# Patient Record
Sex: MALE | Race: WHITE | NOT HISPANIC OR LATINO | URBAN - METROPOLITAN AREA
[De-identification: names, ages, dates, MRNs, and addresses within clinical notes are randomized per-mention and may not be internally consistent; named-entity substitution may affect disease eponyms.]

---

## 2017-03-28 ENCOUNTER — APPOINTMENT (RX ONLY)
Dept: URBAN - METROPOLITAN AREA CLINIC 23 | Facility: CLINIC | Age: 72
Setting detail: DERMATOLOGY
End: 2017-03-28

## 2017-03-28 DIAGNOSIS — D18.0 HEMANGIOMA: ICD-10-CM

## 2017-03-28 DIAGNOSIS — Z85.828 PERSONAL HISTORY OF OTHER MALIGNANT NEOPLASM OF SKIN: ICD-10-CM

## 2017-03-28 DIAGNOSIS — L82.1 OTHER SEBORRHEIC KERATOSIS: ICD-10-CM

## 2017-03-28 DIAGNOSIS — Z87.2 PERSONAL HISTORY OF DISEASES OF THE SKIN AND SUBCUTANEOUS TISSUE: ICD-10-CM

## 2017-03-28 DIAGNOSIS — L81.4 OTHER MELANIN HYPERPIGMENTATION: ICD-10-CM

## 2017-03-28 PROBLEM — D18.01 HEMANGIOMA OF SKIN AND SUBCUTANEOUS TISSUE: Status: ACTIVE | Noted: 2017-03-28

## 2017-03-28 PROCEDURE — ? COUNSELING

## 2017-03-28 PROCEDURE — 99214 OFFICE O/P EST MOD 30 MIN: CPT

## 2017-03-28 ASSESSMENT — LOCATION SIMPLE DESCRIPTION DERM
LOCATION SIMPLE: RIGHT UPPER BACK
LOCATION SIMPLE: RIGHT SHOULDER
LOCATION SIMPLE: LEFT SHOULDER
LOCATION SIMPLE: ABDOMEN
LOCATION SIMPLE: RIGHT PRETIBIAL REGION
LOCATION SIMPLE: CHEST
LOCATION SIMPLE: LEFT CLAVICULAR SKIN
LOCATION SIMPLE: LEFT UPPER BACK
LOCATION SIMPLE: LEFT PRETIBIAL REGION
LOCATION SIMPLE: LEFT POSTERIOR THIGH

## 2017-03-28 ASSESSMENT — LOCATION DETAILED DESCRIPTION DERM
LOCATION DETAILED: RIGHT POSTERIOR SHOULDER
LOCATION DETAILED: RIGHT DISTAL PRETIBIAL REGION
LOCATION DETAILED: LEFT MEDIAL UPPER BACK
LOCATION DETAILED: RIGHT MEDIAL INFERIOR CHEST
LOCATION DETAILED: LEFT POSTERIOR SHOULDER
LOCATION DETAILED: LEFT CLAVICULAR SKIN
LOCATION DETAILED: LEFT DISTAL PRETIBIAL REGION
LOCATION DETAILED: EPIGASTRIC SKIN
LOCATION DETAILED: LEFT DISTAL POSTERIOR THIGH
LOCATION DETAILED: RIGHT INFERIOR UPPER BACK

## 2017-03-28 ASSESSMENT — LOCATION ZONE DERM
LOCATION ZONE: ARM
LOCATION ZONE: TRUNK
LOCATION ZONE: LEG

## 2017-06-29 ENCOUNTER — APPOINTMENT (RX ONLY)
Dept: URBAN - METROPOLITAN AREA CLINIC 23 | Facility: CLINIC | Age: 72
Setting detail: DERMATOLOGY
End: 2017-06-29

## 2017-06-29 DIAGNOSIS — L72.0 EPIDERMAL CYST: ICD-10-CM

## 2017-06-29 PROCEDURE — ? COUNSELING

## 2017-06-29 PROCEDURE — 99212 OFFICE O/P EST SF 10 MIN: CPT

## 2017-06-29 ASSESSMENT — LOCATION DETAILED DESCRIPTION DERM
LOCATION DETAILED: RIGHT MEDIAL UPPER BACK
LOCATION DETAILED: RIGHT INFERIOR LATERAL UPPER BACK

## 2017-06-29 ASSESSMENT — LOCATION SIMPLE DESCRIPTION DERM: LOCATION SIMPLE: RIGHT UPPER BACK

## 2017-06-29 ASSESSMENT — LOCATION ZONE DERM: LOCATION ZONE: TRUNK

## 2017-09-25 ENCOUNTER — APPOINTMENT (OUTPATIENT)
Dept: PHYSICAL THERAPY | Age: 72
End: 2017-09-25

## 2018-01-01 ENCOUNTER — ANESTHESIA EVENT (OUTPATIENT)
Dept: SURGERY | Age: 73
DRG: 470 | End: 2018-01-01
Payer: OTHER MISCELLANEOUS

## 2018-01-01 ENCOUNTER — APPOINTMENT (OUTPATIENT)
Dept: GENERAL RADIOLOGY | Age: 73
DRG: 872 | End: 2018-01-01
Attending: EMERGENCY MEDICINE
Payer: MEDICARE

## 2018-01-01 ENCOUNTER — HOSPITAL ENCOUNTER (INPATIENT)
Age: 73
LOS: 3 days | Discharge: NURSING, REHAB OR LONG-TERM CARE | DRG: 470 | End: 2018-08-05
Attending: ORTHOPAEDIC SURGERY | Admitting: ORTHOPAEDIC SURGERY
Payer: OTHER MISCELLANEOUS

## 2018-01-01 ENCOUNTER — APPOINTMENT (OUTPATIENT)
Dept: MRI IMAGING | Age: 73
DRG: 948 | End: 2018-01-01
Attending: PHYSICAL MEDICINE & REHABILITATION
Payer: MEDICARE

## 2018-01-01 ENCOUNTER — APPOINTMENT (OUTPATIENT)
Dept: GENERAL RADIOLOGY | Age: 73
DRG: 684 | End: 2018-01-01
Attending: EMERGENCY MEDICINE
Payer: MEDICARE

## 2018-01-01 ENCOUNTER — HOSPITAL ENCOUNTER (INPATIENT)
Age: 73
LOS: 3 days | Discharge: NURSING, REHAB OR LONG-TERM CARE | DRG: 872 | End: 2018-08-18
Attending: EMERGENCY MEDICINE | Admitting: INTERNAL MEDICINE
Payer: MEDICARE

## 2018-01-01 ENCOUNTER — HOSPITAL ENCOUNTER (OUTPATIENT)
Dept: PHYSICAL THERAPY | Age: 73
Discharge: HOME | End: 2018-05-08
Payer: OTHER MISCELLANEOUS

## 2018-01-01 ENCOUNTER — APPOINTMENT (OUTPATIENT)
Dept: ULTRASOUND IMAGING | Age: 73
DRG: 872 | End: 2018-01-01
Attending: INTERNAL MEDICINE
Payer: MEDICARE

## 2018-01-01 ENCOUNTER — PATIENT OUTREACH (OUTPATIENT)
Dept: CASE MANAGEMENT | Age: 73
End: 2018-01-01

## 2018-01-01 ENCOUNTER — HOSPITAL ENCOUNTER (OUTPATIENT)
Dept: LAB | Age: 73
Discharge: HOME | End: 2018-09-28

## 2018-01-01 ENCOUNTER — HOSPITAL ENCOUNTER (INPATIENT)
Age: 73
LOS: 7 days | Discharge: NURSING, REHAB OR LONG-TERM CARE | DRG: 684 | End: 2018-09-06
Attending: EMERGENCY MEDICINE | Admitting: INTERNAL MEDICINE
Payer: MEDICARE

## 2018-01-01 ENCOUNTER — HOSPITAL ENCOUNTER (EMERGENCY)
Age: 73
End: 2018-11-27
Attending: EMERGENCY MEDICINE
Payer: MEDICARE

## 2018-01-01 ENCOUNTER — HOSPITAL ENCOUNTER (OUTPATIENT)
Dept: SURGERY | Age: 73
Discharge: HOME | End: 2018-07-26
Payer: OTHER MISCELLANEOUS

## 2018-01-01 ENCOUNTER — ANESTHESIA (OUTPATIENT)
Dept: SURGERY | Age: 73
DRG: 470 | End: 2018-01-01
Payer: OTHER MISCELLANEOUS

## 2018-01-01 ENCOUNTER — APPOINTMENT (OUTPATIENT)
Dept: GENERAL RADIOLOGY | Age: 73
End: 2018-01-01
Attending: EMERGENCY MEDICINE
Payer: MEDICARE

## 2018-01-01 ENCOUNTER — APPOINTMENT (OUTPATIENT)
Dept: GENERAL RADIOLOGY | Age: 73
DRG: 872 | End: 2018-01-01
Attending: INTERNAL MEDICINE
Payer: MEDICARE

## 2018-01-01 ENCOUNTER — APPOINTMENT (OUTPATIENT)
Dept: GENERAL RADIOLOGY | Age: 73
DRG: 684 | End: 2018-01-01
Attending: INTERNAL MEDICINE
Payer: MEDICARE

## 2018-01-01 ENCOUNTER — HOSPITAL ENCOUNTER (OUTPATIENT)
Dept: SURGERY | Age: 73
Discharge: HOME | End: 2018-05-08
Payer: OTHER MISCELLANEOUS

## 2018-01-01 ENCOUNTER — HOSPITAL ENCOUNTER (INPATIENT)
Age: 73
LOS: 14 days | Discharge: NURSING, REHAB OR LONG-TERM CARE | DRG: 948 | End: 2018-09-20
Attending: PHYSICAL MEDICINE & REHABILITATION | Admitting: PHYSICAL MEDICINE & REHABILITATION
Payer: MEDICARE

## 2018-01-01 VITALS
SYSTOLIC BLOOD PRESSURE: 155 MMHG | RESPIRATION RATE: 16 BRPM | BODY MASS INDEX: 27.07 KG/M2 | WEIGHT: 199.9 LBS | HEIGHT: 72 IN | DIASTOLIC BLOOD PRESSURE: 80 MMHG | HEART RATE: 88 BPM | OXYGEN SATURATION: 96 % | TEMPERATURE: 97.7 F

## 2018-01-01 VITALS
WEIGHT: 222 LBS | OXYGEN SATURATION: 90 % | TEMPERATURE: 98.2 F | SYSTOLIC BLOOD PRESSURE: 114 MMHG | BODY MASS INDEX: 30.07 KG/M2 | HEART RATE: 96 BPM | DIASTOLIC BLOOD PRESSURE: 71 MMHG | HEIGHT: 72 IN | RESPIRATION RATE: 16 BRPM

## 2018-01-01 VITALS
WEIGHT: 228.9 LBS | BODY MASS INDEX: 30.34 KG/M2 | TEMPERATURE: 96.9 F | HEIGHT: 73 IN | SYSTOLIC BLOOD PRESSURE: 129 MMHG | HEART RATE: 72 BPM | DIASTOLIC BLOOD PRESSURE: 68 MMHG | OXYGEN SATURATION: 99 %

## 2018-01-01 VITALS
DIASTOLIC BLOOD PRESSURE: 37 MMHG | BODY MASS INDEX: 26.95 KG/M2 | HEIGHT: 72 IN | RESPIRATION RATE: 21 BRPM | SYSTOLIC BLOOD PRESSURE: 64 MMHG | OXYGEN SATURATION: 100 % | WEIGHT: 199 LBS | HEART RATE: 80 BPM

## 2018-01-01 VITALS
HEIGHT: 72 IN | BODY MASS INDEX: 27.07 KG/M2 | SYSTOLIC BLOOD PRESSURE: 162 MMHG | HEART RATE: 99 BPM | TEMPERATURE: 96.3 F | RESPIRATION RATE: 20 BRPM | WEIGHT: 199.9 LBS | OXYGEN SATURATION: 96 % | DIASTOLIC BLOOD PRESSURE: 77 MMHG

## 2018-01-01 VITALS
RESPIRATION RATE: 20 BRPM | TEMPERATURE: 98.3 F | OXYGEN SATURATION: 96 % | WEIGHT: 225 LBS | HEIGHT: 72 IN | SYSTOLIC BLOOD PRESSURE: 111 MMHG | BODY MASS INDEX: 30.48 KG/M2 | DIASTOLIC BLOOD PRESSURE: 73 MMHG | HEART RATE: 87 BPM

## 2018-01-01 VITALS
WEIGHT: 222 LBS | SYSTOLIC BLOOD PRESSURE: 125 MMHG | HEIGHT: 72 IN | TEMPERATURE: 97 F | HEART RATE: 70 BPM | DIASTOLIC BLOOD PRESSURE: 82 MMHG | BODY MASS INDEX: 30.07 KG/M2

## 2018-01-01 DIAGNOSIS — I46.9 CARDIAC ARREST (HCC): Primary | ICD-10-CM

## 2018-01-01 DIAGNOSIS — K59.00 CONSTIPATION, UNSPECIFIED CONSTIPATION TYPE: ICD-10-CM

## 2018-01-01 DIAGNOSIS — I10 ESSENTIAL HYPERTENSION: ICD-10-CM

## 2018-01-01 DIAGNOSIS — A41.9 SEPSIS, DUE TO UNSPECIFIED ORGANISM: ICD-10-CM

## 2018-01-01 DIAGNOSIS — M17.12 PRIMARY OSTEOARTHRITIS OF LEFT KNEE: ICD-10-CM

## 2018-01-01 DIAGNOSIS — I95.9 HYPOTENSION, UNSPECIFIED HYPOTENSION TYPE: ICD-10-CM

## 2018-01-01 DIAGNOSIS — R33.9 URINARY RETENTION: ICD-10-CM

## 2018-01-01 DIAGNOSIS — E78.5 HYPERLIPIDEMIA, UNSPECIFIED HYPERLIPIDEMIA TYPE: ICD-10-CM

## 2018-01-01 DIAGNOSIS — R11.2 NAUSEA AND VOMITING, INTRACTABILITY OF VOMITING NOT SPECIFIED, UNSPECIFIED VOMITING TYPE: ICD-10-CM

## 2018-01-01 DIAGNOSIS — E11.8 CONTROLLED DIABETES MELLITUS TYPE 2 WITH COMPLICATIONS, UNSPECIFIED WHETHER LONG TERM INSULIN USE (HCC): ICD-10-CM

## 2018-01-01 DIAGNOSIS — Z96.652 S/P TOTAL KNEE ARTHROPLASTY, LEFT: ICD-10-CM

## 2018-01-01 DIAGNOSIS — Z96.652 TOTAL KNEE REPLACEMENT STATUS, LEFT: ICD-10-CM

## 2018-01-01 DIAGNOSIS — N18.30 CKD (CHRONIC KIDNEY DISEASE) STAGE 3, GFR 30-59 ML/MIN (HCC): ICD-10-CM

## 2018-01-01 DIAGNOSIS — E86.0 DEHYDRATION: Primary | ICD-10-CM

## 2018-01-01 DIAGNOSIS — R50.9 FEVER, UNSPECIFIED FEVER CAUSE: Primary | ICD-10-CM

## 2018-01-01 DIAGNOSIS — R25.2 SPASTICITY: ICD-10-CM

## 2018-01-01 DIAGNOSIS — N17.9 AKI (ACUTE KIDNEY INJURY) (HCC): ICD-10-CM

## 2018-01-01 DIAGNOSIS — F31.9 BIPOLAR AFFECTIVE DISORDER, REMISSION STATUS UNSPECIFIED (HCC): ICD-10-CM

## 2018-01-01 DIAGNOSIS — D72.825 BANDEMIA: ICD-10-CM

## 2018-01-01 DIAGNOSIS — R82.90 ABNORMAL URINALYSIS: ICD-10-CM

## 2018-01-01 DIAGNOSIS — I10 ESSENTIAL HYPERTENSION: Primary | ICD-10-CM

## 2018-01-01 DIAGNOSIS — N17.9 ACUTE KIDNEY INJURY (HCC): ICD-10-CM

## 2018-01-01 DIAGNOSIS — R53.81 DEBILITY: ICD-10-CM

## 2018-01-01 LAB
ABO + RH BLD: NORMAL
ALBUMIN SERPL-MCNC: 1.7 G/DL (ref 3.2–4.6)
ALBUMIN SERPL-MCNC: 2.5 G/DL (ref 3.2–4.6)
ALBUMIN SERPL-MCNC: 2.9 G/DL (ref 3.2–4.6)
ALBUMIN/GLOB SERPL: 0.3 {RATIO} (ref 1.2–3.5)
ALBUMIN/GLOB SERPL: 0.5 {RATIO} (ref 1.2–3.5)
ALBUMIN/GLOB SERPL: 0.6 {RATIO} (ref 1.2–3.5)
ALP SERPL-CCNC: 105 U/L (ref 50–136)
ALP SERPL-CCNC: 114 U/L (ref 50–136)
ALP SERPL-CCNC: 214 U/L (ref 50–136)
ALT SERPL-CCNC: 1512 U/L (ref 12–65)
ALT SERPL-CCNC: 59 U/L (ref 12–65)
ALT SERPL-CCNC: 93 U/L (ref 12–65)
ANION GAP SERPL CALC-SCNC: 10 MMOL/L (ref 7–16)
ANION GAP SERPL CALC-SCNC: 11 MMOL/L (ref 7–16)
ANION GAP SERPL CALC-SCNC: 11 MMOL/L (ref 7–16)
ANION GAP SERPL CALC-SCNC: 14 MMOL/L (ref 7–16)
ANION GAP SERPL CALC-SCNC: 22 MMOL/L (ref 7–16)
ANION GAP SERPL CALC-SCNC: 5 MMOL/L (ref 7–16)
ANION GAP SERPL CALC-SCNC: 6 MMOL/L (ref 7–16)
ANION GAP SERPL CALC-SCNC: 7 MMOL/L (ref 7–16)
ANION GAP SERPL CALC-SCNC: 7 MMOL/L (ref 7–16)
ANION GAP SERPL CALC-SCNC: 8 MMOL/L (ref 7–16)
ANION GAP SERPL CALC-SCNC: 9 MMOL/L (ref 7–16)
APPEARANCE UR: ABNORMAL
APPEARANCE UR: ABNORMAL
APPEARANCE UR: CLEAR
APTT PPP: 24.8 SEC (ref 23.2–35.3)
APTT PPP: 29.1 SEC (ref 23.2–35.3)
ARTERIAL PATENCY WRIST A: YES
AST SERPL-CCNC: 54 U/L (ref 15–37)
AST SERPL-CCNC: 5585 U/L (ref 15–37)
AST SERPL-CCNC: 88 U/L (ref 15–37)
ATRIAL RATE: 102 BPM
ATRIAL RATE: 277 BPM
ATRIAL RATE: 81 BPM
BACTERIA SPEC CULT: ABNORMAL
BACTERIA SPEC CULT: NORMAL
BACTERIA URNS QL MICRO: ABNORMAL /HPF
BACTERIA URNS QL MICRO: ABNORMAL /HPF
BASE DEFICIT BLD-SCNC: 15 MMOL/L
BASOPHILS # BLD: 0 K/UL
BASOPHILS # BLD: 0 K/UL
BASOPHILS # BLD: 0 K/UL (ref 0–0.2)
BASOPHILS # BLD: 0.1 K/UL
BASOPHILS # BLD: 0.1 K/UL
BASOPHILS # BLD: 0.1 K/UL (ref 0–0.2)
BASOPHILS # BLD: 0.2 K/UL (ref 0–0.2)
BASOPHILS NFR BLD MANUAL: 1 % (ref 0–2)
BASOPHILS NFR BLD: 0 % (ref 0–2)
BASOPHILS NFR BLD: 1 % (ref 0–2)
BASOPHILS NFR BLD: 1 % (ref 0–2)
BDY SITE: ABNORMAL
BILIRUB SERPL-MCNC: 0.5 MG/DL (ref 0.2–1.1)
BILIRUB SERPL-MCNC: 0.7 MG/DL (ref 0.2–1.1)
BILIRUB SERPL-MCNC: 0.9 MG/DL (ref 0.2–1.1)
BILIRUB UR QL: NEGATIVE
BLOOD GROUP ANTIBODIES SERPL: NORMAL
BODY TEMPERATURE: 98.6
BUN SERPL-MCNC: 15 MG/DL (ref 8–23)
BUN SERPL-MCNC: 19 MG/DL (ref 8–23)
BUN SERPL-MCNC: 22 MG/DL (ref 8–23)
BUN SERPL-MCNC: 23 MG/DL (ref 8–23)
BUN SERPL-MCNC: 24 MG/DL (ref 8–23)
BUN SERPL-MCNC: 26 MG/DL (ref 8–23)
BUN SERPL-MCNC: 29 MG/DL (ref 8–23)
BUN SERPL-MCNC: 34 MG/DL (ref 8–23)
BUN SERPL-MCNC: 35 MG/DL (ref 8–23)
BUN SERPL-MCNC: 35 MG/DL (ref 8–23)
BUN SERPL-MCNC: 36 MG/DL (ref 8–23)
BUN SERPL-MCNC: 37 MG/DL (ref 8–23)
BUN SERPL-MCNC: 37 MG/DL (ref 8–23)
BUN SERPL-MCNC: 38 MG/DL (ref 8–23)
BUN SERPL-MCNC: 42 MG/DL (ref 8–23)
BUN SERPL-MCNC: 47 MG/DL (ref 8–23)
CA-I BLD-MCNC: 1.06 MMOL/L (ref 1.12–1.32)
CALCIUM SERPL-MCNC: 10.4 MG/DL (ref 8.3–10.4)
CALCIUM SERPL-MCNC: 8.3 MG/DL (ref 8.3–10.4)
CALCIUM SERPL-MCNC: 8.5 MG/DL (ref 8.3–10.4)
CALCIUM SERPL-MCNC: 8.8 MG/DL (ref 8.3–10.4)
CALCIUM SERPL-MCNC: 8.9 MG/DL (ref 8.3–10.4)
CALCIUM SERPL-MCNC: 9.1 MG/DL (ref 8.3–10.4)
CALCIUM SERPL-MCNC: 9.3 MG/DL (ref 8.3–10.4)
CALCIUM SERPL-MCNC: 9.4 MG/DL (ref 8.3–10.4)
CALCIUM SERPL-MCNC: 9.7 MG/DL (ref 8.3–10.4)
CALCIUM SERPL-MCNC: 9.9 MG/DL (ref 8.3–10.4)
CALCIUM SERPL-MCNC: 9.9 MG/DL (ref 8.3–10.4)
CALCULATED P AXIS, ECG09: 59 DEGREES
CALCULATED R AXIS, ECG10: -65 DEGREES
CALCULATED R AXIS, ECG10: -67 DEGREES
CALCULATED R AXIS, ECG10: -74 DEGREES
CALCULATED T AXIS, ECG11: 85 DEGREES
CALCULATED T AXIS, ECG11: 86 DEGREES
CALCULATED T AXIS, ECG11: 97 DEGREES
CASTS URNS QL MICRO: ABNORMAL /LPF
CHLORIDE SERPL-SCNC: 101 MMOL/L (ref 98–107)
CHLORIDE SERPL-SCNC: 104 MMOL/L (ref 98–107)
CHLORIDE SERPL-SCNC: 105 MMOL/L (ref 98–107)
CHLORIDE SERPL-SCNC: 105 MMOL/L (ref 98–107)
CHLORIDE SERPL-SCNC: 96 MMOL/L (ref 98–107)
CHLORIDE SERPL-SCNC: 97 MMOL/L (ref 98–107)
CHLORIDE SERPL-SCNC: 97 MMOL/L (ref 98–107)
CHLORIDE SERPL-SCNC: 98 MMOL/L (ref 98–107)
CHLORIDE SERPL-SCNC: 99 MMOL/L (ref 98–107)
CHLORIDE SERPL-SCNC: 99 MMOL/L (ref 98–107)
CHLORIDE UR-SCNC: 58 MMOL/L
CO2 SERPL-SCNC: 14 MMOL/L (ref 21–32)
CO2 SERPL-SCNC: 25 MMOL/L (ref 21–32)
CO2 SERPL-SCNC: 26 MMOL/L (ref 21–32)
CO2 SERPL-SCNC: 26 MMOL/L (ref 21–32)
CO2 SERPL-SCNC: 27 MMOL/L (ref 21–32)
CO2 SERPL-SCNC: 28 MMOL/L (ref 21–32)
CO2 SERPL-SCNC: 29 MMOL/L (ref 21–32)
CO2 SERPL-SCNC: 29 MMOL/L (ref 21–32)
CO2 SERPL-SCNC: 30 MMOL/L (ref 21–32)
CO2 SERPL-SCNC: 30 MMOL/L (ref 21–32)
CO2 SERPL-SCNC: 31 MMOL/L (ref 21–32)
COLOR UR: NORMAL
COLOR UR: YELLOW
CREAT SERPL-MCNC: 0.92 MG/DL (ref 0.8–1.5)
CREAT SERPL-MCNC: 1.01 MG/DL (ref 0.8–1.5)
CREAT SERPL-MCNC: 1.02 MG/DL (ref 0.8–1.5)
CREAT SERPL-MCNC: 1.1 MG/DL (ref 0.8–1.5)
CREAT SERPL-MCNC: 1.15 MG/DL (ref 0.8–1.5)
CREAT SERPL-MCNC: 1.19 MG/DL (ref 0.8–1.5)
CREAT SERPL-MCNC: 1.28 MG/DL (ref 0.8–1.5)
CREAT SERPL-MCNC: 1.55 MG/DL (ref 0.8–1.5)
CREAT SERPL-MCNC: 1.61 MG/DL (ref 0.8–1.5)
CREAT SERPL-MCNC: 1.65 MG/DL (ref 0.8–1.5)
CREAT SERPL-MCNC: 1.69 MG/DL (ref 0.8–1.5)
CREAT SERPL-MCNC: 1.82 MG/DL (ref 0.8–1.5)
CREAT SERPL-MCNC: 1.89 MG/DL (ref 0.8–1.5)
CREAT SERPL-MCNC: 1.95 MG/DL (ref 0.8–1.5)
CREAT SERPL-MCNC: 2.18 MG/DL (ref 0.8–1.5)
CREAT SERPL-MCNC: 3.99 MG/DL (ref 0.8–1.5)
CREAT UR-MCNC: 87.56 MG/DL
DIAGNOSIS, 93000: NORMAL
DIFFERENTIAL METHOD BLD: ABNORMAL
EOSINOPHIL # BLD: 0 K/UL
EOSINOPHIL # BLD: 0 K/UL (ref 0–0.8)
EOSINOPHIL # BLD: 0 K/UL (ref 0–0.8)
EOSINOPHIL # BLD: 0.1 K/UL
EOSINOPHIL # BLD: 0.1 K/UL (ref 0–0.8)
EOSINOPHIL # BLD: 0.2 K/UL
EOSINOPHIL # BLD: 0.2 K/UL
EOSINOPHIL # BLD: 0.2 K/UL (ref 0–0.8)
EOSINOPHIL NFR BLD: 0 % (ref 0.5–7.8)
EOSINOPHIL NFR BLD: 1 % (ref 0.5–7.8)
EOSINOPHIL NFR BLD: 2 % (ref 0.5–7.8)
EOSINOPHIL NFR BLD: 2 % (ref 0.5–7.8)
EOSINOPHIL NFR BLD: 3 % (ref 0.5–7.8)
EOSINOPHIL NFR BLD: 3 % (ref 0.5–7.8)
EPI CELLS #/AREA URNS HPF: ABNORMAL /HPF
EPI CELLS #/AREA URNS HPF: ABNORMAL /HPF
ERYTHROCYTE [DISTWIDTH] IN BLOOD BY AUTOMATED COUNT: 14.4 %
ERYTHROCYTE [DISTWIDTH] IN BLOOD BY AUTOMATED COUNT: 14.5 %
ERYTHROCYTE [DISTWIDTH] IN BLOOD BY AUTOMATED COUNT: 14.5 %
ERYTHROCYTE [DISTWIDTH] IN BLOOD BY AUTOMATED COUNT: 14.6 %
ERYTHROCYTE [DISTWIDTH] IN BLOOD BY AUTOMATED COUNT: 14.7 %
ERYTHROCYTE [DISTWIDTH] IN BLOOD BY AUTOMATED COUNT: 14.9 %
ERYTHROCYTE [DISTWIDTH] IN BLOOD BY AUTOMATED COUNT: 14.9 %
ERYTHROCYTE [DISTWIDTH] IN BLOOD BY AUTOMATED COUNT: 15 %
ERYTHROCYTE [DISTWIDTH] IN BLOOD BY AUTOMATED COUNT: 15.1 %
ERYTHROCYTE [DISTWIDTH] IN BLOOD BY AUTOMATED COUNT: 15.1 %
ERYTHROCYTE [DISTWIDTH] IN BLOOD BY AUTOMATED COUNT: 15.2 %
ERYTHROCYTE [DISTWIDTH] IN BLOOD BY AUTOMATED COUNT: 15.2 %
ERYTHROCYTE [DISTWIDTH] IN BLOOD BY AUTOMATED COUNT: 15.3 % (ref 11.9–14.6)
ERYTHROCYTE [DISTWIDTH] IN BLOOD BY AUTOMATED COUNT: 15.6 % (ref 11.9–14.6)
ERYTHROCYTE [DISTWIDTH] IN BLOOD BY AUTOMATED COUNT: 16 % (ref 11.9–14.6)
ERYTHROCYTE [DISTWIDTH] IN BLOOD BY AUTOMATED COUNT: 17 %
GAS FLOW.O2 O2 DELIVERY SYS: ABNORMAL L/MIN
GAS FLOW.O2 SETTING OXYMISER: 15 BPM
GLOBULIN SER CALC-MCNC: 4.9 G/DL (ref 2.3–3.5)
GLOBULIN SER CALC-MCNC: 4.9 G/DL (ref 2.3–3.5)
GLOBULIN SER CALC-MCNC: 5 G/DL (ref 2.3–3.5)
GLUCOSE BLD STRIP.AUTO-MCNC: 105 MG/DL (ref 65–100)
GLUCOSE BLD STRIP.AUTO-MCNC: 108 MG/DL (ref 65–100)
GLUCOSE BLD STRIP.AUTO-MCNC: 110 MG/DL (ref 65–100)
GLUCOSE BLD STRIP.AUTO-MCNC: 111 MG/DL (ref 65–100)
GLUCOSE BLD STRIP.AUTO-MCNC: 112 MG/DL (ref 65–100)
GLUCOSE BLD STRIP.AUTO-MCNC: 112 MG/DL (ref 65–100)
GLUCOSE BLD STRIP.AUTO-MCNC: 116 MG/DL (ref 65–100)
GLUCOSE BLD STRIP.AUTO-MCNC: 116 MG/DL (ref 65–100)
GLUCOSE BLD STRIP.AUTO-MCNC: 119 MG/DL (ref 65–100)
GLUCOSE BLD STRIP.AUTO-MCNC: 130 MG/DL (ref 65–100)
GLUCOSE BLD STRIP.AUTO-MCNC: 130 MG/DL (ref 65–100)
GLUCOSE BLD STRIP.AUTO-MCNC: 131 MG/DL (ref 65–100)
GLUCOSE BLD STRIP.AUTO-MCNC: 132 MG/DL (ref 65–100)
GLUCOSE BLD STRIP.AUTO-MCNC: 132 MG/DL (ref 65–100)
GLUCOSE BLD STRIP.AUTO-MCNC: 133 MG/DL (ref 65–100)
GLUCOSE BLD STRIP.AUTO-MCNC: 135 MG/DL (ref 65–100)
GLUCOSE BLD STRIP.AUTO-MCNC: 135 MG/DL (ref 65–100)
GLUCOSE BLD STRIP.AUTO-MCNC: 137 MG/DL (ref 65–100)
GLUCOSE BLD STRIP.AUTO-MCNC: 137 MG/DL (ref 65–100)
GLUCOSE BLD STRIP.AUTO-MCNC: 140 MG/DL (ref 65–100)
GLUCOSE BLD STRIP.AUTO-MCNC: 143 MG/DL (ref 65–100)
GLUCOSE BLD STRIP.AUTO-MCNC: 145 MG/DL (ref 65–100)
GLUCOSE BLD STRIP.AUTO-MCNC: 147 MG/DL (ref 65–100)
GLUCOSE BLD STRIP.AUTO-MCNC: 150 MG/DL (ref 65–100)
GLUCOSE BLD STRIP.AUTO-MCNC: 150 MG/DL (ref 65–100)
GLUCOSE BLD STRIP.AUTO-MCNC: 151 MG/DL (ref 65–100)
GLUCOSE BLD STRIP.AUTO-MCNC: 152 MG/DL (ref 65–100)
GLUCOSE BLD STRIP.AUTO-MCNC: 153 MG/DL (ref 65–100)
GLUCOSE BLD STRIP.AUTO-MCNC: 154 MG/DL (ref 65–100)
GLUCOSE BLD STRIP.AUTO-MCNC: 154 MG/DL (ref 65–100)
GLUCOSE BLD STRIP.AUTO-MCNC: 159 MG/DL (ref 65–100)
GLUCOSE BLD STRIP.AUTO-MCNC: 160 MG/DL (ref 65–100)
GLUCOSE BLD STRIP.AUTO-MCNC: 160 MG/DL (ref 65–100)
GLUCOSE BLD STRIP.AUTO-MCNC: 165 MG/DL (ref 65–100)
GLUCOSE BLD STRIP.AUTO-MCNC: 165 MG/DL (ref 65–100)
GLUCOSE BLD STRIP.AUTO-MCNC: 171 MG/DL (ref 65–100)
GLUCOSE BLD STRIP.AUTO-MCNC: 173 MG/DL (ref 65–100)
GLUCOSE BLD STRIP.AUTO-MCNC: 174 MG/DL (ref 65–100)
GLUCOSE BLD STRIP.AUTO-MCNC: 175 MG/DL (ref 65–100)
GLUCOSE BLD STRIP.AUTO-MCNC: 176 MG/DL (ref 65–100)
GLUCOSE BLD STRIP.AUTO-MCNC: 178 MG/DL (ref 65–100)
GLUCOSE BLD STRIP.AUTO-MCNC: 178 MG/DL (ref 65–100)
GLUCOSE BLD STRIP.AUTO-MCNC: 179 MG/DL (ref 65–100)
GLUCOSE BLD STRIP.AUTO-MCNC: 180 MG/DL (ref 65–100)
GLUCOSE BLD STRIP.AUTO-MCNC: 181 MG/DL (ref 65–100)
GLUCOSE BLD STRIP.AUTO-MCNC: 183 MG/DL (ref 65–100)
GLUCOSE BLD STRIP.AUTO-MCNC: 183 MG/DL (ref 65–100)
GLUCOSE BLD STRIP.AUTO-MCNC: 185 MG/DL (ref 65–100)
GLUCOSE BLD STRIP.AUTO-MCNC: 186 MG/DL (ref 65–100)
GLUCOSE BLD STRIP.AUTO-MCNC: 188 MG/DL (ref 65–100)
GLUCOSE BLD STRIP.AUTO-MCNC: 188 MG/DL (ref 65–100)
GLUCOSE BLD STRIP.AUTO-MCNC: 189 MG/DL (ref 65–100)
GLUCOSE BLD STRIP.AUTO-MCNC: 192 MG/DL (ref 65–100)
GLUCOSE BLD STRIP.AUTO-MCNC: 194 MG/DL (ref 65–100)
GLUCOSE BLD STRIP.AUTO-MCNC: 194 MG/DL (ref 65–100)
GLUCOSE BLD STRIP.AUTO-MCNC: 195 MG/DL (ref 65–100)
GLUCOSE BLD STRIP.AUTO-MCNC: 198 MG/DL (ref 65–100)
GLUCOSE BLD STRIP.AUTO-MCNC: 202 MG/DL (ref 65–100)
GLUCOSE BLD STRIP.AUTO-MCNC: 202 MG/DL (ref 65–100)
GLUCOSE BLD STRIP.AUTO-MCNC: 203 MG/DL (ref 65–100)
GLUCOSE BLD STRIP.AUTO-MCNC: 205 MG/DL (ref 65–100)
GLUCOSE BLD STRIP.AUTO-MCNC: 206 MG/DL (ref 65–100)
GLUCOSE BLD STRIP.AUTO-MCNC: 212 MG/DL (ref 65–100)
GLUCOSE BLD STRIP.AUTO-MCNC: 219 MG/DL (ref 65–100)
GLUCOSE BLD STRIP.AUTO-MCNC: 223 MG/DL (ref 65–100)
GLUCOSE BLD STRIP.AUTO-MCNC: 229 MG/DL (ref 65–100)
GLUCOSE BLD STRIP.AUTO-MCNC: 243 MG/DL (ref 65–100)
GLUCOSE BLD STRIP.AUTO-MCNC: 244 MG/DL (ref 65–100)
GLUCOSE BLD STRIP.AUTO-MCNC: 246 MG/DL (ref 65–100)
GLUCOSE BLD STRIP.AUTO-MCNC: 249 MG/DL (ref 65–100)
GLUCOSE BLD STRIP.AUTO-MCNC: 251 MG/DL (ref 65–100)
GLUCOSE BLD STRIP.AUTO-MCNC: 259 MG/DL (ref 65–100)
GLUCOSE BLD STRIP.AUTO-MCNC: 260 MG/DL (ref 65–100)
GLUCOSE BLD STRIP.AUTO-MCNC: 261 MG/DL (ref 65–100)
GLUCOSE BLD STRIP.AUTO-MCNC: 263 MG/DL (ref 65–100)
GLUCOSE BLD STRIP.AUTO-MCNC: 279 MG/DL (ref 65–100)
GLUCOSE BLD STRIP.AUTO-MCNC: 293 MG/DL (ref 65–100)
GLUCOSE BLD STRIP.AUTO-MCNC: 30 MG/DL (ref 65–100)
GLUCOSE BLD STRIP.AUTO-MCNC: 344 MG/DL (ref 65–100)
GLUCOSE BLD STRIP.AUTO-MCNC: 57 MG/DL (ref 65–100)
GLUCOSE BLD STRIP.AUTO-MCNC: 69 MG/DL (ref 65–100)
GLUCOSE BLD STRIP.AUTO-MCNC: 71 MG/DL (ref 65–100)
GLUCOSE BLD STRIP.AUTO-MCNC: 71 MG/DL (ref 65–100)
GLUCOSE BLD STRIP.AUTO-MCNC: 73 MG/DL (ref 65–100)
GLUCOSE BLD STRIP.AUTO-MCNC: 76 MG/DL (ref 65–100)
GLUCOSE BLD STRIP.AUTO-MCNC: 78 MG/DL (ref 65–100)
GLUCOSE BLD STRIP.AUTO-MCNC: 78 MG/DL (ref 65–100)
GLUCOSE BLD STRIP.AUTO-MCNC: 79 MG/DL (ref 65–100)
GLUCOSE BLD STRIP.AUTO-MCNC: 80 MG/DL (ref 65–100)
GLUCOSE BLD STRIP.AUTO-MCNC: 80 MG/DL (ref 65–100)
GLUCOSE BLD STRIP.AUTO-MCNC: 84 MG/DL (ref 65–100)
GLUCOSE BLD STRIP.AUTO-MCNC: 86 MG/DL (ref 65–100)
GLUCOSE BLD STRIP.AUTO-MCNC: 88 MG/DL (ref 65–100)
GLUCOSE BLD STRIP.AUTO-MCNC: 88 MG/DL (ref 65–100)
GLUCOSE BLD STRIP.AUTO-MCNC: 92 MG/DL (ref 65–100)
GLUCOSE BLD STRIP.AUTO-MCNC: 94 MG/DL (ref 65–100)
GLUCOSE BLD STRIP.AUTO-MCNC: 94 MG/DL (ref 65–100)
GLUCOSE BLD STRIP.AUTO-MCNC: 95 MG/DL (ref 65–100)
GLUCOSE BLD STRIP.AUTO-MCNC: 95 MG/DL (ref 65–100)
GLUCOSE BLD STRIP.AUTO-MCNC: 99 MG/DL (ref 65–100)
GLUCOSE SERPL-MCNC: 119 MG/DL (ref 65–100)
GLUCOSE SERPL-MCNC: 141 MG/DL (ref 65–100)
GLUCOSE SERPL-MCNC: 160 MG/DL (ref 65–100)
GLUCOSE SERPL-MCNC: 178 MG/DL (ref 65–100)
GLUCOSE SERPL-MCNC: 183 MG/DL (ref 65–100)
GLUCOSE SERPL-MCNC: 200 MG/DL (ref 65–100)
GLUCOSE SERPL-MCNC: 207 MG/DL (ref 65–100)
GLUCOSE SERPL-MCNC: 25 MG/DL (ref 65–100)
GLUCOSE SERPL-MCNC: 258 MG/DL (ref 65–100)
GLUCOSE SERPL-MCNC: 295 MG/DL (ref 65–100)
GLUCOSE SERPL-MCNC: 75 MG/DL (ref 65–100)
GLUCOSE SERPL-MCNC: 78 MG/DL (ref 65–100)
GLUCOSE SERPL-MCNC: 80 MG/DL (ref 65–100)
GLUCOSE SERPL-MCNC: 85 MG/DL (ref 65–100)
GLUCOSE SERPL-MCNC: 93 MG/DL (ref 65–100)
GLUCOSE SERPL-MCNC: 96 MG/DL (ref 65–100)
GLUCOSE UR STRIP.AUTO-MCNC: >1000 MG/DL
GLUCOSE UR STRIP.AUTO-MCNC: ABNORMAL MG/DL
HCO3 BLD-SCNC: 12.7 MMOL/L (ref 22–26)
HCT VFR BLD AUTO: 31.5 % (ref 41.1–50.3)
HCT VFR BLD AUTO: 32.1 % (ref 41.1–50.3)
HCT VFR BLD AUTO: 32.7 % (ref 41.1–50.3)
HCT VFR BLD AUTO: 32.7 % (ref 41.1–50.3)
HCT VFR BLD AUTO: 33 % (ref 41.1–50.3)
HCT VFR BLD AUTO: 33.3 % (ref 41.1–50.3)
HCT VFR BLD AUTO: 33.8 % (ref 41.1–50.3)
HCT VFR BLD AUTO: 34.3 % (ref 41.1–50.3)
HCT VFR BLD AUTO: 36.3 % (ref 41.1–50.3)
HCT VFR BLD AUTO: 36.8 % (ref 41.1–50.3)
HCT VFR BLD AUTO: 37 % (ref 41.1–50.3)
HCT VFR BLD AUTO: 37.2 % (ref 41.1–50.3)
HCT VFR BLD AUTO: 37.5 % (ref 41.1–50.3)
HCT VFR BLD AUTO: 41.2 % (ref 41.1–50.3)
HCT VFR BLD AUTO: 43.7 % (ref 41.1–50.3)
HCT VFR BLD AUTO: 48.9 % (ref 41.1–50.3)
HGB BLD-MCNC: 10 G/DL (ref 13.6–17.2)
HGB BLD-MCNC: 10.1 G/DL (ref 13.6–17.2)
HGB BLD-MCNC: 10.3 G/DL (ref 13.6–17.2)
HGB BLD-MCNC: 10.5 G/DL (ref 13.6–17.2)
HGB BLD-MCNC: 10.7 G/DL (ref 13.6–17.2)
HGB BLD-MCNC: 11.3 G/DL (ref 13.6–17.2)
HGB BLD-MCNC: 11.4 G/DL (ref 13.6–17.2)
HGB BLD-MCNC: 11.4 G/DL (ref 13.6–17.2)
HGB BLD-MCNC: 11.6 G/DL (ref 13.6–17.2)
HGB BLD-MCNC: 11.8 G/DL (ref 13.6–17.2)
HGB BLD-MCNC: 12.6 G/DL (ref 13.6–17.2)
HGB BLD-MCNC: 13 G/DL (ref 13.6–17.2)
HGB BLD-MCNC: 13 G/DL (ref 13.6–17.2)
HGB BLD-MCNC: 14 G/DL (ref 13.6–17.2)
HGB BLD-MCNC: 15.7 G/DL (ref 13.6–17.2)
HGB BLD-MCNC: 9.8 G/DL (ref 13.6–17.2)
HGB BLD-MCNC: 9.9 G/DL (ref 13.6–17.2)
HGB BLD-MCNC: 9.9 G/DL (ref 13.6–17.2)
HGB UR QL STRIP: ABNORMAL
HGB UR QL STRIP: ABNORMAL
HGB UR QL STRIP: NEGATIVE
IMM GRANULOCYTES # BLD: 0 K/UL (ref 0–0.5)
IMM GRANULOCYTES # BLD: 0.1 K/UL
IMM GRANULOCYTES # BLD: 0.1 K/UL (ref 0–0.5)
IMM GRANULOCYTES # BLD: 0.2 K/UL
IMM GRANULOCYTES # BLD: 0.4 K/UL
IMM GRANULOCYTES # BLD: 0.9 K/UL
IMM GRANULOCYTES NFR BLD AUTO: 0 % (ref 0–5)
IMM GRANULOCYTES NFR BLD AUTO: 1 % (ref 0–5)
IMM GRANULOCYTES NFR BLD AUTO: 3 % (ref 0–5)
INR PPP: 1
INR PPP: 1.1
INR PPP: 1.2
INR PPP: 1.6
INR PPP: 1.8
INSPIRATION.DURATION SETTING TIME VENT: 0.9 SEC
KETONES UR QL STRIP.AUTO: 40 MG/DL
KETONES UR QL STRIP.AUTO: ABNORMAL MG/DL
KETONES UR QL STRIP.AUTO: NEGATIVE MG/DL
LACTATE BLD-SCNC: 1.1 MMOL/L (ref 0.5–1.9)
LACTATE BLD-SCNC: 1.5 MMOL/L (ref 0.5–1.9)
LACTATE BLD-SCNC: 15.2 MMOL/L (ref 0.5–1.9)
LEUKOCYTE ESTERASE UR QL STRIP.AUTO: ABNORMAL
LEUKOCYTE ESTERASE UR QL STRIP.AUTO: ABNORMAL
LEUKOCYTE ESTERASE UR QL STRIP.AUTO: NEGATIVE
LYMPHOCYTES # BLD: 1.1 K/UL
LYMPHOCYTES # BLD: 1.5 K/UL (ref 0.5–4.6)
LYMPHOCYTES # BLD: 1.8 K/UL (ref 0.5–4.6)
LYMPHOCYTES # BLD: 1.9 K/UL
LYMPHOCYTES # BLD: 2 K/UL (ref 0.5–4.6)
LYMPHOCYTES # BLD: 2.2 K/UL
LYMPHOCYTES # BLD: 2.2 K/UL (ref 0.5–4.6)
LYMPHOCYTES # BLD: 2.3 K/UL
LYMPHOCYTES # BLD: 2.4 K/UL (ref 0.5–4.6)
LYMPHOCYTES # BLD: 2.7 K/UL (ref 0.5–4.6)
LYMPHOCYTES # BLD: 2.8 K/UL (ref 0.5–4.6)
LYMPHOCYTES # BLD: 6 K/UL (ref 0.5–4.6)
LYMPHOCYTES NFR BLD MANUAL: 31 % (ref 16–44)
LYMPHOCYTES NFR BLD: 10 % (ref 13–44)
LYMPHOCYTES NFR BLD: 13 % (ref 13–44)
LYMPHOCYTES NFR BLD: 15 % (ref 13–44)
LYMPHOCYTES NFR BLD: 16 % (ref 13–44)
LYMPHOCYTES NFR BLD: 18 % (ref 13–44)
LYMPHOCYTES NFR BLD: 19 % (ref 13–44)
LYMPHOCYTES NFR BLD: 24 % (ref 13–44)
LYMPHOCYTES NFR BLD: 26 % (ref 13–44)
LYMPHOCYTES NFR BLD: 28 % (ref 13–44)
LYMPHOCYTES NFR BLD: 29 % (ref 13–44)
LYMPHOCYTES NFR BLD: 3 % (ref 13–44)
LYMPHOCYTES NFR BLD: 35 % (ref 13–44)
LYMPHOCYTES NFR BLD: 6 % (ref 13–44)
MAGNESIUM SERPL-MCNC: 1.6 MG/DL (ref 1.8–2.4)
MAGNESIUM SERPL-MCNC: 1.7 MG/DL (ref 1.8–2.4)
MAGNESIUM SERPL-MCNC: 1.8 MG/DL (ref 1.8–2.4)
MAGNESIUM SERPL-MCNC: 2.5 MG/DL (ref 1.8–2.4)
MCH RBC QN AUTO: 28.2 PG (ref 26.1–32.9)
MCH RBC QN AUTO: 28.3 PG (ref 26.1–32.9)
MCH RBC QN AUTO: 28.4 PG (ref 26.1–32.9)
MCH RBC QN AUTO: 28.5 PG (ref 26.1–32.9)
MCH RBC QN AUTO: 28.7 PG (ref 26.1–32.9)
MCH RBC QN AUTO: 28.7 PG (ref 26.1–32.9)
MCH RBC QN AUTO: 28.8 PG (ref 26.1–32.9)
MCH RBC QN AUTO: 28.9 PG (ref 26.1–32.9)
MCH RBC QN AUTO: 28.9 PG (ref 26.1–32.9)
MCH RBC QN AUTO: 29 PG (ref 26.1–32.9)
MCH RBC QN AUTO: 29.1 PG (ref 26.1–32.9)
MCH RBC QN AUTO: 29.1 PG (ref 26.1–32.9)
MCH RBC QN AUTO: 29.2 PG (ref 26.1–32.9)
MCH RBC QN AUTO: 29.2 PG (ref 26.1–32.9)
MCH RBC QN AUTO: 29.4 PG (ref 26.1–32.9)
MCH RBC QN AUTO: 29.4 PG (ref 26.1–32.9)
MCHC RBC AUTO-ENTMCNC: 28.9 G/DL (ref 31.4–35)
MCHC RBC AUTO-ENTMCNC: 30.6 G/DL (ref 31.4–35)
MCHC RBC AUTO-ENTMCNC: 30.8 G/DL (ref 31.4–35)
MCHC RBC AUTO-ENTMCNC: 30.8 G/DL (ref 31.4–35)
MCHC RBC AUTO-ENTMCNC: 30.9 G/DL (ref 31.4–35)
MCHC RBC AUTO-ENTMCNC: 30.9 G/DL (ref 31.4–35)
MCHC RBC AUTO-ENTMCNC: 31 G/DL (ref 31.4–35)
MCHC RBC AUTO-ENTMCNC: 31.1 G/DL (ref 31.4–35)
MCHC RBC AUTO-ENTMCNC: 31.2 G/DL (ref 31.4–35)
MCHC RBC AUTO-ENTMCNC: 31.5 G/DL (ref 31.4–35)
MCHC RBC AUTO-ENTMCNC: 31.6 G/DL (ref 31.4–35)
MCHC RBC AUTO-ENTMCNC: 32 G/DL (ref 31.4–35)
MCHC RBC AUTO-ENTMCNC: 32.1 G/DL (ref 31.4–35)
MCHC RBC AUTO-ENTMCNC: 32.4 G/DL (ref 31.4–35)
MCV RBC AUTO: 101.8 FL (ref 79.6–97.8)
MCV RBC AUTO: 88.3 FL (ref 79.6–97.8)
MCV RBC AUTO: 90.7 FL (ref 79.6–97.8)
MCV RBC AUTO: 91.2 FL (ref 79.6–97.8)
MCV RBC AUTO: 91.6 FL (ref 79.6–97.8)
MCV RBC AUTO: 92.1 FL (ref 79.6–97.8)
MCV RBC AUTO: 92.4 FL (ref 79.6–97.8)
MCV RBC AUTO: 92.6 FL (ref 79.6–97.8)
MCV RBC AUTO: 92.6 FL (ref 79.6–97.8)
MCV RBC AUTO: 93.4 FL (ref 79.6–97.8)
MCV RBC AUTO: 93.6 FL (ref 79.6–97.8)
MCV RBC AUTO: 93.9 FL (ref 79.6–97.8)
MCV RBC AUTO: 94 FL (ref 79.6–97.8)
MCV RBC AUTO: 94.1 FL (ref 79.6–97.8)
METAMYELOCYTES NFR BLD MANUAL: 1 %
MM INDURATION POC: 0 MM (ref 0–5)
MM INDURATION POC: NORMAL 0MM (ref 0–5)
MONOCYTES # BLD: 0.8 K/UL (ref 0.1–1.3)
MONOCYTES # BLD: 0.9 K/UL (ref 0.1–1.3)
MONOCYTES # BLD: 1 K/UL
MONOCYTES # BLD: 1.1 K/UL (ref 0.1–1.3)
MONOCYTES # BLD: 1.3 K/UL (ref 0.1–1.3)
MONOCYTES # BLD: 1.6 K/UL
MONOCYTES # BLD: 2.2 K/UL (ref 0.1–1.3)
MONOCYTES # BLD: 2.9 K/UL
MONOCYTES # BLD: 3.1 K/UL
MONOCYTES NFR BLD MANUAL: 4 % (ref 3–9)
MONOCYTES NFR BLD: 10 % (ref 4–12)
MONOCYTES NFR BLD: 11 % (ref 4–12)
MONOCYTES NFR BLD: 13 % (ref 4–12)
MONOCYTES NFR BLD: 15 % (ref 4–12)
MONOCYTES NFR BLD: 7 % (ref 4–12)
MONOCYTES NFR BLD: 8 % (ref 4–12)
MONOCYTES NFR BLD: 8 % (ref 4–12)
MONOCYTES NFR BLD: 9 % (ref 4–12)
NEUTS SEG # BLD: 10.7 K/UL (ref 1.7–8.2)
NEUTS SEG # BLD: 12.2 K/UL (ref 1.7–8.2)
NEUTS SEG # BLD: 18.7 K/UL
NEUTS SEG # BLD: 27.6 K/UL
NEUTS SEG # BLD: 28.8 K/UL
NEUTS SEG # BLD: 3.2 K/UL (ref 1.7–8.2)
NEUTS SEG # BLD: 4.1 K/UL (ref 1.7–8.2)
NEUTS SEG # BLD: 5.1 K/UL (ref 1.7–8.2)
NEUTS SEG # BLD: 5.5 K/UL (ref 1.7–8.2)
NEUTS SEG # BLD: 5.8 K/UL (ref 1.7–8.2)
NEUTS SEG # BLD: 7 K/UL (ref 1.7–8.2)
NEUTS SEG # BLD: 8.4 K/UL (ref 1.7–8.2)
NEUTS SEG # BLD: 8.9 K/UL (ref 1.7–8.2)
NEUTS SEG # BLD: 9.9 K/UL
NEUTS SEG NFR BLD MANUAL: 62 % (ref 47–75)
NEUTS SEG NFR BLD: 52 % (ref 43–78)
NEUTS SEG NFR BLD: 54 % (ref 43–78)
NEUTS SEG NFR BLD: 57 % (ref 43–78)
NEUTS SEG NFR BLD: 59 % (ref 43–78)
NEUTS SEG NFR BLD: 64 % (ref 43–78)
NEUTS SEG NFR BLD: 68 % (ref 43–78)
NEUTS SEG NFR BLD: 69 % (ref 43–78)
NEUTS SEG NFR BLD: 72 % (ref 43–78)
NEUTS SEG NFR BLD: 75 % (ref 43–78)
NEUTS SEG NFR BLD: 76 % (ref 43–78)
NEUTS SEG NFR BLD: 81 % (ref 43–78)
NEUTS SEG NFR BLD: 82 % (ref 43–78)
NEUTS SEG NFR BLD: 86 % (ref 43–78)
NITRITE UR QL STRIP.AUTO: NEGATIVE
NITRITE UR QL STRIP.AUTO: POSITIVE
NRBC # BLD: 0 K/UL (ref 0–0.2)
NRBC # BLD: 0.09 K/UL (ref 0–0.2)
NRBC BLD-RTO: 1 PER 100 WBC
O2/TOTAL GAS SETTING VFR VENT: 100 %
OTHER OBSERVATIONS,UCOM: ABNORMAL
OTHER OBSERVATIONS,UCOM: ABNORMAL
P-R INTERVAL, ECG05: 168 MS
PCO2 BLD: 37.3 MMHG (ref 35–45)
PEEP RESPIRATORY: 8 CMH2O
PH BLD: 7.14 [PH] (ref 7.35–7.45)
PH UR STRIP: 5 [PH] (ref 5–9)
PH UR STRIP: 5.5 [PH] (ref 5–9)
PH UR STRIP: 6 [PH] (ref 5–9)
PLATELET # BLD AUTO: 141 K/UL (ref 150–450)
PLATELET # BLD AUTO: 149 K/UL (ref 150–450)
PLATELET # BLD AUTO: 149 K/UL (ref 150–450)
PLATELET # BLD AUTO: 150 K/UL (ref 150–450)
PLATELET # BLD AUTO: 159 K/UL (ref 150–450)
PLATELET # BLD AUTO: 168 K/UL (ref 150–450)
PLATELET # BLD AUTO: 196 K/UL (ref 150–450)
PLATELET # BLD AUTO: 204 K/UL (ref 150–450)
PLATELET # BLD AUTO: 214 K/UL (ref 150–450)
PLATELET # BLD AUTO: 254 K/UL (ref 150–450)
PLATELET # BLD AUTO: 286 K/UL (ref 150–450)
PLATELET # BLD AUTO: 287 K/UL (ref 150–450)
PLATELET # BLD AUTO: 351 K/UL (ref 150–450)
PLATELET # BLD AUTO: 353 K/UL (ref 150–450)
PLATELET # BLD AUTO: 364 K/UL (ref 150–450)
PLATELET # BLD AUTO: 379 K/UL (ref 150–450)
PLATELET COMMENTS,PCOM: ADEQUATE
PMV BLD AUTO: 10.2 FL (ref 9.4–12.3)
PMV BLD AUTO: 10.2 FL (ref 9.4–12.3)
PMV BLD AUTO: 10.4 FL (ref 10.8–14.1)
PMV BLD AUTO: 10.6 FL (ref 9.4–12.3)
PMV BLD AUTO: 10.7 FL (ref 9.4–12.3)
PMV BLD AUTO: 10.8 FL (ref 10.8–14.1)
PMV BLD AUTO: 10.9 FL (ref 10.8–14.1)
PMV BLD AUTO: 10.9 FL (ref 9.4–12.3)
PMV BLD AUTO: 9.2 FL (ref 9.4–12.3)
PMV BLD AUTO: 9.3 FL (ref 9.4–12.3)
PMV BLD AUTO: 9.4 FL (ref 9.4–12.3)
PMV BLD AUTO: 9.5 FL (ref 9.4–12.3)
PMV BLD AUTO: 9.5 FL (ref 9.4–12.3)
PMV BLD AUTO: 9.6 FL (ref 9.4–12.3)
PMV BLD AUTO: 9.7 FL (ref 9.4–12.3)
PMV BLD AUTO: 9.8 FL (ref 9.4–12.3)
PO2 BLD: 494 MMHG (ref 75–100)
POTASSIUM BLD-SCNC: 6.3 MMOL/L (ref 3.5–5.1)
POTASSIUM SERPL-SCNC: 3.9 MMOL/L (ref 3.5–5.1)
POTASSIUM SERPL-SCNC: 4 MMOL/L (ref 3.5–5.1)
POTASSIUM SERPL-SCNC: 4.1 MMOL/L (ref 3.5–5.1)
POTASSIUM SERPL-SCNC: 4.1 MMOL/L (ref 3.5–5.1)
POTASSIUM SERPL-SCNC: 4.3 MMOL/L (ref 3.5–5.1)
POTASSIUM SERPL-SCNC: 4.3 MMOL/L (ref 3.5–5.1)
POTASSIUM SERPL-SCNC: 4.4 MMOL/L (ref 3.5–5.1)
POTASSIUM SERPL-SCNC: 4.4 MMOL/L (ref 3.5–5.1)
POTASSIUM SERPL-SCNC: 4.5 MMOL/L (ref 3.5–5.1)
POTASSIUM SERPL-SCNC: 4.6 MMOL/L (ref 3.5–5.1)
POTASSIUM SERPL-SCNC: 4.6 MMOL/L (ref 3.5–5.1)
POTASSIUM SERPL-SCNC: 4.9 MMOL/L (ref 3.5–5.1)
POTASSIUM SERPL-SCNC: 5 MMOL/L (ref 3.5–5.1)
POTASSIUM SERPL-SCNC: 5 MMOL/L (ref 3.5–5.1)
POTASSIUM SERPL-SCNC: 5.4 MMOL/L (ref 3.5–5.1)
POTASSIUM SERPL-SCNC: 6.7 MMOL/L (ref 3.5–5.1)
PPD POC: NORMAL NEGATIVE
PPD POC: NORMAL NEGATIVE
PROCALCITONIN SERPL-MCNC: 3.7 NG/ML
PROMYELOCYTES NFR BLD MANUAL: 1 %
PROT SERPL-MCNC: 6.7 G/DL (ref 6.3–8.2)
PROT SERPL-MCNC: 7.4 G/DL (ref 6.3–8.2)
PROT SERPL-MCNC: 7.8 G/DL (ref 6.3–8.2)
PROT UR STRIP-MCNC: ABNORMAL MG/DL
PROT UR STRIP-MCNC: ABNORMAL MG/DL
PROT UR STRIP-MCNC: NEGATIVE MG/DL
PROTHROMBIN TIME: 13.8 SEC (ref 11.5–14.5)
PROTHROMBIN TIME: 14.5 SEC (ref 11.5–14.5)
PROTHROMBIN TIME: 14.6 SEC (ref 11.5–14.5)
PROTHROMBIN TIME: 14.7 SEC (ref 11.5–14.5)
PROTHROMBIN TIME: 15 SEC (ref 11.5–14.5)
PROTHROMBIN TIME: 19.9 SEC (ref 11.5–14.5)
PROTHROMBIN TIME: 21.2 SEC (ref 11.5–14.5)
Q-T INTERVAL, ECG07: 334 MS
Q-T INTERVAL, ECG07: 376 MS
Q-T INTERVAL, ECG07: 382 MS
QRS DURATION, ECG06: 112 MS
QRS DURATION, ECG06: 124 MS
QRS DURATION, ECG06: 130 MS
QTC CALCULATION (BEZET), ECG08: 443 MS
QTC CALCULATION (BEZET), ECG08: 444 MS
QTC CALCULATION (BEZET), ECG08: 447 MS
RBC # BLD AUTO: 3.37 M/UL (ref 4.23–5.6)
RBC # BLD AUTO: 3.43 M/UL (ref 4.23–5.6)
RBC # BLD AUTO: 3.44 M/UL (ref 4.23–5.6)
RBC # BLD AUTO: 3.48 M/UL (ref 4.23–5.6)
RBC # BLD AUTO: 3.5 M/UL (ref 4.23–5.6)
RBC # BLD AUTO: 3.64 M/UL (ref 4.23–5.6)
RBC # BLD AUTO: 3.65 M/UL (ref 4.23–5.6)
RBC # BLD AUTO: 3.65 M/UL (ref 4.23–5.6)
RBC # BLD AUTO: 3.92 M/UL (ref 4.23–5.6)
RBC # BLD AUTO: 3.93 M/UL (ref 4.23–5.6)
RBC # BLD AUTO: 3.94 M/UL (ref 4.23–5.6)
RBC # BLD AUTO: 4.06 M/UL (ref 4.23–5.6)
RBC # BLD AUTO: 4.08 M/UL (ref 4.23–5.6)
RBC # BLD AUTO: 4.45 M/UL (ref 4.23–5.67)
RBC # BLD AUTO: 4.79 M/UL (ref 4.23–5.67)
RBC # BLD AUTO: 5.54 M/UL (ref 4.23–5.67)
RBC #/AREA URNS HPF: ABNORMAL /HPF
RBC #/AREA URNS HPF: ABNORMAL /HPF
RBC MORPH BLD: ABNORMAL
SAO2 % BLD: 100 % (ref 95–98)
SERVICE CMNT-IMP: ABNORMAL
SERVICE CMNT-IMP: ABNORMAL
SERVICE CMNT-IMP: NORMAL
SODIUM BLD-SCNC: 131 MMOL/L (ref 136–145)
SODIUM SERPL-SCNC: 135 MMOL/L (ref 136–145)
SODIUM SERPL-SCNC: 135 MMOL/L (ref 136–145)
SODIUM SERPL-SCNC: 136 MMOL/L (ref 136–145)
SODIUM SERPL-SCNC: 136 MMOL/L (ref 136–145)
SODIUM SERPL-SCNC: 137 MMOL/L (ref 136–145)
SODIUM SERPL-SCNC: 138 MMOL/L (ref 136–145)
SODIUM SERPL-SCNC: 139 MMOL/L (ref 136–145)
SODIUM SERPL-SCNC: 141 MMOL/L (ref 136–145)
SODIUM UR-SCNC: 59 MMOL/L
SP GR UR REFRACTOMETRY: 1.01 (ref 1–1.02)
SP GR UR REFRACTOMETRY: 1.02 (ref 1–1.02)
SP GR UR REFRACTOMETRY: 1.02 (ref 1–1.02)
SP GR UR REFRACTOMETRY: 1.03 (ref 1–1.02)
SP GR UR REFRACTOMETRY: 1.03 (ref 1–1.02)
SPECIMEN EXP DATE BLD: NORMAL
SPECIMEN TYPE: ABNORMAL
TROPONIN I BLD-MCNC: 0.05 NG/ML (ref 0.02–0.05)
UROBILINOGEN UR QL STRIP.AUTO: 0.2 EU/DL (ref 0.2–1)
VALPROATE SERPL-MCNC: 54 UG/ML (ref 50–100)
VENTILATION MODE VENT: ABNORMAL
VENTRICULAR RATE, ECG03: 108 BPM
VENTRICULAR RATE, ECG03: 81 BPM
VENTRICULAR RATE, ECG03: 84 BPM
VT SETTING VENT: 500 ML
WBC # BLD AUTO: 10.3 K/UL (ref 4.3–11.1)
WBC # BLD AUTO: 11 K/UL (ref 4.3–11.1)
WBC # BLD AUTO: 12.3 K/UL (ref 4.3–11.1)
WBC # BLD AUTO: 12.5 K/UL (ref 4.3–11.1)
WBC # BLD AUTO: 13.1 K/UL (ref 4.3–11.1)
WBC # BLD AUTO: 15.4 K/UL (ref 4.3–11.1)
WBC # BLD AUTO: 19.2 K/UL (ref 4.3–11.1)
WBC # BLD AUTO: 23 K/UL (ref 4.3–11.1)
WBC # BLD AUTO: 32.3 K/UL (ref 4.3–11.1)
WBC # BLD AUTO: 35 K/UL (ref 4.3–11.1)
WBC # BLD AUTO: 6.1 K/UL (ref 4.3–11.1)
WBC # BLD AUTO: 7.6 K/UL (ref 4.3–11.1)
WBC # BLD AUTO: 7.9 K/UL (ref 4.3–11.1)
WBC # BLD AUTO: 8.6 K/UL (ref 4.3–11.1)
WBC # BLD AUTO: 9.1 K/UL (ref 4.3–11.1)
WBC # BLD AUTO: 9.8 K/UL (ref 4.3–11.1)
WBC MORPH BLD: ABNORMAL
WBC URNS QL MICRO: >100 /HPF
WBC URNS QL MICRO: ABNORMAL /HPF

## 2018-01-01 PROCEDURE — 87186 SC STD MICRODIL/AGAR DIL: CPT

## 2018-01-01 PROCEDURE — 83735 ASSAY OF MAGNESIUM: CPT

## 2018-01-01 PROCEDURE — 36415 COLL VENOUS BLD VENIPUNCTURE: CPT

## 2018-01-01 PROCEDURE — 74011000250 HC RX REV CODE- 250: Performed by: PHYSICAL MEDICINE & REHABILITATION

## 2018-01-01 PROCEDURE — 84295 ASSAY OF SERUM SODIUM: CPT

## 2018-01-01 PROCEDURE — 74011250637 HC RX REV CODE- 250/637: Performed by: UROLOGY

## 2018-01-01 PROCEDURE — 97535 SELF CARE MNGMENT TRAINING: CPT

## 2018-01-01 PROCEDURE — 82962 GLUCOSE BLOOD TEST: CPT

## 2018-01-01 PROCEDURE — 92507 TX SP LANG VOICE COMM INDIV: CPT

## 2018-01-01 PROCEDURE — 74011250636 HC RX REV CODE- 250/636: Performed by: PHYSICAL MEDICINE & REHABILITATION

## 2018-01-01 PROCEDURE — 94760 N-INVAS EAR/PLS OXIMETRY 1: CPT

## 2018-01-01 PROCEDURE — 85027 COMPLETE CBC AUTOMATED: CPT | Performed by: ORTHOPAEDIC SURGERY

## 2018-01-01 PROCEDURE — 97116 GAIT TRAINING THERAPY: CPT

## 2018-01-01 PROCEDURE — 80048 BASIC METABOLIC PNL TOTAL CA: CPT

## 2018-01-01 PROCEDURE — 51702 INSERT TEMP BLADDER CATH: CPT | Performed by: EMERGENCY MEDICINE

## 2018-01-01 PROCEDURE — 65310000000 HC RM PRIVATE REHAB

## 2018-01-01 PROCEDURE — 74011250636 HC RX REV CODE- 250/636: Performed by: INTERNAL MEDICINE

## 2018-01-01 PROCEDURE — C9113 INJ PANTOPRAZOLE SODIUM, VIA: HCPCS | Performed by: INTERNAL MEDICINE

## 2018-01-01 PROCEDURE — 74011636637 HC RX REV CODE- 636/637: Performed by: PHYSICAL MEDICINE & REHABILITATION

## 2018-01-01 PROCEDURE — 74011250636 HC RX REV CODE- 250/636: Performed by: FAMILY MEDICINE

## 2018-01-01 PROCEDURE — 87040 BLOOD CULTURE FOR BACTERIA: CPT

## 2018-01-01 PROCEDURE — 77030036688 HC BLNKT CLD THER S2SG -B

## 2018-01-01 PROCEDURE — 77030037623 HC FEM TRIAL PK ATTUNE INTTN J&J -D: Performed by: ORTHOPAEDIC SURGERY

## 2018-01-01 PROCEDURE — 74011250637 HC RX REV CODE- 250/637: Performed by: PHYSICAL MEDICINE & REHABILITATION

## 2018-01-01 PROCEDURE — 87641 MR-STAPH DNA AMP PROBE: CPT | Performed by: ORTHOPAEDIC SURGERY

## 2018-01-01 PROCEDURE — 74011250637 HC RX REV CODE- 250/637: Performed by: ORTHOPAEDIC SURGERY

## 2018-01-01 PROCEDURE — 93005 ELECTROCARDIOGRAM TRACING: CPT | Performed by: ANESTHESIOLOGY

## 2018-01-01 PROCEDURE — 65270000029 HC RM PRIVATE

## 2018-01-01 PROCEDURE — 77030019605

## 2018-01-01 PROCEDURE — 97162 PT EVAL MOD COMPLEX 30 MIN: CPT

## 2018-01-01 PROCEDURE — 99239 HOSP IP/OBS DSCHRG MGMT >30: CPT | Performed by: PHYSICAL MEDICINE & REHABILITATION

## 2018-01-01 PROCEDURE — 99232 SBSQ HOSP IP/OBS MODERATE 35: CPT | Performed by: PHYSICAL MEDICINE & REHABILITATION

## 2018-01-01 PROCEDURE — 74011250636 HC RX REV CODE- 250/636: Performed by: EMERGENCY MEDICINE

## 2018-01-01 PROCEDURE — 97166 OT EVAL MOD COMPLEX 45 MIN: CPT

## 2018-01-01 PROCEDURE — 80053 COMPREHEN METABOLIC PANEL: CPT

## 2018-01-01 PROCEDURE — 77030020256 HC SOL INJ NACL 0.9%  500ML: Performed by: ORTHOPAEDIC SURGERY

## 2018-01-01 PROCEDURE — 97530 THERAPEUTIC ACTIVITIES: CPT

## 2018-01-01 PROCEDURE — 97150 GROUP THERAPEUTIC PROCEDURES: CPT

## 2018-01-01 PROCEDURE — 85025 COMPLETE CBC W/AUTO DIFF WBC: CPT

## 2018-01-01 PROCEDURE — 77030019557 HC ELECTRD VES SEAL MEDT -F: Performed by: ORTHOPAEDIC SURGERY

## 2018-01-01 PROCEDURE — 74011250637 HC RX REV CODE- 250/637: Performed by: INTERNAL MEDICINE

## 2018-01-01 PROCEDURE — 74011000250 HC RX REV CODE- 250: Performed by: INTERNAL MEDICINE

## 2018-01-01 PROCEDURE — 74011000250 HC RX REV CODE- 250: Performed by: FAMILY MEDICINE

## 2018-01-01 PROCEDURE — 74011000258 HC RX REV CODE- 258: Performed by: INTERNAL MEDICINE

## 2018-01-01 PROCEDURE — 97110 THERAPEUTIC EXERCISES: CPT

## 2018-01-01 PROCEDURE — 80048 BASIC METABOLIC PNL TOTAL CA: CPT | Performed by: ORTHOPAEDIC SURGERY

## 2018-01-01 PROCEDURE — 77030034849

## 2018-01-01 PROCEDURE — 97165 OT EVAL LOW COMPLEX 30 MIN: CPT

## 2018-01-01 PROCEDURE — 85018 HEMOGLOBIN: CPT

## 2018-01-01 PROCEDURE — 0SRD0J9 REPLACEMENT OF LEFT KNEE JOINT WITH SYNTHETIC SUBSTITUTE, CEMENTED, OPEN APPROACH: ICD-10-PCS | Performed by: ORTHOPAEDIC SURGERY

## 2018-01-01 PROCEDURE — 81003 URINALYSIS AUTO W/O SCOPE: CPT

## 2018-01-01 PROCEDURE — 74011636637 HC RX REV CODE- 636/637: Performed by: INTERNAL MEDICINE

## 2018-01-01 PROCEDURE — 76010000162 HC OR TIME 1.5 TO 2 HR INTENSV-TIER 1: Performed by: ORTHOPAEDIC SURGERY

## 2018-01-01 PROCEDURE — 74011000250 HC RX REV CODE- 250

## 2018-01-01 PROCEDURE — 77030003602 HC NDL NRV BLK BBMI -B: Performed by: ANESTHESIOLOGY

## 2018-01-01 PROCEDURE — 85610 PROTHROMBIN TIME: CPT | Performed by: ORTHOPAEDIC SURGERY

## 2018-01-01 PROCEDURE — 96365 THER/PROPH/DIAG IV INF INIT: CPT | Performed by: EMERGENCY MEDICINE

## 2018-01-01 PROCEDURE — 84484 ASSAY OF TROPONIN QUANT: CPT

## 2018-01-01 PROCEDURE — C1776 JOINT DEVICE (IMPLANTABLE): HCPCS | Performed by: ORTHOPAEDIC SURGERY

## 2018-01-01 PROCEDURE — 97163 PT EVAL HIGH COMPLEX 45 MIN: CPT

## 2018-01-01 PROCEDURE — 71045 X-RAY EXAM CHEST 1 VIEW: CPT

## 2018-01-01 PROCEDURE — 77030018673: Performed by: ORTHOPAEDIC SURGERY

## 2018-01-01 PROCEDURE — 85730 THROMBOPLASTIN TIME PARTIAL: CPT | Performed by: ORTHOPAEDIC SURGERY

## 2018-01-01 PROCEDURE — 99285 EMERGENCY DEPT VISIT HI MDM: CPT | Performed by: EMERGENCY MEDICINE

## 2018-01-01 PROCEDURE — 85610 PROTHROMBIN TIME: CPT

## 2018-01-01 PROCEDURE — 77030011943

## 2018-01-01 PROCEDURE — 77030006804 HC BLD SAW RECIP CNMD -B: Performed by: ORTHOPAEDIC SURGERY

## 2018-01-01 PROCEDURE — 77010033678 HC OXYGEN DAILY

## 2018-01-01 PROCEDURE — 77030020263 HC SOL INJ SOD CL0.9% LFCR 1000ML

## 2018-01-01 PROCEDURE — 76942 ECHO GUIDE FOR BIOPSY: CPT | Performed by: ORTHOPAEDIC SURGERY

## 2018-01-01 PROCEDURE — 76010010054 HC POST OP PAIN BLOCK: Performed by: ORTHOPAEDIC SURGERY

## 2018-01-01 PROCEDURE — 94002 VENT MGMT INPAT INIT DAY: CPT

## 2018-01-01 PROCEDURE — 74011250636 HC RX REV CODE- 250/636: Performed by: ORTHOPAEDIC SURGERY

## 2018-01-01 PROCEDURE — 71046 X-RAY EXAM CHEST 2 VIEWS: CPT

## 2018-01-01 PROCEDURE — 82570 ASSAY OF URINE CREATININE: CPT

## 2018-01-01 PROCEDURE — 87088 URINE BACTERIA CULTURE: CPT

## 2018-01-01 PROCEDURE — 85027 COMPLETE CBC AUTOMATED: CPT

## 2018-01-01 PROCEDURE — 73562 X-RAY EXAM OF KNEE 3: CPT

## 2018-01-01 PROCEDURE — 51798 US URINE CAPACITY MEASURE: CPT

## 2018-01-01 PROCEDURE — 86580 TB INTRADERMAL TEST: CPT | Performed by: ORTHOPAEDIC SURGERY

## 2018-01-01 PROCEDURE — 77030005520 HC CATH URETH FOL38 BARD -A

## 2018-01-01 PROCEDURE — 74011250637 HC RX REV CODE- 250/637: Performed by: EMERGENCY MEDICINE

## 2018-01-01 PROCEDURE — 87086 URINE CULTURE/COLONY COUNT: CPT

## 2018-01-01 PROCEDURE — 81001 URINALYSIS AUTO W/SCOPE: CPT | Performed by: ORTHOPAEDIC SURGERY

## 2018-01-01 PROCEDURE — 83605 ASSAY OF LACTIC ACID: CPT

## 2018-01-01 PROCEDURE — 81003 URINALYSIS AUTO W/O SCOPE: CPT | Performed by: ORTHOPAEDIC SURGERY

## 2018-01-01 PROCEDURE — 77030031139 HC SUT VCRL2 J&J -A: Performed by: ORTHOPAEDIC SURGERY

## 2018-01-01 PROCEDURE — 99221 1ST HOSP IP/OBS SF/LOW 40: CPT | Performed by: PHYSICAL MEDICINE & REHABILITATION

## 2018-01-01 PROCEDURE — 77030027138 HC INCENT SPIROMETER -A

## 2018-01-01 PROCEDURE — 77030012935 HC DRSG AQUACEL BMS -B: Performed by: ORTHOPAEDIC SURGERY

## 2018-01-01 PROCEDURE — 74011000250 HC RX REV CODE- 250: Performed by: EMERGENCY MEDICINE

## 2018-01-01 PROCEDURE — 77030020782 HC GWN BAIR PAWS FLX 3M -B: Performed by: ANESTHESIOLOGY

## 2018-01-01 PROCEDURE — 74011250636 HC RX REV CODE- 250/636: Performed by: ANESTHESIOLOGY

## 2018-01-01 PROCEDURE — 77030006720 HC BLD PAT RMR ZIMM -B: Performed by: ORTHOPAEDIC SURGERY

## 2018-01-01 PROCEDURE — 93005 ELECTROCARDIOGRAM TRACING: CPT | Performed by: EMERGENCY MEDICINE

## 2018-01-01 PROCEDURE — 77030013819 HC MX SYS CEM ZIMM -B: Performed by: ORTHOPAEDIC SURGERY

## 2018-01-01 PROCEDURE — 86901 BLOOD TYPING SEROLOGIC RH(D): CPT

## 2018-01-01 PROCEDURE — 93970 EXTREMITY STUDY: CPT

## 2018-01-01 PROCEDURE — 84145 PROCALCITONIN (PCT): CPT

## 2018-01-01 PROCEDURE — 77030033067 HC SUT PDO STRATFX SPIR J&J -B: Performed by: ORTHOPAEDIC SURGERY

## 2018-01-01 PROCEDURE — 96375 TX/PRO/DX INJ NEW DRUG ADDON: CPT | Performed by: EMERGENCY MEDICINE

## 2018-01-01 PROCEDURE — 76210000016 HC OR PH I REC 1 TO 1.5 HR: Performed by: ORTHOPAEDIC SURGERY

## 2018-01-01 PROCEDURE — 0T9B70Z DRAINAGE OF BLADDER WITH DRAINAGE DEVICE, VIA NATURAL OR ARTIFICIAL OPENING: ICD-10-PCS | Performed by: PHYSICAL MEDICINE & REHABILITATION

## 2018-01-01 PROCEDURE — 77030006777 HC BLD SAW OSC CNMD -B: Performed by: ORTHOPAEDIC SURGERY

## 2018-01-01 PROCEDURE — 82803 BLOOD GASES ANY COMBINATION: CPT

## 2018-01-01 PROCEDURE — 77030003665 HC NDL SPN BBMI -A: Performed by: ANESTHESIOLOGY

## 2018-01-01 PROCEDURE — 77030032490 HC SLV COMPR SCD KNE COVD -B

## 2018-01-01 PROCEDURE — 77030018836 HC SOL IRR NACL ICUM -A: Performed by: ORTHOPAEDIC SURGERY

## 2018-01-01 PROCEDURE — 97161 PT EVAL LOW COMPLEX 20 MIN: CPT

## 2018-01-01 PROCEDURE — 77030013727 HC IRR FAN PULSVC ZIMM -B: Performed by: ORTHOPAEDIC SURGERY

## 2018-01-01 PROCEDURE — 74011250636 HC RX REV CODE- 250/636

## 2018-01-01 PROCEDURE — 84300 ASSAY OF URINE SODIUM: CPT

## 2018-01-01 PROCEDURE — 77030019940 HC BLNKT HYPOTHRM STRY -B: Performed by: ANESTHESIOLOGY

## 2018-01-01 PROCEDURE — 77030008467 HC STPLR SKN COVD -B: Performed by: ORTHOPAEDIC SURGERY

## 2018-01-01 PROCEDURE — 92523 SPEECH SOUND LANG COMPREHEN: CPT

## 2018-01-01 PROCEDURE — 77030007880 HC KT SPN EPDRL BBMI -B: Performed by: ANESTHESIOLOGY

## 2018-01-01 PROCEDURE — C9113 INJ PANTOPRAZOLE SODIUM, VIA: HCPCS | Performed by: PHYSICAL MEDICINE & REHABILITATION

## 2018-01-01 PROCEDURE — 77030039270 HC TU BLD FLTR CARD -A

## 2018-01-01 PROCEDURE — 82436 ASSAY OF URINE CHLORIDE: CPT

## 2018-01-01 PROCEDURE — 74011250636 HC RX REV CODE- 250/636: Performed by: UROLOGY

## 2018-01-01 PROCEDURE — 36415 COLL VENOUS BLD VENIPUNCTURE: CPT | Performed by: ORTHOPAEDIC SURGERY

## 2018-01-01 PROCEDURE — 74011000302 HC RX REV CODE- 302: Performed by: ORTHOPAEDIC SURGERY

## 2018-01-01 PROCEDURE — 99223 1ST HOSP IP/OBS HIGH 75: CPT | Performed by: PHYSICAL MEDICINE & REHABILITATION

## 2018-01-01 PROCEDURE — 76060000034 HC ANESTHESIA 1.5 TO 2 HR: Performed by: ORTHOPAEDIC SURGERY

## 2018-01-01 PROCEDURE — 96368 THER/DIAG CONCURRENT INF: CPT | Performed by: EMERGENCY MEDICINE

## 2018-01-01 PROCEDURE — 85025 COMPLETE CBC W/AUTO DIFF WBC: CPT | Performed by: ORTHOPAEDIC SURGERY

## 2018-01-01 PROCEDURE — 80164 ASSAY DIPROPYLACETIC ACD TOT: CPT

## 2018-01-01 PROCEDURE — 77030011208: Performed by: ORTHOPAEDIC SURGERY

## 2018-01-01 PROCEDURE — C1713 ANCHOR/SCREW BN/BN,TIS/BN: HCPCS | Performed by: ORTHOPAEDIC SURGERY

## 2018-01-01 PROCEDURE — 36600 WITHDRAWAL OF ARTERIAL BLOOD: CPT

## 2018-01-01 PROCEDURE — 74011000258 HC RX REV CODE- 258: Performed by: EMERGENCY MEDICINE

## 2018-01-01 PROCEDURE — 70551 MRI BRAIN STEM W/O DYE: CPT

## 2018-01-01 PROCEDURE — 99233 SBSQ HOSP IP/OBS HIGH 50: CPT | Performed by: PHYSICAL MEDICINE & REHABILITATION

## 2018-01-01 PROCEDURE — 74011000250 HC RX REV CODE- 250: Performed by: ORTHOPAEDIC SURGERY

## 2018-01-01 PROCEDURE — 77030034849: Performed by: ORTHOPAEDIC SURGERY

## 2018-01-01 PROCEDURE — 77030011640 HC PAD GRND REM COVD -A: Performed by: ORTHOPAEDIC SURGERY

## 2018-01-01 DEVICE — CEMENT BNE GENTAMC HV R+G 40GM -- PALACOS R+G 5036964: Type: IMPLANTABLE DEVICE | Site: KNEE | Status: FUNCTIONAL

## 2018-01-01 DEVICE — COMPONENT PAT DIA38MM POLYETH DOME CEM MEDIALIZED ATTUNE: Type: IMPLANTABLE DEVICE | Site: KNEE | Status: FUNCTIONAL

## 2018-01-01 DEVICE — INSERT TIB SZ 8 THK6MM KNEE POST STBL ROT PLATFRM ATTUNE: Type: IMPLANTABLE DEVICE | Site: KNEE | Status: FUNCTIONAL

## 2018-01-01 DEVICE — COMPONENT FEM SZ 8 L KNEE POST STBL CEM ATTUNE: Type: IMPLANTABLE DEVICE | Site: KNEE | Status: FUNCTIONAL

## 2018-01-01 DEVICE — BASEPLATE TIB SZ 8 KNEE CEM ROT PLATFRM ATTUNE: Type: IMPLANTABLE DEVICE | Site: KNEE | Status: FUNCTIONAL

## 2018-01-01 RX ORDER — LATANOPROST 50 UG/ML
1 SOLUTION/ DROPS OPHTHALMIC
Status: DISCONTINUED | OUTPATIENT
Start: 2018-01-01 | End: 2018-01-01 | Stop reason: HOSPADM

## 2018-01-01 RX ORDER — TAMSULOSIN HYDROCHLORIDE 0.4 MG/1
0.4 CAPSULE ORAL
Qty: 15 CAP | Refills: 0 | Status: SHIPPED | OUTPATIENT
Start: 2018-01-01 | End: 2018-01-01

## 2018-01-01 RX ORDER — DIVALPROEX SODIUM 500 MG/1
1000 TABLET, EXTENDED RELEASE ORAL 2 TIMES DAILY
COMMUNITY

## 2018-01-01 RX ORDER — AMOXICILLIN 250 MG
1 CAPSULE ORAL DAILY
Status: DISCONTINUED | OUTPATIENT
Start: 2018-01-01 | End: 2018-01-01 | Stop reason: HOSPADM

## 2018-01-01 RX ORDER — PREGABALIN 25 MG/1
25 CAPSULE ORAL
Status: ON HOLD | COMMUNITY
End: 2018-01-01 | Stop reason: CLARIF

## 2018-01-01 RX ORDER — BUPROPION HYDROCHLORIDE 150 MG/1
150 TABLET, EXTENDED RELEASE ORAL 2 TIMES DAILY
Status: CANCELLED | OUTPATIENT
Start: 2018-01-01

## 2018-01-01 RX ORDER — ACETAMINOPHEN 500 MG
1000 TABLET ORAL
Status: COMPLETED | OUTPATIENT
Start: 2018-01-01 | End: 2018-01-01

## 2018-01-01 RX ORDER — ASPIRIN 81 MG/1
81 TABLET ORAL 2 TIMES DAILY
Status: DISCONTINUED | OUTPATIENT
Start: 2018-01-01 | End: 2018-01-01

## 2018-01-01 RX ORDER — TAMSULOSIN HYDROCHLORIDE 0.4 MG/1
0.4 CAPSULE ORAL
Status: DISCONTINUED | OUTPATIENT
Start: 2018-01-01 | End: 2018-01-01 | Stop reason: HOSPADM

## 2018-01-01 RX ORDER — MAGNESIUM SULFATE HEPTAHYDRATE 40 MG/ML
2 INJECTION, SOLUTION INTRAVENOUS ONCE
Status: COMPLETED | OUTPATIENT
Start: 2018-01-01 | End: 2018-01-01

## 2018-01-01 RX ORDER — FUROSEMIDE 20 MG/1
20 TABLET ORAL DAILY
Status: DISCONTINUED | OUTPATIENT
Start: 2018-01-01 | End: 2018-01-01 | Stop reason: HOSPADM

## 2018-01-01 RX ORDER — ZOLPIDEM TARTRATE 5 MG/1
5 TABLET ORAL
Status: DISCONTINUED | OUTPATIENT
Start: 2018-01-01 | End: 2018-01-01 | Stop reason: HOSPADM

## 2018-01-01 RX ORDER — ONDANSETRON 2 MG/ML
4 INJECTION INTRAMUSCULAR; INTRAVENOUS
Status: DISCONTINUED | OUTPATIENT
Start: 2018-01-01 | End: 2018-01-01 | Stop reason: HOSPADM

## 2018-01-01 RX ORDER — SIMVASTATIN 40 MG/1
40 TABLET, FILM COATED ORAL
Status: DISCONTINUED | OUTPATIENT
Start: 2018-01-01 | End: 2018-01-01 | Stop reason: HOSPADM

## 2018-01-01 RX ORDER — DOXYCYCLINE 100 MG/1
100 CAPSULE ORAL 2 TIMES DAILY
COMMUNITY
End: 2018-01-01

## 2018-01-01 RX ORDER — BISMUTH SUBSALICYLATE 262 MG
1 TABLET,CHEWABLE ORAL DAILY
COMMUNITY
End: 2018-01-01

## 2018-01-01 RX ORDER — NYSTATIN 100000 U/G
CREAM TOPICAL
COMMUNITY
End: 2018-01-01

## 2018-01-01 RX ORDER — WARFARIN SODIUM 5 MG/1
5 TABLET ORAL
Status: DISCONTINUED | OUTPATIENT
Start: 2018-01-01 | End: 2018-01-01 | Stop reason: HOSPADM

## 2018-01-01 RX ORDER — ALBUTEROL SULFATE 0.83 MG/ML
2.5 SOLUTION RESPIRATORY (INHALATION) AS NEEDED
Status: DISCONTINUED | OUTPATIENT
Start: 2018-01-01 | End: 2018-01-01 | Stop reason: HOSPADM

## 2018-01-01 RX ORDER — MIRTAZAPINE 15 MG/1
7.5 TABLET, FILM COATED ORAL
Status: DISCONTINUED | OUTPATIENT
Start: 2018-01-01 | End: 2018-01-01 | Stop reason: HOSPADM

## 2018-01-01 RX ORDER — VANCOMYCIN 1.75 GRAM/500 ML IN 0.9 % SODIUM CHLORIDE INTRAVENOUS
1750
Status: DISCONTINUED | OUTPATIENT
Start: 2018-01-01 | End: 2018-01-01 | Stop reason: DRUGHIGH

## 2018-01-01 RX ORDER — NYSTATIN 100000 [USP'U]/G
POWDER TOPICAL 2 TIMES DAILY
Status: DISCONTINUED | OUTPATIENT
Start: 2018-01-01 | End: 2018-01-01 | Stop reason: HOSPADM

## 2018-01-01 RX ORDER — NALOXONE HYDROCHLORIDE 0.4 MG/ML
0.4 INJECTION, SOLUTION INTRAMUSCULAR; INTRAVENOUS; SUBCUTANEOUS AS NEEDED
Status: DISCONTINUED | OUTPATIENT
Start: 2018-01-01 | End: 2018-01-01 | Stop reason: HOSPADM

## 2018-01-01 RX ORDER — PREGABALIN 75 MG/1
75 CAPSULE ORAL 2 TIMES DAILY
Status: DISCONTINUED | OUTPATIENT
Start: 2018-01-01 | End: 2018-01-01 | Stop reason: HOSPADM

## 2018-01-01 RX ORDER — FUROSEMIDE 20 MG/1
20 TABLET ORAL DAILY
COMMUNITY
End: 2018-01-01

## 2018-01-01 RX ORDER — ONDANSETRON 2 MG/ML
4 INJECTION INTRAMUSCULAR; INTRAVENOUS
Status: CANCELLED | OUTPATIENT
Start: 2018-01-01

## 2018-01-01 RX ORDER — THERA TABS 400 MCG
1 TAB ORAL DAILY
COMMUNITY
End: 2018-01-01

## 2018-01-01 RX ORDER — BUPROPION HYDROCHLORIDE 150 MG/1
150 TABLET, EXTENDED RELEASE ORAL 2 TIMES DAILY
Status: DISCONTINUED | OUTPATIENT
Start: 2018-01-01 | End: 2018-01-01 | Stop reason: HOSPADM

## 2018-01-01 RX ORDER — SODIUM CHLORIDE 0.9 % (FLUSH) 0.9 %
5-10 SYRINGE (ML) INJECTION EVERY 8 HOURS
Status: CANCELLED | OUTPATIENT
Start: 2018-01-01

## 2018-01-01 RX ORDER — SODIUM CHLORIDE 9 MG/ML
100 INJECTION, SOLUTION INTRAVENOUS CONTINUOUS
Status: DISPENSED | OUTPATIENT
Start: 2018-01-01 | End: 2018-01-01

## 2018-01-01 RX ORDER — BUPROPION HYDROCHLORIDE 200 MG/1
200 TABLET, EXTENDED RELEASE ORAL DAILY
Status: ON HOLD | COMMUNITY
End: 2018-01-01 | Stop reason: CLARIF

## 2018-01-01 RX ORDER — AMLODIPINE BESYLATE 5 MG/1
5 TABLET ORAL DAILY
Qty: 30 TAB | Refills: 1 | Status: SHIPPED | OUTPATIENT
Start: 2018-01-01

## 2018-01-01 RX ORDER — HYDROCODONE BITARTRATE AND ACETAMINOPHEN 7.5; 325 MG/1; MG/1
1-2 TABLET ORAL
Status: DISCONTINUED | OUTPATIENT
Start: 2018-01-01 | End: 2018-01-01 | Stop reason: HOSPADM

## 2018-01-01 RX ORDER — MIRTAZAPINE 7.5 MG/1
7.5 TABLET, FILM COATED ORAL
Qty: 30 TAB | Refills: 0 | Status: SHIPPED | OUTPATIENT
Start: 2018-01-01 | End: 2018-01-01

## 2018-01-01 RX ORDER — ASPIRIN 81 MG/1
81 TABLET ORAL 2 TIMES DAILY
Status: DISCONTINUED | OUTPATIENT
Start: 2018-01-01 | End: 2018-01-01 | Stop reason: HOSPADM

## 2018-01-01 RX ORDER — HEPARIN SODIUM 5000 [USP'U]/ML
5000 INJECTION, SOLUTION INTRAVENOUS; SUBCUTANEOUS EVERY 12 HOURS
Status: CANCELLED | OUTPATIENT
Start: 2018-01-01

## 2018-01-01 RX ORDER — DIVALPROEX SODIUM 500 MG/1
1000 TABLET, EXTENDED RELEASE ORAL 2 TIMES DAILY
Status: DISCONTINUED | OUTPATIENT
Start: 2018-01-01 | End: 2018-01-01 | Stop reason: HOSPADM

## 2018-01-01 RX ORDER — INSULIN LISPRO 100 [IU]/ML
INJECTION, SOLUTION INTRAVENOUS; SUBCUTANEOUS
Status: DISCONTINUED | OUTPATIENT
Start: 2018-01-01 | End: 2018-01-01

## 2018-01-01 RX ORDER — SODIUM CHLORIDE 0.9 % (FLUSH) 0.9 %
5-10 SYRINGE (ML) INJECTION EVERY 8 HOURS
Status: DISCONTINUED | OUTPATIENT
Start: 2018-01-01 | End: 2018-01-01 | Stop reason: HOSPADM

## 2018-01-01 RX ORDER — ACETAMINOPHEN 325 MG/1
650 TABLET ORAL
Status: DISCONTINUED | OUTPATIENT
Start: 2018-01-01 | End: 2018-01-01 | Stop reason: HOSPADM

## 2018-01-01 RX ORDER — HYDROCODONE BITARTRATE AND ACETAMINOPHEN 7.5; 325 MG/1; MG/1
2 TABLET ORAL
Status: DISCONTINUED | OUTPATIENT
Start: 2018-01-01 | End: 2018-01-01 | Stop reason: HOSPADM

## 2018-01-01 RX ORDER — ASPIRIN 81 MG/1
81 TABLET ORAL 2 TIMES DAILY
Qty: 30 TAB | Refills: 0 | Status: SHIPPED | OUTPATIENT
Start: 2018-01-01 | End: 2018-01-01

## 2018-01-01 RX ORDER — SIMVASTATIN 40 MG/1
40 TABLET, FILM COATED ORAL
COMMUNITY
End: 2018-01-01

## 2018-01-01 RX ORDER — HYDROCODONE BITARTRATE AND ACETAMINOPHEN 7.5; 325 MG/1; MG/1
2 TABLET ORAL
Status: CANCELLED | OUTPATIENT
Start: 2018-01-01

## 2018-01-01 RX ORDER — HYDROCODONE BITARTRATE AND ACETAMINOPHEN 7.5; 325 MG/1; MG/1
1 TABLET ORAL
Status: DISCONTINUED | OUTPATIENT
Start: 2018-01-01 | End: 2018-01-01

## 2018-01-01 RX ORDER — DIVALPROEX SODIUM 500 MG/1
1000 TABLET, EXTENDED RELEASE ORAL 2 TIMES DAILY
Status: CANCELLED | OUTPATIENT
Start: 2018-01-01

## 2018-01-01 RX ORDER — LATANOPROST 50 UG/ML
1 SOLUTION/ DROPS OPHTHALMIC
COMMUNITY

## 2018-01-01 RX ORDER — SODIUM CHLORIDE, SODIUM LACTATE, POTASSIUM CHLORIDE, CALCIUM CHLORIDE 600; 310; 30; 20 MG/100ML; MG/100ML; MG/100ML; MG/100ML
100 INJECTION, SOLUTION INTRAVENOUS CONTINUOUS
Status: DISCONTINUED | OUTPATIENT
Start: 2018-01-01 | End: 2018-01-01 | Stop reason: HOSPADM

## 2018-01-01 RX ORDER — HYDROMORPHONE HYDROCHLORIDE 4 MG/1
4 TABLET ORAL
Status: DISCONTINUED | OUTPATIENT
Start: 2018-01-01 | End: 2018-01-01

## 2018-01-01 RX ORDER — BACLOFEN 10 MG/1
5 TABLET ORAL 3 TIMES DAILY
Status: DISCONTINUED | OUTPATIENT
Start: 2018-01-01 | End: 2018-01-01

## 2018-01-01 RX ORDER — MIRTAZAPINE 15 MG/1
15 TABLET, FILM COATED ORAL
Status: DISCONTINUED | OUTPATIENT
Start: 2018-01-01 | End: 2018-01-01

## 2018-01-01 RX ORDER — HYDROMORPHONE HYDROCHLORIDE 2 MG/1
2 TABLET ORAL
Status: DISCONTINUED | OUTPATIENT
Start: 2018-01-01 | End: 2018-01-01 | Stop reason: HOSPADM

## 2018-01-01 RX ORDER — VANCOMYCIN/0.9 % SOD CHLORIDE 1.5G/250ML
1500 PLASTIC BAG, INJECTION (ML) INTRAVENOUS
Status: DISCONTINUED | OUTPATIENT
Start: 2018-01-01 | End: 2018-01-01

## 2018-01-01 RX ORDER — ROPIVACAINE HYDROCHLORIDE 2 MG/ML
INJECTION, SOLUTION EPIDURAL; INFILTRATION; PERINEURAL AS NEEDED
Status: DISCONTINUED | OUTPATIENT
Start: 2018-01-01 | End: 2018-01-01 | Stop reason: HOSPADM

## 2018-01-01 RX ORDER — PROPOFOL 10 MG/ML
INJECTION, EMULSION INTRAVENOUS
Status: DISCONTINUED | OUTPATIENT
Start: 2018-01-01 | End: 2018-01-01 | Stop reason: HOSPADM

## 2018-01-01 RX ORDER — DIPHENHYDRAMINE HYDROCHLORIDE 50 MG/ML
12.5 INJECTION, SOLUTION INTRAMUSCULAR; INTRAVENOUS
Status: DISCONTINUED | OUTPATIENT
Start: 2018-01-01 | End: 2018-01-01 | Stop reason: HOSPADM

## 2018-01-01 RX ORDER — ACETAMINOPHEN 325 MG/1
650 TABLET ORAL
Status: CANCELLED | OUTPATIENT
Start: 2018-01-01

## 2018-01-01 RX ORDER — MIRTAZAPINE 15 MG/1
15 TABLET, FILM COATED ORAL
Status: DISCONTINUED | OUTPATIENT
Start: 2018-01-01 | End: 2018-01-01 | Stop reason: HOSPADM

## 2018-01-01 RX ORDER — POLYETHYLENE GLYCOL 3350 17 G/17G
17 POWDER, FOR SOLUTION ORAL DAILY
Status: DISCONTINUED | OUTPATIENT
Start: 2018-01-01 | End: 2018-01-01 | Stop reason: HOSPADM

## 2018-01-01 RX ORDER — SODIUM CHLORIDE 0.9 % (FLUSH) 0.9 %
5-10 SYRINGE (ML) INJECTION AS NEEDED
Status: DISCONTINUED | OUTPATIENT
Start: 2018-01-01 | End: 2018-01-01 | Stop reason: HOSPADM

## 2018-01-01 RX ORDER — MORPHINE SULFATE 2 MG/ML
2 INJECTION, SOLUTION INTRAMUSCULAR; INTRAVENOUS ONCE
Status: COMPLETED | OUTPATIENT
Start: 2018-01-01 | End: 2018-01-01

## 2018-01-01 RX ORDER — ACETAMINOPHEN 500 MG
1000 TABLET ORAL EVERY 6 HOURS
Status: DISCONTINUED | OUTPATIENT
Start: 2018-01-01 | End: 2018-01-01 | Stop reason: HOSPADM

## 2018-01-01 RX ORDER — ONDANSETRON 8 MG/1
8 TABLET, ORALLY DISINTEGRATING ORAL
Status: DISCONTINUED | OUTPATIENT
Start: 2018-01-01 | End: 2018-01-01 | Stop reason: HOSPADM

## 2018-01-01 RX ORDER — GLIPIZIDE 5 MG/1
5 TABLET ORAL
Status: DISCONTINUED | OUTPATIENT
Start: 2018-01-01 | End: 2018-01-01

## 2018-01-01 RX ORDER — DEXTROSE 50 % IN WATER (D50W) INTRAVENOUS SYRINGE
25 AS NEEDED
Status: CANCELLED | OUTPATIENT
Start: 2018-01-01

## 2018-01-01 RX ORDER — BUPIVACAINE HYDROCHLORIDE 7.5 MG/ML
INJECTION, SOLUTION INTRASPINAL AS NEEDED
Status: DISCONTINUED | OUTPATIENT
Start: 2018-01-01 | End: 2018-01-01 | Stop reason: HOSPADM

## 2018-01-01 RX ORDER — PROMETHAZINE HYDROCHLORIDE 25 MG/1
25 TABLET ORAL
Status: DISCONTINUED | OUTPATIENT
Start: 2018-01-01 | End: 2018-01-01 | Stop reason: HOSPADM

## 2018-01-01 RX ORDER — BETHANECHOL CHLORIDE 10 MG/1
10 TABLET ORAL
Status: DISCONTINUED | OUTPATIENT
Start: 2018-01-01 | End: 2018-01-01

## 2018-01-01 RX ORDER — MIDAZOLAM HYDROCHLORIDE 1 MG/ML
2 INJECTION, SOLUTION INTRAMUSCULAR; INTRAVENOUS ONCE
Status: COMPLETED | OUTPATIENT
Start: 2018-01-01 | End: 2018-01-01

## 2018-01-01 RX ORDER — DEXTROSE 50 % IN WATER (D50W) INTRAVENOUS SYRINGE
50
Status: COMPLETED | OUTPATIENT
Start: 2018-01-01 | End: 2018-01-01

## 2018-01-01 RX ORDER — VANCOMYCIN/0.9 % SOD CHLORIDE 1.5G/250ML
1500 PLASTIC BAG, INJECTION (ML) INTRAVENOUS EVERY 12 HOURS
Status: DISCONTINUED | OUTPATIENT
Start: 2018-01-01 | End: 2018-01-01

## 2018-01-01 RX ORDER — BISACODYL 5 MG
10 TABLET, DELAYED RELEASE (ENTERIC COATED) ORAL DAILY PRN
Status: DISCONTINUED | OUTPATIENT
Start: 2018-01-01 | End: 2018-01-01 | Stop reason: HOSPADM

## 2018-01-01 RX ORDER — VANCOMYCIN/0.9 % SOD CHLORIDE 1.5G/250ML
1500 PLASTIC BAG, INJECTION (ML) INTRAVENOUS EVERY 24 HOURS
Status: DISCONTINUED | OUTPATIENT
Start: 2018-01-01 | End: 2018-01-01

## 2018-01-01 RX ORDER — DEXTROSE 50 % IN WATER (D50W) INTRAVENOUS SYRINGE
25 AS NEEDED
Status: DISCONTINUED | OUTPATIENT
Start: 2018-01-01 | End: 2018-01-01 | Stop reason: HOSPADM

## 2018-01-01 RX ORDER — GLIPIZIDE 10 MG/1
10 TABLET, FILM COATED, EXTENDED RELEASE ORAL DAILY
Status: DISCONTINUED | OUTPATIENT
Start: 2018-01-01 | End: 2018-01-01 | Stop reason: HOSPADM

## 2018-01-01 RX ORDER — HYDROMORPHONE HYDROCHLORIDE 2 MG/ML
0.5 INJECTION, SOLUTION INTRAMUSCULAR; INTRAVENOUS; SUBCUTANEOUS
Status: DISCONTINUED | OUTPATIENT
Start: 2018-01-01 | End: 2018-01-01 | Stop reason: HOSPADM

## 2018-01-01 RX ORDER — ACETAMINOPHEN 10 MG/ML
1000 INJECTION, SOLUTION INTRAVENOUS ONCE
Status: COMPLETED | OUTPATIENT
Start: 2018-01-01 | End: 2018-01-01

## 2018-01-01 RX ORDER — MIRTAZAPINE 15 MG/1
15 TABLET, FILM COATED ORAL
Status: CANCELLED | OUTPATIENT
Start: 2018-01-01

## 2018-01-01 RX ORDER — CELECOXIB 200 MG/1
200 CAPSULE ORAL EVERY 12 HOURS
Status: DISCONTINUED | OUTPATIENT
Start: 2018-01-01 | End: 2018-01-01

## 2018-01-01 RX ORDER — LIDOCAINE HYDROCHLORIDE 10 MG/ML
0.1 INJECTION INFILTRATION; PERINEURAL AS NEEDED
Status: DISCONTINUED | OUTPATIENT
Start: 2018-01-01 | End: 2018-01-01 | Stop reason: HOSPADM

## 2018-01-01 RX ORDER — BUPROPION HYDROCHLORIDE 150 MG/1
150 TABLET, EXTENDED RELEASE ORAL 2 TIMES DAILY
COMMUNITY

## 2018-01-01 RX ORDER — AMLODIPINE BESYLATE 5 MG/1
5 TABLET ORAL DAILY
Status: DISCONTINUED | OUTPATIENT
Start: 2018-01-01 | End: 2018-01-01 | Stop reason: HOSPADM

## 2018-01-01 RX ORDER — SIMVASTATIN 20 MG/1
40 TABLET, FILM COATED ORAL
Status: DISCONTINUED | OUTPATIENT
Start: 2018-01-01 | End: 2018-01-01 | Stop reason: HOSPADM

## 2018-01-01 RX ORDER — GLIPIZIDE 5 MG/1
10 TABLET ORAL
Status: DISCONTINUED | OUTPATIENT
Start: 2018-01-01 | End: 2018-01-01 | Stop reason: HOSPADM

## 2018-01-01 RX ORDER — SODIUM CHLORIDE 9 MG/ML
75 INJECTION, SOLUTION INTRAVENOUS CONTINUOUS
Status: DISCONTINUED | OUTPATIENT
Start: 2018-01-01 | End: 2018-01-01

## 2018-01-01 RX ORDER — ONDANSETRON 2 MG/ML
INJECTION INTRAMUSCULAR; INTRAVENOUS AS NEEDED
Status: DISCONTINUED | OUTPATIENT
Start: 2018-01-01 | End: 2018-01-01 | Stop reason: HOSPADM

## 2018-01-01 RX ORDER — NALOXONE HYDROCHLORIDE 0.4 MG/ML
0.1 INJECTION, SOLUTION INTRAMUSCULAR; INTRAVENOUS; SUBCUTANEOUS AS NEEDED
Status: DISCONTINUED | OUTPATIENT
Start: 2018-01-01 | End: 2018-01-01 | Stop reason: HOSPADM

## 2018-01-01 RX ORDER — AMOXICILLIN 250 MG
2 CAPSULE ORAL DAILY
Status: DISCONTINUED | OUTPATIENT
Start: 2018-01-01 | End: 2018-01-01 | Stop reason: HOSPADM

## 2018-01-01 RX ORDER — LOSARTAN POTASSIUM 100 MG/1
100 TABLET ORAL DAILY
COMMUNITY
End: 2018-01-01

## 2018-01-01 RX ORDER — HEPARIN SODIUM 5000 [USP'U]/ML
5000 INJECTION, SOLUTION INTRAVENOUS; SUBCUTANEOUS EVERY 12 HOURS
Status: DISCONTINUED | OUTPATIENT
Start: 2018-01-01 | End: 2018-01-01 | Stop reason: HOSPADM

## 2018-01-01 RX ORDER — TAMSULOSIN HYDROCHLORIDE 0.4 MG/1
0.4 CAPSULE ORAL
Status: CANCELLED | OUTPATIENT
Start: 2018-01-01

## 2018-01-01 RX ORDER — INSULIN DEGLUDEC 200 U/ML
50 INJECTION, SOLUTION SUBCUTANEOUS DAILY
Status: DISCONTINUED | OUTPATIENT
Start: 2018-01-01 | End: 2018-01-01 | Stop reason: HOSPADM

## 2018-01-01 RX ORDER — SODIUM CHLORIDE 9 MG/ML
100 INJECTION, SOLUTION INTRAVENOUS CONTINUOUS
Status: DISCONTINUED | OUTPATIENT
Start: 2018-01-01 | End: 2018-01-01

## 2018-01-01 RX ORDER — ASPIRIN 325 MG
325 TABLET, DELAYED RELEASE (ENTERIC COATED) ORAL DAILY
Status: DISCONTINUED | OUTPATIENT
Start: 2018-01-01 | End: 2018-01-01

## 2018-01-01 RX ORDER — SIMVASTATIN 40 MG/1
40 TABLET, FILM COATED ORAL
Status: CANCELLED | OUTPATIENT
Start: 2018-01-01

## 2018-01-01 RX ORDER — ASPIRIN 81 MG/1
81 TABLET ORAL DAILY
Status: DISCONTINUED | OUTPATIENT
Start: 2018-01-01 | End: 2018-01-01 | Stop reason: HOSPADM

## 2018-01-01 RX ORDER — HYDROCODONE BITARTRATE AND ACETAMINOPHEN 7.5; 325 MG/1; MG/1
1 TABLET ORAL
Qty: 30 TAB | Refills: 0 | Status: SHIPPED | OUTPATIENT
Start: 2018-01-01

## 2018-01-01 RX ORDER — FENTANYL CITRATE 50 UG/ML
100 INJECTION, SOLUTION INTRAMUSCULAR; INTRAVENOUS ONCE
Status: DISCONTINUED | OUTPATIENT
Start: 2018-01-01 | End: 2018-01-01 | Stop reason: HOSPADM

## 2018-01-01 RX ORDER — DIPHENHYDRAMINE HCL 25 MG
25 CAPSULE ORAL
Status: DISCONTINUED | OUTPATIENT
Start: 2018-01-01 | End: 2018-01-01 | Stop reason: HOSPADM

## 2018-01-01 RX ORDER — LOSARTAN POTASSIUM 50 MG/1
100 TABLET ORAL
Status: DISCONTINUED | OUTPATIENT
Start: 2018-01-01 | End: 2018-01-01 | Stop reason: HOSPADM

## 2018-01-01 RX ORDER — DIVALPROEX SODIUM 500 MG/1
1000 TABLET, DELAYED RELEASE ORAL 2 TIMES DAILY
Status: ON HOLD | COMMUNITY
End: 2018-01-01 | Stop reason: CLARIF

## 2018-01-01 RX ORDER — DOPAMINE HYDROCHLORIDE 320 MG/100ML
5 INJECTION, SOLUTION INTRAVENOUS
Status: DISCONTINUED | OUTPATIENT
Start: 2018-01-01 | End: 2018-11-28 | Stop reason: HOSPADM

## 2018-01-01 RX ORDER — NALOXONE HYDROCHLORIDE 0.4 MG/ML
.2-.4 INJECTION, SOLUTION INTRAMUSCULAR; INTRAVENOUS; SUBCUTANEOUS
Status: DISCONTINUED | OUTPATIENT
Start: 2018-01-01 | End: 2018-01-01 | Stop reason: HOSPADM

## 2018-01-01 RX ORDER — BUPROPION HYDROCHLORIDE 100 MG/1
200 TABLET, EXTENDED RELEASE ORAL DAILY
Status: DISCONTINUED | OUTPATIENT
Start: 2018-01-01 | End: 2018-01-01 | Stop reason: CLARIF

## 2018-01-01 RX ORDER — INSULIN DEGLUDEC 200 U/ML
50 INJECTION, SOLUTION SUBCUTANEOUS DAILY
COMMUNITY
End: 2018-01-01

## 2018-01-01 RX ORDER — PROPOFOL 10 MG/ML
INJECTION, EMULSION INTRAVENOUS AS NEEDED
Status: DISCONTINUED | OUTPATIENT
Start: 2018-01-01 | End: 2018-01-01 | Stop reason: HOSPADM

## 2018-01-01 RX ORDER — CEFAZOLIN SODIUM/WATER 2 G/20 ML
2 SYRINGE (ML) INTRAVENOUS ONCE
Status: COMPLETED | OUTPATIENT
Start: 2018-01-01 | End: 2018-01-01

## 2018-01-01 RX ORDER — DEXAMETHASONE SODIUM PHOSPHATE 4 MG/ML
INJECTION, SOLUTION INTRA-ARTICULAR; INTRALESIONAL; INTRAMUSCULAR; INTRAVENOUS; SOFT TISSUE AS NEEDED
Status: DISCONTINUED | OUTPATIENT
Start: 2018-01-01 | End: 2018-01-01 | Stop reason: HOSPADM

## 2018-01-01 RX ORDER — CEFPODOXIME PROXETIL 200 MG/1
200 TABLET, FILM COATED ORAL 2 TIMES DAILY
Qty: 14 TAB | Refills: 0 | Status: SHIPPED | OUTPATIENT
Start: 2018-01-01 | End: 2018-01-01

## 2018-01-01 RX ORDER — ASPIRIN 81 MG/1
81 TABLET ORAL EVERY 12 HOURS
Status: DISCONTINUED | OUTPATIENT
Start: 2018-01-01 | End: 2018-01-01 | Stop reason: HOSPADM

## 2018-01-01 RX ORDER — NALOXONE HYDROCHLORIDE 0.4 MG/ML
0.4 INJECTION, SOLUTION INTRAMUSCULAR; INTRAVENOUS; SUBCUTANEOUS AS NEEDED
Status: CANCELLED | OUTPATIENT
Start: 2018-01-01

## 2018-01-01 RX ORDER — ASPIRIN 81 MG/1
81 TABLET ORAL 2 TIMES DAILY
Status: CANCELLED | OUTPATIENT
Start: 2018-01-01

## 2018-01-01 RX ORDER — PREGABALIN 75 MG/1
75 CAPSULE ORAL 2 TIMES DAILY
COMMUNITY
End: 2018-01-01

## 2018-01-01 RX ORDER — ONDANSETRON 2 MG/ML
4 INJECTION INTRAMUSCULAR; INTRAVENOUS ONCE
Status: DISCONTINUED | OUTPATIENT
Start: 2018-01-01 | End: 2018-01-01 | Stop reason: HOSPADM

## 2018-01-01 RX ORDER — NYSTATIN 100000 U/G
CREAM TOPICAL
Status: DISCONTINUED | OUTPATIENT
Start: 2018-01-01 | End: 2018-01-01 | Stop reason: HOSPADM

## 2018-01-01 RX ORDER — CEFAZOLIN SODIUM/WATER 2 G/20 ML
2 SYRINGE (ML) INTRAVENOUS EVERY 8 HOURS
Status: COMPLETED | OUTPATIENT
Start: 2018-01-01 | End: 2018-01-01

## 2018-01-01 RX ORDER — CALCIUM POLYCARBOPHIL 625 MG
1 TABLET ORAL DAILY
Status: DISCONTINUED | OUTPATIENT
Start: 2018-01-01 | End: 2018-01-01 | Stop reason: HOSPADM

## 2018-01-01 RX ORDER — GLIPIZIDE 10 MG/1
10 TABLET, FILM COATED, EXTENDED RELEASE ORAL DAILY
COMMUNITY

## 2018-01-01 RX ORDER — HYDROMORPHONE HYDROCHLORIDE 2 MG/ML
1 INJECTION, SOLUTION INTRAMUSCULAR; INTRAVENOUS; SUBCUTANEOUS
Status: DISCONTINUED | OUTPATIENT
Start: 2018-01-01 | End: 2018-01-01 | Stop reason: HOSPADM

## 2018-01-01 RX ORDER — MAGNESIUM SULFATE HEPTAHYDRATE 40 MG/ML
2 INJECTION, SOLUTION INTRAVENOUS ONCE
Status: DISCONTINUED | OUTPATIENT
Start: 2018-01-01 | End: 2018-01-01

## 2018-01-01 RX ORDER — TRANEXAMIC ACID 100 MG/ML
INJECTION, SOLUTION INTRAVENOUS AS NEEDED
Status: DISCONTINUED | OUTPATIENT
Start: 2018-01-01 | End: 2018-01-01 | Stop reason: HOSPADM

## 2018-01-01 RX ORDER — ENOXAPARIN SODIUM 100 MG/ML
40 INJECTION SUBCUTANEOUS DAILY
Status: DISCONTINUED | OUTPATIENT
Start: 2018-01-01 | End: 2018-01-01 | Stop reason: HOSPADM

## 2018-01-01 RX ORDER — FACIAL-BODY WIPES
10 EACH TOPICAL DAILY PRN
Status: DISCONTINUED | OUTPATIENT
Start: 2018-01-01 | End: 2018-01-01 | Stop reason: HOSPADM

## 2018-01-01 RX ORDER — LATANOPROST 50 UG/ML
1 SOLUTION/ DROPS OPHTHALMIC
Status: CANCELLED | OUTPATIENT
Start: 2018-01-01

## 2018-01-01 RX ORDER — SODIUM BICARBONATE 1 MEQ/ML
50 SYRINGE (ML) INTRAVENOUS
Status: COMPLETED | OUTPATIENT
Start: 2018-01-01 | End: 2018-01-01

## 2018-01-01 RX ORDER — PANTOPRAZOLE SODIUM 40 MG/1
40 TABLET, DELAYED RELEASE ORAL
Status: DISCONTINUED | OUTPATIENT
Start: 2018-01-01 | End: 2018-01-01 | Stop reason: HOSPADM

## 2018-01-01 RX ORDER — DEXAMETHASONE SODIUM PHOSPHATE 100 MG/10ML
10 INJECTION INTRAMUSCULAR; INTRAVENOUS ONCE
Status: ACTIVE | OUTPATIENT
Start: 2018-01-01 | End: 2018-01-01

## 2018-01-01 RX ORDER — MIDAZOLAM HYDROCHLORIDE 1 MG/ML
2 INJECTION, SOLUTION INTRAMUSCULAR; INTRAVENOUS
Status: DISCONTINUED | OUTPATIENT
Start: 2018-01-01 | End: 2018-01-01 | Stop reason: HOSPADM

## 2018-01-01 RX ORDER — NOREPINEPHRINE BIT/0.9 % NACL 4MG/250ML
0-16 PLASTIC BAG, INJECTION (ML) INTRAVENOUS
Status: DISCONTINUED | OUTPATIENT
Start: 2018-01-01 | End: 2018-11-28 | Stop reason: HOSPADM

## 2018-01-01 RX ORDER — CALCIUM POLYCARBOPHIL 625 MG
625 TABLET ORAL DAILY
COMMUNITY

## 2018-01-01 RX ORDER — VANCOMYCIN 2 GRAM/500 ML IN 0.9 % SODIUM CHLORIDE INTRAVENOUS
2000 ONCE
Status: COMPLETED | OUTPATIENT
Start: 2018-01-01 | End: 2018-01-01

## 2018-01-01 RX ADMIN — HEPARIN SODIUM 5000 UNITS: 5000 INJECTION INTRAVENOUS; SUBCUTANEOUS at 05:35

## 2018-01-01 RX ADMIN — SODIUM CHLORIDE 500 ML: 900 INJECTION, SOLUTION INTRAVENOUS at 08:39

## 2018-01-01 RX ADMIN — PANTOPRAZOLE SODIUM 40 MG: 40 TABLET, DELAYED RELEASE ORAL at 06:03

## 2018-01-01 RX ADMIN — POLYETHYLENE GLYCOL (3350) 17 G: 17 POWDER, FOR SOLUTION ORAL at 09:02

## 2018-01-01 RX ADMIN — BUPROPION HYDROCHLORIDE 150 MG: 150 TABLET, EXTENDED RELEASE ORAL at 08:30

## 2018-01-01 RX ADMIN — HUMAN INSULIN 2 UNITS: 100 INJECTION, SOLUTION SUBCUTANEOUS at 07:44

## 2018-01-01 RX ADMIN — PREGABALIN 75 MG: 75 CAPSULE ORAL at 08:24

## 2018-01-01 RX ADMIN — GLIPIZIDE 10 MG: 10 TABLET, FILM COATED, EXTENDED RELEASE ORAL at 09:12

## 2018-01-01 RX ADMIN — DIVALPROEX SODIUM 1000 MG: 500 TABLET, FILM COATED, EXTENDED RELEASE ORAL at 21:35

## 2018-01-01 RX ADMIN — ASPIRIN 81 MG: 81 TABLET, COATED ORAL at 17:05

## 2018-01-01 RX ADMIN — POLYETHYLENE GLYCOL (3350) 17 G: 17 POWDER, FOR SOLUTION ORAL at 08:42

## 2018-01-01 RX ADMIN — SIMVASTATIN 40 MG: 40 TABLET, FILM COATED ORAL at 21:05

## 2018-01-01 RX ADMIN — AMLODIPINE BESYLATE 5 MG: 5 TABLET ORAL at 08:36

## 2018-01-01 RX ADMIN — BUPROPION HYDROCHLORIDE 150 MG: 150 TABLET, EXTENDED RELEASE ORAL at 08:16

## 2018-01-01 RX ADMIN — VANCOMYCIN HYDROCHLORIDE 1500 MG: 10 INJECTION, POWDER, LYOPHILIZED, FOR SOLUTION INTRAVENOUS at 14:06

## 2018-01-01 RX ADMIN — HUMAN INSULIN 4 UNITS: 100 INJECTION, SOLUTION SUBCUTANEOUS at 12:03

## 2018-01-01 RX ADMIN — HEPARIN SODIUM 5000 UNITS: 5000 INJECTION INTRAVENOUS; SUBCUTANEOUS at 09:19

## 2018-01-01 RX ADMIN — TAMSULOSIN HYDROCHLORIDE 0.4 MG: 0.4 CAPSULE ORAL at 20:28

## 2018-01-01 RX ADMIN — BUPROPION HYDROCHLORIDE 150 MG: 150 TABLET, EXTENDED RELEASE ORAL at 17:12

## 2018-01-01 RX ADMIN — BUPROPION HYDROCHLORIDE 150 MG: 150 TABLET, FILM COATED, EXTENDED RELEASE ORAL at 18:34

## 2018-01-01 RX ADMIN — BUPROPION HYDROCHLORIDE 150 MG: 150 TABLET, EXTENDED RELEASE ORAL at 17:13

## 2018-01-01 RX ADMIN — DIVALPROEX SODIUM 1000 MG: 500 TABLET, FILM COATED, EXTENDED RELEASE ORAL at 09:19

## 2018-01-01 RX ADMIN — PANTOPRAZOLE SODIUM 40 MG: 40 TABLET, DELAYED RELEASE ORAL at 04:24

## 2018-01-01 RX ADMIN — ACETAMINOPHEN 1000 MG: 500 TABLET, FILM COATED ORAL at 04:25

## 2018-01-01 RX ADMIN — DIVALPROEX SODIUM 1000 MG: 500 TABLET, FILM COATED, EXTENDED RELEASE ORAL at 21:58

## 2018-01-01 RX ADMIN — TAMSULOSIN HYDROCHLORIDE 0.4 MG: 0.4 CAPSULE ORAL at 21:19

## 2018-01-01 RX ADMIN — BUPROPION HYDROCHLORIDE 150 MG: 150 TABLET, EXTENDED RELEASE ORAL at 18:39

## 2018-01-01 RX ADMIN — DIVALPROEX SODIUM 1000 MG: 500 TABLET, FILM COATED, EXTENDED RELEASE ORAL at 08:12

## 2018-01-01 RX ADMIN — LOSARTAN POTASSIUM 100 MG: 50 TABLET ORAL at 21:32

## 2018-01-01 RX ADMIN — ACETAMINOPHEN 1000 MG: 500 TABLET, FILM COATED ORAL at 23:21

## 2018-01-01 RX ADMIN — BUPROPION HYDROCHLORIDE 150 MG: 150 TABLET, EXTENDED RELEASE ORAL at 17:52

## 2018-01-01 RX ADMIN — DIVALPROEX SODIUM 1000 MG: 500 TABLET, FILM COATED, EXTENDED RELEASE ORAL at 21:32

## 2018-01-01 RX ADMIN — HYDROCODONE BITARTRATE AND ACETAMINOPHEN 2 TABLET: 7.5; 325 TABLET ORAL at 19:19

## 2018-01-01 RX ADMIN — TAMSULOSIN HYDROCHLORIDE 0.4 MG: 0.4 CAPSULE ORAL at 22:45

## 2018-01-01 RX ADMIN — SENNOSIDES AND DOCUSATE SODIUM 1 TABLET: 8.6; 5 TABLET ORAL at 08:42

## 2018-01-01 RX ADMIN — HEPARIN SODIUM 5000 UNITS: 5000 INJECTION INTRAVENOUS; SUBCUTANEOUS at 08:11

## 2018-01-01 RX ADMIN — SIMVASTATIN 40 MG: 40 TABLET, FILM COATED ORAL at 23:21

## 2018-01-01 RX ADMIN — BUPROPION HYDROCHLORIDE 150 MG: 150 TABLET, EXTENDED RELEASE ORAL at 08:25

## 2018-01-01 RX ADMIN — HEPARIN SODIUM 5000 UNITS: 5000 INJECTION INTRAVENOUS; SUBCUTANEOUS at 09:04

## 2018-01-01 RX ADMIN — HUMAN INSULIN 6 UNITS: 100 INJECTION, SOLUTION SUBCUTANEOUS at 12:22

## 2018-01-01 RX ADMIN — GLIPIZIDE 10 MG: 5 TABLET ORAL at 09:09

## 2018-01-01 RX ADMIN — DIVALPROEX SODIUM 1000 MG: 500 TABLET, FILM COATED, EXTENDED RELEASE ORAL at 22:30

## 2018-01-01 RX ADMIN — Medication 10 ML: at 21:34

## 2018-01-01 RX ADMIN — HYDROCODONE BITARTRATE AND ACETAMINOPHEN 2 TABLET: 7.5; 325 TABLET ORAL at 20:51

## 2018-01-01 RX ADMIN — POLYETHYLENE GLYCOL (3350) 17 G: 17 POWDER, FOR SOLUTION ORAL at 21:44

## 2018-01-01 RX ADMIN — DIVALPROEX SODIUM 1000 MG: 500 TABLET, FILM COATED, EXTENDED RELEASE ORAL at 20:45

## 2018-01-01 RX ADMIN — LATANOPROST 1 DROP: 50 SOLUTION OPHTHALMIC at 20:15

## 2018-01-01 RX ADMIN — PANTOPRAZOLE SODIUM 40 MG: 40 TABLET, DELAYED RELEASE ORAL at 08:20

## 2018-01-01 RX ADMIN — POLYETHYLENE GLYCOL (3350) 17 G: 17 POWDER, FOR SOLUTION ORAL at 10:14

## 2018-01-01 RX ADMIN — DIVALPROEX SODIUM 1000 MG: 500 TABLET, FILM COATED, EXTENDED RELEASE ORAL at 10:00

## 2018-01-01 RX ADMIN — BACLOFEN 5 MG: 10 TABLET ORAL at 08:31

## 2018-01-01 RX ADMIN — Medication: at 04:06

## 2018-01-01 RX ADMIN — Medication 5 ML: at 16:10

## 2018-01-01 RX ADMIN — ACETAMINOPHEN 1000 MG: 500 TABLET, FILM COATED ORAL at 22:30

## 2018-01-01 RX ADMIN — SENNOSIDES AND DOCUSATE SODIUM 1 TABLET: 8.6; 5 TABLET ORAL at 08:39

## 2018-01-01 RX ADMIN — PIPERACILLIN SODIUM,TAZOBACTAM SODIUM 3.38 G: 3; .375 INJECTION, POWDER, FOR SOLUTION INTRAVENOUS at 05:19

## 2018-01-01 RX ADMIN — BUPROPION HYDROCHLORIDE 150 MG: 150 TABLET, EXTENDED RELEASE ORAL at 09:06

## 2018-01-01 RX ADMIN — HEPARIN SODIUM 5000 UNITS: 5000 INJECTION INTRAVENOUS; SUBCUTANEOUS at 20:46

## 2018-01-01 RX ADMIN — DIVALPROEX SODIUM 1000 MG: 500 TABLET, FILM COATED, EXTENDED RELEASE ORAL at 08:11

## 2018-01-01 RX ADMIN — HEPARIN SODIUM 5000 UNITS: 5000 INJECTION INTRAVENOUS; SUBCUTANEOUS at 04:54

## 2018-01-01 RX ADMIN — HEPARIN SODIUM 5000 UNITS: 5000 INJECTION INTRAVENOUS; SUBCUTANEOUS at 20:12

## 2018-01-01 RX ADMIN — WARFARIN SODIUM 5 MG: 5 TABLET ORAL at 21:32

## 2018-01-01 RX ADMIN — LOSARTAN POTASSIUM 100 MG: 50 TABLET ORAL at 20:42

## 2018-01-01 RX ADMIN — SIMVASTATIN 40 MG: 20 TABLET, FILM COATED ORAL at 20:45

## 2018-01-01 RX ADMIN — HEPARIN SODIUM 5000 UNITS: 5000 INJECTION INTRAVENOUS; SUBCUTANEOUS at 08:20

## 2018-01-01 RX ADMIN — DIVALPROEX SODIUM 1000 MG: 500 TABLET, FILM COATED, EXTENDED RELEASE ORAL at 08:26

## 2018-01-01 RX ADMIN — LATANOPROST 1 DROP: 50 SOLUTION OPHTHALMIC at 21:24

## 2018-01-01 RX ADMIN — ASPIRIN 81 MG: 81 TABLET, DELAYED RELEASE ORAL at 10:28

## 2018-01-01 RX ADMIN — BUPROPION HYDROCHLORIDE 150 MG: 150 TABLET, EXTENDED RELEASE ORAL at 17:03

## 2018-01-01 RX ADMIN — LATANOPROST 1 DROP: 50 SOLUTION OPHTHALMIC at 21:20

## 2018-01-01 RX ADMIN — PANTOPRAZOLE SODIUM 40 MG: 40 TABLET, DELAYED RELEASE ORAL at 06:30

## 2018-01-01 RX ADMIN — DEXTROSE MONOHYDRATE 25 G: 25 INJECTION, SOLUTION INTRAVENOUS at 21:49

## 2018-01-01 RX ADMIN — HUMAN INSULIN 4 UNITS: 100 INJECTION, SOLUTION SUBCUTANEOUS at 11:52

## 2018-01-01 RX ADMIN — DIVALPROEX SODIUM 1000 MG: 500 TABLET, FILM COATED, EXTENDED RELEASE ORAL at 21:11

## 2018-01-01 RX ADMIN — SENNOSIDES AND DOCUSATE SODIUM 1 TABLET: 8.6; 5 TABLET ORAL at 08:30

## 2018-01-01 RX ADMIN — PANTOPRAZOLE SODIUM 40 MG: 40 TABLET, DELAYED RELEASE ORAL at 06:10

## 2018-01-01 RX ADMIN — SODIUM PHOSPHATE 1 ENEMA: 7; 19 ENEMA RECTAL at 12:43

## 2018-01-01 RX ADMIN — HEPARIN SODIUM 5000 UNITS: 5000 INJECTION INTRAVENOUS; SUBCUTANEOUS at 08:42

## 2018-01-01 RX ADMIN — Medication 2 G: at 17:46

## 2018-01-01 RX ADMIN — BUPROPION HYDROCHLORIDE 150 MG: 150 TABLET, FILM COATED, EXTENDED RELEASE ORAL at 20:42

## 2018-01-01 RX ADMIN — ASPIRIN 81 MG: 81 TABLET, COATED ORAL at 08:30

## 2018-01-01 RX ADMIN — HYDROMORPHONE HYDROCHLORIDE 4 MG: 4 TABLET ORAL at 12:04

## 2018-01-01 RX ADMIN — BACLOFEN 5 MG: 10 TABLET ORAL at 21:19

## 2018-01-01 RX ADMIN — HYDROCODONE BITARTRATE AND ACETAMINOPHEN 2 TABLET: 7.5; 325 TABLET ORAL at 20:06

## 2018-01-01 RX ADMIN — BUPROPION HYDROCHLORIDE 150 MG: 150 TABLET, EXTENDED RELEASE ORAL at 08:39

## 2018-01-01 RX ADMIN — HUMAN INSULIN 4 UNITS: 100 INJECTION, SOLUTION SUBCUTANEOUS at 11:50

## 2018-01-01 RX ADMIN — HYDROCODONE BITARTRATE AND ACETAMINOPHEN 1 TABLET: 7.5; 325 TABLET ORAL at 22:45

## 2018-01-01 RX ADMIN — ENOXAPARIN SODIUM 40 MG: 40 INJECTION, SOLUTION INTRAVENOUS; SUBCUTANEOUS at 08:54

## 2018-01-01 RX ADMIN — MIRTAZAPINE 15 MG: 15 TABLET, FILM COATED ORAL at 21:57

## 2018-01-01 RX ADMIN — TAMSULOSIN HYDROCHLORIDE 0.4 MG: 0.4 CAPSULE ORAL at 21:57

## 2018-01-01 RX ADMIN — Medication 10 ML: at 21:44

## 2018-01-01 RX ADMIN — HYDROCODONE BITARTRATE AND ACETAMINOPHEN 2 TABLET: 7.5; 325 TABLET ORAL at 16:56

## 2018-01-01 RX ADMIN — HEPARIN SODIUM 5000 UNITS: 5000 INJECTION INTRAVENOUS; SUBCUTANEOUS at 22:20

## 2018-01-01 RX ADMIN — HEPARIN SODIUM 5000 UNITS: 5000 INJECTION INTRAVENOUS; SUBCUTANEOUS at 21:17

## 2018-01-01 RX ADMIN — SIMVASTATIN 40 MG: 20 TABLET, FILM COATED ORAL at 21:45

## 2018-01-01 RX ADMIN — PIPERACILLIN SODIUM,TAZOBACTAM SODIUM 3.38 G: 3; .375 INJECTION, POWDER, FOR SOLUTION INTRAVENOUS at 22:19

## 2018-01-01 RX ADMIN — BUPROPION HYDROCHLORIDE 150 MG: 150 TABLET, EXTENDED RELEASE ORAL at 08:26

## 2018-01-01 RX ADMIN — ASPIRIN 81 MG: 81 TABLET, DELAYED RELEASE ORAL at 09:19

## 2018-01-01 RX ADMIN — HEPARIN SODIUM 5000 UNITS: 5000 INJECTION INTRAVENOUS; SUBCUTANEOUS at 04:56

## 2018-01-01 RX ADMIN — BUPROPION HYDROCHLORIDE 150 MG: 150 TABLET, EXTENDED RELEASE ORAL at 17:37

## 2018-01-01 RX ADMIN — PREGABALIN 75 MG: 75 CAPSULE ORAL at 17:14

## 2018-01-01 RX ADMIN — GLIPIZIDE 5 MG: 5 TABLET ORAL at 08:03

## 2018-01-01 RX ADMIN — LATANOPROST 1 DROP: 50 SOLUTION OPHTHALMIC at 22:45

## 2018-01-01 RX ADMIN — HUMAN INSULIN 2 UNITS: 100 INJECTION, SOLUTION SUBCUTANEOUS at 08:11

## 2018-01-01 RX ADMIN — MAGNESIUM SULFATE HEPTAHYDRATE 2 G: 40 INJECTION, SOLUTION INTRAVENOUS at 09:03

## 2018-01-01 RX ADMIN — BUPROPION HYDROCHLORIDE 150 MG: 150 TABLET, EXTENDED RELEASE ORAL at 17:05

## 2018-01-01 RX ADMIN — PIPERACILLIN SODIUM,TAZOBACTAM SODIUM 4.5 G: 4; .5 INJECTION, POWDER, FOR SOLUTION INTRAVENOUS at 15:07

## 2018-01-01 RX ADMIN — TAMSULOSIN HYDROCHLORIDE 0.4 MG: 0.4 CAPSULE ORAL at 20:07

## 2018-01-01 RX ADMIN — SODIUM CHLORIDE 250 ML/HR: 900 INJECTION, SOLUTION INTRAVENOUS at 14:00

## 2018-01-01 RX ADMIN — PANTOPRAZOLE SODIUM 40 MG: 40 TABLET, DELAYED RELEASE ORAL at 09:46

## 2018-01-01 RX ADMIN — INSULIN HUMAN 2 UNITS: 100 INJECTION, SOLUTION PARENTERAL at 12:31

## 2018-01-01 RX ADMIN — HUMAN INSULIN 2 UNITS: 100 INJECTION, SOLUTION SUBCUTANEOUS at 08:08

## 2018-01-01 RX ADMIN — AMLODIPINE BESYLATE 5 MG: 5 TABLET ORAL at 08:31

## 2018-01-01 RX ADMIN — TAMSULOSIN HYDROCHLORIDE 0.4 MG: 0.4 CAPSULE ORAL at 21:35

## 2018-01-01 RX ADMIN — PIPERACILLIN SODIUM,TAZOBACTAM SODIUM 3.38 G: 3; .375 INJECTION, POWDER, FOR SOLUTION INTRAVENOUS at 05:45

## 2018-01-01 RX ADMIN — LATANOPROST 1 DROP: 50 SOLUTION OPHTHALMIC at 22:00

## 2018-01-01 RX ADMIN — HYDROMORPHONE HYDROCHLORIDE 2 MG: 2 TABLET ORAL at 17:46

## 2018-01-01 RX ADMIN — TAMSULOSIN HYDROCHLORIDE 0.4 MG: 0.4 CAPSULE ORAL at 23:42

## 2018-01-01 RX ADMIN — HEPARIN SODIUM 5000 UNITS: 5000 INJECTION INTRAVENOUS; SUBCUTANEOUS at 21:06

## 2018-01-01 RX ADMIN — DIVALPROEX SODIUM 1000 MG: 500 TABLET, FILM COATED, EXTENDED RELEASE ORAL at 09:06

## 2018-01-01 RX ADMIN — PIPERACILLIN SODIUM,TAZOBACTAM SODIUM 4.5 G: 4; .5 INJECTION, POWDER, FOR SOLUTION INTRAVENOUS at 05:36

## 2018-01-01 RX ADMIN — POLYETHYLENE GLYCOL (3350) 17 G: 17 POWDER, FOR SOLUTION ORAL at 08:44

## 2018-01-01 RX ADMIN — HUMAN INSULIN 4 UNITS: 100 INJECTION, SOLUTION SUBCUTANEOUS at 16:48

## 2018-01-01 RX ADMIN — HUMAN INSULIN 6 UNITS: 100 INJECTION, SOLUTION SUBCUTANEOUS at 11:53

## 2018-01-01 RX ADMIN — HUMAN INSULIN 4 UNITS: 100 INJECTION, SOLUTION SUBCUTANEOUS at 12:13

## 2018-01-01 RX ADMIN — BUPROPION HYDROCHLORIDE 150 MG: 150 TABLET, EXTENDED RELEASE ORAL at 17:29

## 2018-01-01 RX ADMIN — HUMAN INSULIN 2 UNITS: 100 INJECTION, SOLUTION SUBCUTANEOUS at 17:40

## 2018-01-01 RX ADMIN — SODIUM CHLORIDE 40 MG: 9 INJECTION INTRAMUSCULAR; INTRAVENOUS; SUBCUTANEOUS at 08:41

## 2018-01-01 RX ADMIN — HUMAN INSULIN 2 UNITS: 100 INJECTION, SOLUTION SUBCUTANEOUS at 21:59

## 2018-01-01 RX ADMIN — DIVALPROEX SODIUM 1000 MG: 500 TABLET, FILM COATED, EXTENDED RELEASE ORAL at 09:12

## 2018-01-01 RX ADMIN — HUMAN INSULIN 6 UNITS: 100 INJECTION, SOLUTION SUBCUTANEOUS at 16:51

## 2018-01-01 RX ADMIN — HUMAN INSULIN 2 UNITS: 100 INJECTION, SOLUTION SUBCUTANEOUS at 16:59

## 2018-01-01 RX ADMIN — DIVALPROEX SODIUM 1000 MG: 500 TABLET, FILM COATED, EXTENDED RELEASE ORAL at 21:16

## 2018-01-01 RX ADMIN — ASPIRIN 81 MG: 81 TABLET, DELAYED RELEASE ORAL at 09:05

## 2018-01-01 RX ADMIN — PREGABALIN 75 MG: 75 CAPSULE ORAL at 08:54

## 2018-01-01 RX ADMIN — PROPOFOL 100 MCG/KG/MIN: 10 INJECTION, EMULSION INTRAVENOUS at 09:51

## 2018-01-01 RX ADMIN — LATANOPROST 1 DROP: 50 SOLUTION OPHTHALMIC at 22:19

## 2018-01-01 RX ADMIN — HUMAN INSULIN 4 UNITS: 100 INJECTION, SOLUTION SUBCUTANEOUS at 16:43

## 2018-01-01 RX ADMIN — SIMVASTATIN 40 MG: 20 TABLET, FILM COATED ORAL at 21:07

## 2018-01-01 RX ADMIN — ACETAMINOPHEN 650 MG: 325 TABLET ORAL at 16:11

## 2018-01-01 RX ADMIN — SODIUM CHLORIDE 100 ML/HR: 900 INJECTION, SOLUTION INTRAVENOUS at 17:49

## 2018-01-01 RX ADMIN — HYDROMORPHONE HYDROCHLORIDE 2 MG: 2 TABLET ORAL at 20:42

## 2018-01-01 RX ADMIN — HEPARIN SODIUM 5000 UNITS: 5000 INJECTION INTRAVENOUS; SUBCUTANEOUS at 16:32

## 2018-01-01 RX ADMIN — VANCOMYCIN HYDROCHLORIDE 1500 MG: 10 INJECTION, POWDER, LYOPHILIZED, FOR SOLUTION INTRAVENOUS at 01:39

## 2018-01-01 RX ADMIN — HYDROCODONE BITARTRATE AND ACETAMINOPHEN 2 TABLET: 7.5; 325 TABLET ORAL at 21:19

## 2018-01-01 RX ADMIN — SODIUM CHLORIDE 75 ML/HR: 900 INJECTION, SOLUTION INTRAVENOUS at 21:33

## 2018-01-01 RX ADMIN — ASPIRIN 81 MG: 81 TABLET, DELAYED RELEASE ORAL at 21:08

## 2018-01-01 RX ADMIN — HEPARIN SODIUM 5000 UNITS: 5000 INJECTION INTRAVENOUS; SUBCUTANEOUS at 08:26

## 2018-01-01 RX ADMIN — SIMVASTATIN 40 MG: 40 TABLET, FILM COATED ORAL at 23:42

## 2018-01-01 RX ADMIN — PIPERACILLIN SODIUM,TAZOBACTAM SODIUM 3.38 G: 3; .375 INJECTION, POWDER, FOR SOLUTION INTRAVENOUS at 06:09

## 2018-01-01 RX ADMIN — SIMVASTATIN 40 MG: 20 TABLET, FILM COATED ORAL at 21:34

## 2018-01-01 RX ADMIN — GLIPIZIDE 10 MG: 5 TABLET ORAL at 08:37

## 2018-01-01 RX ADMIN — SIMVASTATIN 40 MG: 40 TABLET, FILM COATED ORAL at 21:57

## 2018-01-01 RX ADMIN — POLYETHYLENE GLYCOL (3350) 17 G: 17 POWDER, FOR SOLUTION ORAL at 08:03

## 2018-01-01 RX ADMIN — POLYETHYLENE GLYCOL (3350) 17 G: 17 POWDER, FOR SOLUTION ORAL at 08:19

## 2018-01-01 RX ADMIN — SODIUM CHLORIDE 1000 ML: 900 INJECTION, SOLUTION INTRAVENOUS at 13:21

## 2018-01-01 RX ADMIN — BUPROPION HYDROCHLORIDE 150 MG: 150 TABLET, EXTENDED RELEASE ORAL at 17:27

## 2018-01-01 RX ADMIN — DIVALPROEX SODIUM 1000 MG: 500 TABLET, FILM COATED, EXTENDED RELEASE ORAL at 22:45

## 2018-01-01 RX ADMIN — INSULIN HUMAN 2 UNITS: 100 INJECTION, SOLUTION PARENTERAL at 23:48

## 2018-01-01 RX ADMIN — BISACODYL 10 MG: 5 TABLET, COATED ORAL at 09:45

## 2018-01-01 RX ADMIN — VANCOMYCIN HYDROCHLORIDE 1500 MG: 10 INJECTION, POWDER, LYOPHILIZED, FOR SOLUTION INTRAVENOUS at 15:26

## 2018-01-01 RX ADMIN — VANCOMYCIN HYDROCHLORIDE 2000 MG: 10 INJECTION, POWDER, LYOPHILIZED, FOR SOLUTION INTRAVENOUS at 15:55

## 2018-01-01 RX ADMIN — INSULIN HUMAN 2 UNITS: 100 INJECTION, SOLUTION PARENTERAL at 21:33

## 2018-01-01 RX ADMIN — SENNOSIDES AND DOCUSATE SODIUM 1 TABLET: 8.6; 5 TABLET ORAL at 10:14

## 2018-01-01 RX ADMIN — BUPROPION HYDROCHLORIDE 150 MG: 150 TABLET, FILM COATED, EXTENDED RELEASE ORAL at 09:12

## 2018-01-01 RX ADMIN — HYDROCODONE BITARTRATE AND ACETAMINOPHEN 1 TABLET: 7.5; 325 TABLET ORAL at 21:35

## 2018-01-01 RX ADMIN — ASPIRIN 81 MG: 81 TABLET, DELAYED RELEASE ORAL at 09:12

## 2018-01-01 RX ADMIN — PREGABALIN 75 MG: 75 CAPSULE ORAL at 09:12

## 2018-01-01 RX ADMIN — AMLODIPINE BESYLATE 5 MG: 5 TABLET ORAL at 08:03

## 2018-01-01 RX ADMIN — GLIPIZIDE 5 MG: 5 TABLET ORAL at 08:47

## 2018-01-01 RX ADMIN — WARFARIN SODIUM 5 MG: 5 TABLET ORAL at 22:30

## 2018-01-01 RX ADMIN — ASPIRIN 81 MG: 81 TABLET, DELAYED RELEASE ORAL at 09:40

## 2018-01-01 RX ADMIN — TAMSULOSIN HYDROCHLORIDE 0.4 MG: 0.4 CAPSULE ORAL at 21:07

## 2018-01-01 RX ADMIN — GLIPIZIDE 5 MG: 5 TABLET ORAL at 08:42

## 2018-01-01 RX ADMIN — DIVALPROEX SODIUM 1000 MG: 500 TABLET, FILM COATED, EXTENDED RELEASE ORAL at 21:05

## 2018-01-01 RX ADMIN — PREGABALIN 75 MG: 75 CAPSULE ORAL at 17:52

## 2018-01-01 RX ADMIN — HYDROMORPHONE HYDROCHLORIDE 4 MG: 4 TABLET ORAL at 09:12

## 2018-01-01 RX ADMIN — DIVALPROEX SODIUM 1000 MG: 500 TABLET, FILM COATED, EXTENDED RELEASE ORAL at 08:31

## 2018-01-01 RX ADMIN — FUROSEMIDE 20 MG: 20 TABLET ORAL at 08:53

## 2018-01-01 RX ADMIN — DIVALPROEX SODIUM 1000 MG: 500 TABLET, FILM COATED, EXTENDED RELEASE ORAL at 08:36

## 2018-01-01 RX ADMIN — HEPARIN SODIUM 5000 UNITS: 5000 INJECTION INTRAVENOUS; SUBCUTANEOUS at 09:44

## 2018-01-01 RX ADMIN — HUMAN INSULIN 2 UNITS: 100 INJECTION, SOLUTION SUBCUTANEOUS at 16:35

## 2018-01-01 RX ADMIN — ROPIVACAINE HYDROCHLORIDE 20 ML: 2 INJECTION, SOLUTION EPIDURAL; INFILTRATION; PERINEURAL at 09:20

## 2018-01-01 RX ADMIN — HUMAN INSULIN 2 UNITS: 100 INJECTION, SOLUTION SUBCUTANEOUS at 21:25

## 2018-01-01 RX ADMIN — ASPIRIN 81 MG: 81 TABLET, COATED ORAL at 08:03

## 2018-01-01 RX ADMIN — FUROSEMIDE 20 MG: 20 TABLET ORAL at 08:24

## 2018-01-01 RX ADMIN — INSULIN HUMAN 2 UNITS: 100 INJECTION, SOLUTION PARENTERAL at 17:14

## 2018-01-01 RX ADMIN — HEPARIN SODIUM 5000 UNITS: 5000 INJECTION INTRAVENOUS; SUBCUTANEOUS at 15:08

## 2018-01-01 RX ADMIN — PREGABALIN 75 MG: 75 CAPSULE ORAL at 20:42

## 2018-01-01 RX ADMIN — LATANOPROST 1 DROP: 50 SOLUTION OPHTHALMIC at 21:43

## 2018-01-01 RX ADMIN — DIVALPROEX SODIUM 1000 MG: 500 TABLET, FILM COATED, EXTENDED RELEASE ORAL at 10:14

## 2018-01-01 RX ADMIN — ACETAMINOPHEN 1000 MG: 500 TABLET, FILM COATED ORAL at 08:54

## 2018-01-01 RX ADMIN — HEPARIN SODIUM 5000 UNITS: 5000 INJECTION INTRAVENOUS; SUBCUTANEOUS at 21:45

## 2018-01-01 RX ADMIN — BACLOFEN 5 MG: 10 TABLET ORAL at 08:03

## 2018-01-01 RX ADMIN — SODIUM CHLORIDE, SODIUM LACTATE, POTASSIUM CHLORIDE, AND CALCIUM CHLORIDE: 600; 310; 30; 20 INJECTION, SOLUTION INTRAVENOUS at 10:25

## 2018-01-01 RX ADMIN — DIVALPROEX SODIUM 1000 MG: 500 TABLET, FILM COATED, EXTENDED RELEASE ORAL at 22:19

## 2018-01-01 RX ADMIN — ACETAMINOPHEN 1000 MG: 500 TABLET, FILM COATED ORAL at 17:12

## 2018-01-01 RX ADMIN — HUMAN INSULIN 4 UNITS: 100 INJECTION, SOLUTION SUBCUTANEOUS at 16:58

## 2018-01-01 RX ADMIN — PANTOPRAZOLE SODIUM 40 MG: 40 TABLET, DELAYED RELEASE ORAL at 05:16

## 2018-01-01 RX ADMIN — ACETAMINOPHEN 1000 MG: 500 TABLET, FILM COATED ORAL at 12:04

## 2018-01-01 RX ADMIN — HYDROCODONE BITARTRATE AND ACETAMINOPHEN 2 TABLET: 7.5; 325 TABLET ORAL at 21:11

## 2018-01-01 RX ADMIN — DIVALPROEX SODIUM 1000 MG: 500 TABLET, FILM COATED, EXTENDED RELEASE ORAL at 21:33

## 2018-01-01 RX ADMIN — PANTOPRAZOLE SODIUM 40 MG: 40 TABLET, DELAYED RELEASE ORAL at 06:08

## 2018-01-01 RX ADMIN — DIVALPROEX SODIUM 1000 MG: 500 TABLET, FILM COATED, EXTENDED RELEASE ORAL at 08:17

## 2018-01-01 RX ADMIN — HEPARIN SODIUM 5000 UNITS: 5000 INJECTION INTRAVENOUS; SUBCUTANEOUS at 17:20

## 2018-01-01 RX ADMIN — SIMVASTATIN 40 MG: 40 TABLET, FILM COATED ORAL at 22:00

## 2018-01-01 RX ADMIN — MAGNESIUM SULFATE HEPTAHYDRATE 2 G: 40 INJECTION, SOLUTION INTRAVENOUS at 08:50

## 2018-01-01 RX ADMIN — PIPERACILLIN SODIUM,TAZOBACTAM SODIUM 4.5 G: 4; .5 INJECTION, POWDER, FOR SOLUTION INTRAVENOUS at 22:04

## 2018-01-01 RX ADMIN — ASPIRIN 81 MG: 81 TABLET, COATED ORAL at 08:25

## 2018-01-01 RX ADMIN — HUMAN INSULIN 2 UNITS: 100 INJECTION, SOLUTION SUBCUTANEOUS at 21:34

## 2018-01-01 RX ADMIN — AMLODIPINE BESYLATE 5 MG: 5 TABLET ORAL at 08:39

## 2018-01-01 RX ADMIN — SODIUM CHLORIDE 100 ML/HR: 900 INJECTION, SOLUTION INTRAVENOUS at 23:30

## 2018-01-01 RX ADMIN — PREGABALIN 75 MG: 75 CAPSULE ORAL at 08:31

## 2018-01-01 RX ADMIN — VANCOMYCIN HYDROCHLORIDE 2500 MG: 10 INJECTION, POWDER, LYOPHILIZED, FOR SOLUTION INTRAVENOUS at 15:00

## 2018-01-01 RX ADMIN — INSULIN HUMAN 2 UNITS: 100 INJECTION, SOLUTION PARENTERAL at 12:39

## 2018-01-01 RX ADMIN — BUPROPION HYDROCHLORIDE 150 MG: 150 TABLET, FILM COATED, EXTENDED RELEASE ORAL at 18:00

## 2018-01-01 RX ADMIN — HEPARIN SODIUM 5000 UNITS: 5000 INJECTION INTRAVENOUS; SUBCUTANEOUS at 20:53

## 2018-01-01 RX ADMIN — PIPERACILLIN SODIUM,TAZOBACTAM SODIUM 3.38 G: 3; .375 INJECTION, POWDER, FOR SOLUTION INTRAVENOUS at 21:34

## 2018-01-01 RX ADMIN — ASPIRIN 81 MG: 81 TABLET, COATED ORAL at 08:36

## 2018-01-01 RX ADMIN — PANTOPRAZOLE SODIUM 40 MG: 40 TABLET, DELAYED RELEASE ORAL at 05:51

## 2018-01-01 RX ADMIN — DOPAMINE HYDROCHLORIDE IN DEXTROSE 5 MCG/KG/MIN: 3.2 INJECTION, SOLUTION INTRAVENOUS at 16:57

## 2018-01-01 RX ADMIN — SODIUM CHLORIDE 1000 ML: 900 INJECTION, SOLUTION INTRAVENOUS at 14:40

## 2018-01-01 RX ADMIN — HUMAN INSULIN 2 UNITS: 100 INJECTION, SOLUTION SUBCUTANEOUS at 12:30

## 2018-01-01 RX ADMIN — NYSTATIN: 100000 POWDER TOPICAL at 08:18

## 2018-01-01 RX ADMIN — INSULIN DEGLUDEC 50 UNITS: 200 INJECTION, SOLUTION SUBCUTANEOUS at 09:00

## 2018-01-01 RX ADMIN — ASPIRIN 81 MG: 81 TABLET, DELAYED RELEASE ORAL at 12:00

## 2018-01-01 RX ADMIN — SIMVASTATIN 40 MG: 40 TABLET, FILM COATED ORAL at 21:33

## 2018-01-01 RX ADMIN — ASPIRIN 81 MG: 81 TABLET, COATED ORAL at 08:41

## 2018-01-01 RX ADMIN — ASPIRIN 81 MG: 81 TABLET, DELAYED RELEASE ORAL at 08:31

## 2018-01-01 RX ADMIN — DIVALPROEX SODIUM 1000 MG: 500 TABLET, FILM COATED, EXTENDED RELEASE ORAL at 08:24

## 2018-01-01 RX ADMIN — PANTOPRAZOLE SODIUM 40 MG: 40 TABLET, DELAYED RELEASE ORAL at 08:23

## 2018-01-01 RX ADMIN — BETHANECHOL CHLORIDE 10 MG: 10 TABLET ORAL at 08:48

## 2018-01-01 RX ADMIN — BACLOFEN 5 MG: 10 TABLET ORAL at 17:27

## 2018-01-01 RX ADMIN — HEPARIN SODIUM 5000 UNITS: 5000 INJECTION INTRAVENOUS; SUBCUTANEOUS at 09:07

## 2018-01-01 RX ADMIN — BUPROPION HYDROCHLORIDE 150 MG: 150 TABLET, FILM COATED, EXTENDED RELEASE ORAL at 09:05

## 2018-01-01 RX ADMIN — ASPIRIN 81 MG: 81 TABLET, DELAYED RELEASE ORAL at 18:00

## 2018-01-01 RX ADMIN — HUMAN INSULIN 2 UNITS: 100 INJECTION, SOLUTION SUBCUTANEOUS at 21:43

## 2018-01-01 RX ADMIN — SIMVASTATIN 40 MG: 20 TABLET, FILM COATED ORAL at 21:19

## 2018-01-01 RX ADMIN — POLYETHYLENE GLYCOL (3350) 17 G: 17 POWDER, FOR SOLUTION ORAL at 09:42

## 2018-01-01 RX ADMIN — HEPARIN SODIUM 5000 UNITS: 5000 INJECTION INTRAVENOUS; SUBCUTANEOUS at 08:31

## 2018-01-01 RX ADMIN — INSULIN HUMAN 2 UNITS: 100 INJECTION, SOLUTION PARENTERAL at 18:39

## 2018-01-01 RX ADMIN — BACLOFEN 5 MG: 10 TABLET ORAL at 20:45

## 2018-01-01 RX ADMIN — MIRTAZAPINE 7.5 MG: 15 TABLET, FILM COATED ORAL at 02:10

## 2018-01-01 RX ADMIN — MIRTAZAPINE 15 MG: 15 TABLET, FILM COATED ORAL at 22:45

## 2018-01-01 RX ADMIN — HUMAN INSULIN 2 UNITS: 100 INJECTION, SOLUTION SUBCUTANEOUS at 21:10

## 2018-01-01 RX ADMIN — BUPROPION HYDROCHLORIDE 150 MG: 150 TABLET, EXTENDED RELEASE ORAL at 08:31

## 2018-01-01 RX ADMIN — SODIUM CHLORIDE 100 ML/HR: 900 INJECTION, SOLUTION INTRAVENOUS at 18:34

## 2018-01-01 RX ADMIN — LATANOPROST 1 DROP: 50 SOLUTION OPHTHALMIC at 22:30

## 2018-01-01 RX ADMIN — SENNOSIDES AND DOCUSATE SODIUM 2 TABLET: 8.6; 5 TABLET ORAL at 08:24

## 2018-01-01 RX ADMIN — VANCOMYCIN HYDROCHLORIDE 1500 MG: 10 INJECTION, POWDER, LYOPHILIZED, FOR SOLUTION INTRAVENOUS at 03:00

## 2018-01-01 RX ADMIN — TAMSULOSIN HYDROCHLORIDE 0.4 MG: 0.4 CAPSULE ORAL at 21:45

## 2018-01-01 RX ADMIN — HEPARIN SODIUM 5000 UNITS: 5000 INJECTION INTRAVENOUS; SUBCUTANEOUS at 20:31

## 2018-01-01 RX ADMIN — ASPIRIN 81 MG: 81 TABLET, COATED ORAL at 10:25

## 2018-01-01 RX ADMIN — BUPROPION HYDROCHLORIDE 150 MG: 150 TABLET, EXTENDED RELEASE ORAL at 08:20

## 2018-01-01 RX ADMIN — SODIUM CHLORIDE 40 MG: 9 INJECTION INTRAMUSCULAR; INTRAVENOUS; SUBCUTANEOUS at 15:29

## 2018-01-01 RX ADMIN — HEPARIN SODIUM 5000 UNITS: 5000 INJECTION INTRAVENOUS; SUBCUTANEOUS at 17:05

## 2018-01-01 RX ADMIN — HUMAN INSULIN 6 UNITS: 100 INJECTION, SOLUTION SUBCUTANEOUS at 17:02

## 2018-01-01 RX ADMIN — BUPROPION HYDROCHLORIDE 150 MG: 150 TABLET, EXTENDED RELEASE ORAL at 08:42

## 2018-01-01 RX ADMIN — BUPROPION HYDROCHLORIDE 150 MG: 150 TABLET, FILM COATED, EXTENDED RELEASE ORAL at 12:00

## 2018-01-01 RX ADMIN — BUPROPION HYDROCHLORIDE 150 MG: 150 TABLET, EXTENDED RELEASE ORAL at 09:45

## 2018-01-01 RX ADMIN — PIPERACILLIN SODIUM,TAZOBACTAM SODIUM 3.38 G: 3; .375 INJECTION, POWDER, FOR SOLUTION INTRAVENOUS at 13:51

## 2018-01-01 RX ADMIN — SIMVASTATIN 40 MG: 40 TABLET, FILM COATED ORAL at 22:43

## 2018-01-01 RX ADMIN — DIVALPROEX SODIUM 1000 MG: 500 TABLET, FILM COATED, EXTENDED RELEASE ORAL at 08:21

## 2018-01-01 RX ADMIN — ACETAMINOPHEN 650 MG: 325 TABLET ORAL at 02:08

## 2018-01-01 RX ADMIN — DIVALPROEX SODIUM 1000 MG: 500 TABLET, FILM COATED, EXTENDED RELEASE ORAL at 21:19

## 2018-01-01 RX ADMIN — HYDROMORPHONE HYDROCHLORIDE 2 MG: 2 TABLET ORAL at 08:24

## 2018-01-01 RX ADMIN — ASPIRIN 81 MG: 81 TABLET, DELAYED RELEASE ORAL at 18:34

## 2018-01-01 RX ADMIN — ENOXAPARIN SODIUM 40 MG: 40 INJECTION, SOLUTION INTRAVENOUS; SUBCUTANEOUS at 09:12

## 2018-01-01 RX ADMIN — BUPROPION HYDROCHLORIDE 150 MG: 150 TABLET, FILM COATED, EXTENDED RELEASE ORAL at 08:52

## 2018-01-01 RX ADMIN — ASPIRIN 81 MG: 81 TABLET, DELAYED RELEASE ORAL at 08:52

## 2018-01-01 RX ADMIN — HEPARIN SODIUM 5000 UNITS: 5000 INJECTION INTRAVENOUS; SUBCUTANEOUS at 05:48

## 2018-01-01 RX ADMIN — SODIUM CHLORIDE 40 MG: 9 INJECTION INTRAMUSCULAR; INTRAVENOUS; SUBCUTANEOUS at 08:35

## 2018-01-01 RX ADMIN — POLYETHYLENE GLYCOL (3350) 17 G: 17 POWDER, FOR SOLUTION ORAL at 08:25

## 2018-01-01 RX ADMIN — WARFARIN SODIUM 5 MG: 5 TABLET ORAL at 23:21

## 2018-01-01 RX ADMIN — DIVALPROEX SODIUM 1000 MG: 500 TABLET, FILM COATED, EXTENDED RELEASE ORAL at 09:44

## 2018-01-01 RX ADMIN — BACLOFEN 5 MG: 10 TABLET ORAL at 08:24

## 2018-01-01 RX ADMIN — VANCOMYCIN HYDROCHLORIDE 1500 MG: 10 INJECTION, POWDER, LYOPHILIZED, FOR SOLUTION INTRAVENOUS at 10:28

## 2018-01-01 RX ADMIN — BUPROPION HYDROCHLORIDE 150 MG: 150 TABLET, FILM COATED, EXTENDED RELEASE ORAL at 17:37

## 2018-01-01 RX ADMIN — BUPROPION HYDROCHLORIDE 150 MG: 150 TABLET, FILM COATED, EXTENDED RELEASE ORAL at 21:08

## 2018-01-01 RX ADMIN — BUPROPION HYDROCHLORIDE 150 MG: 150 TABLET, FILM COATED, EXTENDED RELEASE ORAL at 09:20

## 2018-01-01 RX ADMIN — SENNOSIDES AND DOCUSATE SODIUM 1 TABLET: 8.6; 5 TABLET ORAL at 08:04

## 2018-01-01 RX ADMIN — PIPERACILLIN SODIUM,TAZOBACTAM SODIUM 3.38 G: 3; .375 INJECTION, POWDER, FOR SOLUTION INTRAVENOUS at 21:43

## 2018-01-01 RX ADMIN — BACLOFEN 5 MG: 10 TABLET ORAL at 08:40

## 2018-01-01 RX ADMIN — HYDROCODONE BITARTRATE AND ACETAMINOPHEN 2 TABLET: 7.5; 325 TABLET ORAL at 08:10

## 2018-01-01 RX ADMIN — AMLODIPINE BESYLATE 5 MG: 5 TABLET ORAL at 09:07

## 2018-01-01 RX ADMIN — SENNOSIDES AND DOCUSATE SODIUM 1 TABLET: 8.6; 5 TABLET ORAL at 09:45

## 2018-01-01 RX ADMIN — FUROSEMIDE 20 MG: 20 TABLET ORAL at 09:12

## 2018-01-01 RX ADMIN — BISACODYL 10 MG: 5 TABLET, COATED ORAL at 08:20

## 2018-01-01 RX ADMIN — BACLOFEN 5 MG: 10 TABLET ORAL at 21:11

## 2018-01-01 RX ADMIN — HYDROCODONE BITARTRATE AND ACETAMINOPHEN 2 TABLET: 7.5; 325 TABLET ORAL at 19:49

## 2018-01-01 RX ADMIN — ASPIRIN 81 MG: 81 TABLET, COATED ORAL at 08:31

## 2018-01-01 RX ADMIN — ACETAMINOPHEN 1000 MG: 500 TABLET, FILM COATED ORAL at 14:42

## 2018-01-01 RX ADMIN — BETHANECHOL CHLORIDE 10 MG: 10 TABLET ORAL at 17:15

## 2018-01-01 RX ADMIN — SODIUM CHLORIDE 40 MG: 9 INJECTION INTRAMUSCULAR; INTRAVENOUS; SUBCUTANEOUS at 08:30

## 2018-01-01 RX ADMIN — GLIPIZIDE 5 MG: 5 TABLET ORAL at 10:25

## 2018-01-01 RX ADMIN — SENNOSIDES AND DOCUSATE SODIUM 2 TABLET: 8.6; 5 TABLET ORAL at 08:53

## 2018-01-01 RX ADMIN — BACLOFEN 5 MG: 10 TABLET ORAL at 17:02

## 2018-01-01 RX ADMIN — HUMAN INSULIN 4 UNITS: 100 INJECTION, SOLUTION SUBCUTANEOUS at 07:51

## 2018-01-01 RX ADMIN — INSULIN DEGLUDEC 50 UNITS: 200 INJECTION, SOLUTION SUBCUTANEOUS at 08:58

## 2018-01-01 RX ADMIN — PIPERACILLIN SODIUM,TAZOBACTAM SODIUM 3.38 G: 3; .375 INJECTION, POWDER, FOR SOLUTION INTRAVENOUS at 15:24

## 2018-01-01 RX ADMIN — TAMSULOSIN HYDROCHLORIDE 0.4 MG: 0.4 CAPSULE ORAL at 21:12

## 2018-01-01 RX ADMIN — PREGABALIN 75 MG: 75 CAPSULE ORAL at 08:26

## 2018-01-01 RX ADMIN — NYSTATIN: 100000 POWDER TOPICAL at 18:00

## 2018-01-01 RX ADMIN — SIMVASTATIN 40 MG: 20 TABLET, FILM COATED ORAL at 20:08

## 2018-01-01 RX ADMIN — BUPROPION HYDROCHLORIDE 150 MG: 150 TABLET, EXTENDED RELEASE ORAL at 17:41

## 2018-01-01 RX ADMIN — TAMSULOSIN HYDROCHLORIDE 0.4 MG: 0.4 CAPSULE ORAL at 21:00

## 2018-01-01 RX ADMIN — BACLOFEN 5 MG: 10 TABLET ORAL at 10:14

## 2018-01-01 RX ADMIN — DIVALPROEX SODIUM 1000 MG: 500 TABLET, FILM COATED, EXTENDED RELEASE ORAL at 08:03

## 2018-01-01 RX ADMIN — PIPERACILLIN SODIUM,TAZOBACTAM SODIUM 4.5 G: 4; .5 INJECTION, POWDER, FOR SOLUTION INTRAVENOUS at 14:06

## 2018-01-01 RX ADMIN — AMLODIPINE BESYLATE 5 MG: 5 TABLET ORAL at 10:19

## 2018-01-01 RX ADMIN — LATANOPROST 1 DROP: 50 SOLUTION OPHTHALMIC at 21:18

## 2018-01-01 RX ADMIN — GLIPIZIDE 5 MG: 5 TABLET ORAL at 08:31

## 2018-01-01 RX ADMIN — BUPROPION HYDROCHLORIDE 150 MG: 150 TABLET, FILM COATED, EXTENDED RELEASE ORAL at 09:40

## 2018-01-01 RX ADMIN — SIMVASTATIN 40 MG: 20 TABLET, FILM COATED ORAL at 22:20

## 2018-01-01 RX ADMIN — ASPIRIN 81 MG: 81 TABLET, COATED ORAL at 08:42

## 2018-01-01 RX ADMIN — LATANOPROST 1 DROP: 50 SOLUTION OPHTHALMIC at 21:58

## 2018-01-01 RX ADMIN — LATANOPROST 1 DROP: 50 SOLUTION OPHTHALMIC at 23:49

## 2018-01-01 RX ADMIN — ASPIRIN 81 MG: 81 TABLET, COATED ORAL at 08:20

## 2018-01-01 RX ADMIN — HEPARIN SODIUM 5000 UNITS: 5000 INJECTION INTRAVENOUS; SUBCUTANEOUS at 21:34

## 2018-01-01 RX ADMIN — DIVALPROEX SODIUM 1000 MG: 500 TABLET, FILM COATED, EXTENDED RELEASE ORAL at 08:22

## 2018-01-01 RX ADMIN — DIVALPROEX SODIUM 1000 MG: 500 TABLET, FILM COATED, EXTENDED RELEASE ORAL at 21:54

## 2018-01-01 RX ADMIN — DEXTROSE MONOHYDRATE 25 G: 25 INJECTION, SOLUTION INTRAVENOUS at 16:55

## 2018-01-01 RX ADMIN — NYSTATIN: 100000 POWDER TOPICAL at 11:00

## 2018-01-01 RX ADMIN — HUMAN INSULIN 2 UNITS: 100 INJECTION, SOLUTION SUBCUTANEOUS at 15:43

## 2018-01-01 RX ADMIN — ASPIRIN 81 MG: 81 TABLET, COATED ORAL at 08:48

## 2018-01-01 RX ADMIN — DIVALPROEX SODIUM 1000 MG: 500 TABLET, FILM COATED, EXTENDED RELEASE ORAL at 09:05

## 2018-01-01 RX ADMIN — POLYETHYLENE GLYCOL (3350) 17 G: 17 POWDER, FOR SOLUTION ORAL at 08:33

## 2018-01-01 RX ADMIN — ASPIRIN 81 MG: 81 TABLET, DELAYED RELEASE ORAL at 08:10

## 2018-01-01 RX ADMIN — GLIPIZIDE 5 MG: 5 TABLET ORAL at 09:44

## 2018-01-01 RX ADMIN — PANTOPRAZOLE SODIUM 40 MG: 40 TABLET, DELAYED RELEASE ORAL at 04:11

## 2018-01-01 RX ADMIN — ASPIRIN 81 MG: 81 TABLET, DELAYED RELEASE ORAL at 20:42

## 2018-01-01 RX ADMIN — HUMAN INSULIN 2 UNITS: 100 INJECTION, SOLUTION SUBCUTANEOUS at 16:39

## 2018-01-01 RX ADMIN — MIRTAZAPINE 15 MG: 15 TABLET, FILM COATED ORAL at 23:42

## 2018-01-01 RX ADMIN — DIVALPROEX SODIUM 1000 MG: 500 TABLET, FILM COATED, EXTENDED RELEASE ORAL at 08:48

## 2018-01-01 RX ADMIN — HEPARIN SODIUM 5000 UNITS: 5000 INJECTION INTRAVENOUS; SUBCUTANEOUS at 16:25

## 2018-01-01 RX ADMIN — HUMAN INSULIN 4 UNITS: 100 INJECTION, SOLUTION SUBCUTANEOUS at 22:16

## 2018-01-01 RX ADMIN — GLIPIZIDE 10 MG: 10 TABLET, FILM COATED, EXTENDED RELEASE ORAL at 08:53

## 2018-01-01 RX ADMIN — HUMAN INSULIN 2 UNITS: 100 INJECTION, SOLUTION SUBCUTANEOUS at 08:27

## 2018-01-01 RX ADMIN — HEPARIN SODIUM 5000 UNITS: 5000 INJECTION INTRAVENOUS; SUBCUTANEOUS at 08:34

## 2018-01-01 RX ADMIN — PIPERACILLIN SODIUM,TAZOBACTAM SODIUM 4.5 G: 4; .5 INJECTION, POWDER, FOR SOLUTION INTRAVENOUS at 05:11

## 2018-01-01 RX ADMIN — SODIUM CHLORIDE 40 MG: 9 INJECTION INTRAMUSCULAR; INTRAVENOUS; SUBCUTANEOUS at 09:08

## 2018-01-01 RX ADMIN — HUMAN INSULIN 2 UNITS: 100 INJECTION, SOLUTION SUBCUTANEOUS at 21:23

## 2018-01-01 RX ADMIN — SENNOSIDES AND DOCUSATE SODIUM 1 TABLET: 8.6; 5 TABLET ORAL at 08:26

## 2018-01-01 RX ADMIN — HYDROCODONE BITARTRATE AND ACETAMINOPHEN 2 TABLET: 7.5; 325 TABLET ORAL at 22:21

## 2018-01-01 RX ADMIN — SIMVASTATIN 40 MG: 20 TABLET, FILM COATED ORAL at 20:28

## 2018-01-01 RX ADMIN — HEPARIN SODIUM 5000 UNITS: 5000 INJECTION INTRAVENOUS; SUBCUTANEOUS at 21:11

## 2018-01-01 RX ADMIN — Medication 10 ML: at 13:51

## 2018-01-01 RX ADMIN — BUPROPION HYDROCHLORIDE 150 MG: 150 TABLET, FILM COATED, EXTENDED RELEASE ORAL at 22:30

## 2018-01-01 RX ADMIN — HEPARIN SODIUM 5000 UNITS: 5000 INJECTION INTRAVENOUS; SUBCUTANEOUS at 10:21

## 2018-01-01 RX ADMIN — DIVALPROEX SODIUM 1000 MG: 500 TABLET, FILM COATED, EXTENDED RELEASE ORAL at 21:07

## 2018-01-01 RX ADMIN — GLIPIZIDE 5 MG: 5 TABLET ORAL at 08:36

## 2018-01-01 RX ADMIN — BACLOFEN 5 MG: 10 TABLET ORAL at 16:49

## 2018-01-01 RX ADMIN — ASPIRIN 81 MG: 81 TABLET, COATED ORAL at 09:45

## 2018-01-01 RX ADMIN — PREGABALIN 75 MG: 75 CAPSULE ORAL at 08:17

## 2018-01-01 RX ADMIN — HUMAN INSULIN 2 UNITS: 100 INJECTION, SOLUTION SUBCUTANEOUS at 17:44

## 2018-01-01 RX ADMIN — HYDROCODONE BITARTRATE AND ACETAMINOPHEN 2 TABLET: 7.5; 325 TABLET ORAL at 21:45

## 2018-01-01 RX ADMIN — DIVALPROEX SODIUM 1000 MG: 500 TABLET, FILM COATED, EXTENDED RELEASE ORAL at 21:59

## 2018-01-01 RX ADMIN — DIPHENHYDRAMINE HYDROCHLORIDE 25 MG: 25 CAPSULE ORAL at 17:12

## 2018-01-01 RX ADMIN — SODIUM CHLORIDE 100 ML/HR: 900 INJECTION, SOLUTION INTRAVENOUS at 15:29

## 2018-01-01 RX ADMIN — HYDROMORPHONE HYDROCHLORIDE 4 MG: 4 TABLET ORAL at 04:19

## 2018-01-01 RX ADMIN — HYDROCODONE BITARTRATE AND ACETAMINOPHEN 1 TABLET: 7.5; 325 TABLET ORAL at 06:12

## 2018-01-01 RX ADMIN — HUMAN INSULIN 2 UNITS: 100 INJECTION, SOLUTION SUBCUTANEOUS at 21:37

## 2018-01-01 RX ADMIN — ASPIRIN 81 MG: 81 TABLET, COATED ORAL at 09:06

## 2018-01-01 RX ADMIN — PIPERACILLIN SODIUM,TAZOBACTAM SODIUM 4.5 G: 4; .5 INJECTION, POWDER, FOR SOLUTION INTRAVENOUS at 14:42

## 2018-01-01 RX ADMIN — HUMAN INSULIN 2 UNITS: 100 INJECTION, SOLUTION SUBCUTANEOUS at 08:30

## 2018-01-01 RX ADMIN — DIVALPROEX SODIUM 1000 MG: 500 TABLET, FILM COATED, EXTENDED RELEASE ORAL at 20:24

## 2018-01-01 RX ADMIN — SIMVASTATIN 40 MG: 40 TABLET, FILM COATED ORAL at 21:08

## 2018-01-01 RX ADMIN — SIMVASTATIN 40 MG: 20 TABLET, FILM COATED ORAL at 22:45

## 2018-01-01 RX ADMIN — GLIPIZIDE 5 MG: 5 TABLET ORAL at 07:51

## 2018-01-01 RX ADMIN — HEPARIN SODIUM 5000 UNITS: 5000 INJECTION INTRAVENOUS; SUBCUTANEOUS at 08:41

## 2018-01-01 RX ADMIN — HEPARIN SODIUM 5000 UNITS: 5000 INJECTION INTRAVENOUS; SUBCUTANEOUS at 21:20

## 2018-01-01 RX ADMIN — ASPIRIN 81 MG: 81 TABLET, DELAYED RELEASE ORAL at 09:00

## 2018-01-01 RX ADMIN — LATANOPROST 1 DROP: 50 SOLUTION OPHTHALMIC at 21:34

## 2018-01-01 RX ADMIN — HUMAN INSULIN 4 UNITS: 100 INJECTION, SOLUTION SUBCUTANEOUS at 22:49

## 2018-01-01 RX ADMIN — HEPARIN SODIUM 5000 UNITS: 5000 INJECTION INTRAVENOUS; SUBCUTANEOUS at 08:43

## 2018-01-01 RX ADMIN — PREGABALIN 75 MG: 75 CAPSULE ORAL at 22:30

## 2018-01-01 RX ADMIN — ASPIRIN 81 MG: 81 TABLET, COATED ORAL at 08:11

## 2018-01-01 RX ADMIN — SODIUM CHLORIDE 40 MG: 9 INJECTION INTRAMUSCULAR; INTRAVENOUS; SUBCUTANEOUS at 09:20

## 2018-01-01 RX ADMIN — SODIUM CHLORIDE 500 ML: 900 INJECTION, SOLUTION INTRAVENOUS at 21:09

## 2018-01-01 RX ADMIN — BUPROPION HYDROCHLORIDE 150 MG: 150 TABLET, EXTENDED RELEASE ORAL at 08:35

## 2018-01-01 RX ADMIN — LATANOPROST 1 DROP: 50 SOLUTION OPHTHALMIC at 20:40

## 2018-01-01 RX ADMIN — HEPARIN SODIUM 5000 UNITS: 5000 INJECTION INTRAVENOUS; SUBCUTANEOUS at 21:09

## 2018-01-01 RX ADMIN — BUPROPION HYDROCHLORIDE 150 MG: 150 TABLET, EXTENDED RELEASE ORAL at 17:40

## 2018-01-01 RX ADMIN — BUPROPION HYDROCHLORIDE 150 MG: 150 TABLET, FILM COATED, EXTENDED RELEASE ORAL at 08:24

## 2018-01-01 RX ADMIN — BUPROPION HYDROCHLORIDE 150 MG: 150 TABLET, FILM COATED, EXTENDED RELEASE ORAL at 08:53

## 2018-01-01 RX ADMIN — DIVALPROEX SODIUM 1000 MG: 500 TABLET, FILM COATED, EXTENDED RELEASE ORAL at 20:51

## 2018-01-01 RX ADMIN — SENNOSIDES AND DOCUSATE SODIUM 1 TABLET: 8.6; 5 TABLET ORAL at 08:17

## 2018-01-01 RX ADMIN — DIVALPROEX SODIUM 1000 MG: 500 TABLET, FILM COATED, EXTENDED RELEASE ORAL at 20:27

## 2018-01-01 RX ADMIN — DIVALPROEX SODIUM 1000 MG: 500 TABLET, FILM COATED, EXTENDED RELEASE ORAL at 20:20

## 2018-01-01 RX ADMIN — Medication 2 G: at 09:42

## 2018-01-01 RX ADMIN — BUPROPION HYDROCHLORIDE 150 MG: 150 TABLET, FILM COATED, EXTENDED RELEASE ORAL at 17:05

## 2018-01-01 RX ADMIN — TUBERCULIN PURIFIED PROTEIN DERIVATIVE 5 UNITS: 5 INJECTION, SOLUTION INTRADERMAL at 08:52

## 2018-01-01 RX ADMIN — PIPERACILLIN SODIUM,TAZOBACTAM SODIUM 4.5 G: 4; .5 INJECTION, POWDER, FOR SOLUTION INTRAVENOUS at 01:00

## 2018-01-01 RX ADMIN — HEPARIN SODIUM 5000 UNITS: 5000 INJECTION INTRAVENOUS; SUBCUTANEOUS at 04:33

## 2018-01-01 RX ADMIN — BUPROPION HYDROCHLORIDE 150 MG: 150 TABLET, EXTENDED RELEASE ORAL at 08:12

## 2018-01-01 RX ADMIN — ASPIRIN 81 MG: 81 TABLET, DELAYED RELEASE ORAL at 17:04

## 2018-01-01 RX ADMIN — HYDROCODONE BITARTRATE AND ACETAMINOPHEN 2 TABLET: 7.5; 325 TABLET ORAL at 20:37

## 2018-01-01 RX ADMIN — DIVALPROEX SODIUM 1000 MG: 500 TABLET, FILM COATED, EXTENDED RELEASE ORAL at 20:42

## 2018-01-01 RX ADMIN — ASPIRIN 81 MG: 81 TABLET, DELAYED RELEASE ORAL at 21:32

## 2018-01-01 RX ADMIN — AMLODIPINE BESYLATE 5 MG: 5 TABLET ORAL at 08:23

## 2018-01-01 RX ADMIN — BACLOFEN 5 MG: 10 TABLET ORAL at 16:58

## 2018-01-01 RX ADMIN — HUMAN INSULIN 4 UNITS: 100 INJECTION, SOLUTION SUBCUTANEOUS at 12:10

## 2018-01-01 RX ADMIN — PROPOFOL 50 MG: 10 INJECTION, EMULSION INTRAVENOUS at 09:51

## 2018-01-01 RX ADMIN — BUPROPION HYDROCHLORIDE 150 MG: 150 TABLET, EXTENDED RELEASE ORAL at 08:37

## 2018-01-01 RX ADMIN — BUPROPION HYDROCHLORIDE 150 MG: 150 TABLET, EXTENDED RELEASE ORAL at 10:19

## 2018-01-01 RX ADMIN — AMLODIPINE BESYLATE 5 MG: 5 TABLET ORAL at 08:48

## 2018-01-01 RX ADMIN — SODIUM CHLORIDE 40 MG: 9 INJECTION INTRAMUSCULAR; INTRAVENOUS; SUBCUTANEOUS at 08:10

## 2018-01-01 RX ADMIN — SENNOSIDES AND DOCUSATE SODIUM 1 TABLET: 8.6; 5 TABLET ORAL at 08:31

## 2018-01-01 RX ADMIN — DIVALPROEX SODIUM 1000 MG: 500 TABLET, FILM COATED, EXTENDED RELEASE ORAL at 08:41

## 2018-01-01 RX ADMIN — HYDROMORPHONE HYDROCHLORIDE 4 MG: 4 TABLET ORAL at 21:32

## 2018-01-01 RX ADMIN — BACLOFEN 5 MG: 10 TABLET ORAL at 16:55

## 2018-01-01 RX ADMIN — HEPARIN SODIUM 5000 UNITS: 5000 INJECTION INTRAVENOUS; SUBCUTANEOUS at 22:45

## 2018-01-01 RX ADMIN — SIMVASTATIN 40 MG: 40 TABLET, FILM COATED ORAL at 22:30

## 2018-01-01 RX ADMIN — SODIUM CHLORIDE, SODIUM LACTATE, POTASSIUM CHLORIDE, AND CALCIUM CHLORIDE 100 ML/HR: 600; 310; 30; 20 INJECTION, SOLUTION INTRAVENOUS at 08:00

## 2018-01-01 RX ADMIN — SIMVASTATIN 40 MG: 20 TABLET, FILM COATED ORAL at 21:17

## 2018-01-01 RX ADMIN — HUMAN INSULIN 2 UNITS: 100 INJECTION, SOLUTION SUBCUTANEOUS at 16:48

## 2018-01-01 RX ADMIN — DIVALPROEX SODIUM 1000 MG: 500 TABLET, FILM COATED, EXTENDED RELEASE ORAL at 22:43

## 2018-01-01 RX ADMIN — DEXAMETHASONE SODIUM PHOSPHATE 10 MG: 4 INJECTION, SOLUTION INTRA-ARTICULAR; INTRALESIONAL; INTRAMUSCULAR; INTRAVENOUS; SOFT TISSUE at 09:55

## 2018-01-01 RX ADMIN — BUPROPION HYDROCHLORIDE 150 MG: 150 TABLET, EXTENDED RELEASE ORAL at 17:04

## 2018-01-01 RX ADMIN — TRANEXAMIC ACID 1000 MG: 100 INJECTION, SOLUTION INTRAVENOUS at 09:55

## 2018-01-01 RX ADMIN — MIRTAZAPINE 15 MG: 15 TABLET, FILM COATED ORAL at 21:05

## 2018-01-01 RX ADMIN — SIMVASTATIN 40 MG: 40 TABLET, FILM COATED ORAL at 21:32

## 2018-01-01 RX ADMIN — HUMAN INSULIN 2 UNITS: 100 INJECTION, SOLUTION SUBCUTANEOUS at 21:47

## 2018-01-01 RX ADMIN — PREGABALIN 75 MG: 75 CAPSULE ORAL at 21:32

## 2018-01-01 RX ADMIN — HUMAN INSULIN 2 UNITS: 100 INJECTION, SOLUTION SUBCUTANEOUS at 08:35

## 2018-01-01 RX ADMIN — DIVALPROEX SODIUM 1000 MG: 500 TABLET, FILM COATED, EXTENDED RELEASE ORAL at 21:42

## 2018-01-01 RX ADMIN — SODIUM CHLORIDE 100 ML/HR: 900 INJECTION, SOLUTION INTRAVENOUS at 04:59

## 2018-01-01 RX ADMIN — SODIUM CHLORIDE, SODIUM LACTATE, POTASSIUM CHLORIDE, AND CALCIUM CHLORIDE: 600; 310; 30; 20 INJECTION, SOLUTION INTRAVENOUS at 10:00

## 2018-01-01 RX ADMIN — HUMAN INSULIN 2 UNITS: 100 INJECTION, SOLUTION SUBCUTANEOUS at 08:03

## 2018-01-01 RX ADMIN — SIMVASTATIN 40 MG: 40 TABLET, FILM COATED ORAL at 21:58

## 2018-01-01 RX ADMIN — DIVALPROEX SODIUM 1000 MG: 500 TABLET, FILM COATED, EXTENDED RELEASE ORAL at 20:07

## 2018-01-01 RX ADMIN — Medication 50 MEQ: at 17:11

## 2018-01-01 RX ADMIN — HYDROCODONE BITARTRATE AND ACETAMINOPHEN 2 TABLET: 7.5; 325 TABLET ORAL at 17:11

## 2018-01-01 RX ADMIN — SENNOSIDES AND DOCUSATE SODIUM 1 TABLET: 8.6; 5 TABLET ORAL at 08:36

## 2018-01-01 RX ADMIN — SENNOSIDES AND DOCUSATE SODIUM 1 TABLET: 8.6; 5 TABLET ORAL at 08:47

## 2018-01-01 RX ADMIN — BUPROPION HYDROCHLORIDE 150 MG: 150 TABLET, EXTENDED RELEASE ORAL at 08:03

## 2018-01-01 RX ADMIN — HEPARIN SODIUM 5000 UNITS: 5000 INJECTION INTRAVENOUS; SUBCUTANEOUS at 20:47

## 2018-01-01 RX ADMIN — Medication 2 G: at 00:20

## 2018-01-01 RX ADMIN — SIMVASTATIN 40 MG: 20 TABLET, FILM COATED ORAL at 21:09

## 2018-01-01 RX ADMIN — TAMSULOSIN HYDROCHLORIDE 0.4 MG: 0.4 CAPSULE ORAL at 22:43

## 2018-01-01 RX ADMIN — DIVALPROEX SODIUM 1000 MG: 500 TABLET, FILM COATED, EXTENDED RELEASE ORAL at 08:51

## 2018-01-01 RX ADMIN — AMLODIPINE BESYLATE 5 MG: 5 TABLET ORAL at 08:21

## 2018-01-01 RX ADMIN — MIRTAZAPINE 15 MG: 15 TABLET, FILM COATED ORAL at 21:08

## 2018-01-01 RX ADMIN — PIPERACILLIN SODIUM,TAZOBACTAM SODIUM 4.5 G: 4; .5 INJECTION, POWDER, FOR SOLUTION INTRAVENOUS at 15:38

## 2018-01-01 RX ADMIN — TAMSULOSIN HYDROCHLORIDE 0.4 MG: 0.4 CAPSULE ORAL at 22:20

## 2018-01-01 RX ADMIN — TAMSULOSIN HYDROCHLORIDE 0.4 MG: 0.4 CAPSULE ORAL at 20:45

## 2018-01-01 RX ADMIN — BUPROPION HYDROCHLORIDE 150 MG: 150 TABLET, EXTENDED RELEASE ORAL at 17:15

## 2018-01-01 RX ADMIN — LATANOPROST 1 DROP: 50 SOLUTION OPHTHALMIC at 21:10

## 2018-01-01 RX ADMIN — SODIUM CHLORIDE 40 MG: 9 INJECTION INTRAMUSCULAR; INTRAVENOUS; SUBCUTANEOUS at 08:52

## 2018-01-01 RX ADMIN — PIPERACILLIN SODIUM,TAZOBACTAM SODIUM 3.38 G: 3; .375 INJECTION, POWDER, FOR SOLUTION INTRAVENOUS at 16:10

## 2018-01-01 RX ADMIN — ACETAMINOPHEN 1000 MG: 500 TABLET, FILM COATED ORAL at 04:19

## 2018-01-01 RX ADMIN — HYDROCODONE BITARTRATE AND ACETAMINOPHEN 2 TABLET: 7.5; 325 TABLET ORAL at 07:50

## 2018-01-01 RX ADMIN — SENNOSIDES AND DOCUSATE SODIUM 2 TABLET: 8.6; 5 TABLET ORAL at 09:12

## 2018-01-01 RX ADMIN — TAMSULOSIN HYDROCHLORIDE 0.4 MG: 0.4 CAPSULE ORAL at 21:17

## 2018-01-01 RX ADMIN — SODIUM CHLORIDE 1000 ML: 900 INJECTION, SOLUTION INTRAVENOUS at 16:39

## 2018-01-01 RX ADMIN — BUPROPION HYDROCHLORIDE 150 MG: 150 TABLET, FILM COATED, EXTENDED RELEASE ORAL at 21:32

## 2018-01-01 RX ADMIN — HEPARIN SODIUM 5000 UNITS: 5000 INJECTION INTRAVENOUS; SUBCUTANEOUS at 05:00

## 2018-01-01 RX ADMIN — ASPIRIN 81 MG: 81 TABLET, DELAYED RELEASE ORAL at 18:39

## 2018-01-01 RX ADMIN — AMLODIPINE BESYLATE 5 MG: 5 TABLET ORAL at 08:41

## 2018-01-01 RX ADMIN — ASPIRIN 81 MG: 81 TABLET, DELAYED RELEASE ORAL at 17:37

## 2018-01-01 RX ADMIN — SODIUM CHLORIDE 40 MG: 9 INJECTION INTRAMUSCULAR; INTRAVENOUS; SUBCUTANEOUS at 09:30

## 2018-01-01 RX ADMIN — DIVALPROEX SODIUM 1000 MG: 500 TABLET, FILM COATED, EXTENDED RELEASE ORAL at 21:45

## 2018-01-01 RX ADMIN — LATANOPROST 1 DROP: 50 SOLUTION OPHTHALMIC at 22:08

## 2018-01-01 RX ADMIN — BUPROPION HYDROCHLORIDE 150 MG: 150 TABLET, FILM COATED, EXTENDED RELEASE ORAL at 08:10

## 2018-01-01 RX ADMIN — HUMAN INSULIN 2 UNITS: 100 INJECTION, SOLUTION SUBCUTANEOUS at 13:06

## 2018-01-01 RX ADMIN — BUPIVACAINE HYDROCHLORIDE 2 ML: 7.5 INJECTION, SOLUTION INTRASPINAL at 09:49

## 2018-01-01 RX ADMIN — HUMAN INSULIN 2 UNITS: 100 INJECTION, SOLUTION SUBCUTANEOUS at 22:00

## 2018-01-01 RX ADMIN — HYDROMORPHONE HYDROCHLORIDE 2 MG: 2 TABLET ORAL at 04:25

## 2018-01-01 RX ADMIN — Medication 10 ML: at 05:20

## 2018-01-01 RX ADMIN — MIDAZOLAM HYDROCHLORIDE 2 MG: 1 INJECTION, SOLUTION INTRAMUSCULAR; INTRAVENOUS at 09:17

## 2018-01-01 RX ADMIN — ASPIRIN 81 MG: 81 TABLET, DELAYED RELEASE ORAL at 08:53

## 2018-01-01 RX ADMIN — LATANOPROST 1 DROP: 50 SOLUTION OPHTHALMIC at 21:14

## 2018-01-01 RX ADMIN — HEPARIN SODIUM 5000 UNITS: 5000 INJECTION INTRAVENOUS; SUBCUTANEOUS at 15:38

## 2018-01-01 RX ADMIN — HYDROMORPHONE HYDROCHLORIDE 4 MG: 4 TABLET ORAL at 23:21

## 2018-01-01 RX ADMIN — HYDROCODONE BITARTRATE AND ACETAMINOPHEN 2 TABLET: 7.5; 325 TABLET ORAL at 14:40

## 2018-01-01 RX ADMIN — PREGABALIN 75 MG: 75 CAPSULE ORAL at 18:39

## 2018-01-01 RX ADMIN — ACETAMINOPHEN 1000 MG: 10 INJECTION, SOLUTION INTRAVENOUS at 17:46

## 2018-01-01 RX ADMIN — BACLOFEN 5 MG: 10 TABLET ORAL at 21:35

## 2018-01-01 RX ADMIN — MIRTAZAPINE 15 MG: 15 TABLET, FILM COATED ORAL at 22:43

## 2018-01-01 RX ADMIN — ACETAMINOPHEN 650 MG: 325 TABLET ORAL at 17:34

## 2018-01-01 RX ADMIN — DIVALPROEX SODIUM 1000 MG: 500 TABLET, FILM COATED, EXTENDED RELEASE ORAL at 08:35

## 2018-01-01 RX ADMIN — BUPROPION HYDROCHLORIDE 150 MG: 150 TABLET, FILM COATED, EXTENDED RELEASE ORAL at 08:41

## 2018-01-01 RX ADMIN — SENNOSIDES AND DOCUSATE SODIUM 1 TABLET: 8.6; 5 TABLET ORAL at 09:06

## 2018-01-01 RX ADMIN — GLIPIZIDE 10 MG: 10 TABLET, FILM COATED, EXTENDED RELEASE ORAL at 08:24

## 2018-01-01 RX ADMIN — HEPARIN SODIUM 5000 UNITS: 5000 INJECTION INTRAVENOUS; SUBCUTANEOUS at 08:04

## 2018-01-01 RX ADMIN — HYDROCODONE BITARTRATE AND ACETAMINOPHEN 2 TABLET: 7.5; 325 TABLET ORAL at 20:45

## 2018-01-01 RX ADMIN — HUMAN INSULIN 4 UNITS: 100 INJECTION, SOLUTION SUBCUTANEOUS at 12:18

## 2018-01-01 RX ADMIN — SENNOSIDES AND DOCUSATE SODIUM 1 TABLET: 8.6; 5 TABLET ORAL at 08:24

## 2018-01-01 RX ADMIN — BACLOFEN 5 MG: 10 TABLET ORAL at 17:41

## 2018-01-01 RX ADMIN — ONDANSETRON 8 MG: 8 TABLET, ORALLY DISINTEGRATING ORAL at 17:12

## 2018-01-01 RX ADMIN — BUPROPION HYDROCHLORIDE 150 MG: 150 TABLET, FILM COATED, EXTENDED RELEASE ORAL at 17:07

## 2018-01-01 RX ADMIN — DIVALPROEX SODIUM 1000 MG: 500 TABLET, FILM COATED, EXTENDED RELEASE ORAL at 21:09

## 2018-01-01 RX ADMIN — TAMSULOSIN HYDROCHLORIDE 0.4 MG: 0.4 CAPSULE ORAL at 22:00

## 2018-01-01 RX ADMIN — ENOXAPARIN SODIUM 40 MG: 40 INJECTION, SOLUTION INTRAVENOUS; SUBCUTANEOUS at 08:24

## 2018-01-01 RX ADMIN — ASPIRIN 81 MG: 81 TABLET, DELAYED RELEASE ORAL at 22:30

## 2018-01-01 RX ADMIN — INSULIN HUMAN 2 UNITS: 100 INJECTION, SOLUTION PARENTERAL at 11:58

## 2018-01-01 RX ADMIN — DIVALPROEX SODIUM 1000 MG: 500 TABLET, FILM COATED, EXTENDED RELEASE ORAL at 08:53

## 2018-01-01 RX ADMIN — HUMAN INSULIN 4 UNITS: 100 INJECTION, SOLUTION SUBCUTANEOUS at 13:10

## 2018-01-01 RX ADMIN — SIMVASTATIN 40 MG: 40 TABLET, FILM COATED ORAL at 21:43

## 2018-01-01 RX ADMIN — Medication 10 ML: at 22:20

## 2018-01-01 RX ADMIN — HYDROMORPHONE HYDROCHLORIDE 1 MG: 2 INJECTION, SOLUTION INTRAMUSCULAR; INTRAVENOUS; SUBCUTANEOUS at 10:15

## 2018-01-01 RX ADMIN — LATANOPROST 1 DROP: 50 SOLUTION OPHTHALMIC at 21:57

## 2018-01-01 RX ADMIN — SODIUM CHLORIDE 40 MG: 9 INJECTION INTRAMUSCULAR; INTRAVENOUS; SUBCUTANEOUS at 09:03

## 2018-01-01 RX ADMIN — SIMVASTATIN 40 MG: 20 TABLET, FILM COATED ORAL at 21:00

## 2018-01-01 RX ADMIN — HYDROCODONE BITARTRATE AND ACETAMINOPHEN 2 TABLET: 7.5; 325 TABLET ORAL at 15:05

## 2018-01-01 RX ADMIN — LOSARTAN POTASSIUM 100 MG: 50 TABLET ORAL at 22:30

## 2018-01-01 RX ADMIN — ONDANSETRON 4 MG: 2 INJECTION INTRAMUSCULAR; INTRAVENOUS at 09:55

## 2018-01-01 RX ADMIN — AMLODIPINE BESYLATE 5 MG: 5 TABLET ORAL at 08:32

## 2018-01-01 RX ADMIN — ASPIRIN 81 MG: 81 TABLET, DELAYED RELEASE ORAL at 17:13

## 2018-01-01 RX ADMIN — SENNOSIDES AND DOCUSATE SODIUM 1 TABLET: 8.6; 5 TABLET ORAL at 08:32

## 2018-01-01 RX ADMIN — BUPROPION HYDROCHLORIDE 150 MG: 150 TABLET, EXTENDED RELEASE ORAL at 08:48

## 2018-01-01 RX ADMIN — SENNOSIDES AND DOCUSATE SODIUM 1 TABLET: 8.6; 5 TABLET ORAL at 08:21

## 2018-01-01 RX ADMIN — SODIUM CHLORIDE 75 ML/HR: 900 INJECTION, SOLUTION INTRAVENOUS at 22:26

## 2018-01-01 RX ADMIN — DIVALPROEX SODIUM 1000 MG: 500 TABLET, FILM COATED, EXTENDED RELEASE ORAL at 12:00

## 2018-01-01 RX ADMIN — GLIPIZIDE 10 MG: 5 TABLET ORAL at 08:20

## 2018-01-01 RX ADMIN — SIMVASTATIN 40 MG: 20 TABLET, FILM COATED ORAL at 21:11

## 2018-01-01 RX ADMIN — SODIUM CHLORIDE, SODIUM LACTATE, POTASSIUM CHLORIDE, AND CALCIUM CHLORIDE 100 ML/HR: 600; 310; 30; 20 INJECTION, SOLUTION INTRAVENOUS at 11:36

## 2018-01-01 RX ADMIN — BETHANECHOL CHLORIDE 10 MG: 10 TABLET ORAL at 12:12

## 2018-01-01 RX ADMIN — ASPIRIN 81 MG: 81 TABLET, DELAYED RELEASE ORAL at 12:47

## 2018-01-01 RX ADMIN — NYSTATIN: 100000 POWDER TOPICAL at 08:33

## 2018-01-01 RX ADMIN — HUMAN INSULIN 4 UNITS: 100 INJECTION, SOLUTION SUBCUTANEOUS at 11:55

## 2018-01-01 RX ADMIN — HYDROCODONE BITARTRATE AND ACETAMINOPHEN 2 TABLET: 7.5; 325 TABLET ORAL at 08:29

## 2018-01-01 RX ADMIN — ASPIRIN 81 MG: 81 TABLET, DELAYED RELEASE ORAL at 08:16

## 2018-01-01 RX ADMIN — PIPERACILLIN SODIUM,TAZOBACTAM SODIUM 4.5 G: 4; .5 INJECTION, POWDER, FOR SOLUTION INTRAVENOUS at 21:15

## 2018-01-01 RX ADMIN — POLYETHYLENE GLYCOL (3350) 17 G: 17 POWDER, FOR SOLUTION ORAL at 12:43

## 2018-01-01 RX ADMIN — AMLODIPINE BESYLATE 5 MG: 5 TABLET ORAL at 09:45

## 2018-01-01 RX ADMIN — DIVALPROEX SODIUM 1000 MG: 500 TABLET, FILM COATED, EXTENDED RELEASE ORAL at 08:42

## 2018-01-01 RX ADMIN — MORPHINE SULFATE 2 MG: 2 INJECTION, SOLUTION INTRAMUSCULAR; INTRAVENOUS at 17:01

## 2018-01-01 RX ADMIN — BACLOFEN 5 MG: 10 TABLET ORAL at 21:08

## 2018-01-01 RX ADMIN — GLIPIZIDE 5 MG: 5 TABLET ORAL at 08:30

## 2018-03-27 ENCOUNTER — RX ONLY (OUTPATIENT)
Age: 73
Setting detail: RX ONLY
End: 2018-03-27

## 2018-03-27 ENCOUNTER — APPOINTMENT (RX ONLY)
Dept: URBAN - METROPOLITAN AREA CLINIC 23 | Facility: CLINIC | Age: 73
Setting detail: DERMATOLOGY
End: 2018-03-27

## 2018-03-27 DIAGNOSIS — L82.1 OTHER SEBORRHEIC KERATOSIS: ICD-10-CM

## 2018-03-27 DIAGNOSIS — L81.4 OTHER MELANIN HYPERPIGMENTATION: ICD-10-CM

## 2018-03-27 DIAGNOSIS — Z87.2 PERSONAL HISTORY OF DISEASES OF THE SKIN AND SUBCUTANEOUS TISSUE: ICD-10-CM

## 2018-03-27 DIAGNOSIS — D18.0 HEMANGIOMA: ICD-10-CM

## 2018-03-27 DIAGNOSIS — Z85.828 PERSONAL HISTORY OF OTHER MALIGNANT NEOPLASM OF SKIN: ICD-10-CM

## 2018-03-27 PROBLEM — I10 ESSENTIAL (PRIMARY) HYPERTENSION: Status: ACTIVE | Noted: 2018-03-27

## 2018-03-27 PROBLEM — D18.01 HEMANGIOMA OF SKIN AND SUBCUTANEOUS TISSUE: Status: ACTIVE | Noted: 2018-03-27

## 2018-03-27 PROBLEM — H91.90 UNSPECIFIED HEARING LOSS, UNSPECIFIED EAR: Status: ACTIVE | Noted: 2018-03-27

## 2018-03-27 PROBLEM — E13.9 OTHER SPECIFIED DIABETES MELLITUS WITHOUT COMPLICATIONS: Status: ACTIVE | Noted: 2018-03-27

## 2018-03-27 PROBLEM — L55.1 SUNBURN OF SECOND DEGREE: Status: ACTIVE | Noted: 2018-03-27

## 2018-03-27 PROCEDURE — ? COUNSELING

## 2018-03-27 PROCEDURE — 99213 OFFICE O/P EST LOW 20 MIN: CPT

## 2018-03-27 ASSESSMENT — LOCATION ZONE DERM
LOCATION ZONE: LEG
LOCATION ZONE: ARM
LOCATION ZONE: TRUNK
LOCATION ZONE: TRUNK

## 2018-03-27 ASSESSMENT — LOCATION SIMPLE DESCRIPTION DERM
LOCATION SIMPLE: LEFT LOWER BACK
LOCATION SIMPLE: LEFT SHOULDER
LOCATION SIMPLE: LEFT PRETIBIAL REGION
LOCATION SIMPLE: RIGHT UPPER BACK
LOCATION SIMPLE: CHEST
LOCATION SIMPLE: RIGHT SHOULDER
LOCATION SIMPLE: ABDOMEN
LOCATION SIMPLE: LEFT UPPER BACK

## 2018-03-27 ASSESSMENT — LOCATION DETAILED DESCRIPTION DERM
LOCATION DETAILED: RIGHT INFERIOR UPPER BACK
LOCATION DETAILED: PERIUMBILICAL SKIN
LOCATION DETAILED: LEFT INFERIOR MEDIAL MIDBACK
LOCATION DETAILED: STERNUM
LOCATION DETAILED: RIGHT SUPERIOR UPPER BACK
LOCATION DETAILED: LEFT MEDIAL UPPER BACK
LOCATION DETAILED: RIGHT MEDIAL INFERIOR CHEST
LOCATION DETAILED: LEFT DISTAL PRETIBIAL REGION
LOCATION DETAILED: LEFT POSTERIOR SHOULDER
LOCATION DETAILED: RIGHT POSTERIOR SHOULDER

## 2018-05-08 PROBLEM — N18.30 CKD (CHRONIC KIDNEY DISEASE) STAGE 3, GFR 30-59 ML/MIN (HCC): Status: ACTIVE | Noted: 2018-01-01

## 2018-05-08 PROBLEM — D72.829 ELEVATED WHITE BLOOD CELL COUNT: Status: ACTIVE | Noted: 2018-01-01

## 2018-05-08 NOTE — PROGRESS NOTES
05/08/18 1030   Oxygen Therapy   O2 Sat (%) 98 %   Pulse via Oximetry 79 beats per minute   O2 Device Room air   Pre-Treatment   Breath Sounds Bilateral Clear   Pre FEV1 (liters) 3.7 liters   % Predicted 105   Incentive Spirometry Treatment   Actual Volume (ml) 2250 ml     Initial respiratory Assessment completed with pt. Pt was interviewed and evaluated in Joint camp prior to surgery. Patient ID:  Pacheco Guzman  [id number]  9 y.o.  1945  Surgeon: Dr. Juan J Rizo  Date of Surgery: 5/31/2018  Procedure: Total Left Knee Arthroplasty  Primary Care Physician: Kristen Sauceda [phone]  Specialists:                                  Pt instructed in the use of Incentive Spirometry. Pt instructed to bring Incentive Spirometer back on date of surgery & to start using Is upon return to pt room.     Pt taught proper cough technique    History of smoking:                                     FORMER                         Quit date:        6/8/1977    Secondhand smoke:      Past procedures with Oxygen desaturation:NONE    Past Medical History:   Diagnosis Date    Arthritis     BMI 30.0-30.9,adult     Chronic kidney disease     no nephrologist     Chronic pain     neck, lower back     Depression     Diabetes ([address]     type 2; oral and insulin meds; 1-2 times per day; average 125-130; A1C=7.1 on 03/12/18    Enlarged prostate with urinary obstruction     Former smoker     GERD (gastroesophageal reflux disease)     High cholesterol     Hypertension     on med for control     Neurogenic bladder     Other ill-defined conditions(799.89)     Overflow incontinence     Sciatica     recently took medrol dose pack around 05/04/18    Self-catheterizes urinary bladder     TID; can urinate \"sometimes\"     Skin cancer     Sleep apnea     bi-pap; instructed to bring dos    Spondylolisthesis of lumbar region     Thromboembolus [facility]) 2013    DVT left leg; caused from a falling accident that damaged left leg     Urine retention     Varicose veins of both lower extremities with complications                                                                                                                                                          Respiratory history:DENIES SOB                                                                  Respiratory meds:                                         FAMILY PRESENT:                                                            NO                                        PAST SLEEP STUDY:        YES                      HX OF STACEY:                        YES                                                        STACEY assessment:     COMPLIANT WITH CPAP                                                                                         PHYSICAL EXAM   Body mass index is 30.2 kg/(m^2).    Visit Vitals    /68 (BP 1 Location: Right arm, BP Patient Position: At rest;Sitting)    Pulse 72    Temp 96.9 °F (36.1 °C)    Ht 6' 1\" (1.854 m)    Wt 103.8 kg (228 lb 14.4 oz)    SpO2 99%    BMI 30.2 kg/m2     Neck circumference:  45    cm    Loud snoring:        YES                             Witnessed apnea or wakening gasping or choking:,                                                                                                   APNEA    Awakens with headaches:                                                  DENIES    Morning or daytime tiredness/ sleepiness:                                                                                                         TIRED   Dry mouth or sore throat in morning:                YES                                                                           Bailey stage:  4    SACS score:58    STOP/BAN                              CPAP:                                                  HOME CPAP                    Referrals:    Pt. Phone Number:

## 2018-05-08 NOTE — ADVANCED PRACTICE NURSE
Total Joint Surgery Preoperative Chart Review      Patient ID:  Latanya Gutierrez  [id number]  35 y.o.  1945  Surgeon: Dr. Sarahi Rogers  Date of Surgery: 5/31/2018  Procedure: Total Left Knee Arthroplasty  Primary Care Physician: Rayna Gann [phone]  Specialty Physician(s):      Subjective:   Latanya Gutierrez is a 71 y.o. WHITE OR  male who presents for preoperative evaluation for Total Left Knee arthroplasty. This is a preoperative chart review note based on data collected by the nurse at the surgical Pre-Assessment visit.     Past Medical History:   Diagnosis Date    Arthritis     BMI 30.0-30.9,adult     Chronic kidney disease     no nephrologist     Chronic pain     neck, lower back     Depression     Diabetes [address] [address]     type 2; oral and insulin meds; 1-2 times per day; average 125-130; A1C=7.1 on 03/12/18    Enlarged prostate with urinary obstruction     Former smoker     GERD (gastroesophageal reflux disease)     High cholesterol     Hypertension     on med for control     Neurogenic bladder     Other ill-defined conditions(799.89)     Overflow incontinence     Sciatica     recently took medrol dose pack around 05/04/18    Self-catheterizes urinary bladder     TID; can urinate \"sometimes\"     Skin cancer     Sleep apnea     bi-pap; instructed to bring dos    Spondylolisthesis of lumbar region     Thromboembolus [facility]) 2013    DVT left leg; caused from a falling accident that damaged left leg     Urine retention     Varicose veins of both lower extremities with complications       Past Surgical History:   Procedure Laterality Date    HX HEMORRHOIDECTOMY  1971    HX SHOULDER ARTHROSCOPY Left 1971    HX UROLOGICAL  06/2017    CYSTOSCOPY, TRANSURETHRAL RESECTION OF PROSTATE     Family History   Problem Relation Age of Onset    No Known Problems Mother     Heart Attack Father     Cancer Sister     Parkinson's Disease Brother       Social History   Substance Use Topics    Smoking status: Former Smoker     Packs/day: 1.00     Years: 7.00     Quit date: 5/8/1971    Smokeless tobacco: Never Used    Alcohol use Yes      Comment: occasionally        Prior to Admission medications    Medication Sig Start Date End Date Taking? Authorizing Provider   buPROPion SR (WELLBUTRIN, ZYBAN) 200 mg SR tablet Take 200 mg by mouth daily. Take / use AM day of surgery  per anesthesia protocols. Indications: ANXIETY WITH DEPRESSION   Yes Historical Provider   divalproex DR (DEPAKOTE) 500 mg tablet Take 1,000 mg by mouth two (2) times a day. Take / use AM day of surgery  per anesthesia protocols. Indications: DEPRESSION ASSOCIATED WITH BIPOLAR DISORDER   Yes Historical Provider   furosemide (LASIX) 20 mg tablet Take 20 mg by mouth daily. Yes Historical Provider   glipiZIDE SR (GLUCOTROL XL) 10 mg CR tablet Take 5 mg by mouth daily. Indications: type 2 diabetes mellitus   Yes Historical Provider   Liraglutide (VICTOZA) 0.6 mg/0.1 mL (18 mg/3 mL) pnij 1.8 mg by SubCUTAneous route daily. Indications: type 2 diabetes mellitus   Yes Historical Provider   losartan (COZAAR) 100 mg tablet Take 100 mg by mouth daily. Indications: hypertension   Yes Historical Provider   nystatin (MYCOSTATIN) topical cream Apply  to affected area two (2) times a day. Yes Historical Provider   calcium polycarbophil (FIBERCON) 625 mg tablet Take 625 mg by mouth daily. Yes Historical Provider   pregabalin (LYRICA) 25 mg capsule Take 25 mg by mouth nightly. Yes Historical Provider   simvastatin (ZOCOR) 40 mg tablet Take 40 mg by mouth nightly. Yes Historical Provider   therapeutic multivitamin (THERAGRAN) tablet Take 1 Tab by mouth daily. Yes Historical Provider   insulin degludec (TRESIBA FLEXTOUCH U-200) 200 unit/mL (3 mL) inpn 40 Units by SubCUTAneous route daily. Take / use 32 units the day before and AM day of surgery  per anesthesia protocols.   Indications: type 2 diabetes mellitus   Yes Historical Provider     Allergies   Allergen Reactions    Oxycodone Itching    Tramadol Itching          Objective:     Physical Exam:   Patient Vitals for the past 24 hrs:   Temp Pulse BP SpO2   05/08/18 1212 96.9 °F (36.1 °C) 72 129/68 99 %       ECG:    EKG Results     None          Data Review:   Labs:   Recent Results (from the past 24 hour(s))   CBC W/O DIFF    Collection Time: 05/08/18 11:22 AM   Result Value Ref Range    WBC 12.5 (H) 4.3 - 11.1 K/uL    RBC 5.54 4.23 - 5.67 M/uL    HGB 15.7 13.6 - 17.2 g/dL    HCT 48.9 41.1 - 50.3 %    MCV 88.3 79.6 - 97.8 FL    MCH 28.3 26.1 - 32.9 PG    MCHC 32.1 31.4 - 35.0 g/dL    RDW 15.3 (H) 11.9 - 14.6 %    PLATELET 022 182 - 744 K/uL    MPV 10.9 10.8 - 37.3 FL   METABOLIC PANEL, BASIC    Collection Time: 05/08/18 11:22 AM   Result Value Ref Range    Sodium 138 136 - 145 mmol/L    Potassium 5.0 3.5 - 5.1 mmol/L    Chloride 101 98 - 107 mmol/L    CO2 28 21 - 32 mmol/L    Anion gap 9 7 - 16 mmol/L    Glucose 295 (H) 65 - 100 mg/dL    BUN 47 (H) 8 - 23 MG/DL    Creatinine 1.89 (H) 0.8 - 1.5 MG/DL    GFR est AA 45 (L) >60 ml/min/1.73m2    GFR est non-AA 38 (L) >60 ml/min/1.73m2    Calcium 9.4 8.3 - 10.4 MG/DL   PROTHROMBIN TIME + INR    Collection Time: 05/08/18 11:22 AM   Result Value Ref Range    Prothrombin time 13.8 11.5 - 14.5 sec    INR 1.0     PTT    Collection Time: 05/08/18 11:22 AM   Result Value Ref Range    aPTT 24.8 23.2 - 35.3 SEC   URINALYSIS W/ RFLX MICROSCOPIC    Collection Time: 05/08/18 11:22 AM   Result Value Ref Range    Color YELLOW      Appearance CLEAR      Specific gravity 1.026 (H) 1.001 - 1.023      pH (UA) 6.0 5.0 - 9.0      Protein NEGATIVE  NEG mg/dL    Glucose >1000 mg/dL    Ketone NEGATIVE  NEG mg/dL    Bilirubin NEGATIVE  NEG      Blood NEGATIVE  NEG      Urobilinogen 0.2 0.2 - 1.0 EU/dL    Nitrites NEGATIVE  NEG      Leukocyte Esterase NEGATIVE  NEG           Problem List:  )  Patient Active Problem List   Diagnosis Code    CKD (chronic kidney disease) stage 3, GFR 30-59 ml/min N18.3    Elevated white blood cell count D72.829       Total Joint Surgery Pre-Assessment Recommendations:           Patient with multiple comorbidities including:  Advanced age 71, Sleep apnea and CKD3. Patient would benefit from inpatient hospitalization with total knee surgery. Patient creatinine elevated. Overuse of NSAID. Discussed only using 800 mg ibuprofen bid instead of 1000 mg tid. Elevated blood glucose and WBC related to use of prednisone. He has 3 days left on prednisone dose pack use for lower back pain. Patient is to wear home BIPAP during hospitalization. Renal protocol initiated. The patient's chart will be flagged renal risk. Renal RisK Alerts:  1. Caution with use of muscle relaxants and sedatives with reduced renal function  2. Caution with total amount of narcotics used   3. Avoid morphine if patient has reduced renal function due to accumulations of the highly active metabolite, morphine-6-glucuronide, which can lead to sedation and respiratory depression  4. Avoid nephrotoxic drugs such as LAMB inhibitors  5. Consider volume status monitoring in addition to [place]  6.  Ensure hand-off to hospitalist for appropriate perioperative IV fluid management          Signed By: FRANCIS Ramirez    May 8, 2018

## 2018-05-08 NOTE — PERIOP NOTES
Dr. Osbaldo Alvarado:      Patient: John Sands, DOS 05/31/18    During a recent visit to the surgical preadmission testing center, the above mentioned patient was found to have a non-fasting blood glucose level of 295 mg/dL. This may indicate inadequate diabetic management and raises concerns that the patient is not medically optimized for surgery. It is our standard practice to postpone elective surgery for patients who present fasting blood glucose level >300 mg/dL on the day of their procedure. The patient has been advised of this policy and counseled on the importance of glucose control. We feel that this patient is at increased risk of cancellation; however, their blood glucose may be in acceptable range when they are NPO. Therefore, we will leave the decision to you whether to delay the surgery and refer the patient to their primary care provider or keep them as currently scheduled. Our goal is to prevent as many delays and cancellations as possible while ensuring patient safety.     Sincerely,    [facility]

## 2018-05-08 NOTE — PROGRESS NOTES
Ramona Inman  : (26 y.o.) [facility] [place] [place] at [place] [address] [facility], [facility] [address]  [phone]       Physical Therapy Prehab Plan of Treatment and Evaluation Summary:2018    ICD-10: Treatment Diagnosis:   · Pain in Left Knee (M25.562)  · Stiffness of Left Knee, Not elsewhere classified (X25.807)  Precautions/Allergies:   Review of patient's allergies indicates no known allergies. MEDICAL/REFERRING DIAGNOSIS:  Unilateral primary osteoarthritis, left knee [M17.12]  REFERRING PHYSICIAN: Olive Ronquillo MD  DATE OF SURGERY: 18   Assessment:   Comments:  He is here by himself. He injured his knee in  at work. He is motivated for surgery. His dtr is a single parent with 4 kids and is his family in town. She cannot stya with him at [place] so is looking to pursue rehab. I gave him the list of facilities and instructed him to call his . PROBLEM LIST (Impacting functional limitations):  Mr. Kwasi Fu presents with the following left lower extremity(s) problems:  1. Strength  2. Range of Motion  3. Home Exercise Program  4. Pain   INTERVENTIONS PLANNED:  1. Home Exercise Program  2. Educational Discussion     TREATMENT PLAN: Effective Dates: 2018 TO 2018. Frequency/Duration: Patient to continue to perform home exercise program at least twice per day up until his surgery. GOALS: (Goals have been discussed and agreed upon with patient.)  Discharge Goals: Time Frame: 1 Day  1. Patient will demonstrate independence with a home exercise program designed to increase strength, range of motion and pain control to minimize functional deficits and optimize patient for total joint replacement. Rehabilitation Potential For Stated Goals: Good  Regarding Cboy Cutler's therapy, I certify that the treatment plan above will be carried out by a therapist or under their direction.   Thank you for this referral,  Emi Rdz, PT HISTORY:   Present Symptoms:  Pain Intensity 1: 5 (at its worst)  Pain Location 1: Knee  Pain Orientation 1: Anterior, Left, Lateral, Medial   History of Present Injury/Illness (Reason for Referral):  Medical/Referring Diagnosis: Unilateral primary osteoarthritis, left knee [M17.12]   Past Medical History/Comorbidities:   Mr. Ivan Howell  has a past medical history of Diabetes ([address] and Other ill-defined conditions(799.89). Mr. Ivan Howell  has a past surgical history that includes hx orthopaedic.   Social History/Living Environment:   Home Environment: Private residence  # Steps to Enter: 0  One/Two Story Residence: One story  Living Alone: Yes  Support Systems: Child(giovanni)  Patient Expects to be Discharged to[de-identified] Rehabilitation facility  Current DME Used/Available at Home: Cane, straight  Tub or Shower Type: Shower  Work/Activity:retired (worker's comp injury)  Dominant Side:  RIGHT  Current Medications:  See Pre-assessment nursing note   Number of Personal Factors/Comorbidities that affect the Plan of Care: 1-2: MODERATE COMPLEXITY   EXAMINATION:   ADLs (Current Functional Status):   Ambulation:  [x] Independent  [] Walk Indoors Only  [] Walk Outdoors  [x] Use Assistive Device  [] Use Wheelchair Only Dressing:  [x] [address] from Someone for:  [] Sock/Shoes  [] Pants  [] Everything   Bathing/Showering:   [x] Independent  [] Requires Assistance from Someone  [] [address]:  [] Routine house and yard work  [x] Light Housework Only  [] None   Observation/Orthostatic Postural Assessment:    Genu valgus left, Leg length discrepancy, left (L LE 1/8\" shorter than R)  ROM/Flexibility:   AROM: Within functional limits (R LE)                LLE AROM  L Knee Flexion: 108          Strength:   Strength: Generally decreased, functional (R hip/knee 4/5; ankle DF 2+/5)       LLE Strength  L Hip Flexion: 4  L Knee Extension: 3+  L Ankle Dorsiflexion: 3          Functional Mobility: Sensation: Impaired (R LE DM)    Stand to Sit: Independent  Sit to Stand: Independent  Stand Pivot Transfers: Independent  Distance (ft): 1000 Feet (ft)  Ambulation - Level of Assistance: Modified independent  Assistive Device: Cane, straight  Speed/Kenzie: Pace decreased (<100 feet/min)  Step Length: Right shortened  Stance: Left decreased  Gait Abnormalities: Antalgic          Balance:    Sitting: Intact  Standing: With support   Body Structures Involved:  1. Bones  2. Joints  3. Muscles Body Functions Affected:  1. Neuromusculoskeletal  2. Movement Related Activities and Participation Affected:  1. Mobility   Number of elements that affect the Plan of Care: 4+: HIGH COMPLEXITY   CLINICAL PRESENTATION:   Presentation: Stable and uncomplicated: LOW COMPLEXITY   CLINICAL DECISION MAKING:   Outcome Measure: Tool Used: Lower Extremity Functional Scale (LEFS)  Score:  Initial: 37/80 Most Recent: X/80 (Date: -- )   Interpretation of Score: 20 questions each scored on a 5 point scale with 0 representing \"extreme difficulty or unable to perform\" and 4 representing \"no difficulty\". The lower the score, the greater the functional disability. 80/80 represents no disability. Minimal detectable change is 9 points. Score 80 79-65 64-49 48-33 32-17 16-1 0   Modifier CH CI CJ CK CL CM CN     ? Mobility - Walking and Moving Around:     - CURRENT STATUS: CK - 40%-59% impaired, limited or restricted    - GOAL STATUS: CK - 40%-59% impaired, limited or restricted    - D/C STATUS:  CK - 40%-59% impaired, limited or restricted  Medical Necessity:   · Mr. Angelica Heard is expected to optimize his lower extremity strength and ROM in preparation for joint replacement surgery. Reason for Services/Other Comments:  · Achieve baseline assesment of musculoskeletal system, functional mobility and home environment. , educate in PT HEP in preparation for surgery, educate in hospital plan of care.    Use of outcome tool(s) and clinical judgement create a POC that gives a: Clear prediction of patient's progress: LOW COMPLEXITY   TREATMENT:   Treatment/Session Assessment:  Patient was instructed in PT- HEP to increase strength and ROM in LEs. Answered all questions. · Post session pain:  2  · Compliance with Program/Exercises: compliant most of the time.   Total Treatment Duration:  PT Patient Time In/Time Out  Time In: 1000  Time Out: [address], PT

## 2018-05-08 NOTE — PERIOP NOTES
Dr. Jimy Mckeon,    Your patient was seen in [place] [address] today. I am attaching the results of the labs for your to review and follow-up with if necessary. If you would like to order additional labs or tests please enter into [employer] [facility] [place] or fax to [phone]. Please do not respond to this message as my mailbox is not monitored. You may call [phone] with questions or concerns.     Recent Results (from the past 12 hour(s))   CBC W/O DIFF    Collection Time: 05/08/18 11:22 AM   Result Value Ref Range    WBC 12.5 (H) 4.3 - 11.1 K/uL    RBC 5.54 4.23 - 5.67 M/uL    HGB 15.7 13.6 - 17.2 g/dL    HCT 48.9 41.1 - 50.3 %    MCV 88.3 79.6 - 97.8 FL    MCH 28.3 26.1 - 32.9 PG    MCHC 32.1 31.4 - 35.0 g/dL    RDW 15.3 (H) 11.9 - 14.6 %    PLATELET 353 634 - 635 K/uL    MPV 10.9 10.8 - 78.5 FL   METABOLIC PANEL, BASIC    Collection Time: 05/08/18 11:22 AM   Result Value Ref Range    Sodium 138 136 - 145 mmol/L    Potassium 5.0 3.5 - 5.1 mmol/L    Chloride 101 98 - 107 mmol/L    CO2 28 21 - 32 mmol/L    Anion gap 9 7 - 16 mmol/L    Glucose 295 (H) 65 - 100 mg/dL    BUN 47 (H) 8 - 23 MG/DL    Creatinine 1.89 (H) 0.8 - 1.5 MG/DL    GFR est AA 45 (L) >60 ml/min/1.73m2    GFR est non-AA 38 (L) >60 ml/min/1.73m2    Calcium 9.4 8.3 - 10.4 MG/DL   PROTHROMBIN TIME + INR    Collection Time: 05/08/18 11:22 AM   Result Value Ref Range    Prothrombin time 13.8 11.5 - 14.5 sec    INR 1.0     PTT    Collection Time: 05/08/18 11:22 AM   Result Value Ref Range    aPTT 24.8 23.2 - 35.3 SEC   URINALYSIS W/ RFLX MICROSCOPIC    Collection Time: 05/08/18 11:22 AM   Result Value Ref Range    Color YELLOW      Appearance CLEAR      Specific gravity 1.026 (H) 1.001 - 1.023      pH (UA) 6.0 5.0 - 9.0      Protein NEGATIVE  NEG mg/dL    Glucose >1000 mg/dL    Ketone NEGATIVE  NEG mg/dL    Bilirubin NEGATIVE  NEG      Blood NEGATIVE  NEG      Urobilinogen 0.2 0.2 - 1.0 EU/dL    Nitrites NEGATIVE  NEG      Leukocyte Esterase NEGATIVE  NEG

## 2018-05-08 NOTE — PERIOP NOTES
Radha Alejandro MD    Your patient recently had labs drawn during a hospital appointment due to an upcoming surgery. The results are attached. If you have any questions or concerns please reach out to your patient for a follow-up in your office. Please do not respond to this message as my mailbox is not monitored. You may call [phone] with questions or concerns.     Recent Results (from the past 12 hour(s))   CBC W/O DIFF    Collection Time: 05/08/18 11:22 AM   Result Value Ref Range    WBC 12.5 (H) 4.3 - 11.1 K/uL    RBC 5.54 4.23 - 5.67 M/uL    HGB 15.7 13.6 - 17.2 g/dL    HCT 48.9 41.1 - 50.3 %    MCV 88.3 79.6 - 97.8 FL    MCH 28.3 26.1 - 32.9 PG    MCHC 32.1 31.4 - 35.0 g/dL    RDW 15.3 (H) 11.9 - 14.6 %    PLATELET 301 866 - 968 K/uL    MPV 10.9 10.8 - 64.4 FL   METABOLIC PANEL, BASIC    Collection Time: 05/08/18 11:22 AM   Result Value Ref Range    Sodium 138 136 - 145 mmol/L    Potassium 5.0 3.5 - 5.1 mmol/L    Chloride 101 98 - 107 mmol/L    CO2 28 21 - 32 mmol/L    Anion gap 9 7 - 16 mmol/L    Glucose 295 (H) 65 - 100 mg/dL    BUN 47 (H) 8 - 23 MG/DL    Creatinine 1.89 (H) 0.8 - 1.5 MG/DL    GFR est AA 45 (L) >60 ml/min/1.73m2    GFR est non-AA 38 (L) >60 ml/min/1.73m2    Calcium 9.4 8.3 - 10.4 MG/DL   PROTHROMBIN TIME + INR    Collection Time: 05/08/18 11:22 AM   Result Value Ref Range    Prothrombin time 13.8 11.5 - 14.5 sec    INR 1.0     PTT    Collection Time: 05/08/18 11:22 AM   Result Value Ref Range    aPTT 24.8 23.2 - 35.3 SEC   URINALYSIS W/ RFLX MICROSCOPIC    Collection Time: 05/08/18 11:22 AM   Result Value Ref Range    Color YELLOW      Appearance CLEAR      Specific gravity 1.026 (H) 1.001 - 1.023      pH (UA) 6.0 5.0 - 9.0      Protein NEGATIVE  NEG mg/dL    Glucose >1000 mg/dL    Ketone NEGATIVE  NEG mg/dL    Bilirubin NEGATIVE  NEG      Blood NEGATIVE  NEG      Urobilinogen 0.2 0.2 - 1.0 EU/dL    Nitrites NEGATIVE  NEG      Leukocyte Esterase NEGATIVE  NEG

## 2018-05-08 NOTE — PERIOP NOTES
Patient verified name, , and surgery as listed in [facility]. Type 3 surgery, walk in assessment complete. Labs per surgeon: CBC, BMP, U/A, PT/PTT ; results within [facility] protocols except BUN=47 & Creatinine=1.89; [facility] to review. MRSA nasal swab pending. Labs per anesthesia protocol: included in surgeons orders. EKG: last done at pcp office 17. Per Care Everywhere \"normal\". Office called and record requested. [phone]     Patient states hasn't seen cardio, Dr. Eva Mora in about 5-6 yrs. Office called and records requested. [address], NP notified of elevated WBC count, BUN, and Creatinine. She came and seen patient today. Patient currently on a medrol dose pack for sciatic pain. Per [facility] can increase labs. No orders received. Most recent endocrinology office note dated 18 on chart for reference. All other records under care everywhere for reference if needed. Hibiclens and instructions to return bottle on DOS given per hospital policy. Nasal Swab collected per MD order and instructions for Mupirocin nasal ointment if required. Patient provided with handouts including Guide to Surgery, Pain Management, Hand Hygiene, Blood Transfusion Education, and [place] [facility] Brochure. Patient answered medical/surgical history questions at their best of ability. All prior to admission medications documented in [facility]. Original medication prescription bottles not visualized during patient appointment. Patient instructed to hold all vitamins 7 days prior to surgery and NSAIDS 5 days prior to surgery. Medications to be held: vitamins. Patient instructed to continue previous medications as prescribed prior to surgery and to take the following medications the day of surgery according to anesthesia guidelines with a small sip of water: wellbutrin, depakote, 32 units of Tresiba. Instructed to bring [place], Daniel Schein, and [address].       Patient teach back successful and patient demonstrates knowledge of instruction.

## 2018-05-08 NOTE — PERIOP NOTES
Dr Marietta Cervantes reviewed chart (cardio note from Dr Mariana Everett dated 2010). No order received. Ok to proceed.

## 2018-07-26 PROBLEM — R82.90 ABNORMAL URINALYSIS: Status: ACTIVE | Noted: 2018-01-01

## 2018-07-26 NOTE — PERIOP NOTES
Corey Alexander MD    Your patient recently had labs drawn during a hospital appointment due to an upcoming surgery. The results are attached. If you have any questions or concerns please reach out to your patient for a follow-up in your office. Please do not respond to this message as my mailbox is not monitored. You may call [phone] with questions or concerns. Antibiotic script for positive nitrites to be called in by Jacqueline Li NP. Recent Results (from the past 12 hour(s))   EKG, 12 LEAD, INITIAL    Collection Time: 07/26/18 11:15 AM   Result Value Ref Range    Ventricular Rate 81 BPM    Atrial Rate 81 BPM    P-R Interval 168 ms    QRS Duration 130 ms    Q-T Interval 382 ms    QTC Calculation (Bezet) 443 ms    Calculated P Axis 59 degrees    Calculated R Axis -67 degrees    Calculated T Axis 97 degrees    Diagnosis       Sinus rhythm with marked sinus arrhythmia  Left anterior fascicular block  Non-specific intra-ventricular conduction block  Cannot rule out Septal infarct , age undetermined  Abnormal ECG  No previous ECGs available  Confirmed by SALEEM HERNANDEZ (), Signa Prost ([id number]) on 7/26/2018 1:11:44 PM     CBC WITH AUTOMATED DIFF    Collection Time: 07/26/18 12:26 PM   Result Value Ref Range    WBC 7.9 4.3 - 11.1 K/uL    RBC 4.79 4.23 - 5.67 M/uL    HGB 14.0 13.6 - 17.2 g/dL    HCT 43.7 41.1 - 50.3 %    MCV 91.2 79.6 - 97.8 FL    MCH 29.2 26.1 - 32.9 PG    MCHC 32.0 31.4 - 35.0 g/dL    RDW 15.6 (H) 11.9 - 14.6 %    PLATELET 620 825 - 384 K/uL    MPV 10.4 (L) 10.8 - 14.1 FL    DF AUTOMATED      NEUTROPHILS 52 43 - 78 %    LYMPHOCYTES 35 13 - 44 %    MONOCYTES 10 4.0 - 12.0 %    EOSINOPHILS 3 0.5 - 7.8 %    BASOPHILS 0 0.0 - 2.0 %    IMMATURE GRANULOCYTES 0 0.0 - 5.0 %    ABS. NEUTROPHILS 4.1 1.7 - 8.2 K/UL    ABS. LYMPHOCYTES 2.7 0.5 - 4.6 K/UL    ABS. MONOCYTES 0.8 0.1 - 1.3 K/UL    ABS. EOSINOPHILS 0.2 0.0 - 0.8 K/UL    ABS. BASOPHILS 0.0 0.0 - 0.2 K/UL    ABS. IMM.  GRANS. 0.0 0.0 - 0.5 K/UL METABOLIC PANEL, BASIC    Collection Time: 07/26/18 12:26 PM   Result Value Ref Range    Sodium 138 136 - 145 mmol/L    Potassium 4.6 3.5 - 5.1 mmol/L    Chloride 101 98 - 107 mmol/L    CO2 29 21 - 32 mmol/L    Anion gap 8 7 - 16 mmol/L    Glucose 78 65 - 100 mg/dL    BUN 38 (H) 8 - 23 MG/DL    Creatinine 1.95 (H) 0.8 - 1.5 MG/DL    GFR est AA 44 (L) >60 ml/min/1.73m2    GFR est non-AA 36 (L) >60 ml/min/1.73m2    Calcium 9.1 8.3 - 10.4 MG/DL   PROTHROMBIN TIME + INR    Collection Time: 07/26/18 12:26 PM   Result Value Ref Range    Prothrombin time 14.7 (H) 11.5 - 14.5 sec    INR 1.1     PTT    Collection Time: 07/26/18 12:26 PM   Result Value Ref Range    aPTT 29.1 23.2 - 35.3 SEC   GLUCOSE, POC    Collection Time: 07/26/18 12:27 PM   Result Value Ref Range    Glucose (POC) 78 65 - 100 mg/dL   URINALYSIS W/ RFLX MICROSCOPIC    Collection Time: 07/26/18  1:04 PM   Result Value Ref Range    Color YELLOW      Appearance TURBID      Specific gravity 1.017 1.001 - 1.023      pH (UA) 5.5 5.0 - 9.0      Protein TRACE (A) NEG mg/dL    Glucose >1000 mg/dL    Ketone NEGATIVE  NEG mg/dL    Bilirubin NEGATIVE  NEG      Blood MODERATE (A) NEG      Urobilinogen 0.2 0.2 - 1.0 EU/dL    Nitrites POSITIVE (A) NEG      Leukocyte Esterase LARGE (A) NEG

## 2018-07-26 NOTE — PERIOP NOTES
Dr. Tito Estrada,     Your patient was seen in [place] [address] today. I am attaching the results of the labs for your to review and follow-up with if necessary. If you would like to order additional labs or tests please enter into [facility] or fax to [phone]. Please do not respond to this message as my mailbox is not monitored. You may call [phone] with questions or concerns. Antibiotic script for positive nitrites to be called in by Jasmine Martinez NP. Recent Results (from the past 12 hour(s))   EKG, 12 LEAD, INITIAL    Collection Time: 07/26/18 11:15 AM   Result Value Ref Range    Ventricular Rate 81 BPM    Atrial Rate 81 BPM    P-R Interval 168 ms    QRS Duration 130 ms    Q-T Interval 382 ms    QTC Calculation (Bezet) 443 ms    Calculated P Axis 59 degrees    Calculated R Axis -67 degrees    Calculated T Axis 97 degrees    Diagnosis       Sinus rhythm with marked sinus arrhythmia  Left anterior fascicular block  Non-specific intra-ventricular conduction block  Cannot rule out Septal infarct , age undetermined  Abnormal ECG  No previous ECGs available  Confirmed by SALEEM HERNANDEZ (), Farida Vail ([id number]) on 7/26/2018 1:11:44 PM     CBC WITH AUTOMATED DIFF    Collection Time: 07/26/18 12:26 PM   Result Value Ref Range    WBC 7.9 4.3 - 11.1 K/uL    RBC 4.79 4.23 - 5.67 M/uL    HGB 14.0 13.6 - 17.2 g/dL    HCT 43.7 41.1 - 50.3 %    MCV 91.2 79.6 - 97.8 FL    MCH 29.2 26.1 - 32.9 PG    MCHC 32.0 31.4 - 35.0 g/dL    RDW 15.6 (H) 11.9 - 14.6 %    PLATELET 657 274 - 115 K/uL    MPV 10.4 (L) 10.8 - 14.1 FL    DF AUTOMATED      NEUTROPHILS 52 43 - 78 %    LYMPHOCYTES 35 13 - 44 %    MONOCYTES 10 4.0 - 12.0 %    EOSINOPHILS 3 0.5 - 7.8 %    BASOPHILS 0 0.0 - 2.0 %    IMMATURE GRANULOCYTES 0 0.0 - 5.0 %    ABS. NEUTROPHILS 4.1 1.7 - 8.2 K/UL    ABS. LYMPHOCYTES 2.7 0.5 - 4.6 K/UL    ABS. MONOCYTES 0.8 0.1 - 1.3 K/UL    ABS. EOSINOPHILS 0.2 0.0 - 0.8 K/UL    ABS. BASOPHILS 0.0 0.0 - 0.2 K/UL    ABS. IMM.  GRANS. 0.0 0.0 - 0.5 K/UL   METABOLIC PANEL, BASIC    Collection Time: 07/26/18 12:26 PM   Result Value Ref Range    Sodium 138 136 - 145 mmol/L    Potassium 4.6 3.5 - 5.1 mmol/L    Chloride 101 98 - 107 mmol/L    CO2 29 21 - 32 mmol/L    Anion gap 8 7 - 16 mmol/L    Glucose 78 65 - 100 mg/dL    BUN 38 (H) 8 - 23 MG/DL    Creatinine 1.95 (H) 0.8 - 1.5 MG/DL    GFR est AA 44 (L) >60 ml/min/1.73m2    GFR est non-AA 36 (L) >60 ml/min/1.73m2    Calcium 9.1 8.3 - 10.4 MG/DL   PROTHROMBIN TIME + INR    Collection Time: 07/26/18 12:26 PM   Result Value Ref Range    Prothrombin time 14.7 (H) 11.5 - 14.5 sec    INR 1.1     PTT    Collection Time: 07/26/18 12:26 PM   Result Value Ref Range    aPTT 29.1 23.2 - 35.3 SEC   GLUCOSE, POC    Collection Time: 07/26/18 12:27 PM   Result Value Ref Range    Glucose (POC) 78 65 - 100 mg/dL   URINALYSIS W/ RFLX MICROSCOPIC    Collection Time: 07/26/18  1:04 PM   Result Value Ref Range    Color YELLOW      Appearance TURBID      Specific gravity 1.017 1.001 - 1.023      pH (UA) 5.5 5.0 - 9.0      Protein TRACE (A) NEG mg/dL    Glucose >1000 mg/dL    Ketone NEGATIVE  NEG mg/dL    Bilirubin NEGATIVE  NEG      Blood MODERATE (A) NEG      Urobilinogen 0.2 0.2 - 1.0 EU/dL    Nitrites POSITIVE (A) NEG      Leukocyte Esterase LARGE (A) NEG

## 2018-07-26 NOTE — PERIOP NOTES
Patient verified name, , and surgery as listed in [facility]. Type 3 surgery, walk in assessment complete. Labs per surgeon: CBC, BMP, U/A, PT/PTT, MRSA nasal swab ; results pending. Labs per anesthesia protocol: included in surgeons orders. SQBS=78  EKG: done today; abnormal; [facility] to review. Had already requested and received old cardiology records dated back to  from [facility] from previous [address] appointment in May. Dr. Shan Avila with anesthesia reviewed cardiac records on 18 and said it was okay to proceed with surgery at that time. Hibiclens and instructions to return bottle on DOS given per hospital policy. Nasal Swab collected per MD order and instructions for Mupirocin nasal ointment if required. Patient provided with handouts including Guide to Surgery, Pain Management, Hand Hygiene, Blood Transfusion Education, and [place] [facility] Brochure. Patient answered medical/surgical history questions at their best of ability. All prior to admission medications documented in [facility]. Original medication prescription bottles visualized during patient appointment. Patient instructed to hold all vitamins 7 days prior to surgery and NSAIDS 5 days prior to surgery. Medications to be held: vitamins. Instructed to use/take 40 units of tresiba the day before surgery per protocol. Patient instructed to continue previous medications as prescribed prior to surgery and to take the following medications the day of surgery according to anesthesia guidelines with a small sip of water: bupropion, divalproex, 40 units of tresiba. Instructed to bring: bi-pap,  Victoza, eye drops, tresiba, and [employer]. Patient teach back successful and patient demonstrates knowledge of instruction.

## 2018-07-26 NOTE — ADVANCED PRACTICE NURSE
Total Joint Surgery Preoperative Chart Review      Patient ID:  Delbert Rivas  [id number]  89 y.o.  1945  Surgeon: Dr. Mehdi Zapata  Date of Surgery: 8/2/2018  Procedure: Total Left Knee Arthroplasty  Primary Care Physician: Refugio Gann -[phone]  Specialty Physician(s):      Subjective:   Delbert Rivas is a 71 y.o. WHITE OR  male who presents for preoperative evaluation for Total Left Knee arthroplasty. This is a preoperative chart review note based on data collected by the nurse at the surgical Pre-Assessment visit.     Past Medical History:   Diagnosis Date    Arthritis     BMI 30.0-30.9,adult     CAD (coronary artery disease)     mild to moderate apical reversible ischemia per cardio note dated 07/12/10    Chronic kidney disease     no nephrologist     Chronic pain     neck, lower back     Depression     Diabetes ([address]     type 2; oral and insulin meds; 1-2 times per day; average 104; A1C=8.1 on 06/13/18    Enlarged prostate with urinary obstruction     Former smoker     GERD (gastroesophageal reflux disease)     High cholesterol     Hypertension     on med for control     Neurogenic bladder     Other ill-defined conditions(799.89)     Overflow incontinence     Sciatica     recently took medrol dose pack around 05/04/18    Self-catheterizes urinary bladder     has catheterized once in the past month; urinating more frequently own his own now  (07/26/18)    Skin cancer     Sleep apnea     bi-pap; instructed to bring dos    Spondylolisthesis of lumbar region     Thromboembolus [facility]) 2013    DVT left leg; caused from a falling accident that damaged left leg     Urine retention     Varicose veins of both lower extremities with complications       Past Surgical History:   Procedure Laterality Date    HX HEMORRHOIDECTOMY  1971    HX SHOULDER ARTHROSCOPY Left 1971    HX UROLOGICAL  06/2017    CYSTOSCOPY, TRANSURETHRAL RESECTION OF PROSTATE     Family History   Problem Relation Age of Onset    No Known Problems Mother     Heart Attack Father     Cancer Sister     Parkinson's Disease Brother       Social History   Substance Use Topics    Smoking status: Former Smoker     Packs/day: 1.00     Years: 7.00     Quit date: 5/8/1971    Smokeless tobacco: Never Used    Alcohol use Yes      Comment: occasionally        Prior to Admission medications    Medication Sig Start Date End Date Taking? Authorizing Provider   latanoprost (XALATAN) 0.005 % ophthalmic solution Administer 1 Drop to both eyes nightly. Yes Historical Provider   empagliflozin-metformin (SYNJARDY XR) 25-1,000 mg TBph Take 1 Tab by mouth daily. Indications: type 2 diabetes mellitus   Yes Historical Provider   multivitamin (ONE A DAY) tablet Take 1 Tab by mouth daily. Yes Historical Provider   buPROPion SR (WELLBUTRIN, ZYBAN) 200 mg SR tablet Take 200 mg by mouth daily. Take / use AM day of surgery  per anesthesia protocols. Indications: ANXIETY WITH DEPRESSION   Yes Historical Provider   divalproex DR (DEPAKOTE) 500 mg tablet Take 1,000 mg by mouth two (2) times a day. Take / use AM day of surgery  per anesthesia protocols. Indications: DEPRESSION ASSOCIATED WITH BIPOLAR DISORDER   Yes Historical Provider   furosemide (LASIX) 20 mg tablet Take 20 mg by mouth daily. Yes Historical Provider   glipiZIDE SR (GLUCOTROL XL) 10 mg CR tablet Take 5 mg by mouth daily. Indications: type 2 diabetes mellitus   Yes Historical Provider   Liraglutide (VICTOZA) 0.6 mg/0.1 mL (18 mg/3 mL) pnij 1.8 mg by SubCUTAneous route daily. Indications: type 2 diabetes mellitus   Yes Historical Provider   losartan (COZAAR) 100 mg tablet Take 100 mg by mouth daily. Indications: hypertension   Yes Historical Provider   nystatin (MYCOSTATIN) topical cream Apply  to affected area two (2) times daily as needed. Yes Historical Provider   calcium polycarbophil (FIBERCON) 625 mg tablet Take 625 mg by mouth daily.    Yes Historical Provider   pregabalin (LYRICA) 25 mg capsule Take 25 mg by mouth nightly. Yes Historical Provider   simvastatin (ZOCOR) 40 mg tablet Take 40 mg by mouth nightly. Yes Historical Provider   insulin degludec (TRESIBA FLEXTOUCH U-200) 200 unit/mL (3 mL) inpn 50 Units by SubCUTAneous route daily. Take / use 40 units the day before and AM day of surgery  per anesthesia protocols.   Indications: type 2 diabetes mellitus   Yes Historical Provider     Allergies   Allergen Reactions    Oxycodone Itching    Tramadol Itching          Objective:     Physical Exam:   Patient Vitals for the past 24 hrs:   Temp Pulse BP   07/26/18 1214 97 °F (36.1 °C) 70 125/82       ECG:    EKG Results     Procedure 720 Value Units Date/Time    EKG, 12 LEAD, INITIAL [[id number]] Collected:  07/26/18 1115    Order Status:  Completed Updated:  07/26/18 1311     Ventricular Rate 81 BPM      Atrial Rate 81 BPM      P-R Interval 168 ms      QRS Duration 130 ms      Q-T Interval 382 ms      QTC Calculation (Bezet) 443 ms      Calculated P Axis 59 degrees      Calculated R Axis -67 degrees      Calculated T Axis 97 degrees      Diagnosis --     Sinus rhythm with marked sinus arrhythmia  Left anterior fascicular block  Non-specific intra-ventricular conduction block  Cannot rule out Septal infarct , age undetermined  Abnormal ECG  No previous ECGs available  Confirmed by SALEEM HERNANDEZ (), Almita Ivett ([id number]) on 7/26/2018 1:11:44 PM            Data Review:   Labs:   Recent Results (from the past 24 hour(s))   EKG, 12 LEAD, INITIAL    Collection Time: 07/26/18 11:15 AM   Result Value Ref Range    Ventricular Rate 81 BPM    Atrial Rate 81 BPM    P-R Interval 168 ms    QRS Duration 130 ms    Q-T Interval 382 ms    QTC Calculation (Bezet) 443 ms    Calculated P Axis 59 degrees    Calculated R Axis -67 degrees    Calculated T Axis 97 degrees    Diagnosis       Sinus rhythm with marked sinus arrhythmia  Left anterior fascicular block  Non-specific intra-ventricular conduction block  Cannot rule out Septal infarct , age undetermined  Abnormal ECG  No previous ECGs available  Confirmed by SALEEM HERNANDEZ (), Claude Connolly ([id number]) on 7/26/2018 1:11:44 PM     CBC WITH AUTOMATED DIFF    Collection Time: 07/26/18 12:26 PM   Result Value Ref Range    WBC 7.9 4.3 - 11.1 K/uL    RBC 4.79 4.23 - 5.67 M/uL    HGB 14.0 13.6 - 17.2 g/dL    HCT 43.7 41.1 - 50.3 %    MCV 91.2 79.6 - 97.8 FL    MCH 29.2 26.1 - 32.9 PG    MCHC 32.0 31.4 - 35.0 g/dL    RDW 15.6 (H) 11.9 - 14.6 %    PLATELET 198 742 - 054 K/uL    MPV 10.4 (L) 10.8 - 14.1 FL    DF AUTOMATED      NEUTROPHILS 52 43 - 78 %    LYMPHOCYTES 35 13 - 44 %    MONOCYTES 10 4.0 - 12.0 %    EOSINOPHILS 3 0.5 - 7.8 %    BASOPHILS 0 0.0 - 2.0 %    IMMATURE GRANULOCYTES 0 0.0 - 5.0 %    ABS. NEUTROPHILS 4.1 1.7 - 8.2 K/UL    ABS. LYMPHOCYTES 2.7 0.5 - 4.6 K/UL    ABS. MONOCYTES 0.8 0.1 - 1.3 K/UL    ABS. EOSINOPHILS 0.2 0.0 - 0.8 K/UL    ABS. BASOPHILS 0.0 0.0 - 0.2 K/UL    ABS. IMM.  GRANS. 0.0 0.0 - 0.5 K/UL   METABOLIC PANEL, BASIC    Collection Time: 07/26/18 12:26 PM   Result Value Ref Range    Sodium 138 136 - 145 mmol/L    Potassium 4.6 3.5 - 5.1 mmol/L    Chloride 101 98 - 107 mmol/L    CO2 29 21 - 32 mmol/L    Anion gap 8 7 - 16 mmol/L    Glucose 78 65 - 100 mg/dL    BUN 38 (H) 8 - 23 MG/DL    Creatinine 1.95 (H) 0.8 - 1.5 MG/DL    GFR est AA 44 (L) >60 ml/min/1.73m2    GFR est non-AA 36 (L) >60 ml/min/1.73m2    Calcium 9.1 8.3 - 10.4 MG/DL   PROTHROMBIN TIME + INR    Collection Time: 07/26/18 12:26 PM   Result Value Ref Range    Prothrombin time 14.7 (H) 11.5 - 14.5 sec    INR 1.1     PTT    Collection Time: 07/26/18 12:26 PM   Result Value Ref Range    aPTT 29.1 23.2 - 35.3 SEC   GLUCOSE, POC    Collection Time: 07/26/18 12:27 PM   Result Value Ref Range    Glucose (POC) 78 65 - 100 mg/dL   URINALYSIS W/ RFLX MICROSCOPIC    Collection Time: 07/26/18  1:04 PM   Result Value Ref Range    Color YELLOW      Appearance TURBID Specific gravity 1.017 1.001 - 1.023      pH (UA) 5.5 5.0 - 9.0      Protein TRACE (A) NEG mg/dL    Glucose >1000 mg/dL    Ketone NEGATIVE  NEG mg/dL    Bilirubin NEGATIVE  NEG      Blood MODERATE (A) NEG      Urobilinogen 0.2 0.2 - 1.0 EU/dL    Nitrites POSITIVE (A) NEG      Leukocyte Esterase LARGE (A) NEG      WBC >100 0 /hpf    RBC 10-20 0 /hpf    Epithelial cells 0-3 0 /hpf    Bacteria TRACE 0 /hpf    Casts 0-3 0 /lpf    Other observations RESULTS VERIFIED MANUALLY           Problem List:  )  Patient Active Problem List   Diagnosis Code    CKD (chronic kidney disease) stage 3, GFR 30-59 ml/min N18.3    Elevated white blood cell count D72.829    Abnormal urinalysis R82.90       Total Joint Surgery Pre-Assessment Recommendations:           Patient with multiple comorbidities including:  Advanced age 71, sleep apnea and CKD 3. Patient would benefit from inpatient hospitalization with total knee surgery. Patient is to wear home BiPAP during hospitalization. Urine Microbiology results reviewed. Antibiotic therapy is indicated this time. Called and spoke with the patient about the results. Called in  Macrobid 100 mg po bid #14 with zero refills  prescription to [facility] at [phone]. Repeat urinalysis on day of surgery. Renal protocol initiated. The patient's chart will be flagged renal risk. Renal RisK Alerts:  1. Caution with use of muscle relaxants and sedatives with reduced renal function  2. Caution with total amount of narcotics used   3. Avoid morphine if patient has reduced renal function due to accumulations of the highly active metabolite, morphine-6-glucuronide, which can lead to sedation and respiratory depression  4. Avoid nephrotoxic drugs such as LAMB inhibitors  5. Consider volume status monitoring in addition to [place]  6.  Ensure hand-off to hospitalist for appropriate perioperative IV fluid management            Signed By: FRANCIS Motley    July 26, 2018

## 2018-07-26 NOTE — PERIOP NOTES
CBC, BMP, PT/PTT; results within [facility] protocols except elevated PT level=14.7 and elevated Creatinine=1.95.    U/A with positive nitrites and large amount of leukocytes. Result called to Sergio Littlejohn NP. States she will call in antibiotic script and inform patient. Dr. Neelam Jacob with anesthesia reviewed ekg with cardiac records from 2010, creatinine, and elevated PT level. No orders received. Okay to proceed.

## 2018-07-31 NOTE — H&P
H&P 
 
Patient ID: 
Whit Malcolm 
[id number] 
30 y.o. 
1945 Surgeon:  Feng Mooney MD 
Date of Surgery: * No surgery date entered * Procedure: Left Knee Total Arthroplasty Primary Care Physician: Tabatha Ruelas MD 
 
 
 
Subjective: 
Whit Malcolm is a 71 y.o. WHITE OR  male who presents with Left Knee pain. He has history of Left Knee pain for several months ago. Symptoms worse with walking and relieved with rest. Conservative treatment consisting of  therapeutic injections into the knee has not helped. The patient  lives with their family. The patients goal after surgery is improved pain and function. Past Medical History:  
Diagnosis Date  Arthritis  BMI 30.0-30.9,adult  CAD (coronary artery disease)   
 mild to moderate apical reversible ischemia per cardio note dated 07/12/10  Chronic kidney disease   
 no nephrologist   
 Chronic pain   
 neck, lower back  Depression  Diabetes ([facility] [address] type 2; oral and insulin meds; 1-2 times per day; average 104; A1C=8.1 on 06/13/18  Enlarged prostate with urinary obstruction  Former smoker  GERD (gastroesophageal reflux disease)  High cholesterol  Hypertension   
 on med for control  Neurogenic bladder  Other ill-defined conditions(799.89)  Overflow incontinence  Sciatica   
 recently took medrol dose pack around 05/04/18  Self-catheterizes urinary bladder   
 has catheterized once in the past month; urinating more frequently own his own now  (07/26/18)  Skin cancer  Sleep apnea   
 bi-pap; instructed to bring [facility]  Spondylolisthesis of lumbar region  Thromboembolus [facility]) 2013 DVT left leg; caused from a falling accident that damaged left leg  Urine retention  Varicose veins of both lower extremities with complications Past Surgical History:  
Procedure Laterality Date [address]   HX SHOULDER ARTHROSCOPY Left 1971  HX UROLOGICAL  06/2017 CYSTOSCOPY, TRANSURETHRAL RESECTION OF PROSTATE Family History Problem Relation Age of Onset  No Known Problems Mother  Heart Attack Father  Cancer Sister  Parkinson's Disease Brother Social History Substance Use Topics  Smoking status: Former Smoker Packs/day: 1.00 Years: 7.00 Quit date: 5/8/1971  Smokeless tobacco: Never Used  Alcohol use Yes Comment: occasionally Prior to Admission medications Medication Sig Start Date End Date Taking? Authorizing Provider  
latanoprost (XALATAN) 0.005 % ophthalmic solution Administer 1 Drop to both eyes nightly. Historical Provider  
empagliflozin-metformin (SYNJARDY XR) 25-1,000 mg TBph Take 1 Tab by mouth daily. Indications: type 2 diabetes mellitus    Historical Provider  
multivitamin (ONE A DAY) tablet Take 1 Tab by mouth daily. Historical Provider buPROPion SR (WELLBUTRIN, ZYBAN) 200 mg SR tablet Take 200 mg by mouth daily. Take / use AM day of surgery  per anesthesia protocols. Indications: ANXIETY WITH DEPRESSION    Historical Provider  
divalproex DR (DEPAKOTE) 500 mg tablet Take 1,000 mg by mouth two (2) times a day. Take / use AM day of surgery  per anesthesia protocols. Indications: DEPRESSION ASSOCIATED WITH BIPOLAR DISORDER    Historical Provider  
furosemide (LASIX) 20 mg tablet Take 20 mg by mouth daily. Historical Provider  
glipiZIDE SR (GLUCOTROL XL) 10 mg CR tablet Take 5 mg by mouth daily. Indications: type 2 diabetes mellitus    Historical Provider Liraglutide (VICTOZA) 0.6 mg/0.1 mL (18 mg/3 mL) pnij 1.8 mg by SubCUTAneous route daily. Indications: type 2 diabetes mellitus    Historical Provider  
losartan (COZAAR) 100 mg tablet Take 100 mg by mouth daily. Indications: hypertension    Historical Provider  
nystatin (MYCOSTATIN) topical cream Apply  to affected area two (2) times daily as needed.     Historical Provider  
calcium polycarbophil (FIBERCON) 625 mg tablet Take 625 mg by mouth daily. Historical Provider  
pregabalin (LYRICA) 25 mg capsule Take 25 mg by mouth nightly. Historical Provider  
simvastatin (ZOCOR) 40 mg tablet Take 40 mg by mouth nightly. Historical Provider  
insulin degludec (TRESIBA FLEXTOUCH U-200) 200 unit/mL (3 mL) inpn 50 Units by SubCUTAneous route daily. Take / use 40 units the day before and AM day of surgery  per anesthesia protocols. Indications: type 2 diabetes mellitus    Historical Provider Allergies Allergen Reactions  Oxycodone Itching  Tramadol Itching REVIEW OF SYSTEMS: 
CONSTITUTIONAL: Denies fever, decreased appetite, weight loss/gain, night sweats or fatigue. HEENT: Denies vision or hearing changes. has glasses. denies hearing aids. CARDIAC: Denies CP, palpitations, rheumatic fever, murmur, peripheral edema, carotid artery disease or syncopal episodes. RESPIRATORY: Denies dyspnea on exertion, asthma, COPD or orthopnea. GI: Denies GERD, history of GI bleed or melena, PUD, hepatitis or cirrhosis. : Denies dysuria, hematuria. denies BPH symptoms. HEMATOLOGIC: Denies anemia or blood disorders. ENDOCRINE: Denies thyroid disease. MUSCULOSKELETAL: See HPI. NEUROLOGIC: Denies seizure, peripheral neuropathy or memory loss. PSYCH: Denies depression, anxiety or insomnia. SKIN: Denies rash or open sores. Objective: PHYSICAL EXAM 
GENERAL: No data found. EYES: PERRL. EOM intact. MOUTH:Teeth and Gums normal. NECK: Full ROM. Trachea midline. No thyromegaly or JVD. CARDIOVASCULAR: Regular rate and rhythm. No murmur or gallops. No carotid bruits. Peripheral pulses: radial 2 +, PT 2+, DP 2+ bilaterally. LUNGS: CTA bilaterally. No wheezes, rhonchi or rales. GI: positive BS. Abdomen nontender. NEUROLOGIC: Alert and oriented x 3. Bilateral equal strong had grasp and bilateral equal strong plantar flexion and dorsiflexion. GAIT: abnormal  MUSCULOSKELETAL: ROM: full range with pain.  Tenderness: None. Crepitus: present. SKIN: No rash, bruising, swelling, redness or warmth. Labs:  No results found for this or any previous visit (from the past 24 hour(s)). Xray Left Knee: Joint space narrowing Assessment: 
Advanced Left Knee Osteoarthritis. Total Left Knee Arthroplasty Indicated. Patient Active Problem List  
Diagnosis Code  CKD (chronic kidney disease) stage 3, GFR 30-59 ml/min N18.3  Elevated white blood cell count D72.829  Abnormal urinalysis R82.90 Plan: 
I have advised the patient of the risks and consequences, including possible complications of performing total joint replacement, as well as not doing this operation. The patient had the opportunity to ask questions and have them answered to their satisfaction. Signed: 
CRISTINA Shannon 7/31/2018

## 2018-08-02 PROBLEM — M19.90 OSTEOARTHRITIS: Status: ACTIVE | Noted: 2018-01-01

## 2018-08-02 NOTE — PROGRESS NOTES
Care Management Interventions Mode of Transport at Discharge: Other (see comment) Transition of Care Consult (CM Consult): Discharge Planning Physical Therapy Consult: Yes Occupational Therapy Consult: Yes Current Support Network: Own Home, Lives Alone Confirm Follow Up Transport: Other (see comment) Plan discussed with Pt/Family/Caregiver: Yes Freedom of Choice Offered: Yes Discharge Location Discharge Placement: Skilled nursing facility TERRELL spoke with pt who is A&O. Pt is s/p L TKA. Pt states he lives alone and plans on going to rehab at [facility]. Pt is covered under [employer] ( RN case manager is Tay Clay at [phone] ). TERRELL spoke with Yamileth at [facility] who is aware of pt & provided  name & number at [employer]. TERRELL called & left VM message for [employer] , requesting call back in the event she needs any clinical info.

## 2018-08-02 NOTE — IP AVS SNAPSHOT
[id number] [facility] 
[phone] Patient: Gene Joshi 
MRN: [id number] :1945 About your hospitalization You were admitted on:  2018 You last received care in the:  [facility] 1 You were discharged on:  2018 Why you were hospitalized Your primary diagnosis was:  S/P Total Knee Arthroplasty, Left Your diagnoses also included:  Osteoarthritis Follow-up Information Follow up With Details Comments Contact Info [address] [place] [address] 
[phone] Discharge Orders None A check randy indicates which time of day the medication should be taken. My Medications ASK your doctor about these medications Instructions Each Dose to Equal  
 Morning Noon Evening Bedtime  
 calcium polycarbophil 625 mg tablet Commonly known as:  Zev Stands Your last dose was: Your next dose is: Take 625 mg by mouth daily. 625 mg  
    
   
   
   
  
 DEPAKOTE  mg ER tablet Generic drug:  divalproex ER Your last dose was: Your next dose is: Take 1,000 mg by mouth two (2) times a day. 1000 mg  
    
   
   
   
  
 doxycycline 100 mg capsule Commonly known as:  Thora Deem Your last dose was: Your next dose is: Take 100 mg by mouth two (2) times a day. 100 mg  
    
   
   
   
  
 glipiZIDE SR 10 mg CR tablet Commonly known as:  GLUCOTROL XL Your last dose was: Your next dose is: Take 10 mg by mouth daily. Indications: type 2 diabetes mellitus 10 mg  
    
   
   
   
  
 LASIX 20 mg tablet Generic drug:  furosemide Your last dose was: Your next dose is: Take 20 mg by mouth daily. 20 mg  
    
   
   
   
  
 latanoprost 0.005 % ophthalmic solution Commonly known as:  Vincent Weathers Your last dose was: Your next dose is:    
   
   
 Administer 1 Drop to both eyes nightly. 1 Drop Liraglutide 0.6 mg/0.1 mL (18 mg/3 mL) Pnij Commonly known as:  Rachel Esquivel Your last dose was: Your next dose is:    
   
   
 1.8 mg by SubCUTAneous route daily. Indications: type 2 diabetes mellitus 1.8 mg  
    
   
   
   
  
 losartan 100 mg tablet Commonly known as:  COZAAR Your last dose was: Your next dose is: Take 100 mg by mouth daily. Indications: hypertension 100 mg  
    
   
   
   
  
 LYRICA 75 mg capsule Generic drug:  pregabalin Your last dose was: Your next dose is: Take 75 mg by mouth two (2) times a day. 75 mg  
    
   
   
   
  
 multivitamin tablet Commonly known as:  ONE A DAY Your last dose was: Your next dose is: Take 1 Tab by mouth daily. 1 Tab  
    
   
   
   
  
 nystatin topical cream  
Commonly known as:  MYCOSTATIN Your last dose was: Your next dose is:    
   
   
 Apply  to affected area two (2) times daily as needed. simvastatin 40 mg tablet Commonly known as:  ZOCOR Your last dose was: Your next dose is: Take 40 mg by mouth nightly. 40 mg SYNJARDY XR 25-1,000 mg Tbph  
Generic drug:  empagliflozin-metformin Your last dose was: Your next dose is: Take 1 Tab by mouth daily. Indications: type 2 diabetes mellitus 1 Tab TRESIBA FLEXTOUCH U-200 200 unit/mL (3 mL) Inpn Generic drug:  insulin degludec Your last dose was: Your next dose is:    
   
   
 50 Units by SubCUTAneous route daily. Take / use 40 units the day before and AM day of surgery  per anesthesia protocols. Indications: type 2 diabetes mellitus 50 Units WELLBUTRIN  mg SR tablet Generic drug:  buPROPion SR Your last dose was: Your next dose is: Take 150 mg by mouth two (2) times a day. 150 mg Discharge Instructions None [facility] Announcement We are excited to announce that we are making your provider's discharge notes available to you in [employer]. You will see these notes when they are completed and signed by the physician that discharged you from your recent hospital stay. If you have any questions or concerns about any information you see in [employer], please call the Health Information Department where you were seen or reach out to your Primary Care Provider for more information about your plan of care. Introducing [facility]! [facility] introduces [employer] patient portal. Now you can access parts of your medical record, email your doctor's office, and request medication refills online. 1. In your internet browser, go to [email or web address] 2. Click on the First Time User? Click Here link in the Sign In box. You will see the New Member Sign Up page. 3. Enter your [employer] Access Code exactly as it appears below. You will not need to use this code after youve completed the sign-up process. If you do not sign up before the expiration date, you must request a new code. · [employer] Access Code: [email or web address] Expires: 10/21/2018  2:36 PM 
 
4. Enter the last four digits of your Social Security Number (xxxx) and Date of Birth (mm/dd/yyyy) as indicated and click Submit. You will be taken to the next sign-up page. 5. Create a myTom[employer]st ID. This will be your [employer] login ID and cannot be changed, so think of one that is secure and easy to remember. 6. Create a [employer] password. You can change your password at any time. 7. Enter your Password Reset Question and Answer. This can be used at a later time if you forget your password. 8. Enter your e-mail address. You will receive e-mail notification when new information is available in [address] 9. Click Sign Up. You can now view and download portions of your medical record. 10. Click the Download Summary menu link to download a portable copy of your medical information. If you have questions, please visit the Frequently Asked Questions section of the [employer] website. Remember, [employer] is NOT to be used for urgent needs. For medical emergencies, dial 911. Now available from your iPhone and Android! Introducing George Mcclellan As a [employer] patient, I wanted to make you aware of our electronic visit tool called George Mcclellan. [employer] 24/7 allows you to connect within minutes with a medical provider 24 hours a day, seven days a week via a mobile device or tablet or logging into a secure website from your computer. You can access George Mcclellan from anywhere in the [place]. A virtual visit might be right for you when you have a simple condition and feel like you just dont want to get out of bed, or cant get away from work for an appointment, when your regular [employer] provider is not available (evenings, weekends or holidays), or when youre out of town and need minor care. Electronic visits cost only $49 and if the [employer] 24/7 provider determines a prescription is needed to treat your condition, one can be electronically transmitted to a nearby pharmacy*. Please take a moment to enroll today if you have not already done so. The enrollment process is free and takes just a few minutes. To enroll, please download the [employer] 24/7 peewee to your tablet or phone, or visit [email or web address] to enroll on your computer.    
And, as an [address] [place] [address] patient with a "[employer] account, the results of your visits will be scanned into your electronic medical record and your primary care provider will be able to view the scanned results. We urge you to continue to see your regular [employer] provider for your ongoing medical care. And while your primary care provider may not be the one available when you seek a Voc[employer] virtual visit, the peace of mind you get from getting a real diagnosis real time can be priceless. For more information on Voc[employer], view our Frequently Asked Questions (FAQs) at [email or web address]. Sincerely, 
 
Clarisse Pacheco MD 
Chief Medical Officer [employer] *:  certain medications cannot be prescribed via Vocab Providers Seen During Your Hospitalization Provider Specialty Primary office phone Claudius Litten, MD Orthopedic Surgery [phone] Immunizations Administered for This Admission Name Date  
 TB Skin Test (PPD) Intradermal 8/2/2018 Your Primary Care Physician (PCP) Primary Care Physician Office Phone Office Fax Taty [phone] You are allergic to the following Allergen Reactions Oxycodone Itching Tramadol Itching Recent Documentation Height Weight BMI Smoking Status 1.829 m 100.7 kg 30.11 kg/m2 Former Smoker Emergency Contacts Name Discharge Info Relation Home Work Mobile Dee Gutierrez DISCHARGE CAREGIVER [3] Daughter [21] [phone] Patient Belongings The following personal items are in your possession at time of discharge: 
  Dental Appliances: Lowers, Partials, With patient  Visual Aid: Glasses      Home Medications: None   Jewelry: None  Clothing: Other (comment)    Other Valuables: Cell Phone, Wallet (PT'S DAUGHTER HAS VALUABLES) Please provide this summary of care documentation to your next provider. Signatures-by signing, you are acknowledging that this After Visit Summary has been reviewed with you and you have received a copy.   
  
 
  
    
    
 Patient Signature: ____________________________________________________________ Date:  ____________________________________________________________  
  
Marvetta Land Provider Signature:  ____________________________________________________________ Date:  ____________________________________________________________

## 2018-08-02 NOTE — CONSULTS
Physical Medicine & Rehabilitation Note-consult Patient: Gene Joshi MRN: [id number]  SSN: [id number] YOB: 1945  Age: 71 y.o. Sex: male Admit Date: 8/2/2018 Admitting Physician: Lexis See MD 
 
Medical Decision Making/Plan/Recommend:  Gait impairment and functional deficits following L TKA. Neurovascular exam intact. Expect no unusual barriers. Patient plans for SNF discharge. Patient will benefit from continued daily skilled rehabilitation efforts and regular medical and nursing care at SNF. PT, OT to start in am for left TKA ROM, strengthening, mobility, transfers, and gait training. Will follow progress. Chief Complaint : Gait dysfunction secondary to below. Admit Diagnosis: Unilateral primary osteoarthritis, left knee [M17.12] 
left total knee arthroplasty 8/2/2018 Pain DVT risk Post op acute blood loss anemia Hypertension Diabetes ([facility] [address] Spondylolisthesis of lumbar region Neurogenic bladder  -has catheterized once in the past month; urinating more frequently own his own now  (07/26/18) Acute Rehab Dx: 
Gait impairment Debility Mobility and ambulation deficits Self Care/ADL deficits Medical Dx: 
Past Medical History:  
Diagnosis Date  Arthritis  BMI 30.0-30.9,adult  CAD (coronary artery disease)   
 mild to moderate apical reversible ischemia per cardio note dated 07/12/10  Chronic kidney disease   
 no nephrologist   
 Chronic pain   
 neck, lower back  Depression  Diabetes ([address] type 2; oral and insulin meds; 1-2 times per day; average 104; A1C=8.1 on 06/13/18  Enlarged prostate with urinary obstruction  Former smoker  GERD (gastroesophageal reflux disease)  High cholesterol  Hypertension   
 on med for control  Neurogenic bladder  Other ill-defined conditions(799.89)  Overflow incontinence  Sciatica   
 recently took medrol dose pack around 05/04/18  Self-catheterizes urinary bladder   
 has catheterized once in the past month; urinating more frequently own his own now  (07/26/18)  Skin cancer  Sleep apnea   
 bi-pap; instructed to bring [facility]  Spondylolisthesis of lumbar region  Thromboembolus [facility]) 2013 DVT left leg; caused from a falling accident that damaged left leg  Urine retention  Varicose veins of both lower extremities with complications Subjective: HPI: Rebecca Calderon is a 71 y.o. male patient at [address] [place] [address] who was admitted on 8/2/2018  by Brianna Wallace MD with below mentioned medical history, is being seen for Physical Medicine and Rehabilitation consult. Rebecca Calderon presented with severe left knee pain due to end stage DJD that has been refractory to conservative treatment including intra-articular cortisone injections and modification of activities. Patient underwent a left total knee arthroplasty per Dr. Brianna Wallace MD on 8/2/2018. Katharine Joseph are consulted to assist with rehab needs and placement. Patient is to be WBAT LLE. Patient will be limited by surgical knee pain, decreased ROM and strength. Expect no major barriers otherwise. Rebecca Calderon is seen and examined today. Medical Records reviewed. Patient has been independent with ambulation, prior to admission, limited by left knee pain. Current Level of Function:  bed mobility - min A, transfers - min A x2, decreased balance , ambulation - min A x2. Previous Functional Level-  independent Family History Problem Relation Age of Onset  No Known Problems Mother  Heart Attack Father  Cancer Sister  Parkinson's Disease Brother Social History Substance Use Topics  Smoking status: Former Smoker Packs/day: 1.00 Years: 7.00 Quit date: 5/8/1971  Smokeless tobacco: Never Used  Alcohol use Yes Comment: occasionally Past Surgical History:  
Procedure Laterality Date  HX HEMORRHOIDECTOMY [facility] UROLOGICAL  06/2017 CYSTOSCOPY, TRANSURETHRAL RESECTION OF PROSTATE Prior to Admission medications Medication Sig Start Date End Date Taking? Authorizing Provider  
latanoprost (XALATAN) 0.005 % ophthalmic solution Administer 1 Drop to both eyes nightly. Yes Historical Provider  
empagliflozin-metformin (SYNJARDY XR) 25-1,000 mg TBph Take 1 Tab by mouth daily. Indications: type 2 diabetes mellitus   Yes Historical Provider  
multivitamin (ONE A DAY) tablet Take 1 Tab by mouth daily. Yes Historical Provider buPROPion SR (WELLBUTRIN, ZYBAN) 200 mg SR tablet Take 200 mg by mouth daily. Take / use AM day of surgery  per anesthesia protocols. Indications: ANXIETY WITH DEPRESSION   Yes Historical Provider  
divalproex DR (DEPAKOTE) 500 mg tablet Take 1,000 mg by mouth two (2) times a day. Take / use AM day of surgery  per anesthesia protocols. Indications: DEPRESSION ASSOCIATED WITH BIPOLAR DISORDER   Yes Historical Provider  
furosemide (LASIX) 20 mg tablet Take 20 mg by mouth daily. Yes Historical Provider  
glipiZIDE SR (GLUCOTROL XL) 10 mg CR tablet Take 5 mg by mouth daily. Indications: type 2 diabetes mellitus   Yes Historical Provider Liraglutide (VICTOZA) 0.6 mg/0.1 mL (18 mg/3 mL) pnij 1.8 mg by SubCUTAneous route daily. Indications: type 2 diabetes mellitus   Yes Historical Provider  
losartan (COZAAR) 100 mg tablet Take 100 mg by mouth daily. Indications: hypertension   Yes Historical Provider  
calcium polycarbophil (FIBERCON) 625 mg tablet Take 625 mg by mouth daily. Yes Historical Provider  
pregabalin (LYRICA) 25 mg capsule Take 25 mg by mouth nightly. Yes Historical Provider  
simvastatin (ZOCOR) 40 mg tablet Take 40 mg by mouth nightly. Yes Historical Provider  
insulin degludec (TRESIBA FLEXTOUCH U-200) 200 unit/mL (3 mL) inpn 50 Units by SubCUTAneous route daily.  Take / use 40 units the day before and AM day of surgery per anesthesia protocols. Indications: type 2 diabetes mellitus   Yes Historical Provider  
nystatin (MYCOSTATIN) topical cream Apply  to affected area two (2) times daily as needed. Historical Provider Allergies Allergen Reactions  Oxycodone Itching  Tramadol Itching Review of Systems: +left knee pain, +antalgic gait. Denies chest pain, shortness of breath, cough, headache, visual problems, abdominal pain, dysurea, calf pain. Pertinent positives are as noted in the medical records and unremarkable otherwise. Objective:  
 
Vitals: 
Blood pressure 131/84, pulse 66, temperature 97.2 °F (36.2 °C), resp. rate 16, height 6' (1.829 m), weight 222 lb (100.7 kg), SpO2 97 %. Temp (24hrs), Av.7 °F (36.5 °C), Min:97.2 °F (36.2 °C), Max:98.2 °F (36.8 °C) BMI (calculated): 30.1 (18 0936) Intake and Output: 
  
 
Physical Exam:  
General: Alert and age appropriately oriented. No acute cardio respiratory distress. HEENT: Normocephalic, no conjunctival pallor. Oral mucosa moist without cyanosis. No JVD. Lungs: Clear to auscultation bilaterally. Respiration even and unlabored Heart: Regular rate and rhythm, S1, S2 No  Murmurs. Abdomen: Soft, non-tender, non-distended. Genitourinary: defered Neuromuscular:  
 
 Grossly no focal motor deficits. wiggles left toes, foot. No sensory deficits distally BLE to soft touch. Skin/extremity: Non tender calves BLE. No rashes, no marginal erythema. Labs/Studies: 
Recent Results (from the past 72 hour(s)) GLUCOSE, POC Collection Time: 18  8:38 AM  
Result Value Ref Range Glucose (POC) 94 65 - 100 mg/dL TYPE & SCREEN Collection Time: 18  8:42 AM  
Result Value Ref Range Crossmatch Expiration 2018 ABO/Rh(D) A POSITIVE Antibody screen NEG Functional Assessment: 
Reviewed participation and progress in therapies Requires assist 
 
Ambulation: 
Requires assist 
 
Impression/Plan: Active Problems: 
  Osteoarthritis (8/2/2018) Current Facility-Administered Medications Medication Dose Route Frequency Provider Last Rate Last Dose  [START ON 8/3/2018] tuberculin injection 5 Units  5 Units IntraDERMal ONCE Lexis See MD   5 Units at 08/02/18 7818  [START ON 8/3/2018] buPROPion SR (WELLBUTRIN SR) tablet 200 mg  200 mg Oral DAILY Lexis See MD      
 . PHARMACY TO SUBSTITUTE PER PROTOCOL (Reordered from: calcium polycarbophil (FIBERCON) 625 mg tablet)    Per Protocol Lexis See MD      
 divalproex DR (DEPAKOTE) tablet 1,000 mg  1,000 mg Oral BID Lexis See MD      
 . PHARMACY TO SUBSTITUTE PER PROTOCOL (Reordered from: empagliflozin-metformin (SYNJARDY XR) 25-1,000 mg [facility])    Per Protocol Lexis See MD      
 [START ON 8/3/2018] furosemide (LASIX) tablet 20 mg  20 mg Oral DAILY Lexis See MD      
 [START ON 8/3/2018] glipiZIDE SR (GLUCOTROL XL) tablet 5 mg  5 mg Oral DAILY Lexis See MD      
 . PHARMACY TO SUBSTITUTE PER PROTOCOL (Reordered from: insulin degludec (TRESIBA FLEXTOUCH U-200) 200 unit/mL (3 mL) in)    Per Protocol Lexis See MD      
 latanoprost (XALATAN) 0.005 % ophthalmic solution 1 Drop  1 Drop Both Eyes QHS Lexis See MD      
 . PHARMACY TO SUBSTITUTE PER PROTOCOL (Reordered from: Liraglutide (VICTOZA) 0.6 mg/0.1 mL (18 mg/3 mL) pn)    Per Protocol Lexis See MD      
 [START ON 8/3/2018] losartan (COZAAR) tablet 100 mg  100 mg Oral DAILY Lexis See MD      
 nystatin (MYCOSTATIN) 100,000 unit/gram cream   Topical BID PRN Lexis See MD      
 pregabalin (LYRICA) capsule 25 mg  25 mg Oral QHS Lexis See MD      
 simvastatin (ZOCOR) tablet 40 mg  40 mg Oral QHS Lexis See MD      
 0.9% sodium chloride infusion  100 mL/hr IntraVENous CONTINUOUS Hollie Clements MD      
 sodium chloride (NS) flush 5-10 mL  5-10 mL IntraVENous PRN Hollie Clements MD      
 ceFAZolin (ANCEF) 2 g/20 mL in sterile water IV syringe  2 g IntraVENous Q8H Hollie Clements MD      
 acetaminophen (OFIRMEV) infusion 1,000 mg  1,000 mg IntraVENous ONCE Hollie Clements MD      
 [START ON 8/3/2018] acetaminophen (TYLENOL) tablet 1,000 mg  1,000 mg Oral Q6H Hollie Clements MD      
 HYDROmorphone (PF) (DILAUDID) injection 1 mg  1 mg IntraVENous Q3H PRN Hollie Clements MD      
 naloxone [facility]) injection 0.2-0.4 mg  0.2-0.4 mg IntraVENous Q10MIN PRN Hollie Clements MD      
 [START ON 8/3/2018] dexamethasone (DECADRON) injection 10 mg  10 mg IntraVENous ONCE Hollie Clements MD      
 promethazine (PHENERGAN) tablet 25 mg  25 mg Oral Q6H PRN Hollie Clements MD      
 diphenhydrAMINE (BENADRYL) capsule 25 mg  25 mg Oral Q4H PRN Hollie Clements MD      
 [START ON 8/3/2018] senna-docusate (PERICOLACE) 8.6-50 mg per tablet 2 Tab  2 Tab Oral DAILY Hollie Clements MD      
 zolpidem [facility]) tablet 5 mg  5 mg Oral QHS PRN Hollie Clements MD      
 aspirin delayed-release tablet 81 mg  81 mg Oral Q12H Hollie Clements MD      
 HYDROmorphone (DILAUDID) tablet 4 mg  4 mg Oral Q4H PRN Hollie Clements MD      
 [START ON 8/3/2018] enoxaparin (LOVENOX) injection 40 mg  40 mg SubCUTAneous DAILY Hollie Clements MD      
 warfarin (COUMADIN) tablet 5 mg  5 mg Oral QPM Hollie Clements MD      
 ondansetron [facility]) tablet 8 mg  8 mg Oral Q8H PRN Hollie Clements MD      
 HYDROmorphone (DILAUDID) tablet 2 mg  2 mg Oral Q4H PRN Hollie Clements MD      
  
 
Recommendations: Planning sub acute rehab at [facility]. Continue Acute Rehab Program. PT, OT  to focus on  gait training, transfer training, balance activities, ROM and strengthening exercises. Coordination of rehab/medical care.  
Counseling of Physical Medicine & Rehab care issues management. Rehabilitation Management/ Medical Management: 1. Devices:Walkers, Type: Dyane Pavy 2. Consult:Rehab team including PT, OT,  and . 3. Disposition Rehab-discussed with patient. 4. Thigh-high or knee-high JEFFREY's when out of bed. 5. DVT Prophylaxis - patient started on coumadin post operatively per ortho, bridged with lovenox. Monitor INR. \6. Incentive spirometer Q1H while awake 7. Post op hemorrhagic anemia- monitor. Check in am.  
8. Activity: WBAT LLE 9. Planned Labs: CBC,BMP, INR daily 10. Pain Control:   Continue scheduled tylenol, Celebrex and  PRN meds. 11. Wound Care: Keep left TKA wound clean and dry and reinforce dressing PRN. May remove Aquacel 1 week post op ad replace with new one. Remove staples 12-14 post surgery, when incision appears appropriately closed and apply benzoin and 1/2\" steristrips. Follow up with ORTHO per instructions. Thank you for the opportunity to participate in the care of this patient.  
 
Signed By: Elenita Puente MD

## 2018-08-02 NOTE — INTERVAL H&P NOTE
H&P Update: 
Felicita Aparicio was seen and examined. History and physical has been reviewed. The patient has been examined. There have been no significant clinical changes since the completion of the originally dated History and Physical. 
 
Signed By: CRISTINA Botello  August 2, 2018 8:53 AM

## 2018-08-02 NOTE — IP AVS SNAPSHOT
[address] [facility] [address] [facility] 
[phone] Patient: Jeffy Oscar 
MRN: [id number] :1945 A check randy indicates which time of day the medication should be taken. My Medications ASK your doctor about these medications Instructions Each Dose to Equal  
 Morning Noon Evening Bedtime  
 calcium polycarbophil 625 mg tablet Commonly known as:  Mitchell Miranda Your last dose was: Your next dose is: Take 625 mg by mouth daily. 625 mg  
    
   
   
   
  
 DEPAKOTE  mg ER tablet Generic drug:  divalproex ER Your last dose was: Your next dose is: Take 1,000 mg by mouth two (2) times a day. 1000 mg  
    
   
   
   
  
 doxycycline 100 mg capsule Commonly known as:  Jluis Manriqueon Your last dose was: Your next dose is: Take 100 mg by mouth two (2) times a day. 100 mg  
    
   
   
   
  
 glipiZIDE SR 10 mg CR tablet Commonly known as:  GLUCOTROL XL Your last dose was: Your next dose is: Take 10 mg by mouth daily. Indications: type 2 diabetes mellitus 10 mg  
    
   
   
   
  
 LASIX 20 mg tablet Generic drug:  furosemide Your last dose was: Your next dose is: Take 20 mg by mouth daily. 20 mg  
    
   
   
   
  
 latanoprost 0.005 % ophthalmic solution Commonly known as:  Marisa Wood Your last dose was: Your next dose is:    
   
   
 Administer 1 Drop to both eyes nightly. 1 Drop Liraglutide 0.6 mg/0.1 mL (18 mg/3 mL) Pnij Commonly known as:  Daniela Louis Your last dose was: Your next dose is:    
   
   
 1.8 mg by SubCUTAneous route daily. Indications: type 2 diabetes mellitus 1.8 mg  
    
   
   
   
  
 losartan 100 mg tablet Commonly known as:  COZAAR Your last dose was: Your next dose is: Take 100 mg by mouth daily. Indications: hypertension 100 mg  
    
   
   
   
  
 LYRICA 75 mg capsule Generic drug:  pregabalin Your last dose was: Your next dose is: Take 75 mg by mouth two (2) times a day. 75 mg  
    
   
   
   
  
 multivitamin tablet Commonly known as:  ONE A DAY Your last dose was: Your next dose is: Take 1 Tab by mouth daily. 1 Tab  
    
   
   
   
  
 nystatin topical cream  
Commonly known as:  MYCOSTATIN Your last dose was: Your next dose is:    
   
   
 Apply  to affected area two (2) times daily as needed. simvastatin 40 mg tablet Commonly known as:  ZOCOR Your last dose was: Your next dose is: Take 40 mg by mouth nightly. 40 mg SYNJARDY XR 25-1,000 mg Tbph  
Generic drug:  empagliflozin-metformin Your last dose was: Your next dose is: Take 1 Tab by mouth daily. Indications: type 2 diabetes mellitus 1 Tab TRESIBA FLEXTOUCH U-200 200 unit/mL (3 mL) Inpn Generic drug:  insulin degludec Your last dose was: Your next dose is:    
   
   
 50 Units by SubCUTAneous route daily. Take / use 40 units the day before and AM day of surgery  per anesthesia protocols. Indications: type 2 diabetes mellitus 50 Units WELLBUTRIN  mg SR tablet Generic drug:  buPROPion SR Your last dose was: Your next dose is: Take 150 mg by mouth two (2) times a day.   
 150 mg

## 2018-08-02 NOTE — ANESTHESIA PREPROCEDURE EVALUATION
Anesthetic History Review of Systems / Medical History Patient summary reviewed, nursing notes reviewed and pertinent labs reviewed Pulmonary Sleep apnea: CPAP Neuro/Psych Cardiovascular Hypertension: well controlled Exercise tolerance: >4 METS 
  
GI/Hepatic/Renal 
  
 
 
 
 
 
 Endo/Other Diabetes: well controlled, type 2 Other Findings Physical Exam 
 
Airway Mallampati: II 
TM Distance: 4 - 6 cm Neck ROM: normal range of motion Mouth opening: Normal 
 
 Cardiovascular Regular rate and rhythm,  S1 and S2 normal,  no murmur, click, rub, or gallop Dental 
No notable dental hx Pulmonary Breath sounds clear to auscultation Abdominal 
 
 
 
 Other Findings Anesthetic Plan ASA: 3 Anesthesia type: spinal 
 
 
Post-op pain plan if not by surgeon: peripheral nerve block single Induction: Intravenous Anesthetic plan and risks discussed with: Patient

## 2018-08-02 NOTE — PROGRESS NOTES
Spiritual care and pre-op prayer given to pt and daughter. Also, assisted patient in completing HCPOA. Copy placed on chart and original and additional copies given to patient. DOROTA Tejada

## 2018-08-02 NOTE — PERIOP NOTES
TRANSFER - OUT REPORT: 
 
Verbal report given to Andrea Heller RN on [place]  being transferred to [facility] for routine post - op Report consisted of patients Situation, Background, Assessment and  
Recommendations(SBAR). Information from the following report(s) OR Summary, Procedure Summary, Intake/Output and MAR was reviewed with the receiving nurse. Opportunity for questions and clarification was provided. Patient transported with: 
 O2 @ 2 liters

## 2018-08-02 NOTE — PERIOP NOTES
RASHEED EVANS AT BEDSIDE TO EVALUATE PT'S BOIL ON BACK AND EVALUATED IT. RASHEED AWARE PT HAD BOIL DRAINED YESTERDAY AT  AND HASN'T STARTED ANTIBIOTICS. [facility] PT WILL HAVE IV ANTIBIOTICS WITH SURGERY THAT WILL COVER BOIL AS WELL. PLAN TO PROCEED WITH SURGERY.

## 2018-08-02 NOTE — PROGRESS NOTES
Problem: Mobility Impaired (Adult and Pediatric) Goal: *Acute Goals and Plan of Care (Insert Text) GOALS (1-4 days): 
(1.)Mr. Cutler will move from supine to sit and sit to supine  in bed with STAND BY ASSIST. 
(2.)Mr. Cutler will transfer from bed to chair and chair to bed with STAND BY ASSIST using the least restrictive device. (3.)Mr. Cutler will ambulate with STAND BY ASSIST for 200 feet with the least restrictive device. (4.)Mr. Cutler will increase left knee ROM to 5°-80°. 
________________________________________________________________________________________________ PHYSICAL THERAPY [facility]: Initial Assessment 8/2/2018 INPATIENT: Hospital Day: 1 Payor: Elvira Jones / Plan: [id number] [facility] / Product Type: Workers Comp /  
  
NAME/AGE/GENDER: Amparo Sosa is a 71 y.o. male PRIMARY DIAGNOSIS:  Unilateral primary osteoarthritis, left knee [M17.12] Procedure(s) and Anesthesia Type: 
   * LEFT KNEE ARTHROPLASTY TOTAL / DEPUY - Spinal (Left) ICD-10: Treatment Diagnosis:  
 · Pain in Left Knee (M25.562) · Stiffness of Left Knee, Not elsewhere classified (M25.662) · Difficulty in walking, Not elsewhere classified (R26.2) · Other abnormalities of gait and mobility (R26.89) ASSESSMENT:  
 
Mr. Floresita Youngblood presents with decreased strength and ROM L LE and limited functional mobility S/P L TKA. Patient was able to transfer to recliner with rolling walker and min assist X 2 where he performed LE exercises. Gait distance was limited due to decreased sensation B LE. Patient lives alone and plans to discharge to SNF. Patient will benefit from skilled therapy services to address the below problem list.  
 
This section established at most recent assessment PROBLEM LIST (Impairments causing functional limitations): 1. Decreased Strength 2. Decreased ADL/Functional Activities 3. Decreased Transfer Abilities 4.  Decreased Ambulation Ability/Technique 5. Decreased Activity Tolerance 6. Decreased Flexibility/Joint Mobility 7. Decreased Pettis with Home Exercise Program 
 INTERVENTIONS PLANNED: (Benefits and precautions of physical therapy have been discussed with the patient.) 1. Bed Mobility 2. Gait Training 3. Home Exercise Program (HEP) 4. Therapeutic Exercise/Strengthening 5. Transfer Training 6. Range of Motion: active/assisted/passive 7. Therapeutic Activities 8. Group Therapy TREATMENT PLAN: Frequency/Duration: Follow patient BID for duration of hospital stay to address above goals. Rehabilitation Potential For Stated Goals: Good RECOMMENDED REHABILITATION/EQUIPMENT: (at time of discharge pending progress): Continue Skilled Therapy and Rehab. HISTORY:  
History of Present Injury/Illness (Reason for Referral): S/P L TKA Past Medical History/Comorbidities:  
Mr. Steven Dominguez  has a past medical history of Arthritis; BMI 30.0-30.9,adult; CAD (coronary artery disease); Chronic kidney disease; Chronic pain; Depression; Diabetes ([address] Enlarged prostate with urinary obstruction; Former smoker; GERD (gastroesophageal reflux disease); High cholesterol; Hypertension; Neurogenic bladder; Other ill-defined conditions(799.89); Overflow incontinence; Sciatica; Self-catheterizes urinary bladder; Skin cancer; Sleep apnea; Spondylolisthesis of lumbar region; Thromboembolus ([address] (2013); Urine retention; and Varicose veins of both lower extremities with complications. He also has no past medical history of Adverse effect of anesthesia; Aneurysm ([address] Arrhythmia; Asthma; Autoimmune disease ([address] Chronic obstructive pulmonary disease ([address] Coagulation disorder ([address] Difficult intubation; Endocarditis; Heart failure ([address] Ill-defined condition; Liver disease; Malignant hyperthermia due to anesthesia;  Nausea & vomiting; Nicotine vapor product user; Non-nicotine vapor product user; Pseudocholinesterase deficiency; PUD (peptic ulcer disease); Rheumatic fever; Seizures ([facility] Stroke [facility]); or Thyroid disease. Mr. Fiona Reyes  has a past surgical history that includes hx hemorrhoidectomy (1971); hx shoulder arthroscopy (Left, 1971); and hx urological (06/2017). Social History/Living Environment:  
Home Environment: Private residence # Steps to Enter: 0 One/Two Story Residence: One story Living Alone: Yes Support Systems: Child(giovanni) Patient Expects to be Discharged to[de-identified] Rehabilitation facility PHYSICI[facility] - [facility] [place]) Current DME Used/Available at Home: Cane, straight Tub or Shower Type: Shower Prior Level of Function/Work/Activity: 
ambulating with a cane Number of Personal Factors/Comorbidities that affect the Plan of Care: 1-2: MODERATE COMPLEXITY EXAMINATION:  
Most Recent Physical Functioning:  
  
Gross Assessment AROM: Within functional limits (except L knee) Strength: Generally decreased, functional 
Coordination: Within functional limits LLE AROM 
L Knee Flexion: 70 (approximate) L Knee Extension: -10 Bed Mobility Supine to Sit: Contact guard assistance;Minimum assistance Sit to Supine:  (left up in chair) Transfers Sit to Stand: Minimum assistance;Assist x2 Stand to Sit: Minimum assistance;Assist x1;Assist x2 Bed to Chair: Minimum assistance;Assist x2 Balance Sitting: Intact Standing: Pull to stand; With support Weight Bearing Status Left Side Weight Bearing: As tolerated Distance (ft): 5 Feet (ft) Ambulation - Level of Assistance: Minimal assistance;Assist x2 Assistive Device: Walker, rolling Speed/Kenzie: Slow Step Length: Left shortened;Right shortened Stance: Left decreased Gait Abnormalities: Antalgic;Decreased step clearance Interventions: Manual cues; Safety awareness training;Verbal cues Braces/Orthotics: none Left Knee Cold Type: Cryocuff Body Structures Involved: 1. Bones 2. Joints 3.  Muscles Body Functions Affected: 1. Neuromusculoskeletal 
2. Movement Related Activities and Participation Affected: 1. General Tasks and Demands 2. Mobility 3. Self Care Number of elements that affect the Plan of Care: 3: MODERATE COMPLEXITY CLINICAL PRESENTATION:  
Presentation: Stable and uncomplicated: LOW COMPLEXITY CLINICAL DECISION MAKIN [facility] [address] [id number] AM-PAC 6 Clicks Basic Mobility Inpatient Short Form How much difficulty does the patient currently have. .. Unable A Lot A Little None 1. Turning over in bed (including adjusting bedclothes, sheets and blankets)? [] 1   [] 2   [x] 3   [] 4  
2. Sitting down on and standing up from a chair with arms ( e.g., wheelchair, bedside commode, etc.)   [] 1   [] 2   [x] 3   [] 4  
3. Moving from lying on back to sitting on the side of the bed? [] 1   [] 2   [x] 3   [] 4 How much help from another person does the patient currently need. .. Total A Lot A Little None 4. Moving to and from a bed to a chair (including a wheelchair)? [] 1   [] 2   [x] 3   [] 4  
5. Need to walk in hospital room? [] 1   [x] 2   [] 3   [] 4  
6. Climbing 3-5 steps with a railing? [] 1   [x] 2   [] 3   [] 4  
© , Trustees of [address] [place] [address] [address] [address], under license to [employer]. All rights reserved Score:  Initial: 16 Most Recent: X (Date: -- ) Interpretation of Tool:  Represents activities that are increasingly more difficult (i.e. Bed mobility, Transfers, Gait). Score 24 23 22-20 19-15 14-10 9-7 6 Modifier CH CI CJ CK CL CM CN   
 
? Mobility - Walking and Moving Around:  
  - CURRENT STATUS: CK - 40%-59% impaired, limited or restricted  - GOAL STATUS: CJ - 20%-39% impaired, limited or restricted  - D/C STATUS:  ---------------To be determined--------------- Payor: Calvin Ervin / Plan: [address] [place] [address] / Product Type:  Workers Comp /   
 
Medical Necessity:    
· Patient demonstrates good rehab potential due to higher previous functional level. Reason for Services/Other Comments: 
· Patient continues to require present interventions due to patient's inability to perform functional mobility and exercises independently. Use of outcome tool(s) and clinical judgement create a POC that gives a: Clear prediction of patient's progress: LOW COMPLEXITY  
  
 
 
 
TREATMENT:  
(In addition to Assessment/Re-Assessment sessions the following treatments were rendered) Pre-treatment Symptoms/Complaints:  L knee pain 
Pain: Initial:  
Pain Intensity 1: 2 Pain Location 1: Knee Pain Orientation 1: Left Pain Intervention(s) 1: Cold pack, Repositioned  Post Session:  2 Therapeutic Exercise: (10 Minutes):  Exercises per grid below to improve mobility and strength. Required minimal verbal and manual cues to perform exercises correctly. Progressed range and repetitions as indicated. Date: 
8/2/18 Date: 
 Date: 
  
ACTIVITY/EXERCISE AM PM AM PM AM PM  
GROUP THERAPY  []  []  []  []  []  [] Ankle Pumps  10 Quad Sets  10 Gluteal Sets  10 Hip ABd/ADduction  10 Straight Leg Raises Knee Slides  10 Short Arc The [address] Chair Slides B = bilateral; AA = active assistive; A = active; P = passive Treatment/Session Assessment:   
 Response to Treatment:  Tolerated well. Education: 
[x] Home Exercises [x] Fall Precautions [] Hip Precautions [] D/C Instruction Review 
[] Knee/Hip Prosthesis Review [x] Walker Management/Safety [] Adaptive Equipment as Needed Interdisciplinary Collaboration:  
o Physical Therapist 
o Registered Nurse 
o Certified Nursing Assistant/Patient Care Technician After treatment position/precautions:  
o Up in chair 
o Bed/Chair-wheels locked 
o Call light within reach 
o RN notified Compliance with Program/Exercises: Will assess as treatment progresses.   
 Recommendations/Intent for next treatment session: Treatment next visit will focus on increasing Mr. Dagoberto Paul independence with bed mobility, transfers, gait training, strength/ROM exercises, modalities for pain, and patient education. Total Treatment Duration: PT Patient Time In/Time Out Time In: 6550 Time Out: 1700 Evy Wang, PT

## 2018-08-02 NOTE — ANESTHESIA PROCEDURE NOTES
Peripheral Block Start time: 8/2/2018 9:17 AM 
End time: 8/2/2018 9:20 AM 
Performed by: Leonid Peterson Authorized by: Leonid Peterson  
 
 
Pre-procedure: Indications: at surgeon's request and post-op pain management Preanesthetic Checklist: patient identified, risks and benefits discussed, site marked, timeout performed, anesthesia consent given and patient being monitored Timeout Time: 09:17 Block Type:  
Block Type: Adductor canal 
Monitoring:  Standard ASA monitoring, continuous pulse ox, frequent vital sign checks, heart rate, oxygen and responsive to questions Injection Technique:  Single shot Procedures: ultrasound guided Patient Position: supine Prep: chlorhexidine Location:  Mid thigh Needle Type:  Stimuplex Needle Gauge:  21 G Needle Localization:  Ultrasound guidance and anatomical landmarks Medication Injected:  0.2% 
ropivacaine Volume (mL):  20 Assessment: 
Number of attempts:  1 Injection Assessment:  Incremental injection every 5 mL, local visualized surrounding nerve on ultrasound, negative aspiration for blood, no paresthesia, no intravascular symptoms and ultrasound image on chart Patient tolerance:  Patient tolerated the procedure well with no immediate complications

## 2018-08-02 NOTE — PROGRESS NOTES
TRANSFER - IN REPORT: 
 
Verbal report received from Clem RN (name) on Laurie Putnam  being received from PACU (unit) for routine progression of care Report consisted of patients Situation, Background, Assessment and  
Recommendations(SBAR). Information from the following report(s) SBAR, Kardex, OR Summary, Procedure Summary, Intake/Output, MAR, Accordion, Recent Results and Med Rec Status was reviewed with the receiving nurse. Opportunity for questions and clarification was provided. Assessment completed upon patients arrival to unit and care assumed.

## 2018-08-02 NOTE — ANESTHESIA PROCEDURE NOTES
Spinal Block Start time: 8/2/2018 9:47 AM 
End time: 8/2/2018 9:49 AM 
Performed by: Kristofer Rojo Authorized by: Kristofer Rojo  
 
Pre-procedure: Indications: primary anesthetic  Preanesthetic Checklist: patient identified, risks and benefits discussed, anesthesia consent, site marked, patient being monitored and timeout performed Timeout Time: 09:47 Spinal Block:  
Patient Position:  Seated Prep Region:  Lumbar Prep: chlorhexidine Location:  L3-4 Technique:  Single shot Local:  Lidocaine 1% Local Dose (mL):  3 Needle:  
Needle Type:  Pencan Needle Gauge:  25 G Attempts:  1 Events: CSF confirmed, no blood with aspiration and no paresthesia Assessment: 
Insertion:  Uncomplicated Patient tolerance:  Patient tolerated the procedure well with no immediate complications Anesthesiology Spinal Procedure Note Risks and benefits were discussed with patient and plans are to proceed. Patient was placed in the sitting position. The back was prepped at the lumbar region with Chlorhexidine. 1% xylocaine was used as local at L3-L4. A  25g Pencan was passed 1 times. Easy aspiration of CSF was noted. 15 mg isobaric Bupivacaine was injected.

## 2018-08-02 NOTE — PROGRESS NOTES
08/02/18 1333 Oxygen Therapy O2 Sat (%) 97 % Pulse via Oximetry 67 beats per minute O2 Device Room air Patient achieved 2000Ml/sec on IS. Patient encouraged to do every hour while awake-patient agreed and demonstrated. No shortness of breath or distress noted. BS are clear b/l. [facility] notes reviewed- patient will use home CPAP unit

## 2018-08-02 NOTE — PROGRESS NOTES
Shift Assessment Complete. Pt had a LTKA today. . Pt is A&Ox 3.  +2 pedal pulses. BLE pharmacologically paralyzed. Dressing is clean, dry and intact. IVF inusing. Ramey is draining straw yellow urine. Pt denies any pain or need at this time. Bed low and locked. Side rails x3. Call light with in reach. Pt verbalizes understanding of call light.

## 2018-08-02 NOTE — ANESTHESIA POSTPROCEDURE EVALUATION
Post-Anesthesia Evaluation and Assessment Patient: James Owen MRN: [id number]  SSN: [id number] YOB: 1945  Age: 71 y.o. Sex: male Cardiovascular Function/Vital Signs Visit Vitals  /57 (BP 1 Location: Left arm, BP Patient Position: At rest)  Pulse 66  Temp 36.2 °C (97.2 °F)  Resp 18  Ht 6' (1.829 m)  Wt 100.7 kg (222 lb)  SpO2 100%  BMI 30.11 kg/m2 Patient is status post spinal anesthesia for Procedure(s): LEFT KNEE ARTHROPLASTY TOTAL / Ed Castañeda. Nausea/Vomiting: None Postoperative hydration reviewed and adequate. Pain: 
Pain Scale 1: Visual (08/02/18 1207) Pain Intensity 1: 0 (08/02/18 1207) Managed Neurological Status:  
Neuro (WDL): Exceptions to [facility] (08/02/18 1207) Neuro Neurologic State: Sleeping (08/02/18 1207) LUE Motor Response: Purposeful (08/02/18 1207) LLE Motor Response: Numbness; Pharmacologically paralyzed (08/02/18 1207) RUE Motor Response: Purposeful (08/02/18 1207) RLE Motor Response: Numbness; Pharmacologically paralyzed (08/02/18 1207) At baseline Mental Status and Level of Consciousness: Arousable Pulmonary Status:  
O2 Device: Nasal cannula (08/02/18 1207) Adequate oxygenation and airway patent Complications related to anesthesia: None Post-anesthesia assessment completed. No concerns Signed By: Jerome Pepper MD   
 August 2, 2018

## 2018-08-02 NOTE — OP NOTES
[facility] Cemented Total Knee Arthroplasty Patient:Lux Cutler  
: 1945 Medical Record AVTCNQ:[id number] Pre-operative Diagnosis:  Unilateral primary osteoarthritis, left knee [M17.12] Post-operative Diagnosis: Unilateral primary osteoarthritis, left knee [M17.12] Surgeon: Ponce Forbes MD 
Assistant: Arley Knox PA-C Anesthesia: Spinal 
 
Procedure: Total Knee Arthroplasty with use of Bone Cement The complexity of the total joint surgery requires the use of a first assistant for positioning, retraction and assistance in closure. The patient's Body mass index is 30.11 kg/(m^2). , BMI's greater then 40 make surgical exposure and retraction extremely difficult and increase operative time. Tourniquet Time: none EBL: 150cc Additional Findings: Severe LFC and PF DJD Releases none Amparo Sosa was brought to the operating room and positioned on the operating table. He was anethestized  A salmon catheter was placed preoperatively and IV antibiotics was administered. Prior to the incision being made a timeout was called identifying the patient, procedure ,operative side and surgeon. . The left leg was prepped and draped in the usual sterile manner  An anterior longitudinal incision was accomplished just medial to the tibial tubercle and extending approximal 6 centimeters proximal to the superior pole of the patella. A medial parapatellar capsular incision was performed. The medial capsular flap was elevated around to the insertion of the semimembranous tendon. The patella was everted and the knee flexed and externally rotated. The medial and external menisci were excised. The lateral half of the fat pad excised and the patella femoral ligament was released. The anterior cruciate ligament was resected and the posterior cruciate ligament was substituted.   Using extramedullary instrumentation, the tibial cut was accomplished with appropriate posterior slope. Approxiamately 2 mm of bone was removed from the low side of the tibia. The distal femur was next addressed. A drill hole was made above the intracondylar notch. Using appropriate intramedullary instrumentation,a 6 degree valgus distal cut was accomplished. A femur was sized. The anterior and posterior cuts were then made about the distal femur. The osteophytes were removed from the tibial and femoral surfaces. The flexion and extension gaps were assessed with the appropriate spacer blocks. Additional surgical procedures included none. The flexion and extension gaps were deemed appropriately balanced. The appropriate cutting blocks were then utilized to perform the anterior chamfer, posterior chamfer and notch cuts, with appropriate lateral tranlation accomplished for the patellofemoral groove. The tibia was sized. The tibial base plate was pinned into place with the appropriate external rotation and stem site prepared. A preliminary range of motion was accomplished with the above size trial components. A polyethylene insert allowed the patient to obtain full extension as well as appropriate flexion. The patient's ligaments were stable in flexion and extension to medial and lateral stressing and the alignment was through the appropriate mechanical axis. The patella was then everted. The bone was resected appropriately for a pegged patella button. A trial reduction revealed appropriate tracking through the patellofemoral groove. All trial components were removed and the cut surfaces prepared for cementing with irrigation and debridement of the bone interstices. There were no femoral deficiencies. There were no tibial deficiencies. No augmentation was utilized. The implants were cemented into position and pressurized in the usual fashion. Bone and cement debris were meticulously removed. The betadine lavage protocol was used.  
 
[facility] Gromest knee was placed through range of motion and noted to be stable as mentioned above with the trail components. The wound was dry, therefore no drain was used. The operative knee was injected with 60cc of Naropin, 10 cc's of morphine and 1 cc of 30mg of Toradol. The capsular layer was closed using a #1 vicryl suture, while subcutaneous layers were closed using 2-0 Vicryl interrupted sutures. Finally the skin was closed using 3-0 Vicryl and skin staples, which were applied in occlusive fashion and sterile bandage applied. An Iceman cryo pad was applied on the operative leg. Sponge count and needle counts were correct. Constance Estrada left the operating room Implants:  
Implant Name Type Inv. Item Serial No.  Lot No. LRB No. Used CEMENT BNE [employer] HV R+G 40GM -- PALACOS R+G [id number] - [id number]  CEMENT BNE [employer] HV R+G 40GM -- PALACOS R+G [id number] [facility] [id number] Left 2  
PAT LISBETH DOME MEDIAL 38MM -- ATTUNE - [id number]  PAT LISBETH DOME MEDIAL 38MM -- ATTUNE [id number] [facility]ED[facility] [id number] Left 1 FEM PS SZ 8 LT LISBETH -- ATTUNE - [id number]  FEM PS SZ 8 LT LISBETH -- ATTUNE [id number] [facility] [id number] Left 1 BASEPLT TIB FB SZ 8 LISBETH -- ATTUNE - [id number]   BASEPLT TIB FB SZ 8 LISBETH -- ATTUNE [id number] [facility]TH[facility] [id number] Left 1 Signed By: Nathan Levy MD

## 2018-08-02 NOTE — PROGRESS NOTES
Pt stated he has had urinary retention issues over past several months and has had to in and out cath. Pt requested that his salmon come out in the morning

## 2018-08-03 PROBLEM — Z96.652 S/P TOTAL KNEE ARTHROPLASTY, LEFT: Status: ACTIVE | Noted: 2018-01-01

## 2018-08-03 NOTE — PROGRESS NOTES
Problem: Mobility Impaired (Adult and Pediatric) Goal: *Acute Goals and Plan of Care (Insert Text) GOALS (1-4 days): 
(1.)Mr. Cutler will move from supine to sit and sit to supine  in bed with STAND BY ASSIST. 
(2.)Mr. Cutler will transfer from bed to chair and chair to bed with STAND BY ASSIST using the least restrictive device. (3.)Mr. Cutler will ambulate with STAND BY ASSIST for 200 feet with the least restrictive device. (4.)Mr. Cutler will increase left knee ROM to 5°-80°. 
________________________________________________________________________________________________ PHYSICAL THERAPY Joint camp tKa: Daily Note, PM 8/3/2018 INPATIENT: Hospital Day: 2 Payor: Calvin Ervin / Plan: [id number] [place] [address] / Product Type: Workers Comp /  
  
NAME/AGE/GENDER: James Owen is a 71 y.o. male PRIMARY DIAGNOSIS:  Unilateral primary osteoarthritis, left knee [M17.12] Procedure(s) and Anesthesia Type: 
   * LEFT KNEE ARTHROPLASTY TOTAL / DEPUY - Spinal (Left) ICD-10: Treatment Diagnosis:  
 · Pain in Left Knee (M25.562) · Stiffness of Left Knee, Not elsewhere classified (M25.662) · Difficulty in walking, Not elsewhere classified (R26.2) · Other abnormalities of gait and mobility (R26.89) ASSESSMENT:  
 
Mr. Fidelina Carlos is sitting up in chair on arrival.  He request to not walk to the gym this afternoon and will walk after group. Pt performed exercises in gym with assist for some. He then stood and walked about 8 feet with CGA. He felt a little nauseous and clamy. Pt returned to room and left up in chair with needs in reach. This section established at most recent assessment PROBLEM LIST (Impairments causing functional limitations): 1. Decreased Strength 2. Decreased ADL/Functional Activities 3. Decreased Transfer Abilities 4. Decreased Ambulation Ability/Technique 5. Decreased Activity Tolerance 6. Decreased Flexibility/Joint Mobility 7.  Decreased [place] with Home Exercise Program 
 INTERVENTIONS PLANNED: (Benefits and precautions of physical therapy have been discussed with the patient.) 1. Bed Mobility 2. Gait Training 3. Home Exercise Program (HEP) 4. Therapeutic Exercise/Strengthening 5. Transfer Training 6. Range of Motion: active/assisted/passive 7. Therapeutic Activities 8. Group Therapy TREATMENT PLAN: Frequency/Duration: Follow patient BID for duration of hospital stay to address above goals. Rehabilitation Potential For Stated Goals: Good RECOMMENDED REHABILITATION/EQUIPMENT: (at time of discharge pending progress): Continue Skilled Therapy and Rehab. HISTORY:  
History of Present Injury/Illness (Reason for Referral): S/P L TKA Past Medical History/Comorbidities:  
Mr. Tatiana Shaffer  has a past medical history of Arthritis; BMI 30.0-30.9,adult; CAD (coronary artery disease); Chronic kidney disease; Chronic pain; Depression; Diabetes ([address] Enlarged prostate with urinary obstruction; Former smoker; GERD (gastroesophageal reflux disease); High cholesterol; Hypertension; Neurogenic bladder; Other ill-defined conditions(799.89); Overflow incontinence; Sciatica; Self-catheterizes urinary bladder; Skin cancer; Sleep apnea; Spondylolisthesis of lumbar region; Thromboembolus ([address] (2013); Urine retention; and Varicose veins of both lower extremities with complications. He also has no past medical history of Adverse effect of anesthesia; Aneurysm ([address] Arrhythmia; Asthma; Autoimmune disease ([address] Chronic obstructive pulmonary disease ([address] Coagulation disorder ([address] Difficult intubation; Endocarditis; Heart failure ([address] Ill-defined condition; Liver disease; Malignant hyperthermia due to anesthesia; Nausea & vomiting; Nicotine vapor product user; Non-nicotine vapor product user; Pseudocholinesterase deficiency; PUD (peptic ulcer disease); Rheumatic fever; Seizures ([address] Stroke [facility]); or Thyroid disease.   Mr. Tatiana Shaffer has a past surgical history that includes hx hemorrhoidectomy (); hx shoulder arthroscopy (Left, ); and hx urological (2017). Social History/Living Environment:  
Home Environment: Private residence # Steps to Enter: 0 One/Two Story Residence: One story Living Alone: Yes Support Systems: Child(giovanni) Patient Expects to be Discharged to[de-identified] Rehabilitation facility PHYSICIANS [facility]) Current DME Used/Available at Home: Cane, straight Tub or Shower Type: Shower Prior Level of Function/Work/Activity: 
ambulating with a cane Number of Personal Factors/Comorbidities that affect the Plan of Care: 1-2: MODERATE COMPLEXITY EXAMINATION:  
Most Recent Physical Functioning:  
  
  
 
         
 
  
 
Bed Mobility Supine to Sit: Contact guard assistance Transfers Sit to Stand: Minimum assistance Stand to Sit: Contact guard assistance Bed to Chair: Minimum assistance Balance Sitting: Intact Standing: With support Weight Bearing Status Left Side Weight Bearing: As tolerated Distance (ft): 8 Feet (ft) Ambulation - Level of Assistance: Contact guard assistance Assistive Device: Walker, rolling Speed/Kenzie: Delayed;Pace decreased (<100 feet/min) Step Length: Left shortened;Right shortened Stance: Left decreased Gait Abnormalities: Antalgic;Decreased step clearance Interventions: Safety awareness training;Verbal cues Braces/Orthotics: none Left Knee Cold Type: Cryocuff Body Structures Involved: 1. Bones 2. Joints 3. Muscles Body Functions Affected: 1. Neuromusculoskeletal 
2. Movement Related Activities and Participation Affected: 1. General Tasks and Demands 2. Mobility 3. Self Care Number of elements that affect the Plan of Care: 3: MODERATE COMPLEXITY CLINICAL PRESENTATION:  
Presentation: Stable and uncomplicated: LOW COMPLEXITY CLINICAL DECISION [facility] [address] [address] AM-PAC 6 Clicks Basic Mobility Inpatient Short Form How much difficulty does the patient currently have. .. Unable A Lot A Little None 1. Turning over in bed (including adjusting bedclothes, sheets and blankets)? [] 1   [] 2   [x] 3   [] 4  
2. Sitting down on and standing up from a chair with arms ( e.g., wheelchair, bedside commode, etc.)   [] 1   [] 2   [x] 3   [] 4  
3. Moving from lying on back to sitting on the side of the bed? [] 1   [] 2   [x] 3   [] 4 How much help from another person does the patient currently need. .. Total A Lot A Little None 4. Moving to and from a bed to a chair (including a wheelchair)? [] 1   [] 2   [x] 3   [] 4  
5. Need to walk in hospital room? [] 1   [x] 2   [] 3   [] 4  
6. Climbing 3-5 steps with a railing? [] 1   [x] 2   [] 3   [] 4  
© 2007, Trustees of [address] [place] [address] [address] [address], under license to [employer]. All rights reserved Score:  Initial: 16 Most Recent: X (Date: -- ) Interpretation of Tool:  Represents activities that are increasingly more difficult (i.e. Bed mobility, Transfers, Gait). Score 24 23 22-20 19-15 14-10 9-7 6 Modifier CH CI CJ CK CL CM CN   
 
? Mobility - Walking and Moving Around:  
  - CURRENT STATUS: CK - 40%-59% impaired, limited or restricted  - GOAL STATUS: CJ - 20%-39% impaired, limited or restricted  - D/C STATUS:  ---------------To be determined--------------- Payor: Linda Luna / Plan: [id number] [facility] / Product Type: Workers Comp /   
 
Medical Necessity:    
· Patient demonstrates good rehab potential due to higher previous functional level. Reason for Services/Other Comments: 
· Patient continues to require present interventions due to patient's inability to perform functional mobility and exercises independently.   
Use of outcome tool(s) and clinical judgement create a POC that gives a: Clear prediction of patient's progress: LOW COMPLEXITY  
  
 
 
 
TREATMENT:  
(In addition to Assessment/Re-Assessment sessions the following treatments were rendered) Pre-treatment Symptoms/Complaints:  L knee pain 
Pain: Initial:  
   Post Session:  6/10 Therapeutic Exercise: (45 Minutes (group)):  Exercises per grid below to improve mobility and strength. Required minimal verbal and manual cues to perform exercises correctly. Progressed range and repetitions as indicated. Gait Training (10 Minutes):  Gait training to improve and/or restore physical functioning as related to mobility, strength and balance. Ambulated 8 Feet (ft) with Contact guard assistance using a Walker, rolling and minimal Safety awareness training;Verbal cues related to their stance phase and stride length to promote proper body alignment, promote proper body posture and promote proper body mechanics. Date: 
8/2/18 Date: 
8/3/18 Date: 
  
ACTIVITY/EXERCISE AM PM AM PM AM PM  
GROUP THERAPY  []  []  []  [x]  []  [] Ankle Pumps  10 10 15 Quad Sets  10 10 15 Gluteal Sets  10 10 15 Hip ABd/ADduction  10 10 15 Straight Leg Raises   10 15 Knee Slides  10 10 15 Short Arc Quads    15 812 [facility] Chair Slides    15    
        
B = bilateral; AA = active assistive; A = active; P = passive Treatment/Session Assessment:   
 Response to Treatment:   Pt with decreased distance this afternoon due to nausea. Education: 
[x] Home Exercises [x] Fall Precautions [] Hip Precautions [] D/C Instruction Review 
[] Knee/Hip Prosthesis Review [x] Walker Management/Safety [] Adaptive Equipment as Needed Interdisciplinary Collaboration:  
o Physical Therapy Assistant 
o Registered Nurse After treatment position/precautions:  
o Up in chair 
o Bed/Chair-wheels locked 
o Call light within reach 
o RN notified Compliance with Program/Exercises: Will assess as treatment progresses.   
 Recommendations/Intent for next treatment session:  Treatment next visit will focus on increasing Mr. Buddie Gowers independence with bed mobility, transfers, gait training, strength/ROM exercises, modalities for pain, and patient education. Total Treatment Duration: PT Patient Time In/Time Out Time In: 1300 Time Out: 1400 Donato Kamara, GUILLE

## 2018-08-03 NOTE — PROGRESS NOTES
Problem: Falls - Risk of 
Goal: *Absence of Falls Document Carlos Bell Fall Risk and appropriate interventions in the flowsheet. Outcome: Progressing Towards Goal 
Fall Risk Interventions: 
Mobility Interventions: Assess mobility with egress test, Bed/chair exit alarm, Communicate number of staff needed for ambulation/transfer, OT consult for ADLs, PT Consult for mobility concerns, Strengthening exercises (ROM-active/passive), Utilize gait belt for transfers/ambulation, Utilize walker, cane, or other assistive device, PT Consult for assist device competence, Patient to call before getting OOB Medication Interventions: Assess postural VS orthostatic hypotension, Utilize gait belt for transfers/ambulation, Teach patient to arise slowly, Patient to call before getting OOB, Evaluate medications/consider consulting pharmacy, Bed/chair exit alarm Elimination Interventions: Bed/chair exit alarm, Call light in reach, Elevated toilet seat, Patient to call for help with toileting needs, Toilet paper/wipes in reach, Toileting schedule/hourly rounds, Urinal in reach

## 2018-08-03 NOTE — PROGRESS NOTES
Per Yamileth at [facility] pt can admit on Saturday to room 318. Per pt he does not have anyone that can transport so will need ambulance. Pt asked that SW notify Worker's Comp . TERRELL called & left VM message for Clarice Dennis.

## 2018-08-03 NOTE — PROGRESS NOTES
Problem: Mobility Impaired (Adult and Pediatric) Goal: *Acute Goals and Plan of Care (Insert Text) GOALS (1-4 days): 
(1.)Mr. Cutler will move from supine to sit and sit to supine  in bed with STAND BY ASSIST. 
(2.)Mr. Cutler will transfer from bed to chair and chair to bed with STAND BY ASSIST using the least restrictive device. (3.)Mr. Cutler will ambulate with STAND BY ASSIST for 200 feet with the least restrictive device. (4.)Mr. Cutler will increase left knee ROM to 5°-80°. 
________________________________________________________________________________________________ PHYSICAL THERAPY Joint camp tKa: Daily Note, AM 8/3/2018 INPATIENT: Hospital Day: 2 Payor: [employer] / Plan: [id number] [place] [address] / Product Type: Workers Comp /  
  
NAME/AGE/GENDER: Kevin Sibley is a 71 y.o. male PRIMARY DIAGNOSIS:  Unilateral primary osteoarthritis, left knee [M17.12] Procedure(s) and Anesthesia Type: 
   * LEFT KNEE ARTHROPLASTY TOTAL / DEPUY - Spinal (Left) ICD-10: Treatment Diagnosis:  
 · Pain in Left Knee (M25.562) · Stiffness of Left Knee, Not elsewhere classified (M25.662) · Difficulty in walking, Not elsewhere classified (R26.2) · Other abnormalities of gait and mobility (R26.89) ASSESSMENT:  
 
Mr. Ladarius Ramirez is sitting up in chair on arrival.  He states he received IV pain medicine earlier and feels a \"little fuzzy\" at the time. Pt agreeable to exercises and short walk. This section established at most recent assessment PROBLEM LIST (Impairments causing functional limitations): 1. Decreased Strength 2. Decreased ADL/Functional Activities 3. Decreased Transfer Abilities 4. Decreased Ambulation Ability/Technique 5. Decreased Activity Tolerance 6. Decreased Flexibility/Joint Mobility 7.  Decreased [place] with Home Exercise Program 
 INTERVENTIONS PLANNED: (Benefits and precautions of physical therapy have been discussed with the patient.) 1. Bed Mobility 2. Gait Training 3. Home Exercise Program (HEP) 4. Therapeutic Exercise/Strengthening 5. Transfer Training 6. Range of Motion: active/assisted/passive 7. Therapeutic Activities 8. Group Therapy TREATMENT PLAN: Frequency/Duration: Follow patient BID for duration of hospital stay to address above goals. Rehabilitation Potential For Stated Goals: Good RECOMMENDED REHABILITATION/EQUIPMENT: (at time of discharge pending progress): Continue Skilled Therapy and Rehab. HISTORY:  
History of Present Injury/Illness (Reason for Referral): S/P L TKA Past Medical History/Comorbidities:  
Mr. Isaac Weeks  has a past medical history of Arthritis; BMI 30.0-30.9,adult; CAD (coronary artery disease); Chronic kidney disease; Chronic pain; Depression; Diabetes ([address] Enlarged prostate with urinary obstruction; Former smoker; GERD (gastroesophageal reflux disease); High cholesterol; Hypertension; Neurogenic bladder; Other ill-defined conditions(799.89); Overflow incontinence; Sciatica; Self-catheterizes urinary bladder; Skin cancer; Sleep apnea; Spondylolisthesis of lumbar region; Thromboembolus ([address] (2013); Urine retention; and Varicose veins of both lower extremities with complications. He also has no past medical history of Adverse effect of anesthesia; Aneurysm ([address] Arrhythmia; Asthma; Autoimmune disease ([address] Chronic obstructive pulmonary disease ([address] Coagulation disorder ([address] Difficult intubation; Endocarditis; Heart failure ([address] Ill-defined condition; Liver disease; Malignant hyperthermia due to anesthesia; Nausea & vomiting; Nicotine vapor product user; Non-nicotine vapor product user; Pseudocholinesterase deficiency; PUD (peptic ulcer disease); Rheumatic fever; Seizures ([address] Stroke [facility]); or Thyroid disease.   Mr. Isaac Weeks  has a past surgical history that includes hx hemorrhoidectomy (1971); hx shoulder arthroscopy (Left, 1971); and hx urological (06/2017). Social History/Living Environment:  
Home Environment: Private residence # Steps to Enter: 0 One/Two Story Residence: One story Living Alone: Yes Support Systems: Child(giovanni) Patient Expects to be Discharged to[de-identified] Rehabilitation facility PHYSICIANS [facility] [place]) Current DME Used/Available at Home: Cane, straight Tub or Shower Type: Shower Prior Level of Function/Work/Activity: 
ambulating with a cane Number of Personal Factors/Comorbidities that affect the Plan of Care: 1-2: MODERATE COMPLEXITY EXAMINATION:  
Most Recent Physical Functioning:  
  
  
 
         
 
  
 
  
 
Transfers Sit to Stand: Minimum assistance Stand to Sit: Contact guard assistance Bed to Chair: Minimum assistance Weight Bearing Status Left Side Weight Bearing: As tolerated Distance (ft): 20 Feet (ft) Ambulation - Level of Assistance: Contact guard assistance Assistive Device: Walker, rolling Speed/Kenzie: Pace decreased (<100 feet/min) Step Length: Left shortened;Right shortened Stance: Left decreased Gait Abnormalities: Antalgic;Decreased step clearance Interventions: Safety awareness training;Verbal cues Braces/Orthotics: none Left Knee Cold Type: Cryocuff Body Structures Involved: 1. Bones 2. Joints 3. Muscles Body Functions Affected: 1. Neuromusculoskeletal 
2. Movement Related Activities and Participation Affected: 1. General Tasks and Demands 2. Mobility 3. Self Care Number of elements that affect the Plan of Care: 3: MODERATE COMPLEXITY CLINICAL PRESENTATION:  
Presentation: Stable and uncomplicated: LOW COMPLEXITY CLINICAL DECISION MAKING:  
[facility]-PAC 6 Clicks Basic Mobility Inpatient Short Form How much difficulty does the patient currently have. .. Unable A Lot A Little None 1. Turning over in bed (including adjusting bedclothes, sheets and blankets)? [] 1   [] 2   [x] 3   [] 4  
2.   Sitting down on and standing up from a chair with arms ( e.g., wheelchair, bedside commode, etc.)   [] 1   [] 2   [x] 3   [] 4  
3. Moving from lying on back to sitting on the side of the bed? [] 1   [] 2   [x] 3   [] 4 How much help from another person does the patient currently need. .. Total A Lot A Little None 4. Moving to and from a bed to a chair (including a wheelchair)? [] 1   [] 2   [x] 3   [] 4  
5. Need to walk in hospital room? [] 1   [x] 2   [] 3   [] 4  
6. Climbing 3-5 steps with a railing? [] 1   [x] 2   [] 3   [] 4  
© 2007, Trustees of [address] [place] [address] [address] [address], under license to [employer]. All rights reserved Score:  Initial: 16 Most Recent: X (Date: -- ) Interpretation of Tool:  Represents activities that are increasingly more difficult (i.e. Bed mobility, Transfers, Gait). Score 24 23 22-20 19-15 14-10 9-7 6 Modifier CH CI CJ CK CL CM CN   
 
? Mobility - Walking and Moving Around:  
  - CURRENT STATUS: CK - 40%-59% impaired, limited or restricted  - GOAL STATUS: CJ - 20%-39% impaired, limited or restricted  - D/C STATUS:  ---------------To be determined--------------- Payor: Mariely Valdez / Plan: [address] [place] [address] / Product Type: Workers Comp /   
 
Medical Necessity:    
· Patient demonstrates good rehab potential due to higher previous functional level. Reason for Services/Other Comments: 
· Patient continues to require present interventions due to patient's inability to perform functional mobility and exercises independently. Use of outcome tool(s) and clinical judgement create a POC that gives a: Clear prediction of patient's progress: LOW COMPLEXITY  
  
 
 
 
TREATMENT:  
(In addition to Assessment/Re-Assessment sessions the following treatments were rendered) Pre-treatment Symptoms/Complaints:  L knee pain 
Pain: Initial:  
   Post Session:  6/10 Therapeutic Exercise: (15 Minutes):  Exercises per grid below to improve mobility and strength.   Required minimal verbal and manual cues to perform exercises correctly. Progressed range and repetitions as indicated. Gait Training (10 Minutes):  Gait training to improve and/or restore physical functioning as related to mobility, strength and balance. Ambulated 20 Feet (ft) with Contact guard assistance using a Walker, rolling and minimal Safety awareness training;Verbal cues related to their stance phase and stride length to promote proper body alignment, promote proper body posture and promote proper body mechanics. Date: 
8/2/18 Date: 
8/3/18 Date: 
  
ACTIVITY/EXERCISE AM PM AM PM AM PM  
GROUP THERAPY  []  []  [x]  []  []  [] Ankle Pumps  10 10 Quad Sets  10 10 Gluteal Sets  10 10 Hip ABd/ADduction  10 10 Straight Leg Raises   10 Knee Slides  10 10 Short Arc [employer] [place] [facility] [address] Chair Slides B = bilateral; AA = active assistive; A = active; P = passive Treatment/Session Assessment:   
 Response to Treatment:   Pt doing well but having increased pain. Education: 
[x] Home Exercises [x] Fall Precautions [] Hip Precautions [] D/C Instruction Review 
[] Knee/Hip Prosthesis Review [x] Walker Management/Safety [] Adaptive Equipment as Needed Interdisciplinary Collaboration:  
o Physical Therapy Assistant 
o Registered Nurse After treatment position/precautions:  
o Up in chair 
o Bed/Chair-wheels locked 
o Call light within reach 
o RN notified Compliance with Program/Exercises: Will assess as treatment progresses. Recommendations/Intent for next treatment session:  Treatment next visit will focus on increasing Mr. Nadia Perazae independence with bed mobility, transfers, gait training, strength/ROM exercises, modalities for pain, and patient education. Total Treatment Duration: PT Patient Time In/Time Out Time In: 1030 Time Out: 1055 Ricki Pop PTA

## 2018-08-03 NOTE — CONSULTS
Hospitalist Consult Note Admit Date:  2018  7:06 AM  
Name:  Tye Singer  
Age:  71 y.o. 
:  1945 MRN:  [id number] PCP:  Bran Chan MD 
Treatment Team: Attending Provider: Connie Bro MD; Consulting Provider: Vianca Mcnally MD; Consulting Provider: Melanie Tavares MD 
 
HPI:  
 
The patient is a 72 y/o gentleman with DM type 2, GERD, HTN, Dyslipidemia, h/o DVT, was admitted by Orthopedics after an elective left total knee arthroplasty. It seems like the procedure was uneventful. Hospitalist consultation was requested for assistance in the management of his comorbidities. The patient is doing well, denies any significant complaints except for some occasional left knee pain. He expects to be discharged to Rehab in about 2 days. ROS: 10 systems reviewed and negative except as noted in HPI. Past Medical History:  
Diagnosis Date  Arthritis  BMI 30.0-30.9,adult  CAD (coronary artery disease)   
 mild to moderate apical reversible ischemia per cardio note dated 07/12/10  Chronic kidney disease   
 no nephrologist   
 Chronic pain   
 neck, lower back  Depression  Diabetes ([facility] [address] type 2; oral and insulin meds; 1-2 times per day; average 104; A1C=8.1 on 18  Enlarged prostate with urinary obstruction  Former smoker  GERD (gastroesophageal reflux disease)  High cholesterol  Hypertension   
 on med for control  Neurogenic bladder  Other ill-defined conditions(799.89)  Overflow incontinence  Sciatica   
 recently took medrol dose pack around 18  Self-catheterizes urinary bladder   
 has catheterized once in the past month; urinating more frequently own his own now  (18)  Skin cancer  Sleep apnea   
 bi-pap; instructed to bring [facility]  Spondylolisthesis of lumbar region  Thromboembolus [facility])   DVT left leg; caused from a falling accident that damaged left leg  Urine retention  Varicose veins of both lower extremities with complications Past Surgical History:  
Procedure Laterality Date [address]  HX SHOULDER ARTHROSCOPY Left 1971  HX UROLOGICAL  06/2017 CYSTOSCOPY, TRANSURETHRAL RESECTION OF PROSTATE Current Facility-Administered Medications Medication Dose Route Frequency  [START ON 8/3/2018] tuberculin injection 5 Units  5 Units IntraDERMal ONCE  
 [START ON 8/3/2018] calcium polycarbophil (FIBERCON) tablet 625 mg  (Patient Supplied)  1 Tab Oral DAILY  divalproex ER (DEPAKOTE ER) 24 hour tablet 1,000 mg  1,000 mg Oral BID  [START ON 8/3/2018] empagliflozin-metformin 25-1,000 mg TBph 1 Tab  (Patient Supplied)  1 Tab Oral DAILY  [START ON 8/3/2018] furosemide (LASIX) tablet 20 mg  20 mg Oral DAILY  [START ON 8/3/2018] glipiZIDE SR (GLUCOTROL XL) tablet 10 mg  10 mg Oral DAILY  [START ON 8/3/2018] insulin degludec inpn Link Christy) 50 Units  (Patient Supplied)  50 Units SubCUTAneous DAILY  latanoprost (XALATAN) 0.005 % ophthalmic solution 1 Drop  1 Drop Both Eyes QHS  [START ON 8/3/2018] Liraglutide (VICTOZA) 0.6 mg/0.1 mL (18 mg/3 mL) sub-q pen 1.8 mg  (Patient Supplied)  1.8 mg SubCUTAneous DAILY  losartan (COZAAR) tablet 100 mg  100 mg Oral QHS  nystatin (MYCOSTATIN) 100,000 unit/gram cream   Topical BID PRN  pregabalin (LYRICA) capsule 75 mg  75 mg Oral BID  simvastatin (ZOCOR) tablet 40 mg  40 mg Oral QHS  
 0.9% sodium chloride infusion  100 mL/hr IntraVENous CONTINUOUS  
 sodium chloride (NS) flush 5-10 mL  5-10 mL IntraVENous PRN  
 ceFAZolin (ANCEF) 2 g/20 mL in sterile water IV syringe  2 g IntraVENous Q8H  
 [START ON 8/3/2018] acetaminophen (TYLENOL) tablet 1,000 mg  1,000 mg Oral Q6H  
 HYDROmorphone (PF) (DILAUDID) injection 1 mg  1 mg IntraVENous Q3H PRN  
 naloxone (NARCAN) injection 0.2-0.4 mg  0.2-0.4 mg IntraVENous Q10MIN PRN  
 [START ON 8/3/2018] dexamethasone (DECADRON) injection 10 mg  10 mg IntraVENous ONCE  promethazine (PHENERGAN) tablet 25 mg  25 mg Oral Q6H PRN  
 diphenhydrAMINE (BENADRYL) capsule 25 mg  25 mg Oral Q4H PRN  
 [START ON 8/3/2018] senna-docusate (PERICOLACE) 8.6-50 mg per tablet 2 Tab  2 Tab Oral DAILY  zolpidem (AMBIEN) tablet 5 mg  5 mg Oral QHS PRN  
 aspirin delayed-release tablet 81 mg  81 mg Oral Q12H  
 HYDROmorphone (DILAUDID) tablet 4 mg  4 mg Oral Q4H PRN  
 [START ON 8/3/2018] enoxaparin (LOVENOX) injection 40 mg  40 mg SubCUTAneous DAILY  warfarin (COUMADIN) tablet 5 mg  5 mg Oral QHS  ondansetron (ZOFRAN ODT) tablet 8 mg  8 mg Oral Q8H PRN  
 HYDROmorphone (DILAUDID) tablet 2 mg  2 mg Oral Q4H PRN  
 buPROPion SR (WELLBUTRIN SR) tablet 150 mg  150 mg Oral BID Allergies Allergen Reactions  Oxycodone Itching  Tramadol Itching Social History Substance Use Topics  Smoking status: Former Smoker Packs/day: 1.00 Years: 7.00 Quit date: 5/8/1971  Smokeless tobacco: Never Used  Alcohol use Yes Comment: occasionally Family History Problem Relation Age of Onset  No Known Problems Mother  Heart Attack Father  Cancer Sister  Parkinson's Disease Brother Immunization History Administered Date(s) Administered  TB Skin Test (PPD) Intradermal 08/02/2018 Objective:  
Patient Vitals for the past 24 hrs: 
 Temp Pulse Resp BP SpO2  
08/02/18 1333 - - - - 97 % 08/02/18 1320 - 66 16 131/84 99 % 08/02/18 1252 - 64 16 130/60 100 % 08/02/18 1237 - 62 16 114/70 100 % 08/02/18 1222 - 65 20 110/59 100 % 08/02/18 1207 - 66 18 114/57 100 % 08/02/18 1152 - 62 18 112/67 98 % 08/02/18 1137 - 71 16 105/63 98 % 08/02/18 1132 - 69 16 (!) 83/50 99 % 08/02/18 1127 - 66 14 (!) 85/51 99 % 08/02/18 1126 97.2 °F (36.2 °C) 66 16 (!) 86/50 99 % 08/02/18 1122 - 68 14 (!) 86/50 99 % 08/02/18 0930 - 78 16 114/63 96 % 08/02/18 0925 - 78 16 120/64 97 % 08/02/18 0920 - 78 16 120/69 95 % 08/02/18 0914 - - 16 134/63 97 % 08/02/18 0910 - 87 - 135/67 97 % 08/02/18 0739 98.2 °F (36.8 °C) 72 18 120/71 95 % Oxygen Therapy O2 Sat (%): 97 % (08/02/18 1333) Pulse via Oximetry: 67 beats per minute (08/02/18 1333) O2 Device: Room air (08/02/18 1333) O2 Flow Rate (L/min): 2 l/min (08/02/18 1252) Intake/Output Summary (Last 24 hours) at 08/02/18 2045 Last data filed at 08/02/18 1207 Gross per 24 hour Intake             3000 ml Output             2950 ml Net               50 ml Physical Exam: 
General:    Well nourished. Alert. Eyes:   Normal sclera. Extraocular movements intact. ENT:  Normocephalic, atraumatic. Moist mucous membranes CV:   RRR. No murmur, rub, or gallop. Lungs:  CTAB. No wheezing, rhonchi, or rales. Abdomen: Soft, nontender, nondistended. Bowel sounds normal.  
Extremities: Warm and dry. No cyanosis or edema. Neurologic: CN II-XII grossly intact. Sensation intact. Skin:     No rashes or jaundice. Psych:  Normal mood and affect. I reviewed the labs, imaging, EKGs, telemetry, and other studies done this admission. Data Review:  
Recent Results (from the past 24 hour(s)) GLUCOSE, POC Collection Time: 08/02/18  8:38 AM  
Result Value Ref Range Glucose (POC) 94 65 - 100 mg/dL TYPE & SCREEN Collection Time: 08/02/18  8:42 AM  
Result Value Ref Range Crossmatch Expiration 08/05/2018 ABO/Rh(D) A POSITIVE Antibody screen NEG   
HEMOGLOBIN Collection Time: 08/02/18  6:53 PM  
Result Value Ref Range HGB 13.0 (L) 13.6 - 17.2 g/dL PROTHROMBIN TIME + INR Collection Time: 08/02/18  6:53 PM  
Result Value Ref Range Prothrombin time 14.5 11.5 - 14.5 sec INR 1.1 All Micro Results Procedure Component Value Units Date/Time CULTURE, URINE [[id number]] Order Status:  Sent Specimen:  Urine from [place] Specimen Other Studies: No results found. 
 
Assessment and Plan:  
 
Hospital Problems as of 8/2/2018  Never Reviewed Codes Class Noted - Resolved POA Osteoarthritis ICD-10-CM: M19.90 ICD-9-CM: 715.90  8/2/2018 - Present Unknown 1 - OA s/p Left Total Knee Arthroplasty 2 - DM type 2, controlled 3 - GERD 
4 - HTN 
5 - Dyslipidemia 6 - h/o DVT PLAN: 
· Agree with current pain medication regimen · Resume home medications for chronic conditions · Further postoperative management as per Orthopedics · Discharge planning per Orthopedics 
  Thank you very much, Dr Merly De, for allowing us to participate in the care of your patient. He seems to be clinically stable at this time. Will sign off. Please feel free to call us back as needed. Signed: Ge Villaseñor MD

## 2018-08-03 NOTE — PROGRESS NOTES
Problem: Self Care Deficits Care Plan (Adult) Goal: *Acute Goals and Plan of Care (Insert Text) GOALS:  
DISCHARGE GOALS (in preparation for going home/rehab):  3 days 1. Mr. Christianne Hughes will perform one lower body dressing activity with minimal assistance required to demonstrate improved functional mobility and safety. 2.  Mr. Christianne Hughes will perform one lower body bathing activity with minimal assistance required to demonstrate improved functional mobility and safety. GOAL MET 8/3/2018 3. Mr. Christianne Hughes will perform toileting/toilet transfer with contact guard assistance to demonstrate improved functional mobility and safety. 4.  Mr. Christianne Hughes will perform shower transfer with contact guard assistance to demonstrate improved functional mobility and safety. JOINT CAMP OCCUPATIONAL THERAPY TKA: Initial Assessment and Treatment Day: 1st 8/3/2018 INPATIENT: Hospital Day: 2 Payor: Ricco Collins / Plan: [id number] [facility] / Product Type: Workers Comp /  
  
NAME/AGE/GENDER: Kiana Lovett is a 71 y.o. male PRIMARY DIAGNOSIS:  Unilateral primary osteoarthritis, left knee [M17.12] Procedure(s) and Anesthesia Type: 
   * LEFT KNEE ARTHROPLASTY TOTAL / DEPUY - Spinal (Left) ICD-10: Treatment Diagnosis:  
 · Pain in Left Knee (M25.562) · Stiffness of Left Knee, Not elsewhere classified (M25.662) ASSESSMENT:  
 
Mr. Christianne Hughes is s/p Left TKA and presents with decreased weight bearing on L LE and decreased independence with functional mobility and activities of daily living as compared to baseline level of function and safety. After initial evaluation patient required treatment for new TKA. Tx/ADL: Patient completed shower and dressing as charter below in ADL grid and is ambulating with rolling walker and minimal assist.  Patient has met 1/4 goals and plans to return home with good family support.   Family able to provide patient with appropriate level of assistance at this time. OT reviewed safety precautions throughout session and therapy schedule for the remainder of today. Patient instructed to call for assistance when needing to get up from recliner and all needs in reach. Patient verbalized understanding of call light. Continue OT POC. This section established at most recent assessment PROBLEM LIST (Impairments causing functional limitations): 1. Decreased Strength 2. Decreased ADL/Functional Activities 3. Decreased Transfer Abilities 4. Increased Pain 5. Increased Fatigue 6. Decreased Flexibility/Joint Mobility 7. Decreased Knowledge of Precautions INTERVENTIONS PLANNED: (Benefits and precautions of occupational therapy have been discussed with the patient.) 1. Activities of daily living training 2. Adaptive equipment training 3. Balance training 4. Clothing management 5. Donning&doffing training 6. Theraputic activity TREATMENT PLAN: Frequency/Duration: Follow patient 1-3tx to address above goals. Rehabilitation Potential For Stated Goals: Good RECOMMENDED REHABILITATION/EQUIPMENT: (at time of discharge pending progress): Continue Skilled Therapy. OCCUPATIONAL PROFILE AND HISTORY:  
History of Present Injury/Illness (Reason for Referral): Pt presents this date s/p (Left) TKA. Past Medical History/Comorbidities:  
Mr. Nakul Alcala  has a past medical history of Arthritis; BMI 30.0-30.9,adult; CAD (coronary artery disease); Chronic kidney disease; Chronic pain; Depression; Diabetes ([address] Enlarged prostate with urinary obstruction; Former smoker; GERD (gastroesophageal reflux disease); High cholesterol; Hypertension; Neurogenic bladder; Other ill-defined conditions(799.89); Overflow incontinence; Sciatica; Self-catheterizes urinary bladder; Skin cancer; Sleep apnea; Spondylolisthesis of lumbar region; Thromboembolus ([address] (2013); Urine retention; and Varicose veins of both lower extremities with complications.  He also has no past medical history of Adverse effect of anesthesia; Aneurysm ([facility] [address] Arrhythmia; Asthma; Autoimmune disease ([facility] [address] Chronic obstructive pulmonary disease ([address] Coagulation disorder ([facility] [address] Difficult intubation; Endocarditis; Heart failure ([facility] [address] Ill-defined condition; Liver disease; Malignant hyperthermia due to anesthesia; Nausea & vomiting; Nicotine vapor product user; Non-nicotine vapor product user; Pseudocholinesterase deficiency; PUD (peptic ulcer disease); Rheumatic fever; Seizures ([facility] [address] Stroke [facility]); or Thyroid disease. Mr. Christianne Hughes  has a past surgical history that includes hx hemorrhoidectomy (1971); hx shoulder arthroscopy (Left, 1971); and hx urological (06/2017). Social History/Living Environment:  
Home Environment: Private residence # Steps to Enter: 0 One/Two Story Residence: One story Living Alone: Yes Support Systems: Child(giovanni) Patient Expects to be Discharged to[de-identified] [facility] Current DME Used/Available at Home: Cane, straight Tub or Shower Type: Shower Prior Level of Function/Work/Activity: 
Independent prior. Lives alone. Number of Personal Factors/Comorbidities that affect the Plan of Care: Brief history (0):  LOW COMPLEXITY ASSESSMENT OF OCCUPATIONAL PERFORMANCE[de-identified]  
Most Recent Physical Functioning:  
Balance Sitting: Intact Standing: With support Coordination Fine Motor Skills-Upper: Left Intact; Right Intact Gross Motor Skills-Upper: Left Intact; Right Intact Mental Status Neurologic State: Alert Orientation Level: Oriented X4 Cognition: Appropriate decision making Perception: Appears intact Perseveration: No perseveration noted Safety/Judgement: Awareness of environment Basic ADLs (From Assessment) Complex ADLs (From Assessment) Basic ADL Feeding: Independent Oral Facial Hygiene/Grooming: Setup Bathing: Minimum assistance Type of Bath: Chlorhexidine (CHG), Full, Shower Upper Body Dressing: Setup Lower Body Dressing: Moderate assistance Toileting: Moderate assistance Grooming/Bathing/Dressing Activities of Daily Living Cognitive Retraining Safety/Judgement: Awareness of environment Functional Transfers Toilet Transfer : Moderate assistance Shower Transfer: Minimum assistance Bed/Mat Mobility Supine to Sit: Contact guard assistance Sit to Stand: Minimum assistance Bed to Chair: Minimum assistance Physical Skills Involved: 1. Range of Motion 2. Balance 3. Strength Cognitive Skills Affected (resulting in the inability to perform in a timely and safe manner): 1. nt Psychosocial Skills Affected: 1. nT  
Number of elements that affect the Plan of Care: 1-3:  LOW COMPLEXITY CLINICAL DECISION MAKIN[address] [place] [address] AM-PAC 6 Clicks Daily Activity Inpatient Short Form How much help from another person does the patient currently need. .. Total A Lot A Little None 1. Putting on and taking off regular lower body clothing? [] 1   [x] 2   [] 3   [] 4  
2. Bathing (including washing, rinsing, drying)? [] 1   [] 2   [x] 3   [] 4  
3. Toileting, which includes using toilet, bedpan or urinal?   [] 1   [x] 2   [] 3   [] 4  
4. Putting on and taking off regular upper body clothing? [] 1   [] 2   [] 3   [x] 4  
5. Taking care of personal grooming such as brushing teeth? [] 1   [] 2   [] 3   [x] 4  
6. Eating meals? [] 1   [] 2   [] 3   [x] 4  
© , Trustees of [address] [place] [address], under license to [employer]. All rights reserved Score:  Initial: 19 Most Recent: X (Date: -- ) Interpretation of Tool:  Represents activities that are increasingly more difficult (i.e. Bed mobility, Transfers, Gait). Score 24 23 22-20 19-15 14-10 9-7 6 Modifier CH CI CJ CK CL CM CN   
 
? Self Care:  
  - CURRENT STATUS: CK - 40%-59% impaired, limited or restricted  - GOAL STATUS: CJ - 20%-39% impaired, limited or restricted   - D/C STATUS:  ---------------To be determined--------------- Payor: Mariely Valdez / Plan: [address] / Product Type: Workers Comp /   
 
Medical Necessity:    
· Skilled intervention continues to be required due to Deficits noted above. Reason for Services/Other Comments: 
· Patient continues to require skilled intervention due to new TKA. Use of outcome tool(s) and clinical judgement create a POC that gives a: MODERATE COMPLEXITY  
  
 
 
 
TREATMENT:  
(In addition to Assessment/Re-Assessment sessions the following treatments were rendered) Pre-treatment Symptoms/Complaints:   
Pain: Initial:  
Pain Intensity 1: 6  Post Session:  5 Self Care: (45): Procedure(s) (per grid) utilized to improve and/or restore self-care/home management as related to dressing, bathing, toileting and grooming. Required minimal visual and verbal cueing to facilitate activities of daily living skills. Initial evaluation: 10 minutes. Treatment/Session Assessment:   
 Response to Treatment:  Good, recliner. Education: 
[] Home Exercises [x] Fall Precautions [] Hip Precautions [] Going Home Video [x] Knee/Hip Prosthesis Review [x] Walker Management/Safety [x] Adaptive Equipment as Needed Interdisciplinary Collaboration:  
o Physical Therapist 
o Occupational Therapist 
o Registered Nurse After treatment position/precautions:  
o Up in chair 
o Bed/Chair-wheels locked 
o Caregiver at bedside 
o Call light within reach 
o RN notified Compliance with Program/Exercises: compliant all of the time. Recommendations/Intent for next treatment session:  Treatment next visit will focus on increasing Mr. Payne Going independence with bed mobility, transfers, self care, functional mobility, modalities for pain, and patient education. Total Treatment Duration: OT Patient Time In/Time Out Time In: 6398 Time Out: 0968 Priyank Rizvi OT

## 2018-08-03 NOTE — PROGRESS NOTES
Orthopedic Joint Progress Note August 3, 2018 Admit Date: 2018 Admit Diagnosis: Unilateral primary osteoarthritis, left knee [M17.12] 1 Day Post-Op Subjective:  
 
Miguel Angel Uyener awake and alert Review of Systems: Pertinent items are noted in HPI. Objective:  
 
PT/OT:  
 
PATIENT MOBILITY Bed Mobility Supine to Sit: Contact guard assistance, Minimum assistance Sit to Supine:  (left up in chair) Transfers Sit to Stand: Minimum assistance, Assist x2 Stand to Sit: Minimum assistance, Assist x1, Assist x2 Bed to Chair: Minimum assistance, Assist x2 Gait Speed/Kenzie: Slow Step Length: Left shortened, Right shortened Stance: Left decreased Gait Abnormalities: Antalgic, Decreased step clearance Ambulation - Level of Assistance: Minimal assistance, Assist x2 Distance (ft): 5 Feet (ft) Assistive Device: Walker, rolling Interventions: Manual cues, Safety awareness training, Verbal cues Weight Bearing Status Left Side Weight Bearing: As tolerated Vital Signs:   
Blood pressure 143/71, pulse 75, temperature 97.6 °F (36.4 °C), resp. rate 17, height 6' (1.829 m), weight 100.7 kg (222 lb), SpO2 98 %. Temp (24hrs), Av.8 °F (36.6 °C), Min:97.2 °F (36.2 °C), Max:98.2 °F (36.8 °C) Pain Control:  
Pain Assessment Pain Scale 1: Numeric (0 - 10) Pain Intensity 1: 3 Pain Onset 1: YRS Pain Location 1: Knee Pain Orientation 1: Left Pain Description 1: Sore Pain Intervention(s) 1: Medication (see MAR) Meds: 
Current Facility-Administered Medications Medication Dose Route Frequency  tuberculin injection 5 Units  5 Units IntraDERMal ONCE  
 calcium polycarbophil (FIBERCON) tablet 625 mg  (Patient Supplied)  1 Tab Oral DAILY  divalproex ER (DEPAKOTE ER) 24 hour tablet 1,000 mg  1,000 mg Oral BID  empagliflozin-metformin 25-1,000 mg TBph 1 Tab  (Patient Supplied)  1 Tab Oral DAILY  furosemide (LASIX) tablet 20 mg  20 mg Oral DAILY  glipiZIDE SR (GLUCOTROL XL) tablet 10 mg  10 mg Oral DAILY  insulin degludec inpn Roseline Kalen) 50 Units  (Patient Supplied)  50 Units SubCUTAneous DAILY  latanoprost (XALATAN) 0.005 % ophthalmic solution 1 Drop  1 Drop Both Eyes QHS  Liraglutide (VICTOZA) 0.6 mg/0.1 mL (18 mg/3 mL) sub-q pen 1.8 mg  (Patient Supplied)  1.8 mg SubCUTAneous DAILY  losartan (COZAAR) tablet 100 mg  100 mg Oral QHS  nystatin (MYCOSTATIN) 100,000 unit/gram cream   Topical BID PRN  pregabalin (LYRICA) capsule 75 mg  75 mg Oral BID  simvastatin (ZOCOR) tablet 40 mg  40 mg Oral QHS  
 0.9% sodium chloride infusion  100 mL/hr IntraVENous CONTINUOUS  
 sodium chloride (NS) flush 5-10 mL  5-10 mL IntraVENous PRN  
 acetaminophen (TYLENOL) tablet 1,000 mg  1,000 mg Oral Q6H  
 HYDROmorphone (PF) (DILAUDID) injection 1 mg  1 mg IntraVENous Q3H PRN  
 naloxone (NARCAN) injection 0.2-0.4 mg  0.2-0.4 mg IntraVENous Q10MIN PRN  
 dexamethasone (DECADRON) injection 10 mg  10 mg IntraVENous ONCE  promethazine (PHENERGAN) tablet 25 mg  25 mg Oral Q6H PRN  
 diphenhydrAMINE (BENADRYL) capsule 25 mg  25 mg Oral Q4H PRN  
 senna-docusate (PERICOLACE) 8.6-50 mg per tablet 2 Tab  2 Tab Oral DAILY  zolpidem (AMBIEN) tablet 5 mg  5 mg Oral QHS PRN  
 aspirin delayed-release tablet 81 mg  81 mg Oral Q12H  
 HYDROmorphone (DILAUDID) tablet 4 mg  4 mg Oral Q4H PRN  
 enoxaparin (LOVENOX) injection 40 mg  40 mg SubCUTAneous DAILY  warfarin (COUMADIN) tablet 5 mg  5 mg Oral QHS  ondansetron (ZOFRAN ODT) tablet 8 mg  8 mg Oral Q8H PRN  
 HYDROmorphone (DILAUDID) tablet 2 mg  2 mg Oral Q4H PRN  
 buPROPion SR (WELLBUTRIN SR) tablet 150 mg  150 mg Oral BID  
  
 
LAB:   
Lab Results Component Value Date/Time INR 1.1 08/03/2018 04:32 AM  
 INR 1.1 08/02/2018 06:53 PM  
 INR 1.1 07/26/2018 12:26 PM  
 
Lab Results Component Value Date/Time  HGB 12.6 (L) 08/03/2018 04:32 AM  
 HGB 13.0 (L) 08/02/2018 06:53 PM  
 HGB 14.0 07/26/2018 12:26 PM  
 
 
Wound Knee Left (Active) DRESSING STATUS Clean, dry, and intact 8/2/2018  7:31 PM  
DRESSING TYPE Aquacel 8/2/2018  7:31 PM  
Number of days:1 Physical Exam: 
Calves soft/ neuro intact Assessment:  
  
Principal Problem: S/P total knee arthroplasty, left (8/3/2018) Active Problems: 
  Osteoarthritis (8/2/2018) Plan:  
 
Continue PT/OT/Rehab Consult: Rehab team including PT, OT, recreational therapy, and  To SNF soon Patient Expects to be Discharged to[de-identified] Rehabilitation facility [facility]) Signed By: Kimberli Manuel MD

## 2018-08-03 NOTE — DISCHARGE SUMMARY
REHABILITATION DISCHARGE SUMMARY Patient: Shanel Hernández MRN: [id number]  SSN: [id number] YOB: 1945  Age: 71 y.o. Sex: male Date: 8/3/2018 Admit Date: 8/2/2018 Discharge Date: 8/3/2018 Admitting Physician: Jeff Douglas MD  
Primary Care Physician: Dennis Callaway MD  
 
Admission Condition: good Chief Complaint : Gait dysfunction secondary to below. Admit Diagnosis: Unilateral primary osteoarthritis, left knee [M17.12] 
left total knee arthroplasty 8/2/2018 Pain DVT risk Post op acute blood loss anemia Hypertension Diabetes ([address] Spondylolisthesis of lumbar region Neurogenic bladder  -has catheterized once in the past month; urinating more frequently own his own now  (07/26/18) Acute Rehab Dx: 
Gait impairment Debility Mobility and ambulation deficits Self Care/ADL deficits HPI: Shanel Hernández is a 71 y.o. male patient at [address] [place] [address] who was admitted on 8/2/2018  by Jeff Douglas MD with below mentioned medical history, is being seen for Physical Medicine and Rehabilitation. Shanel Hernández presented with severe left knee pain due to end stage DJD that has been refractory to conservative treatment including intra-articular cortisone injections and modification of activities. Patient underwent a left total knee arthroplasty per Dr. Jeff Douglas MD on 8/2/2018. T  
Patient is to be WBAT LLE. Patient will be limited by surgical knee pain, decreased ROM and strength. Expect no major barriers otherwise. Shanel Hernández is seen and examined today. Medical Records reviewed. Patient has been independent with ambulation, prior to admission, limited by left knee pain. 
  
Rehabiitation Course:  
Functional  Level On Admission: Walk: Modified Independent Functional Level At Discharge: Walk: Contact Guard Assist 
Home Architecture: Home Situation Home Environment: Private residence (08/02/18 6983) # Steps to Enter: 0 (08/02/18 1716) One/Two Story Residence: One story (08/02/18 1716) Living Alone: Yes (08/02/18 1716) Support Systems: Child(giovanni) (08/02/18 1716) Patient Expects to be Discharged to[de-identified] Rehabilitation facility [facility]) (08/02/18 1716) Current DME Used/Available at Home: [address] [facility], straight (08/02/18 1716) Tub or Shower Type: Shower (08/02/18 1716) Past Medical History:  
Diagnosis Date  Arthritis  BMI 30.0-30.9,adult  CAD (coronary artery disease)   
 mild to moderate apical reversible ischemia per cardio note dated 07/12/10  Chronic kidney disease   
 no nephrologist   
 Chronic pain   
 neck, lower back  Depression  Diabetes ([facility] [address] type 2; oral and insulin meds; 1-2 times per day; average 104; A1C=8.1 on 06/13/18  Enlarged prostate with urinary obstruction  Former smoker  GERD (gastroesophageal reflux disease)  High cholesterol  Hypertension   
 on med for control  Neurogenic bladder  Other ill-defined conditions(799.89)  Overflow incontinence  Sciatica   
 recently took medrol dose pack around 05/04/18  Self-catheterizes urinary bladder   
 has catheterized once in the past month; urinating more frequently own his own now  (07/26/18)  Skin cancer  Sleep apnea   
 bi-pap; instructed to bring [facility]  Spondylolisthesis of lumbar region  Thromboembolus [facility]) 2013 DVT left leg; caused from a falling accident that damaged left leg  Urine retention  Varicose veins of both lower extremities with complications Past Surgical History:  
Procedure Laterality Date [address]  HX SHOULDER ARTHROSCOPY Left 1971  HX UROLOGICAL  06/2017 CYSTOSCOPY, TRANSURETHRAL RESECTION OF PROSTATE Family History Problem Relation Age of Onset  No Known Problems Mother  Heart Attack Father  Cancer Sister  Parkinson's Disease Brother Social History Substance Use Topics  Smoking status: Former Smoker Packs/day: 1.00 Years: 7.00 Quit date: 5/8/1971  Smokeless tobacco: Never Used  Alcohol use Yes Comment: occasionally Allergies Allergen Reactions  Oxycodone Itching  Tramadol Itching Prior to Admission medications Medication Sig Start Date End Date Taking? Authorizing Provider buPROPion SR (WELLBUTRIN SR) 150 mg SR tablet Take 150 mg by mouth two (2) times a day. Yes Historical Provider  
divalproex ER (DEPAKOTE ER) 500 mg ER tablet Take 1,000 mg by mouth two (2) times a day. Yes Historical Provider  
pregabalin (LYRICA) 75 mg capsule Take 75 mg by mouth two (2) times a day. Yes Historical Provider  
doxycycline (MONODOX) 100 mg capsule Take 100 mg by mouth two (2) times a day. Yes Historical Provider  
latanoprost (XALATAN) 0.005 % ophthalmic solution Administer 1 Drop to both eyes nightly. Yes Historical Provider  
empagliflozin-metformin (SYNJARDY XR) 25-1,000 mg TBph Take 1 Tab by mouth daily. Indications: type 2 diabetes mellitus   Yes Historical Provider  
multivitamin (ONE A DAY) tablet Take 1 Tab by mouth daily. Yes Historical Provider  
furosemide (LASIX) 20 mg tablet Take 20 mg by mouth daily. Yes Historical Provider  
glipiZIDE SR (GLUCOTROL XL) 10 mg CR tablet Take 10 mg by mouth daily. Indications: type 2 diabetes mellitus   Yes Historical Provider Liraglutide (VICTOZA) 0.6 mg/0.1 mL (18 mg/3 mL) pnij 1.8 mg by SubCUTAneous route daily. Indications: type 2 diabetes mellitus   Yes Historical Provider  
losartan (COZAAR) 100 mg tablet Take 100 mg by mouth daily. Indications: hypertension   Yes Historical Provider  
calcium polycarbophil (FIBERCON) 625 mg tablet Take 625 mg by mouth daily. Yes Historical Provider  
simvastatin (ZOCOR) 40 mg tablet Take 40 mg by mouth nightly. Yes Historical Provider  
insulin degludec (TRESIBA FLEXTOUCH U-200) 200 unit/mL (3 mL) inpn 50 Units by SubCUTAneous route daily.  Take / use 40 units the day before and AM day of surgery  per anesthesia protocols. Indications: type 2 diabetes mellitus   Yes Historical Provider  
nystatin (MYCOSTATIN) topical cream Apply  to affected area two (2) times daily as needed. Historical Provider Current Medications: 
Current Facility-Administered Medications Medication Dose Route Frequency Provider Last Rate Last Dose  calcium polycarbophil (FIBERCON) tablet 625 mg  (Patient Supplied)  1 Tab Oral DAILY Feng Mooney MD      
 divalproex ER (DEPAKOTE ER) 24 hour tablet 1,000 mg  1,000 mg Oral BID Feng Mooney MD   1,000 mg at 08/03/18 1543  empagliflozin-metformin 25-1,000 mg TBph 1 Tab  (Patient Supplied)  1 Tab Oral DAILY Feng Mooney MD      
 furosemide (LASIX) tablet 20 mg  20 mg Oral DAILY Feng Mooney MD   20 mg at 08/03/18 0824  
 glipiZIDE SR (GLUCOTROL XL) tablet 10 mg  10 mg Oral DAILY Feng Mooney MD   10 mg at 08/03/18 5529  insulin degludec inpn [place] [facility]) 50 Units  (Patient Supplied)  50 Units SubCUTAneous DAILY Feng Mooney MD   50 Units at 08/03/18 0900  latanoprost (XALATAN) 0.005 % ophthalmic solution 1 Drop  1 Drop Both Eyes QHS Feng Mooney MD      
 Liraglutide (VICTOZA) 0.6 mg/0.1 mL (18 mg/3 mL) sub-q pen 1.8 mg  (Patient Supplied)  1.8 mg SubCUTAneous DAILY Feng Mooney MD   1.8 mg at 08/03/18 0900  losartan (COZAAR) tablet 100 mg  100 mg Oral QHS Feng Mooney MD   100 mg at 08/02/18 2042  nystatin (MYCOSTATIN) 100,000 unit/gram cream   Topical BID PRN Feng Mooney MD      
 pregabalin (LYRICA) capsule 75 mg  75 mg Oral BID Feng Mooney MD   75 mg at 08/03/18 1860  simvastatin (ZOCOR) tablet 40 mg  40 mg Oral QHS Feng Mooney MD   40 mg at 08/02/18 2321  
 0.9% sodium chloride infusion  100 mL/hr IntraVENous CONTINUOUS Feng Mooney  mL/hr at 08/02/18 2330 100 mL/hr at 08/02/18 2330  
 sodium chloride (NS) flush 5-10 mL  5-10 mL IntraVENous PRN Sheryl Reyes MD      
 acetaminophen (TYLENOL) tablet 1,000 mg  1,000 mg Oral Q6H Sheryl Reyes MD   1,000 mg at 08/03/18 1712  
 HYDROmorphone (PF) (DILAUDID) injection 1 mg  1 mg IntraVENous Q3H PRN Sheryl Reyes MD   1 mg at 08/03/18 1015  
 naloxone [facility]) injection 0.2-0.4 mg  0.2-0.4 mg IntraVENous Q10MIN PRN Sheryl Reyes MD      
 dexamethasone (DECADRON) injection 10 mg  10 mg IntraVENous ONCE Sheryl Reyes MD      
 [facility]) tablet 25 mg  25 mg Oral Q6H PRN Sheryl Reyes MD      
 diphenhydrAMINE (BENADRYL) capsule 25 mg  25 mg Oral Q4H PRN Sheryl Reyes MD   25 mg at 08/03/18 [phone]  senna-docusate (PERICOLACE) 8.6-50 mg per tablet 2 Tab  2 Tab Oral DAILY Sheryl Reyes MD   2 Tab at 08/03/18 8497  zolpidem (AMBIEN) tablet 5 mg  5 mg Oral QHS PRN Sheryl Reyes MD      
 aspirin delayed-release tablet 81 mg  81 mg Oral Q12H Sheryl Reyes MD   81 mg at 08/03/18 0900  
 HYDROmorphone (DILAUDID) tablet 4 mg  4 mg Oral Q4H PRN Sheryl Reyes MD   4 mg at 08/03/18 1204  enoxaparin (LOVENOX) injection 40 mg  40 mg SubCUTAneous DAILY Sheryl Reyes MD   40 mg at 08/03/18 0074  warfarin (COUMADIN) tablet 5 mg  5 mg Oral QHS Sheryl Reyes MD   5 mg at 08/02/18 2321  ondansetron (ZOFRAN ODT) tablet 8 mg  8 mg Oral Q8H PRN Sheryl Reyes MD   8 mg at 08/03/18 1712  
 HYDROmorphone (DILAUDID) tablet 2 mg  2 mg Oral Q4H PRN Sheryl Reyes MD   2 mg at 08/03/18 6499  buPROPion [facility]) tablet 150 mg  150 mg Oral BID Sheryl Reyes MD   150 mg at 08/03/18 8856 Review of Systems: Denies chest pain, shortness of breath, cough, headache, visual problems, abdominal pain, dysurea, calf pain. Pertinent positives are as noted in the medical records and unremarkable otherwise.  
 
Vital Signs: Patient Vitals for the past 8 hrs: 
 BP Temp Pulse Resp SpO2  
08/03/18 1515 181/90 98.1 °F (36.7 °C) 98 18 95 % 08/03/18 1145 (!) 169/98 98.5 °F (36.9 °C) 93 18 97 % Physical Exam:  
General: Alert and age appropriately oriented. No acute cardio respiratory distress. HEENT: Normocephalic. Oral mucosa moist without cyanosis. Lungs: Clear to auscultation  bilaterally. Respiration even and unlabored Heart: Regular rate and rhythm, S1, S2 No  murmurs, clicks, rub or gallops Abdomen: Soft, non-tender, nondistended. Bowel sounds present Genitourinary: defered Neuromuscular:  
 
 Grossly no focal neurological deficits noted. L Ankle dorsiflexion 5/5 L Ankle plantarflexion 5/5 R Ankle dorsiflexion 5/5 R Ankle plantarflexion 5/5 No sensory deficits. Skin/extremity: No rashes, no erythema. Calves soft. Wound covered. Skin Incision(s)/Wound(s):  
  
 
Lab Review:  
Recent Results (from the past 72 hour(s)) GLUCOSE, POC Collection Time: 08/02/18  8:38 AM  
Result Value Ref Range Glucose (POC) 94 65 - 100 mg/dL TYPE & SCREEN Collection Time: 08/02/18  8:42 AM  
Result Value Ref Range Crossmatch Expiration 08/05/2018 ABO/Rh(D) A POSITIVE Antibody screen NEG   
HEMOGLOBIN Collection Time: 08/02/18  6:53 PM  
Result Value Ref Range HGB 13.0 (L) 13.6 - 17.2 g/dL PROTHROMBIN TIME + INR Collection Time: 08/02/18  6:53 PM  
Result Value Ref Range Prothrombin time 14.5 11.5 - 14.5 sec INR 1.1 PROTHROMBIN TIME + INR Collection Time: 08/03/18  4:32 AM  
Result Value Ref Range Prothrombin time 14.6 (H) 11.5 - 14.5 sec INR 1.1 METABOLIC PANEL, BASIC Collection Time: 08/03/18  4:32 AM  
Result Value Ref Range Sodium 139 136 - 145 mmol/L Potassium 5.4 (H) 3.5 - 5.1 mmol/L Chloride 104 98 - 107 mmol/L  
 CO2 26 21 - 32 mmol/L Anion gap 9 7 - 16 mmol/L Glucose 258 (H) 65 - 100 mg/dL BUN 34 (H) 8 - 23 MG/DL  Creatinine 1.61 (H) 0.8 - 1.5 MG/DL  
 GFR est AA 55 (L) >60 ml/min/1.73m2 GFR est non-AA 45 (L) >60 ml/min/1.73m2 Calcium 8.5 8.3 - 10.4 MG/DL  
HEMOGLOBIN Collection Time: 08/03/18  4:32 AM  
Result Value Ref Range HGB 12.6 (L) 13.6 - 17.2 g/dL PLEASE READ & DOCUMENT PPD TEST IN 24 HRS Collection Time: 08/03/18  9:14 AM  
Result Value Ref Range PPD neg Negative  
 mm Induration 0 mm PT Initial  PT Most Recent AROM: Within functional limits (except L knee) (08/02/18 1700) Within functional limits (except L knee) (08/02/18 1700) Strength: Generally decreased, functional (08/02/18 1700) Generally decreased, functional (08/02/18 1700) Coordination: Within functional limits (08/02/18 1700) Within functional limits (08/02/18 1700) Distance (ft): 5 Feet (ft) (08/02/18 1700) 8 Feet (ft) (08/03/18 1400) Assistive Device: Walker, rolling (08/02/18 1700) Walker, rolling (08/03/18 1400) OT Initial OT Most Recent ST Initial ST Most Recent Principal Problem: S/P total knee arthroplasty, left (8/3/2018) Active Problems: 
  Osteoarthritis (8/2/2018) Condition on discharge :  good Rehabilitation  potential : Good Goals in Rehab : Patient to reach maximal rehabilitation potential in functional mobility,ambulation and ADL/ self care skills ; to improve strength and endurance Expected Length Of Stay : Depending on pace of progress in mobility and self care in PT and OT ,therapies Discharge Instructions: 
Rehabilitation Management/ Medical Management: 1. Devices:Walkers, Type: Farideh Rubioir 2. Consult:Rehab team including PT, OT,  and . 3. Disposition Rehab-discussed with patient. 4. Thigh-high or knee-high JEFFREY's when out of bed. 5. DVT Prophylaxis - coumadin x 30 days. Bridged with lovenox. Discontinue Lovenox when INR> 1.8.  Coumadin dosing per facility MD. Patient has been started on coumadin 5mg po EVERY BEDTIME since 8/2. Please notify surgeon at [address] if DVT diagnosed. 6. Incentive spirometer Q1H while awake 7. Post op hemorrhagic anemia- monitor. 8. Activity: WBAT LLE 9. Planned Labs: CBC,BMP, Daily INR until otherwise directed per facility MD.  
10. Pain Control: fair control. Continue scheduled tylenol, celebrex and  PRN meds. Continue current management. 11. Wound Care: May remove Aquacel 1 week post op and replace with Tegaderm and sterile gauze dressing. Keep wound clean and dry and reinforce dressing PRN. Remove staples 12-14 post surgery, when incision appears appropriately closed and apply benzoin and 1/2\" steristrips. 12. In case of complications: Please notify surgeon at [address] if suspect infection, cellulitis, wound dehiscence or instrument failure, and if considering starting antibiotic. Follow up with ORTHO per instructions. [phone] Discharge Medications: 
 
 
warfarin (COUMADIN) tablet 5 mg Sig - Route: Take 1 Tab by mouth nightly x 30 days for DVT prophylaxis. HYDROmorphone (DILAUDID) tablet 2 mg 1 Tab  Ordered Dose: 2mg Route: Oral Frequency: EVERY 4 HOURS  as needed for pain. May repeat dose for severe pain. acetaminophen (TYLENOL) tablet 1,000 mg Ordered Dose: 1,000 mg Route: Oral Frequency: EVERY 8 HOURS x 1 week then change to prn.  
 
 
enoxaparin (LOVENOX) injection 40 mg  Ordered Dose: 40 mg Route: SubCUTAneous Frequency: daily  
 discontinue when INR > 1.8.  
  
senna-docusate (PERICOLACE) 8.6-50 mg per tablet 2 Tab Ordered Dose: 2 Tab Route: Oral Frequency: DAILY Current Discharge Medication List  
  
CONTINUE these medications which have NOT CHANGED Details buPROPion SR (WELLBUTRIN SR) 150 mg SR tablet Take 150 mg by mouth two (2) times a day.   
  
divalproex ER (DEPAKOTE ER) 500 mg ER tablet Take 1,000 mg by mouth two (2) times a day. pregabalin (LYRICA) 75 mg capsule Take 75 mg by mouth two (2) times a day. doxycycline (MONODOX) 100 mg capsule Take 100 mg by mouth two (2) times a day. latanoprost (XALATAN) 0.005 % ophthalmic solution Administer 1 Drop to both eyes nightly. empagliflozin-metformin (SYNJARDY XR) 25-1,000 mg TBph Take 1 Tab by mouth daily. Indications: type 2 diabetes mellitus  
  
multivitamin (ONE A DAY) tablet Take 1 Tab by mouth daily. furosemide (LASIX) 20 mg tablet Take 20 mg by mouth daily. glipiZIDE SR (GLUCOTROL XL) 10 mg CR tablet Take 10 mg by mouth daily. Indications: type 2 diabetes mellitus Liraglutide (VICTOZA) 0.6 mg/0.1 mL (18 mg/3 mL) pnij 1.8 mg by SubCUTAneous route daily. Indications: type 2 diabetes mellitus  
  
losartan (COZAAR) 100 mg tablet Take 100 mg by mouth daily. Indications: hypertension  
  
calcium polycarbophil (FIBERCON) 625 mg tablet Take 625 mg by mouth daily. simvastatin (ZOCOR) 40 mg tablet Take 40 mg by mouth nightly. insulin degludec (TRESIBA FLEXTOUCH U-200) 200 unit/mL (3 mL) inpn 50 Units by SubCUTAneous route daily. Take / use 40 units the day before and AM day of surgery  per anesthesia protocols. Indications: type 2 diabetes mellitus  
  
nystatin (MYCOSTATIN) topical cream Apply  to affected area two (2) times daily as needed. Discharge time: 35 minutes. Signed By: Vianca Mcnally MD   
 August 3, 2018

## 2018-08-03 NOTE — PROGRESS NOTES
SW received call back from Walker Black ( worker's comp ) who agrees to contact Yamileth at [facility] & authorize pt's admission for Sat.

## 2018-08-03 NOTE — PROGRESS NOTES
TERRELL received call back from Clarice Dennis who states ambulance transport is approved. The bill needs to be sent to [facility] [id number]  [address]  Questions call Juan Valle [phone].

## 2018-08-03 NOTE — PROGRESS NOTES
Has been sleeping. Family in room now. C/o itching, medicated with benadryl. Encouraged to use IS. C/o feeling queasy, zofran po given.

## 2018-08-03 NOTE — PROGRESS NOTES
Shift Assessment Complete. Pt is post op day 1 from [address]. Pt is A&Ox 3.  +2 pedal pulses with purposeful movement in all four extremities. Dressing is clean, dry and intact. PIV capped. Pt c/o pain. See mar . Bed low and locked. Side rails x3. Call light with in reach. Pt verbalizes understanding of call light.

## 2018-08-04 NOTE — PROGRESS NOTES
Problem: Mobility Impaired (Adult and Pediatric) Goal: *Acute Goals and Plan of Care (Insert Text) GOALS (1-4 days): 
(1.)Mr. Cutler will move from supine to sit and sit to supine  in bed with STAND BY ASSIST. 
(2.)Mr. Cutler will transfer from bed to chair and chair to bed with STAND BY ASSIST using the least restrictive device. (3.)Mr. Cutler will ambulate with STAND BY ASSIST for 200 feet with the least restrictive device. (4.)Mr. Cutler will increase left knee ROM to 5°-80°. 
________________________________________________________________________________________________ PHYSICAL THERAPY [facility]: Daily Note, AM 8/4/2018 INPATIENT: Hospital Day: 3 Payor: Peggyann Boast / Plan: [id number] [place] [address] / Product Type: Workers Comp /  
  
NAME/AGE/GENDER: Marcella Santos is a 71 y.o. male PRIMARY DIAGNOSIS:  Unilateral primary osteoarthritis, left knee [M17.12] Procedure(s) and Anesthesia Type: 
   * LEFT KNEE ARTHROPLASTY TOTAL / DEPUY - Spinal (Left) ICD-10: Treatment Diagnosis:  
 · Pain in Left Knee (M25.562) · Stiffness of Left Knee, Not elsewhere classified (M25.662) · Difficulty in walking, Not elsewhere classified (R26.2) · Other abnormalities of gait and mobility (R26.89) ASSESSMENT:  
 
Mr. Shania Zheng is sitting up in chair on arrival.  He request to not walk to the gym this morning and will walk after group. Patient actively participated with PT during AM group therapy session completing all activities with supervision to contact guard asssist.  He then stood and walked about 20 feet with CGA. He felt a little nauseous and clamy. Pt returned to room and left up in chair with needs in reach. This section established at most recent assessment PROBLEM LIST (Impairments causing functional limitations): 1. Decreased Strength 2. Decreased ADL/Functional Activities 3. Decreased Transfer Abilities 4.  Decreased Ambulation Ability/Technique 5. Decreased Activity Tolerance 6. Decreased Flexibility/Joint Mobility 7. Decreased [place] with Home Exercise Program 
 INTERVENTIONS PLANNED: (Benefits and precautions of physical therapy have been discussed with the patient.) 1. Bed Mobility 2. Gait Training 3. Home Exercise Program (HEP) 4. Therapeutic Exercise/Strengthening 5. Transfer Training 6. Range of Motion: active/assisted/passive 7. Therapeutic Activities 8. Group Therapy TREATMENT PLAN: Frequency/Duration: Follow patient BID for duration of hospital stay to address above goals. Rehabilitation Potential For Stated Goals: Good RECOMMENDED REHABILITATION/EQUIPMENT: (at time of discharge pending progress): Continue Skilled Therapy and Rehab. HISTORY:  
History of Present Injury/Illness (Reason for Referral): S/P L TKA Past Medical History/Comorbidities:  
Mr. Ирина Carrillo  has a past medical history of Arthritis; BMI 30.0-30.9,adult; CAD (coronary artery disease); Chronic kidney disease; Chronic pain; Depression; Diabetes ([address] Enlarged prostate with urinary obstruction; Former smoker; GERD (gastroesophageal reflux disease); High cholesterol; Hypertension; Neurogenic bladder; Other ill-defined conditions(799.89); Overflow incontinence; Sciatica; Self-catheterizes urinary bladder; Skin cancer; Sleep apnea; Spondylolisthesis of lumbar region; Thromboembolus ([address] (2013); Urine retention; and Varicose veins of both lower extremities with complications. He also has no past medical history of Adverse effect of anesthesia; Aneurysm ([address] Arrhythmia; Asthma; Autoimmune disease ([address] Chronic obstructive pulmonary disease ([address] Coagulation disorder ([address] Difficult intubation; Endocarditis; Heart failure ([address] Ill-defined condition; Liver disease; Malignant hyperthermia due to anesthesia;  Nausea & vomiting; Nicotine vapor product user; Non-nicotine vapor product user; Pseudocholinesterase deficiency; PUD (peptic ulcer disease); Rheumatic fever; Seizures ([facility] [address] Stroke [facility]); or Thyroid disease. Mr. Mary Dc  has a past surgical history that includes hx hemorrhoidectomy (1971); hx shoulder arthroscopy (Left, 1971); and hx urological (06/2017). Social History/Living Environment:  
Home Environment: Private residence # Steps to Enter: 0 One/Two Story Residence: One story Living Alone: Yes Support Systems: Child(giovanni) Patient Expects to be Discharged to[de-identified] Rehabilitation facility [facility] [place]) Current DME Used/Available at Home: Cane, straight Tub or Shower Type: Shower Prior Level of Function/Work/Activity: 
ambulating with a cane Number of Personal Factors/Comorbidities that affect the Plan of Care: 1-2: MODERATE COMPLEXITY EXAMINATION:  
Most Recent Physical Functioning:  
Gross Assessment: Yes Gross Assessment AROM: Generally decreased, functional 
PROM: Generally decreased, functional 
Strength: Generally decreased, functional 
Coordination: Generally decreased, functional 
Tone: Normal 
Sensation: Intact LLE AROM 
L Knee Flexion: 87 L Knee Extension: 14 Bed Mobility Rolling: Supervision Supine to Sit: Supervision Sit to Supine: Supervision Scooting: Supervision Transfers Sit to Stand: Supervision Stand to Sit: Supervision Stand Pivot Transfers: Supervision Bed to Chair: Supervision Lateral Transfers: Supervision Balance Sitting: Intact Standing: Impaired Standing - Static: Good Standing - Dynamic : Fair    Posture Posture (WDL): Exceptions to [facility] Posture Assessment: Rounded shoulders; Forward head Weight Bearing Status Left Side Weight Bearing: As tolerated Distance (ft): 20 Feet (ft) Ambulation - Level of Assistance: Modified independent Assistive Device: Walker, rolling Speed/Kenzie: Pace decreased (<100 feet/min) Step Length: Left shortened;Right shortened Stance: Left decreased;Right increased Gait Abnormalities: Antalgic Interventions: Verbal cues;Safety awareness training Braces/Orthotics: none Body Structures Involved: 1. Bones 2. Joints 3. Muscles Body Functions Affected: 1. Neuromusculoskeletal 
2. Movement Related Activities and Participation Affected: 1. General Tasks and Demands 2. Mobility 3. Self Care Number of elements that affect the Plan of Care: 3: MODERATE COMPLEXITY CLINICAL PRESENTATION:  
Presentation: Stable and uncomplicated: LOW COMPLEXITY CLINICAL DECISION [id number] [place] [address] AM-PAC 6 Clicks Basic Mobility Inpatient Short Form How much difficulty does the patient currently have. .. Unable A Lot A Little None 1. Turning over in bed (including adjusting bedclothes, sheets and blankets)? [] 1   [] 2   [x] 3   [] 4  
2. Sitting down on and standing up from a chair with arms ( e.g., wheelchair, bedside commode, etc.)   [] 1   [] 2   [x] 3   [] 4  
3. Moving from lying on back to sitting on the side of the bed? [] 1   [] 2   [x] 3   [] 4 How much help from another person does the patient currently need. .. Total A Lot A Little None 4. Moving to and from a bed to a chair (including a wheelchair)? [] 1   [] 2   [x] 3   [] 4  
5. Need to walk in hospital room? [] 1   [x] 2   [] 3   [] 4  
6. Climbing 3-5 steps with a railing? [] 1   [x] 2   [] 3   [] 4  
© , Trustees of [address] [place] [address], under license to [employer]. All rights reserved Score:  Initial: 16 Most Recent: X (Date: -- ) Interpretation of Tool:  Represents activities that are increasingly more difficult (i.e. Bed mobility, Transfers, Gait). Score 24 23 22-20 19-15 14-10 9-7 6 Modifier CH CI CJ CK CL CM CN   
 
? Mobility - Walking and Moving Around:  
  - CURRENT STATUS: CK - 40%-59% impaired, limited or restricted  - GOAL STATUS: CJ - 20%-39% impaired, limited or restricted  - D/C STATUS:  ---------------To be determined--------------- Payor: Corey Hamilton / Plan: [employer] WORK[employer] COMPENSATION / Product Type: Workers Comp /   
 
Medical Necessity:    
· Patient demonstrates good rehab potential due to higher previous functional level. Reason for Services/Other Comments: 
· Patient continues to require present interventions due to patient's inability to perform functional mobility and exercises independently. Use of outcome tool(s) and clinical judgement create a POC that gives a: Clear prediction of patient's progress: LOW COMPLEXITY  
  
 
 
 
TREATMENT:  
(In addition to Assessment/Re-Assessment sessions the following treatments were rendered) Pre-treatment Symptoms/Complaints:  L knee pain 
Pain: Initial:  
Pain Intensity 1: 7 Pain Location 1: Knee Pain Orientation 1: Left Pain Intervention(s) 1: Rest, Medication (see MAR)  Post Session:  6/10 Gait Training (15 Minutes):  Gait training to improve and/or restore physical functioning as related to mobility, strength, balance and coordination. Ambulated 20 Feet (ft) with Modified independent using a Walker, rolling and moderate Verbal cues; Safety awareness training related to their stride length, knee position and motion and hip position and motion to promote proper body alignment, promote proper body posture, promote proper body mechanics and promote proper body breathing techniques. Instruction in performance of ambulation to correct stride length, knee position and motion and hip position and motion. Therapeutic Exercise: (45 Minutes):  Exercises per grid below to improve mobility and strength. Required minimal verbal and manual cues to perform exercises correctly. Progressed range and repetitions as indicated. Date: 
8/2/18 Date: 
8/3/18 Date: 
8/4/2018 ACTIVITY/EXERCISE AM PM AM PM AM PM  
GROUP THERAPY  []  []  []  [x]  [x]  [] Ankle Pumps  10 10 15 20 Quad Sets  10 10 15 20 Gluteal Sets  10 10 15 20 Hip ABd/ADduction  10 10 15 20 Straight Leg Raises   10 15 20 Knee Slides  10 10 15 20 Short Arc Quads    15 20 812 [facility] Chair Slides    15 20 B = bilateral; AA = active assistive; A = active; P = passive Treatment/Session Assessment:   
 Response to Treatment:   Patient tolerated treatment with reports of pain with ambulation. Education: 
[x] Home Exercises [x] Fall Precautions [] Hip Precautions [] D/C Instruction Review 
[] Knee/Hip Prosthesis Review [x] Walker Management/Safety [] Adaptive Equipment as Needed Interdisciplinary Collaboration:  
o Physical Therapy Assistant After treatment position/precautions:  
o Up in chair 
o Bed/Chair-wheels locked 
o Call light within reach 
o RN notified Compliance with Program/Exercises: Will assess as treatment progresses. Recommendations/Intent for next treatment session:  Treatment next visit will focus on increasing Mr. Kathy Pollock independence with bed mobility, transfers, gait training, strength/ROM exercises, modalities for pain, and patient education. Total Treatment Duration: PT Patient Time In/Time Out Time In: 1030 Time Out: 1130 Ty Broussard, PT

## 2018-08-04 NOTE — PROGRESS NOTES
[facility] PROGRESS NOTE James Owen 
Admit Date: 8/2/2018 Admit Diagnosis: Unilateral primary osteoarthritis, left knee [M17.12] Subjective Patient's discharge cancelled due to hypotension. Presently vss. Afebrile. /8, SaO2 96% on RA. Rosetta Grant He is awake, follows commands well but, midlly groggy. States he is sleepy. Objective:  
 
Current Facility-Administered Medications Medication Dose Route Frequency  calcium polycarbophil (FIBERCON) tablet 625 mg  (Patient Supplied)  1 Tab Oral DAILY  divalproex ER (DEPAKOTE ER) 24 hour tablet 1,000 mg  1,000 mg Oral BID  empagliflozin-metformin 25-1,000 mg TBph 1 Tab  (Patient Supplied)  1 Tab Oral DAILY  furosemide (LASIX) tablet 20 mg  20 mg Oral DAILY  glipiZIDE SR (GLUCOTROL XL) tablet 10 mg  10 mg Oral DAILY  insulin degludec inpn Roseline Kalen) 50 Units  (Patient Supplied)  50 Units SubCUTAneous DAILY  latanoprost (XALATAN) 0.005 % ophthalmic solution 1 Drop  1 Drop Both Eyes QHS  Liraglutide (VICTOZA) 0.6 mg/0.1 mL (18 mg/3 mL) sub-q pen 1.8 mg  (Patient Supplied)  1.8 mg SubCUTAneous DAILY  losartan (COZAAR) tablet 100 mg  100 mg Oral QHS  nystatin (MYCOSTATIN) 100,000 unit/gram cream   Topical BID PRN  pregabalin (LYRICA) capsule 75 mg  75 mg Oral BID  simvastatin (ZOCOR) tablet 40 mg  40 mg Oral QHS  sodium chloride (NS) flush 5-10 mL  5-10 mL IntraVENous PRN  
 acetaminophen (TYLENOL) tablet 1,000 mg  1,000 mg Oral Q6H  
 HYDROmorphone (PF) (DILAUDID) injection 1 mg  1 mg IntraVENous Q3H PRN  
 naloxone (NARCAN) injection 0.2-0.4 mg  0.2-0.4 mg IntraVENous Q10MIN PRN  promethazine (PHENERGAN) tablet 25 mg  25 mg Oral Q6H PRN  
 diphenhydrAMINE (BENADRYL) capsule 25 mg  25 mg Oral Q4H PRN  
 senna-docusate (PERICOLACE) 8.6-50 mg per tablet 2 Tab  2 Tab Oral DAILY  zolpidem (AMBIEN) tablet 5 mg  5 mg Oral QHS PRN  
 aspirin delayed-release tablet 81 mg  81 mg Oral Q12H  
 enoxaparin (LOVENOX) injection 40 mg  40 mg SubCUTAneous DAILY  warfarin (COUMADIN) tablet 5 mg  5 mg Oral QHS  ondansetron (ZOFRAN ODT) tablet 8 mg  8 mg Oral Q8H PRN  
 HYDROmorphone (DILAUDID) tablet 2 mg  2 mg Oral Q4H PRN  
 buPROPion SR (WELLBUTRIN SR) tablet 150 mg  150 mg Oral BID Visit Vitals  /83  Pulse 98  Temp 98.2 °F (36.8 °C)  Resp 18  Ht 6' (1.829 m)  Wt 222 lb (100.7 kg)  SpO2 96%  BMI 30.11 kg/m2 Review of Systems: +antalgic gait. Denies chest pain, shortness of breath, cough, headache, visual problems, abdominal pain, dysurea, calf pain. Pertinent positives are as noted in the medical records and unremarkable otherwise. Physical Exam:  
General: Alert and age appropriately oriented. No acute cardio respiratory distress. HEENT: Normocephalic, no scleral icterus, no conjunctival pallor. Oral mucosa moist without cyanosis Lungs: Clear to auscultation  bilaterally. Respiration even and unlabored Heart: Regular rate and rhythm, S1, S2 No  murmurs, no JVD. Abdomen: Soft, non-tender, non-distended. Genitourinary: defered Neuromuscular:  
 
 Grossly no focal motor deficits noted. Moves ankles. Ankle dorsiflexion 5/5 Ankle plantarflexion 5/5 No sensory deficits distally. Exam limited by pain. Skin/extremity: No calf tenderness BLE Wound covered. Dressing clean, dry. No rashes, no marginal erythema. Functional Assessment: 
Gross Assessment AROM: Generally decreased, functional (08/04/18 1100) PROM: Generally decreased, functional (08/04/18 1100) Strength: Generally decreased, functional (08/04/18 1100) Coordination: Generally decreased, functional (08/04/18 1100) Tone: Normal (08/04/18 1100) Sensation: Intact (08/04/18 1100) Bed Mobility Rolling: Supervision (08/04/18 1100) Supine to Sit: Supervision (08/04/18 1100) Sit to Supine: Supervision (08/04/18 1100) Scooting: Supervision (08/04/18 1100) Balance Sitting: Intact (08/04/18 1100) Standing: Impaired (08/04/18 1100) Standing - Static: Good (08/04/18 1100) Standing - Dynamic : Fair (08/04/18 1100) Bed/Mat Mobility Rolling: Supervision (08/04/18 1100) Supine to Sit: Supervision (08/04/18 1100) Sit to Supine: Supervision (08/04/18 1100) Sit to Stand: Supervision (08/04/18 1100) Bed to Chair: Supervision (08/04/18 1100) Scooting: Supervision (08/04/18 1100) Ed Elizabeth Fall Risk Assessment: 
Franci Carney Risk Mobility: Ambulates or transfers with assist devices or assistance (08/04/18 1007) Mobility Interventions: Bed/chair exit alarm;Communicate number of staff needed for ambulation/transfer;OT consult for ADLs; Patient to call before getting OOB (08/04/18 1007) Mentation: Alert, oriented x 3 (08/04/18 1007) Medication: Patient receiving anticonvulsants, sedatives(tranquilizers), psychotropics or hypnotics, hypoglycemics, narcotics, sleep aids, antihypertensives, laxatives, or diuretics (08/04/18 1007) Medication Interventions: Bed/chair exit alarm;Evaluate medications/consider consulting pharmacy; Patient to call before getting OOB (08/04/18 1007) Elimination: Needs assistance with toileting (08/04/18 1007) Elimination Interventions: Call light in reach;Elevated toilet seat;Patient to call for help with toileting needs; Toilet paper/wipes in reach (08/04/18 1007) Prior Fall History: No (08/04/18 1007) Total Score: 3 (08/04/18 1007) Standard Fall Precautions: Yes (08/04/18 1007) High Fall Risk: Yes (08/04/18 1007) Speech Assessment: 
    
 
Ambulation: 
Gait Speed/Kenzie: Pace decreased (<100 feet/min) (08/04/18 1100) Step Length: Left shortened;Right shortened (08/04/18 1100) Stance: Left decreased;Right increased (08/04/18 1100) Gait Abnormalities: Antalgic (08/04/18 1100) Ambulation - Level of Assistance: Modified independent (08/04/18 1100) Distance (ft): 20 Feet (ft) (08/04/18 1100) Assistive Device: Walker, rolling (08/04/18 1100) Interventions: Verbal cues; Safety awareness training (08/04/18 1100) Duration: 15 Minutes (08/04/18 1100) Labs/Studies: 
Recent Results (from the past 72 hour(s)) GLUCOSE, POC Collection Time: 08/02/18  8:38 AM  
Result Value Ref Range Glucose (POC) 94 65 - 100 mg/dL TYPE & SCREEN Collection Time: 08/02/18  8:42 AM  
Result Value Ref Range Crossmatch Expiration 08/05/2018 ABO/Rh(D) A POSITIVE Antibody screen NEG   
HEMOGLOBIN Collection Time: 08/02/18  6:53 PM  
Result Value Ref Range HGB 13.0 (L) 13.6 - 17.2 g/dL PROTHROMBIN TIME + INR Collection Time: 08/02/18  6:53 PM  
Result Value Ref Range Prothrombin time 14.5 11.5 - 14.5 sec INR 1.1 PROTHROMBIN TIME + INR Collection Time: 08/03/18  4:32 AM  
Result Value Ref Range Prothrombin time 14.6 (H) 11.5 - 14.5 sec INR 1.1 METABOLIC PANEL, BASIC Collection Time: 08/03/18  4:32 AM  
Result Value Ref Range Sodium 139 136 - 145 mmol/L Potassium 5.4 (H) 3.5 - 5.1 mmol/L Chloride 104 98 - 107 mmol/L  
 CO2 26 21 - 32 mmol/L Anion gap 9 7 - 16 mmol/L Glucose 258 (H) 65 - 100 mg/dL BUN 34 (H) 8 - 23 MG/DL Creatinine 1.61 (H) 0.8 - 1.5 MG/DL  
 GFR est AA 55 (L) >60 ml/min/1.73m2 GFR est non-AA 45 (L) >60 ml/min/1.73m2 Calcium 8.5 8.3 - 10.4 MG/DL  
HEMOGLOBIN Collection Time: 08/03/18  4:32 AM  
Result Value Ref Range HGB 12.6 (L) 13.6 - 17.2 g/dL PLEASE READ & DOCUMENT PPD TEST IN 24 HRS Collection Time: 08/03/18  9:14 AM  
Result Value Ref Range PPD neg Negative  
 mm Induration 0 mm PROTHROMBIN TIME + INR Collection Time: 08/04/18  4:08 AM  
Result Value Ref Range Prothrombin time 15.0 (H) 11.5 - 14.5 sec INR 1.2 PLEASE READ & DOCUMENT PPD TEST IN 48 HRS Collection Time: 08/04/18  9:40 AM  
Result Value Ref Range PPD  Negative  
 mm Induration  0mm Assessment: Problem List as of 8/4/2018  Never Reviewed Codes Class Noted - Resolved * (Principal)S/P total knee arthroplasty, left ICD-10-CM: O76.453 ICD-9-CM: V43.65  8/3/2018 - Present Osteoarthritis ICD-10-CM: M19.90 ICD-9-CM: 715.90  8/2/2018 - Present Abnormal urinalysis ICD-10-CM: R82.90 ICD-9-CM: 791.9  7/26/2018 - Present CKD (chronic kidney disease) stage 3, GFR 30-59 ml/min ICD-10-CM: N18.3 ICD-9-CM: 585.3  5/8/2018 - Present Elevated white blood cell count ICD-10-CM: R68.106 ICD-9-CM: 288.60  5/8/2018 - Present Plan: S/p L TKA POD #2. Stable exam.  
 
- hypotension - transfer to SNF delayed. Brought back by EMS transport due to hypotension. Once back BP remained good, stable. - Will plan transfer tomorrow if stable. - Continue orthopedics nursing, acute PT, OT.  
 
DVT Prophylaxis - coumadin, bridged with Lovenox. INR 1.2 today. Post hemorrhagic anemia- monitor. WBAT LLE Pain Control: fair control with prn opiates, celebrex, tylenol. Dilaudid 2mg q4h prn. D/c 4mg dose. Keep wound clean and dry and Reinforce dressing PRN- remove staples 10-14 post surgery and apply benzoin and 1/2\" steristrips. Anticipate safe discharge to SNF tomorrow. Signed By: Clemente Linton MD   
 August 4, 2018

## 2018-08-04 NOTE — PROGRESS NOTES
Patient returned to Ortho room 320 by [facility], patient's blood pressures dropped after loading him in the ambulance for transport so he was returned to us. Notified Dr. Mary Abdi ( [facility]) and Dr. Anum Luna ALBINO [facility]) of patient's return.

## 2018-08-04 NOTE — PROGRESS NOTES
08/03/18 2120 Oxygen Therapy O2 Sat (%) 95 % Pulse via Oximetry 80 beats per minute O2 Device Room air (Home CPAP set up at bedside. ) O2 Flow Rate (L/min) 0 l/min

## 2018-08-04 NOTE — PROGRESS NOTES
2018 Post Op day: 2 Days Post-Op Admit Date: 2018 Admit Diagnosis: Unilateral primary osteoarthritis, left knee [M17.12] Subjective: Doing well, No complaints, No SOB, No Chest Pain, No Nausea or Vomitting. PT/OT:  
  
  
  
Assistive Device: Walker (comment) LLE AROM 
L Knee Flexion: 70 (approximate) L Knee Extension: -10 Weight Bearing Status: WBAT 
BMP: 
Recent Labs 18 
 3362 CREA  1.61* BUN  34* NA  139  
K  5.4*  
CL  104 CO2  26 AGAP  9  
GLU  258* Patient Vitals for the past 8 hrs: 
 BP Temp Pulse Resp SpO2  
18 0652 144/79 98.4 °F (36.9 °C) 100 18 97 % 18 0356 (!) 153/97 98.7 °F (37.1 °C) 99 18 95 % Temp (24hrs), Av.2 °F (36.8 °C), Min:97.5 °F (36.4 °C), Max:98.7 °F (37.1 °C) CBC: 
Recent Labs 18 
 6274  18 
 1853 HGB  12.6*  13.0* Microbiology:  
 
All Micro Results Procedure Component Value Units Date/Time CULTURE, URINE [[id number]] Order Status:  Sent Specimen:  Urine from [place] Specimen Objective: Vital Signs are Stable, No Acute Distress, Alert and Oriented Dressing is Dry,  Neurovascular exam is normal. 
 
Assessment: 
Patient Active Problem List  
Diagnosis Code  CKD (chronic kidney disease) stage 3, GFR 30-59 ml/min N18.3  Elevated white blood cell count D72.829  Abnormal urinalysis R82.90  
 Osteoarthritis M19.90  
 S/P total knee arthroplasty, left S33.933 Plan: Continue Physical Therapy Monitor Hbg and labs. Dispo soon to rehab Signed By: Erik Rai MD

## 2018-08-05 NOTE — PROGRESS NOTES
Problem: Falls - Risk of  Goal: *Absence of Falls  Document Steffen Fall Risk and appropriate interventions in the flowsheet.    Outcome: Progressing Towards Goal  Fall Risk Interventions:  Mobility Interventions: Patient to call before getting OOB         Medication Interventions: Evaluate medications/consider consulting pharmacy, Patient to call before getting OOB    Elimination Interventions: Bed/chair exit alarm, Patient to call for help with toileting needs

## 2018-08-05 NOTE — PROGRESS NOTES
Patient was scheduled to transfer to [facility] on Sat. 8/4. Patient's blood pressure dropped after loaded in ambulance. Patient discharged Sunday 8/5 to [facility].

## 2018-08-05 NOTE — PROGRESS NOTES
2018         Post Op day: 3 Days Post-Op     Admit Date: 2018  Admit Diagnosis: Unilateral primary osteoarthritis, left knee [M17.12]    Subjective: Doing well, No complaints, No SOB, No Chest Pain, No Nausea or Vomitting. No issues with urination other than normal for him at this time. Transfer canceled due to hypotension. PT/OT:            Assistive Device: Walker (comment)        LLE AROM  L Knee Flexion: 87  L Knee Extension: 14       Weight Bearing Status: WBAT  BMP:  Recent Labs      18   CREA  1.82*  1.61*   BUN  35*  34*   NA  137  139   K  5.0  5.4*   CL  101  104   CO2  31  26   AGAP  5*  9   GLU  200*  258*     Patient Vitals for the past 8 hrs:   BP Temp Pulse Resp SpO2   18 0430 112/74 98.6 °F (37 °C) 99 16 95 %   18 0018 132/76 98.5 °F (36.9 °C) (!) 105 16 92 %     Temp (24hrs), Av °F (37.2 °C), Min:98.2 °F (36.8 °C), Max:100.5 °F (38.1 °C)    CBC:  Recent Labs      18   1853   WBC  15.4*   --    --    GRANS  69   --    --    MONOS  15*   --    --    EOS  0*   --    --    ANEU  10.7*   --    --    ABL  2.4   --    --    HGB  13.0*  12.6*  13.0*   HCT  41.2   --    --    PLT  149*   --    --        Microbiology:     All Micro Results     Procedure Component Value Units Date/Time    CULTURE, URINE [[id number]] Collected:  18 0730    Order Status:  Canceled Specimen:  Urine from [place] Specimen         Objective: Vital Signs are Stable, No Acute Distress, Alert and Oriented  Dressing is Dry,  Neurovascular exam is normal.    Assessment:  Patient Active Problem List   Diagnosis Code    CKD (chronic kidney disease) stage 3, GFR 30-59 ml/min N18.3    Elevated white blood cell count D72.829    Abnormal urinalysis R82.90    Osteoarthritis M19.90    S/P total knee arthroplasty, left D98.589       Plan: Continue Physical Therapy  Monitor Hbg and labs. Dispo soon if vitals remain stable.      Signed By: Ashley Lerma MD

## 2018-08-05 NOTE — PROGRESS NOTES
TRANSFER - OUT REPORT:    Verbal report given to Roosevelt Vela (name) on Tracie Tinajero  being transferred to [facility](unit) for routine progression of care       Report consisted of patients Situation, Background, Assessment and   Recommendations(SBAR). Information from the following report(s) SBAR, Kardex, [facility] and Recent Results was reviewed with the receiving nurse. Lines:       Opportunity for questions and clarification was provided.       Patient transported with: [facility]

## 2018-08-05 NOTE — PROGRESS NOTES
Problem: Mobility Impaired (Adult and Pediatric)  Goal: *Acute Goals and Plan of Care (Insert Text)  GOALS (1-4 days):  (1.)Mr. Cutler will move from supine to sit and sit to supine  in bed with STAND BY ASSIST.  (2.)Mr. Cutler will transfer from bed to chair and chair to bed with STAND BY ASSIST using the least restrictive device. (3.)Mr. Cutler will ambulate with STAND BY ASSIST for 200 feet with the least restrictive device. (4.)Mr. Cutler will increase left knee ROM to 5°-80°.  ________________________________________________________________________________________________    PHYSICAL THERAPY [facility] camp tKa: Daily Note, Treatment Day: 3rd, AM 8/5/2018  INPATIENT: Hospital Day: 4  Payor: Eric Memory / Plan: [id number] [facility] / Product Type: Workers Comp /      NAME/AGE/GENDER: Kevin Sibley is a 71 y.o. male   PRIMARY DIAGNOSIS:  Unilateral primary osteoarthritis, left knee [M17.12]   Procedure(s) and Anesthesia Type:     * LEFT KNEE ARTHROPLASTY TOTAL / Emmette Pea - Spinal (Left)  ICD-10: Treatment Diagnosis:    · Pain in Left Knee (M25.562)  · Stiffness of Left Knee, Not elsewhere classified (M25.662)  · Difficulty in walking, Not elsewhere classified (R26.2)  · Other abnormalities of gait and mobility (R26.89)      ASSESSMENT:     Mr. Ladarius Ramirez showed increased gait distance but still unsteady with WB activity. Pt will need SNF for additional rehab. This section established at most recent assessment   PROBLEM LIST (Impairments causing functional limitations):  1. Decreased Strength  2. Decreased ADL/Functional Activities  3. Decreased Transfer Abilities  4. Decreased Ambulation Ability/Technique  5. Decreased Activity Tolerance  6. Decreased Flexibility/Joint Mobility  7. Decreased Adrian with Home Exercise Program   INTERVENTIONS PLANNED: (Benefits and precautions of physical therapy have been discussed with the patient.)  1. Bed Mobility  2.  Gait Training  3. Home Exercise Program (HEP)  4. Therapeutic Exercise/Strengthening  5. Transfer Training  6. Range of Motion: active/assisted/passive  7. Therapeutic Activities  8. Group Therapy     TREATMENT PLAN: Frequency/Duration: Follow patient BID for duration of hospital stay to address above goals. Rehabilitation Potential For Stated Goals: Good     RECOMMENDED REHABILITATION/EQUIPMENT: (at time of discharge pending progress): Continue Skilled Therapy and Rehab. HISTORY:   History of Present Injury/Illness (Reason for Referral):  S/P L TKA  Past Medical History/Comorbidities:   Mr. Esau Allen  has a past medical history of Arthritis; BMI 30.0-30.9,adult; CAD (coronary artery disease); Chronic kidney disease; Chronic pain; Depression; Diabetes ([address] Enlarged prostate with urinary obstruction; Former smoker; GERD (gastroesophageal reflux disease); High cholesterol; Hypertension; Neurogenic bladder; Other ill-defined conditions(799.89); Overflow incontinence; Sciatica; Self-catheterizes urinary bladder; Skin cancer; Sleep apnea; Spondylolisthesis of lumbar region; Thromboembolus ([address] (2013); Urine retention; and Varicose veins of both lower extremities with complications. He also has no past medical history of Adverse effect of anesthesia; Aneurysm ([address] Arrhythmia; Asthma; Autoimmune disease ([address] Chronic obstructive pulmonary disease ([address] Coagulation disorder ([address] Difficult intubation; Endocarditis; Heart failure ([address] Ill-defined condition; Liver disease; Malignant hyperthermia due to anesthesia; Nausea & vomiting; Nicotine vapor product user; Non-nicotine vapor product user; Pseudocholinesterase deficiency; PUD (peptic ulcer disease); Rheumatic fever; Seizures ([address] Stroke [facility]); or Thyroid disease. Mr. Esau Allen  has a past surgical history that includes hx hemorrhoidectomy (1971); hx shoulder arthroscopy (Left, 1971); and hx urological (06/2017).   Social History/Living Environment: Home Environment: Private residence  # Steps to Enter: 0  One/Two Story Residence: One story  Living Alone: Yes  Support Systems: Child(giovanni)  Patient Expects to be Discharged to[de-identified] Rehabilitation facility [facility] [place])  Current DME Used/Available at Home: Cane, straight  Tub or Shower Type: Shower  Prior Level of Function/Work/Activity:  ambulating with a cane   Number of Personal Factors/Comorbidities that affect the Plan of Care: 1-2: MODERATE COMPLEXITY   EXAMINATION:   Most Recent Physical Functioning:                 LLE AROM  L Knee Flexion: 74  L Knee Extension: -14          Bed Mobility  Rolling:  (NT)  Supine to Sit:  (NT)  Sit to Supine:  (NT)  Scooting: Contact guard assistance    Transfers  Sit to Stand: Minimum assistance  Stand to Sit: Minimum assistance  Bed to Chair: Minimum assistance (with walker)    Balance  Sitting: Intact; Without support  Standing: Impaired; With support (with walker)  Standing - Static:  (fair to fair- with walker)  Standing - Dynamic :  (fair to fair- with walker)              Weight Bearing Status  Left Side Weight Bearing: As tolerated  Distance (ft): 100 Feet (ft) (70 ft)  Ambulation - Level of Assistance: Contact guard assistance (LOB x 1 with min A to recover)  Assistive Device: Walker, rolling  Speed/Kenzie: Shuffled; Slow  Step Length: Left shortened;Right shortened  Stance: Left decreased  Gait Abnormalities: Antalgic;Decreased step clearance  Interventions: Safety awareness training;Verbal cues     Braces/Orthotics: none    Left Knee Cold  Type: Cryocuff      Body Structures Involved:  1. Bones  2. Joints  3. Muscles Body Functions Affected:  1. Neuromusculoskeletal  2. Movement Related Activities and Participation Affected:  1. General Tasks and Demands  2. Mobility  3.  Self Care   Number of elements that affect the Plan of Care: 3: MODERATE COMPLEXITY   CLINICAL PRESENTATION:   Presentation: Stable and uncomplicated: LOW COMPLEXITY   CLINICAL DECISION MAKING:   [employer] AM-PAC 6 Clicks   Basic Mobility Inpatient Short Form  How much difficulty does the patient currently have. .. Unable A Lot A Little None   1. Turning over in bed (including adjusting bedclothes, sheets and blankets)? [] 1   [] 2   [x] 3   [] 4   2. Sitting down on and standing up from a chair with arms ( e.g., wheelchair, bedside commode, etc.)   [] 1   [] 2   [x] 3   [] 4   3. Moving from lying on back to sitting on the side of the bed? [] 1   [] 2   [x] 3   [] 4   How much help from another person does the patient currently need. .. Total A Lot A Little None   4. Moving to and from a bed to a chair (including a wheelchair)? [] 1   [] 2   [x] 3   [] 4   5. Need to walk in hospital room? [] 1   [x] 2   [] 3   [] 4   6. Climbing 3-5 steps with a railing? [] 1   [x] 2   [] 3   [] 4   © 2007, Trustees of [address] [place] [address] [address] [id number], under license to [employer]. All rights reserved        Score:  Initial: 16 Most Recent: X (Date: -- )    Interpretation of Tool:  Represents activities that are increasingly more difficult (i.e. Bed mobility, Transfers, Gait). Score 24 23 22-20 19-15 14-10 9-7 6     Modifier CH CI CJ CK CL CM CN      ? Mobility - Walking and Moving Around:     - CURRENT STATUS: CK - 40%-59% impaired, limited or restricted    - GOAL STATUS: CJ - 20%-39% impaired, limited or restricted    - D/C STATUS:  ---------------To be determined---------------  Payor: Mariely Valdez / Plan: [id number] [facility] / Product Type: Workers Comp /      Medical Necessity:     · Patient demonstrates good rehab potential due to higher previous functional level. Reason for Services/Other Comments:  · Patient continues to require present interventions due to patient's inability to perform functional mobility and exercises independently.    Use of outcome tool(s) and clinical judgement create a POC that gives a: Clear prediction of patient's progress: LOW COMPLEXITY TREATMENT:   (In addition to Assessment/Re-Assessment sessions the following treatments were rendered)     Pre-treatment Symptoms/Complaints:  Frustrated with painful gait  Pain: Initial: visual scale  Pain Intensity 1: 4  Pain Location 1: Knee  Pain Orientation 1: Left  Pain Intervention(s) 1: Ambulation/Increased Activity, Cold pack  Post Session:  4/10     Gait Training (15 Minutes):  Gait training to improve and/or restore physical functioning as related to mobility, strength, balance and coordination. Ambulated 100 Feet (ft) (70 ft) with Contact guard assistance (LOB x 1 with min A to recover) using a Walker, rolling and moderate Safety awareness training;Verbal cues related to their stride length, knee position and motion and hip position and motion to promote proper body alignment, promote proper body posture, promote proper body mechanics and promote proper body breathing techniques. Instruction in performance of ambulation to correct stride length, knee position and motion and hip position and motion. Therapeutic Exercise: (30 Minutes (group)):  Exercises per grid below to improve mobility and strength. Required minimal verbal and manual cues to perform exercises correctly. Progressed range and repetitions as indicated. Date:  8/5 Date:   Date:     Activity/Exercise Parameters Parameters Parameters   Ankle pumps 20      quad sets 20     Gluteal sets 20      heel slides 20     Hip ABD-ADD 20aa     SAQ 20aa     SLR's 20aa     Chair slides 20                          Date:  8/2/18 Date:  8/3/18 Date:  8/4/2018   ACTIVITY/EXERCISE AM PM AM PM AM PM   GROUP THERAPY  []  []  []  [x]  [x]  []   Ankle Pumps  10 10 15 20    Quad Sets  10 10 15 20    Gluteal Sets  10 10 15 20    Hip ABd/ADduction  10 10 15 20    Straight Leg Raises   10 15 20    Knee Slides  10 10 15 20    Short Arc Quads    15 20    Long Arc Quads         Chair Slides    15 20             B = bilateral; AA = active assistive;  A = active; P = passive      Treatment/Session Assessment:     Response to Treatment:   Pt pleased with increased gait distance    Education:  [x] Home Exercises  [x] Fall Precautions  [] Hip Precautions [] D/C Instruction Review  [] Knee/Hip Prosthesis Review  [x] Walker Management/Safety [] Adaptive Equipment as Needed       Interdisciplinary Collaboration:   o Registered Nurse    After treatment position/precautions:   o Up in chair  o Bed/Chair-wheels locked  o Call light within reach  o RN notified    Compliance with Program/Exercises: Will assess as treatment progresses. Recommendations/Intent for next treatment session:  Treatment next visit will focus on increasing Mr. Marian Balderas independence with bed mobility, transfers, gait training, strength/ROM exercises, modalities for pain, and patient education.       Total Treatment Duration:  PT Patient Time In/Time Out  Time In: 1030  Time Out: Sydnee [id number], PT

## 2018-08-15 PROBLEM — E86.9 VOLUME DEPLETION: Status: ACTIVE | Noted: 2018-01-01

## 2018-08-15 PROBLEM — A41.9 SEPSIS (HCC): Status: ACTIVE | Noted: 2018-01-01

## 2018-08-15 PROBLEM — I10 ESSENTIAL HYPERTENSION: Status: ACTIVE | Noted: 2018-01-01

## 2018-08-15 PROBLEM — R50.9 FEVER: Status: ACTIVE | Noted: 2018-01-01

## 2018-08-15 PROBLEM — N30.00 ACUTE CYSTITIS WITHOUT HEMATURIA: Status: ACTIVE | Noted: 2018-01-01

## 2018-08-15 PROBLEM — F31.9 BIPOLAR DISORDER (HCC): Status: ACTIVE | Noted: 2018-01-01

## 2018-08-15 PROBLEM — E78.5 HYPERLIPIDEMIA: Status: ACTIVE | Noted: 2018-01-01

## 2018-08-15 PROBLEM — E11.9 DIABETES MELLITUS TYPE 2, CONTROLLED (HCC): Status: ACTIVE | Noted: 2018-01-01

## 2018-08-15 NOTE — ED TRIAGE NOTES
Pt arrives via EMS with a fever today (102.9), unsure if patient was given anything for that fever today, L knee replacement a week ago. Pt also refused breakfast this AM and vomited as well.  VS: 130/60, , RR 16,

## 2018-08-15 NOTE — PROGRESS NOTES
Pt assessment completed. Alert and oriented. Lungs CTA even and unlabored. Heart sounds regular. Abdomen soft and non tender with active bowel sounds. IV present and infusing without difficulty. Pt denies pain needs at this time. Pt oriented to room, aware to call for assistance. Left knee incision from previous knee surgery with steri strips present, clean dry and intact. Healed ulcer noted to LLE bruising around left ankle. Warmth present to left leg, cooler to touch on the right leg. All needs met at this time, will continue to monitor.

## 2018-08-15 NOTE — ROUTINE PROCESS
TRANSFER - OUT REPORT:    Verbal report given to [facility], RN (name) on [facility]  being transferred to [facility](unit) for routine progression of care       Report consisted of patients Situation, Background, Assessment and   Recommendations(SBAR). Information from the following report(s) SBAR was reviewed with the receiving nurse. Lines:   Peripheral IV 08/15/18 Right Antecubital (Active)   Site Assessment Clean, dry, & intact 8/15/2018  1:41 PM   Phlebitis Assessment 0 8/15/2018  1:41 PM   Infiltration Assessment 0 8/15/2018  1:41 PM   Dressing Status Clean, dry, & intact 8/15/2018  1:41 PM        Opportunity for questions and clarification was provided.       Patient transported with:   [employer]

## 2018-08-15 NOTE — PROGRESS NOTES
Visited with the patient. He states \"I guess rehabilitation is going okay\". Confirmed that he is planning on returning to [facility] after discharge. Patient states that he does not have anyone that can help him as much as he would need at home. States he has a daughter and 4 grandchildren locally and he has a son in [place].

## 2018-08-15 NOTE — PROGRESS NOTES
TRANSFER - IN REPORT:    Verbal report received from [facility] RN(name) on James Owen  being received from ED(unit) for routine progression of care      Report consisted of patients Situation, Background, Assessment and   Recommendations(SBAR). Information from the following report(s) SBAR, Kardex, Intake/Output, MAR and Recent Results was reviewed with the receiving nurse. Opportunity for questions and clarification was provided. Assessment completed upon patients arrival to unit and care assumed.

## 2018-08-15 NOTE — PROGRESS NOTES
Vancomycin Consult (Day 1)    MD ordering: [place]   ID following? no  Indication: sepsis  DOT:  ?  days  Goal level(s): 15 - 20    Ht Readings from Last 1 Encounters:   08/15/18 6' (1.829 m)      Wt Readings from Last 1 Encounters:   08/15/18 102.1 kg (225 lb)       Allergies as of 08/15/2018 - Review Complete 08/15/2018   Allergen Reaction Noted    Oxycodone Itching 2017    Tramadol Itching 2017     Current Antimicrobial Therapy (168h ago through future)      Ordered     Start Stop      08/15/18 1446  vancomycin (VANCOCIN) 1500 mg in  ml infusion  1,500 mg,   IntraVENous,   EVERY 18 HOURS      18 0600 --    08/15/18 1422  vancomycin (VANCOCIN) 2500 mg in  mL infusion  2,500 mg,   IntraVENous,   ONCE     Comments:  Pharmacy to dose vancomycin after Once dose. 08/15/18 1500 18 0259    08/15/18 1428  piperacillin-tazobactam (ZOSYN) 4.5 g in 0.9% sodium chloride (MBP/ADV) 100 mL  4.5 g,   IntraVENous,   EVERY 8 HOURS      08/15/18 1429 --            All Micro Results       Procedure Component Value Units Date/Time      CULTURE, BLOOD [[id number]] Collected:  08/15/18 1404    Order Status:  Completed Specimen:  Blood from Blood Updated:  08/15/18 1412    CULTURE, BLOOD [[id number]] Collected:  08/15/18 1342    Order Status:  Completed Specimen:  Blood from Blood Updated:  08/15/18 1353            Temp (24hrs), Av.2 °F (39 °C), Min:102.2 °F (39 °C), Max:102.2 °F (39 °C)    UOP: mL/kg/hr  Dosing weight: 102 kg (actual body weight)  71 y.o. Date:  Dose/Freq Admin Times Level/Time:   8-15 Vanco 2500 mg IV LD (15)    8-16 Vanco 1500 mg IV every 18 hours ()                    Recent Labs      08/15/18   1347  08/15/18   1342   BUN   --   37*   CREA   --   1.69*   WBC   --   32.3*   LACPOC  1.1   --      Estimated Creatinine Clearance: 49.6 mL/min (based on Cr of 1.69). A/P:  Vancomycin 2500 mg IV now, then Vancomycin 1500 mg IV every 18 hours.     Krys AlexanderD, BCPS

## 2018-08-15 NOTE — ED PROVIDER NOTES
HPI Comments: Patient recently had knee replacement surgery and is in rehabilitation for this. He states that this morning he started running a fever. He denies any shortness of breath or cough, denies any diarrhea. He states his knee has been doing fine without any problems. He was sent here for evaluation of his fever. Per EMS, his fever was 102.9 earlier today. Elements of this note were created using speech recognition software. As such, errors of speech recognition may be present. Patient is a 71 y.o. male presenting with fever. The history is provided by the patient. Fever    Pertinent negatives include no diarrhea, no cough and no shortness of breath.         Past Medical History:   Diagnosis Date    Arthritis     BMI 30.0-30.9,adult     CAD (coronary artery disease)     mild to moderate apical reversible ischemia per cardio note dated 07/12/10    Chronic kidney disease     no nephrologist     Chronic pain     neck, lower back     Depression     Diabetes [address]     type 2; oral and insulin meds; 1-2 times per day; average 104; A1C=8.1 on 06/13/18    Enlarged prostate with urinary obstruction     Former smoker     GERD (gastroesophageal reflux disease)     High cholesterol     Hypertension     on med for control     Neurogenic bladder     Other ill-defined conditions(799.89)     Overflow incontinence     Sciatica     recently took medrol dose pack around 05/04/18    Self-catheterizes urinary bladder     has catheterized once in the past month; urinating more frequently own his own now  (07/26/18)    Skin cancer     Sleep apnea     bi-pap; instructed to bring dos    Spondylolisthesis of lumbar region     Thromboembolus [facility]) 2013    DVT left leg; caused from a falling accident that damaged left leg     Urine retention     Varicose veins of both lower extremities with complications        Past Surgical History:   Procedure Laterality Date    HX HEMORRHOIDECTOMY  1971    HX SHOULDER ARTHROSCOPY Left 1971    HX UROLOGICAL  06/2017    CYSTOSCOPY, TRANSURETHRAL RESECTION OF PROSTATE         Family History:   Problem Relation Age of Onset    No Known Problems Mother     Heart Attack Father     Cancer Sister     Parkinson's Disease Brother        Social History     Social History    Marital status:      Spouse name: N/A    Number of children: N/A    Years of education: N/A     Occupational History    Not on file. Social History Main Topics    Smoking status: Former Smoker     Packs/day: 1.00     Years: 7.00     Quit date: 5/8/1971    Smokeless tobacco: Never Used    Alcohol use Yes      Comment: occasionally     Drug use: No    Sexual activity: Not on file     Other Topics Concern    Not on file     Social History Narrative         ALLERGIES: Oxycodone and Tramadol    Review of Systems   Constitutional: Positive for fever. Respiratory: Negative for cough and shortness of breath. Gastrointestinal: Negative for constipation and diarrhea. All other systems reviewed and are negative. Vitals:    08/15/18 1331   BP: 116/52   Pulse: (!) 110   Resp: 18   Temp: (!) 102.2 °F (39 °C)   SpO2: 95%   Weight: 102.1 kg (225 lb)   Height: 6' (1.829 m)            Physical Exam   Constitutional: He is oriented to person, place, and time. He appears well-developed and well-nourished. HENT:   Head: Normocephalic and atraumatic. Dry mucous membranes   Eyes: Conjunctivae are normal. Pupils are equal, round, and reactive to light. Neck: Normal range of motion. Neck supple. Cardiovascular: Normal rate and regular rhythm. Pulmonary/Chest: Effort normal and breath sounds normal.   Abdominal: Soft. Bowel sounds are normal.   Musculoskeletal: He exhibits no edema or tenderness. Left knee surgical incision clean dry and intact. Steri-Strips in place anteriorly.   There is no redness or swelling to the incision site or the knee generally   Neurological: He is alert and oriented to person, place, and time. Skin: Skin is warm and dry. Psychiatric: He has a normal mood and affect. His behavior is normal.   Nursing note and vitals reviewed. MDM  Number of Diagnoses or Management Options  Bandemia: new and requires workup  Fever, unspecified fever cause: new and requires workup  S/P total knee arthroplasty, left:   Diagnosis management comments: 2:59 PM discussed results with patient, no source yet for his fever that we are still awaiting his urinalysis.   I spoke with the hospitalist Dr. Macho Mckeon, to see patient for admission       Amount and/or Complexity of Data Reviewed  Clinical lab tests: ordered and reviewed  Tests in the radiology section of CPT®: ordered and reviewed  Discuss the patient with other providers: yes  Independent visualization of images, tracings, or specimens: yes    Risk of Complications, Morbidity, and/or Mortality  Presenting problems: moderate  Diagnostic procedures: moderate  Management options: moderate    Patient Progress  Patient progress: improved        ED Course       Procedures

## 2018-08-15 NOTE — PROGRESS NOTES
I received a request from Dr. Albino Layne to give him a call. I called and told him about the patient. He will discuss with Dr. Tiara Cuevas. In the mean time, he concurs that patient should continue empiric antibiotics as ordered (Vancomycin and Zosyn) and be put on SCD and Aspirin 325 mg po q day for DVT prophylaxis. Will look for evidence of DVT as well. Will check venous doppler of both legs.

## 2018-08-15 NOTE — H&P
History and Physical    Patient: Amparo Sosa MRN: [id number]  SSN: [id number]    YOB: 1945  Age: 71 y.o. Sex: male      Subjective:      Amparo Sosa is a 71 y.o. male who was sent from a rehab facility due to fever. Patient has PMH of DM type 2, hypertension, hyperlipidemia, bipolar disorder, OA, previous DVT. Patient recently had left knee arthroplasty about a week ago. He was sent to a rehab facility and was working with physical therapy. Today he was found to have fever of 102+F and he felt chilled. He denies shortness of breath, chest pain, skin lesions, dysuria, abdominal pain, diarrhea. No recent sick contacts. He has not been eating well in the past 24 hours. No hematuria. No blood per rectum. No sore throat. Hospitalist service was consulted to see patient. He was found to have cloudy urine.           Past Medical History:   Diagnosis Date    Arthritis     BMI 30.0-30.9,adult     CAD (coronary artery disease)     mild to moderate apical reversible ischemia per cardio note dated 07/12/10    Chronic kidney disease     no nephrologist     Chronic pain     neck, lower back     Depression     Diabetes ([facility] [address]     type 2; oral and insulin meds; 1-2 times per day; average 104; A1C=8.1 on 06/13/18    Enlarged prostate with urinary obstruction     Former smoker     GERD (gastroesophageal reflux disease)     High cholesterol     Hypertension     on med for control     Neurogenic bladder     Other ill-defined conditions(799.89)     Overflow incontinence     Sciatica     recently took medrol dose pack around 05/04/18    Self-catheterizes urinary bladder     has catheterized once in the past month; urinating more frequently own his own now  (07/26/18)    Skin cancer     Sleep apnea     bi-pap; instructed to bring dos    Spondylolisthesis of lumbar region     Thromboembolus [facility]) 2013    DVT left leg; caused from a falling accident that damaged left leg     Urine retention     Varicose veins of both lower extremities with complications      Past Surgical History:   Procedure Laterality Date    HX HEMORRHOIDECTOMY  1971    HX SHOULDER ARTHROSCOPY Left 1971    HX UROLOGICAL  06/2017    CYSTOSCOPY, TRANSURETHRAL RESECTION OF PROSTATE      Family History   Problem Relation Age of Onset    No Known Problems Mother     Heart Attack Father     Cancer Sister     Parkinson's Disease Brother      Social History   Substance Use Topics    Smoking status: Former Smoker     Packs/day: 1.00     Years: 7.00     Quit date: 5/8/1971    Smokeless tobacco: Never Used    Alcohol use Yes      Comment: occasionally       Prior to Admission medications    Medication Sig Start Date End Date Taking? Authorizing Provider   buPROPion SR [facility] SR) 150 mg SR tablet Take 150 mg by mouth two (2) times a day. Historical Provider   divalproex ER (DEPAKOTE ER) 500 mg ER tablet Take 1,000 mg by mouth two (2) times a day. Historical Provider   pregabalin (LYRICA) 75 mg capsule Take 75 mg by mouth two (2) times a day. Historical Provider   doxycycline (MONODOX) 100 mg capsule Take 100 mg by mouth two (2) times a day. Historical Provider   latanoprost (XALATAN) 0.005 % ophthalmic solution Administer 1 Drop to both eyes nightly. Historical Provider   empagliflozin-metformin (SYNJARDY XR) 25-1,000 mg TBph Take 1 Tab by mouth daily. Indications: type 2 diabetes mellitus    Historical Provider   multivitamin (ONE A DAY) tablet Take 1 Tab by mouth daily. Historical Provider   furosemide (LASIX) 20 mg tablet Take 20 mg by mouth daily. Historical Provider   glipiZIDE SR (GLUCOTROL XL) 10 mg CR tablet Take 10 mg by mouth daily. Indications: type 2 diabetes mellitus    Historical Provider   Liraglutide (VICTOZA) 0.6 mg/0.1 mL (18 mg/3 mL) pnij 1.8 mg by SubCUTAneous route daily.  Indications: type 2 diabetes mellitus    Historical Provider   losartan (COZAAR) 100 mg tablet Take 100 mg by mouth daily. Indications: hypertension    Historical Provider   nystatin (MYCOSTATIN) topical cream Apply  to affected area two (2) times daily as needed. Historical Provider   calcium polycarbophil (FIBERCON) 625 mg tablet Take 625 mg by mouth daily. Historical Provider   simvastatin (ZOCOR) 40 mg tablet Take 40 mg by mouth nightly. Historical Provider   insulin degludec (TRESIBA FLEXTOUCH U-200) 200 unit/mL (3 mL) inpn 50 Units by SubCUTAneous route daily. Take / use 40 units the day before and AM day of surgery  per anesthesia protocols. Indications: type 2 diabetes mellitus    Historical Provider        Allergies   Allergen Reactions    Oxycodone Itching    Tramadol Itching       Review of Systems:    Constitutional: Positive for chills and fever. HENT: Negative for rhinorrhea and sore throat. Eyes: Negative for pain, redness and visual disturbance. Respiratory: Negative for chest tightness, shortness of breath and wheezing. Cardiovascular: Negative for chest pain and leg swelling. Gastrointestinal: Negative for abdominal pain, bowel incontinence, diarrhea, nausea and vomiting. Genitourinary: Negative for bladder incontinence, dysuria and hematuria. Musculoskeletal: Negative for back pain, gait problem, neck pain and neck stiffness. Recent left knee surgery as mentioned. Skin: Negative for color change and rash. Neurological: negative for speech difficulty. Negative for focal weakness, weakness, numbness, headaches and loss of balance. Psychiatric/Behavioral: Negative for agitation, confusion and memory loss. Objective:     Vitals:    08/15/18 1344 08/15/18 1425 08/15/18 1455 08/15/18 1510   BP: 117/61 123/73 106/57 111/62   Pulse:  (!) 106 96 97   Resp:  22 22 19   Temp:       SpO2: 91% 97%     Weight:       Height:            Physical Exam:    General:                    The patient is a pleasant elderly male in no acute respiratory distress. lethargic. Fatigued. Head:                                   Normocephalic/atraumatic. Eyes:                                   No palpebral pallor or scleral icterus. ENT:                                    External auricular and nasal exam within normal limits. Mucous membranes are very dry. Neck:                                   Supple, non-tender, no JVD. Lungs:                       Clear to auscultation bilaterally without wheezes or crackles. No respiratory distress or accessory muscle use. Heart:                                  Regular rate and rhythm, without murmurs, rubs, or gallops. Abdomen:                  Soft, non-tender, non-distended with normoactive bowel sounds. Genitourinary:           No tenderness over the bladder or bilateral CVAs. Extremities:               Without clubbing, cyanosis, or edema. Left knee is slightly warn and tender and swollen. Wound is intact and no redness. Skin:                                    Normal color, texture, and turgor. No rashes, lesions, or jaundice. Pulses:                      Radial and dorsalis pedis pulses present 2+ bilaterally. Capillary refill <2s. Neurologic:                CN II-XII grossly intact and symmetrical.                                               Moving all four extremities well with no focal deficits. Psychiatric:                Pleasant demeanor, appropriate affect.  Alert and oriented x 3    Lab and data    Recent Results (from the past 24 hour(s))   CBC WITH AUTOMATED DIFF    Collection Time: 08/15/18  1:42 PM   Result Value Ref Range    WBC 32.3 (H) 4.3 - 11.1 K/uL    RBC 4.06 (L) 4.23 - 5.6 M/uL    HGB 11.8 (L) 13.6 - 17.2 g/dL    HCT 37.5 (L) 41.1 - 50.3 %    MCV 92.4 79.6 - 97.8 FL    MCH 29.1 26.1 - 32.9 PG    MCHC 31.5 31.4 - 35.0 g/dL    RDW 15.1 %    PLATELET 603 952 - 345 K/uL    MPV 9.3 (L) 9.4 - 12.3 FL    ABSOLUTE NRBC 0.00 0.0 - 0.2 K/uL    DF AUTOMATED      NEUTROPHILS 86 (H) 43 - 78 %    LYMPHOCYTES 3 (L) 13 - 44 %    MONOCYTES 10 4.0 - 12.0 %    EOSINOPHILS 0 (L) 0.5 - 7.8 %    BASOPHILS 0 0.0 - 2.0 %    IMMATURE GRANULOCYTES 1 0.0 - 5.0 %    ABS. NEUTROPHILS 27.6 K/UL    ABS. LYMPHOCYTES 1.1 K/UL    ABS. MONOCYTES 3.1 K/UL    ABS. EOSINOPHILS 0.0 K/UL    ABS. BASOPHILS 0.1 K/UL    ABS. IMM. GRANS. 0.4 K/UL   METABOLIC PANEL, COMPREHENSIVE    Collection Time: 08/15/18  1:42 PM   Result Value Ref Range    Sodium 136 136 - 145 mmol/L    Potassium 4.9 3.5 - 5.1 mmol/L    Chloride 99 98 - 107 mmol/L    CO2 27 21 - 32 mmol/L    Anion gap 10 7 - 16 mmol/L    Glucose 119 (H) 65 - 100 mg/dL    BUN 37 (H) 8 - 23 MG/DL    Creatinine 1.69 (H) 0.8 - 1.5 MG/DL    GFR est AA 52 (L) >60 ml/min/1.73m2    GFR est non-AA 43 (L) >60 ml/min/1.73m2    Calcium 9.3 8.3 - 10.4 MG/DL    Bilirubin, total 0.7 0.2 - 1.1 MG/DL    ALT (SGPT) 93 (H) 12 - 65 U/L    AST (SGOT) 88 (H) 15 - 37 U/L    Alk.  phosphatase 114 50 - 136 U/L    Protein, total 7.4 6.3 - 8.2 g/dL    Albumin 2.5 (L) 3.2 - 4.6 g/dL    Globulin 4.9 (H) 2.3 - 3.5 g/dL    A-G Ratio 0.5 (L) 1.2 - 3.5     EKG, 12 LEAD, INITIAL    Collection Time: 08/15/18  1:42 PM   Result Value Ref Range    Ventricular Rate 108 BPM    Atrial Rate 102 BPM    QRS Duration 112 ms    Q-T Interval 334 ms    QTC Calculation (Bezet) 447 ms    Calculated R Axis -74 degrees    Calculated T Axis 86 degrees    Diagnosis       Undetermined rhythm  Low voltage QRS  Left anterior fascicular block  Abnormal ECG     POC LACTIC ACID    Collection Time: 08/15/18  1:47 PM   Result Value Ref Range    Lactic Acid (POC) 1.1 0.5 - 1.9 mmol/L   URINALYSIS W/ RFLX MICROSCOPIC    Collection Time: 08/15/18  2:36 PM   Result Value Ref Range    Color YELLOW      Appearance CLOUDY      Specific gravity 1.015 1.001 - 1.023      pH (UA) 6.0 5.0 - 9.0      Protein TRACE (A) NEG mg/dL Glucose GREATER THAN/EQUAL TO 2000 (A) NEG mg/dL    Ketone TRACE (A) NEG mg/dL    Bilirubin NEGATIVE  NEG      Blood MODERATE (A) NEG      Urobilinogen 0.2 0.2 - 1.0 EU/dL    Nitrites NEGATIVE  NEG      Leukocyte Esterase MODERATE (A) NEG      WBC  0 /hpf    RBC 20-50 0 /hpf    Epithelial cells 0-3 0 /hpf    Bacteria TRACE 0 /hpf    Other observations Microscopic performed on unspun urine. QNS to spin. All Micro Results     Procedure Component Value Units Date/Time    CULTURE, URINE [[id number]] Collected:  08/15/18 1436    Order Status:  Completed Specimen:  Urine from Clean catch Updated:  08/15/18 1532    CULTURE, URINE [[id number]]     Order Status:  Canceled Specimen:  Urine from Clean catch     CULTURE, BLOOD [[id number]] Collected:  08/15/18 1404    Order Status:  Completed Specimen:  Blood from Blood Updated:  08/15/18 1412    CULTURE, BLOOD [[id number]] Collected:  08/15/18 1342    Order Status:  Completed Specimen:  Blood from Blood Updated:  08/15/18 1353        CXR  8/15/2018  IMPRESSION:   1. No acute cardiopulmonary process evident on single frontal view of the  chest.     I have reviewed chest x-ray and ECG myself.       Assessment:     Hospital Problems  Never Reviewed          Codes Class Noted POA    Fever ICD-10-CM: R50.9  ICD-9-CM: 780.60  8/15/2018 Unknown        Volume depletion ICD-10-CM: E86.9  ICD-9-CM: 276.50  8/15/2018 Unknown        Diabetes mellitus type 2, controlled ([facility] ICD-10-CM: E11.9  ICD-9-CM: 250.00  8/15/2018 Unknown        Hyperlipidemia ICD-10-CM: E78.5  ICD-9-CM: 272.4  8/15/2018 Unknown        Essential hypertension ICD-10-CM: I10  ICD-9-CM: 401.9  8/15/2018 Unknown        * (Principal)Sepsis ([facility] ICD-10-CM: A41.9  ICD-9-CM: 038.9, 995.91  8/15/2018 Unknown        Acute cystitis without hematuria ICD-10-CM: N30.00  ICD-9-CM: 595.0  8/15/2018 Unknown        S/P total knee arthroplasty, left ICD-10-CM: Q49.194  ICD-9-CM: V43.65  8/3/2018 Yes        Osteoarthritis ICD-10-CM: M19.90  ICD-9-CM: 715.90  8/2/2018 Yes        CKD (chronic kidney disease) stage 3, GFR 30-59 ml/min ICD-10-CM: N18.3  ICD-9-CM: 585.3  5/8/2018 Yes        Elevated white blood cell count ICD-10-CM: D72.829  ICD-9-CM: 288.60  5/8/2018 Yes            Bipolar disorder    Plan:     Sepsis   Likely from UTI, acute cystitis. Recent left knee surgery is concerning. Blood and urine cultures are ordered. Empiric Vancomycin and Zosyn. Consult orthopedic service to evaluate patient and also assistance regarding anticoagulation regimen. Volume depletion and CKD stage 3  Patient appears very dry. Will give IV fluid. Monitor renal function and intake and output. Avoid nephrotoxic agents. Hold ACEI or ARB for now. Diabetes mellitus type 2  Monitor blood sugar. Cover with insulin sliding scale accordingly for now. Hypertension  Monitor blood pressure and manage accordingly. hyperlipidemia  Continue home medications. Bipolar disorder  Continue home medications. Patient requires hospital stay as an in-patient and anticipated stay is more than 2 midnights due to the serious nature of the illness. I have discussed with patient regarding advance directive. Patient would like to have a full-code status. I have discussed the plan of care with patient. DVT prophylaxis : SCD for now. Pending further guidance from orthopedics. Risk of deterioration is high.        Signed By: Taz Clay MD     August 15, 2018

## 2018-08-16 NOTE — PROGRESS NOTES
Progress Note    Patient: Tye Singer MRN: [id number]  SSN: [id number]    YOB: 1945  Age: 71 y.o. Sex: male      Admit Date: 8/15/2018    LOS: 1 day     Subjective:     Feeling better. No fever. Eating and ambulating. No chills. Objective:     Vitals:    08/15/18 2020 08/16/18 0111 08/16/18 0500 08/16/18 0826   BP: 109/75 124/68 112/69 129/69   Pulse: 89 98 95 94   Resp: 18 18 16 16   Temp: 97.9 °F (36.6 °C) 98.7 °F (37.1 °C) 99 °F (37.2 °C) 99 °F (37.2 °C)   SpO2: 92% 91% 92% 94%   Weight:       Height:            Intake and Output:  Current Shift: 08/16 0701 - 08/16 1900  In: -   Out: 200 [Urine:200]  Last three shifts: 08/14 1901 - 08/16 0700  In: -   Out: 200 [Urine:200]    Physical Exam:   General:                    The patient is a pleasant elderly male in no acute respiratory distress. More alert and answered questions well. Sitting up in a chair. IBRY:                                   [id number]. Eyes:                                   No palpebral pallor or scleral icterus. ENT:                                    External auricular and nasal exam within normal limits.                                             EAQRMN membranes are very dry. Neck:                                   Supple, non-tender, no JVD. Lungs:                       Clear to auscultation bilaterally without wheezes or crackles.                                             No respiratory distress or accessory muscle use. Heart:                                  Regular rate and rhythm, without murmurs, rubs, or gallops. Abdomen:                  Soft, non-tender, non-distended with normoactive bowel sounds. Genitourinary:           No tenderness over the bladder or bilateral CVAs. Extremities:               Without clubbing, cyanosis, or edema. Left knee is slightly warn and tender and swollen. Wound is intact and no redness.    Skin:                                    Normal color, texture, and turgor. No rashes, lesions, or jaundice. Pulses:                      Radial and dorsalis pedis pulses present 2+ bilaterally.                                               Capillary refill <2s. Neurologic:                CN II-XII grossly intact and symmetrical.                                               Moving all four extremities well with no focal deficits. Psychiatric:                Pleasant demeanor, appropriate affect. Alert and oriented x 3      Lab/Data Review:    Recent Results (from the past 24 hour(s))   CBC WITH AUTOMATED DIFF    Collection Time: 08/15/18  1:42 PM   Result Value Ref Range    WBC 32.3 (H) 4.3 - 11.1 K/uL    RBC 4.06 (L) 4.23 - 5.6 M/uL    HGB 11.8 (L) 13.6 - 17.2 g/dL    HCT 37.5 (L) 41.1 - 50.3 %    MCV 92.4 79.6 - 97.8 FL    MCH 29.1 26.1 - 32.9 PG    MCHC 31.5 31.4 - 35.0 g/dL    RDW 15.1 %    PLATELET 934 413 - 036 K/uL    MPV 9.3 (L) 9.4 - 12.3 FL    ABSOLUTE NRBC 0.00 0.0 - 0.2 K/uL    DF AUTOMATED      NEUTROPHILS 86 (H) 43 - 78 %    LYMPHOCYTES 3 (L) 13 - 44 %    MONOCYTES 10 4.0 - 12.0 %    EOSINOPHILS 0 (L) 0.5 - 7.8 %    BASOPHILS 0 0.0 - 2.0 %    IMMATURE GRANULOCYTES 1 0.0 - 5.0 %    ABS. NEUTROPHILS 27.6 K/UL    ABS. LYMPHOCYTES 1.1 K/UL    ABS. MONOCYTES 3.1 K/UL    ABS. EOSINOPHILS 0.0 K/UL    ABS. BASOPHILS 0.1 K/UL    ABS. IMM. GRANS. 0.4 K/UL   METABOLIC PANEL, COMPREHENSIVE    Collection Time: 08/15/18  1:42 PM   Result Value Ref Range    Sodium 136 136 - 145 mmol/L    Potassium 4.9 3.5 - 5.1 mmol/L    Chloride 99 98 - 107 mmol/L    CO2 27 21 - 32 mmol/L    Anion gap 10 7 - 16 mmol/L    Glucose 119 (H) 65 - 100 mg/dL    BUN 37 (H) 8 - 23 MG/DL    Creatinine 1.69 (H) 0.8 - 1.5 MG/DL    GFR est AA 52 (L) >60 ml/min/1.73m2    GFR est non-AA 43 (L) >60 ml/min/1.73m2    Calcium 9.3 8.3 - 10.4 MG/DL    Bilirubin, total 0.7 0.2 - 1.1 MG/DL    ALT (SGPT) 93 (H) 12 - 65 U/L    AST (SGOT) 88 (H) 15 - 37 U/L    Alk.  phosphatase 114 50 - 136 U/L Protein, total 7.4 6.3 - 8.2 g/dL    Albumin 2.5 (L) 3.2 - 4.6 g/dL    Globulin 4.9 (H) 2.3 - 3.5 g/dL    A-G Ratio 0.5 (L) 1.2 - 3.5     CULTURE, BLOOD    Collection Time: 08/15/18  1:42 PM   Result Value Ref Range    Special Requests: RIGHT  Antecubital        Culture result: NO GROWTH AFTER 18 HOURS     PROCALCITONIN    Collection Time: 08/15/18  1:42 PM   Result Value Ref Range    Procalcitonin 3.7 ng/mL   PROTHROMBIN TIME + INR    Collection Time: 08/15/18  1:42 PM   Result Value Ref Range    Prothrombin time 19.9 (H) 11.5 - 14.5 sec    INR 1.6     EKG, 12 LEAD, INITIAL    Collection Time: 08/15/18  1:42 PM   Result Value Ref Range    Ventricular Rate 108 BPM    Atrial Rate 102 BPM    QRS Duration 112 ms    Q-T Interval 334 ms    QTC Calculation (Bezet) 447 ms    Calculated R Axis -74 degrees    Calculated T Axis 86 degrees    Diagnosis       Undetermined rhythm  Low voltage QRS  Left anterior fascicular block  Abnormal ECG     POC LACTIC ACID    Collection Time: 08/15/18  1:47 PM   Result Value Ref Range    Lactic Acid (POC) 1.1 0.5 - 1.9 mmol/L   CULTURE, BLOOD    Collection Time: 08/15/18  2:04 PM   Result Value Ref Range    Special Requests: LEFT  Antecubital        Culture result: NO GROWTH AFTER 18 HOURS     URINALYSIS W/ RFLX MICROSCOPIC    Collection Time: 08/15/18  2:36 PM   Result Value Ref Range    Color YELLOW      Appearance CLOUDY      Specific gravity 1.015 1.001 - 1.023      pH (UA) 6.0 5.0 - 9.0      Protein TRACE (A) NEG mg/dL    Glucose GREATER THAN/EQUAL TO 2000 (A) NEG mg/dL    Ketone TRACE (A) NEG mg/dL    Bilirubin NEGATIVE  NEG      Blood MODERATE (A) NEG      Urobilinogen 0.2 0.2 - 1.0 EU/dL    Nitrites NEGATIVE  NEG      Leukocyte Esterase MODERATE (A) NEG      WBC  0 /hpf    RBC 20-50 0 /hpf    Epithelial cells 0-3 0 /hpf    Bacteria TRACE 0 /hpf    Other observations Microscopic performed on unspun urine. QNS to spin.      CULTURE, URINE    Collection Time: 08/15/18  2:36 PM Result Value Ref Range    Special Requests: NO SPECIAL REQUESTS      Culture result:        NO GROWTH AFTER SHORT PERIOD OF INCUBATION. FURTHER RESULTS TO FOLLOW AFTER OVERNIGHT INCUBATION. GLUCOSE, POC    Collection Time: 08/15/18  5:33 PM   Result Value Ref Range    Glucose (POC) 110 (H) 65 - 100 mg/dL   GLUCOSE, POC    Collection Time: 08/15/18 10:04 PM   Result Value Ref Range    Glucose (POC) 132 (H) 65 - 148 mg/dL   METABOLIC PANEL, BASIC    Collection Time: 08/16/18  5:54 AM   Result Value Ref Range    Sodium 137 136 - 145 mmol/L    Potassium 4.5 3.5 - 5.1 mmol/L    Chloride 101 98 - 107 mmol/L    CO2 25 21 - 32 mmol/L    Anion gap 11 7 - 16 mmol/L    Glucose 85 65 - 100 mg/dL    BUN 37 (H) 8 - 23 MG/DL    Creatinine 1.65 (H) 0.8 - 1.5 MG/DL    GFR est AA 53 (L) >60 ml/min/1.73m2    GFR est non-AA 44 (L) >60 ml/min/1.73m2    Calcium 8.8 8.3 - 10.4 MG/DL   CBC WITH AUTOMATED DIFF    Collection Time: 08/16/18  5:54 AM   Result Value Ref Range    WBC 35.0 (H) 4.3 - 11.1 K/uL    RBC 3.65 (L) 4.23 - 5.6 M/uL    HGB 10.5 (L) 13.6 - 17.2 g/dL    HCT 33.8 (L) 41.1 - 50.3 %    MCV 92.6 79.6 - 97.8 FL    MCH 28.8 26.1 - 32.9 PG    MCHC 31.1 (L) 31.4 - 35.0 g/dL    RDW 15.2 %    PLATELET 154 627 - 046 K/uL    MPV 9.2 (L) 9.4 - 12.3 FL    ABSOLUTE NRBC 0.00 0.0 - 0.2 K/uL    DF AUTOMATED      NEUTROPHILS 82 (H) 43 - 78 %    LYMPHOCYTES 6 (L) 13 - 44 %    MONOCYTES 8 4.0 - 12.0 %    EOSINOPHILS 0 (L) 0.5 - 7.8 %    BASOPHILS 0 0.0 - 2.0 %    IMMATURE GRANULOCYTES 3 0.0 - 5.0 %    ABS. NEUTROPHILS 28.8 K/UL    ABS. LYMPHOCYTES 2.2 K/UL    ABS. MONOCYTES 2.9 K/UL    ABS. EOSINOPHILS 0.1 K/UL    ABS. BASOPHILS 0.1 K/UL    ABS. IMM.  GRANS. 0.9 K/UL   GLUCOSE, POC    Collection Time: 08/16/18  7:36 AM   Result Value Ref Range    Glucose (POC) 78 65 - 100 mg/dL     All Micro Results     Procedure Component Value Units Date/Time    CULTURE, URINE [[id number]] Collected:  08/15/18 1436    Order Status:  Completed Specimen: Urine from Clean catch Updated:  08/16/18 0929     Special Requests: NO SPECIAL REQUESTS        Culture result:         NO GROWTH AFTER SHORT PERIOD OF INCUBATION. FURTHER RESULTS TO FOLLOW AFTER OVERNIGHT INCUBATION. CULTURE, BLOOD [[id number]] Collected:  08/15/18 1342    Order Status:  Completed Specimen:  Blood from Blood Updated:  08/16/18 0817     Special Requests: --        RIGHT  Antecubital       Culture result: NO GROWTH AFTER 18 HOURS       CULTURE, BLOOD [[id number]] Collected:  08/15/18 1404    Order Status:  Completed Specimen:  Blood from Blood Updated:  08/16/18 0817     Special Requests: --        LEFT  Antecubital       Culture result: NO GROWTH AFTER 18 HOURS       CULTURE, URINE [[id number]]     Order Status:  Canceled Specimen:  Urine from Clean catch         CXR  8/15/2018  IMPRESSION:   1.  No acute cardiopulmonary process evident on single frontal view of the  chest.    Assessment:     Principal Problem:    Sepsis ([address] (8/15/2018)    Active Problems:    CKD (chronic kidney disease) stage 3, GFR 30-59 ml/min (5/8/2018)      Elevated white blood cell count (5/8/2018)      Osteoarthritis (8/2/2018)      S/P total knee arthroplasty, left (8/3/2018)      Fever (8/15/2018)      Volume depletion (8/15/2018)      Diabetes mellitus type 2, controlled ([address] (8/15/2018)      Hyperlipidemia (8/15/2018)      Essential hypertension (8/15/2018)      Acute cystitis without hematuria (8/15/2018)      Bipolar disorder ([address] (8/15/2018)        Plan:     Sepsis   From UTI, acute cystitis. Recent left knee surgery, but this is unlikely the source of infection. Blood and urine cultures are negative thus far. Empiric Vancomycin and Zosyn day 2. Volume depletion and CKD stage 3  Improved. Continue gentle IV fluid. Will cut rate. Monitor renal function and intake and output. Avoid nephrotoxic agents. Hold ACEI or ARB for now.      Diabetes mellitus type 2  Monitor blood sugar.   Cover with insulin sliding scale accordingly for now.      Hypertension  Monitor blood pressure and manage accordingly.      hyperlipidemia  Continue home medications.      Bipolar disorder  Continue home medications.       I have discussed the plan of care with patient.      DVT prophylaxis : SCD           Signed By: Anuj Spangler MD     August 16, 2018

## 2018-08-16 NOTE — PROGRESS NOTES
Problem: Mobility Impaired (Adult and Pediatric)  Goal: *Acute Goals and Plan of Care (Insert Text)  GOALS (1-4 days):  (1.)Mr. Cutler will move from supine to sit and sit to supine  in bed with CONTACT GUARD ASSIST.  (2.)Mr. Cutler will transfer from bed to chair and chair to bed with CONTACT GUARD ASSIST using the least restrictive device. (3.)Mr. Cutler will ambulate with CONTACT GUARD ASSIST for 100 feet with the least restrictive device. (4.)Mr. Cutler will increase L LE strength 1/2 grade to improve mobility and ADLs. (5.)Mr. Cutler will increase left knee ROM to 5°-90°.  ________________________________________________________________________________________________    PHYSICAL THERAPY ORTHOPEDIC EVAL: Initial Assessment, PM 8/16/2018  INPATIENT: Hospital Day: 2  [place] / [place] / Product Type: Medicare /      NAME/AGE/GENDER: Tio Miranda is a 71 y.o. male   PRIMARY DIAGNOSIS:  UTI. Pt. Is S/P L TKA 8/2/18 with Dr. Merly De     ICD-10: Treatment Diagnosis:    · Pain in Left Knee (M25.562)  · Stiffness of Left Knee, Not elsewhere classified (A50.529)  · Other abnormalities of gait and mobility (R26.89)      ASSESSMENT:     Mr. Raford Habermann presents from [facility] for STR for a UTI. He is S/P L TKA with Dr. Merly De on 8/2/18. He demonstrates a decline from his premorbid level of function. He would benefit from further PT while here to address the deficits of decreased general strength,  L LE ROM,  standing balance,  endurance, functional mobility, TKA awareness and increased anxiety while using the [address] [place] had addressed going home with HH vs. Back to rehab at [facility], but at this point I am uncertain whether he will reach a level to go home during his short stay here with the UTI. Per his dtr., he really hasn't made a lot of progress while at [facility]. He still requires assist fo 2 for transfers. I feel like his anxiety plays a major role in limiting his progress. He is afraid to move the L LE at all. I had a discussion with him about how forcing himself to exercise and gaining confidence will help him tremendously in his recovery time and outcome. PT will try to see hm bid. He is sitting in the chair upon arrival.  He was soak, so we got him clean up before we walk. He performs exercises with some help from therapist, first around of 20 each, then pt request to do another round with therapist, so he rested for 2 min, then pt and therapist performs exercises again. He was left in supine with call light near. This section established at most recent assessment   PROBLEM LIST (Impairments causing functional limitations):  1. Decreased Strength  2. Decreased ADL/Functional Activities  3. Decreased Transfer Abilities  4. Decreased Ambulation Ability/Technique  5. Decreased Balance  6. Increased Pain  7. Decreased Activity Tolerance  8. Increased Fatigue  9. Decreased Flexibility/Joint Mobility  10. Decreased Knowledge of Precautions  11. Decreased [place] with Home Exercise Program  12. Increased anxiety   INTERVENTIONS PLANNED: (Benefits and precautions of physical therapy have been discussed with the patient.)  1. Balance Exercise  2. Bed Mobility  3. Cold  4. Family Education  5. Gait Training  6. Home Exercise Program (HEP)  7. Therapeutic Exercise/Strengthening  8. Transfer Training  9. Range of Motion: active/assisted/passive  10. Therapeutic Activities     TREATMENT PLAN: Frequency/Duration: Follow patient BID for duration of hospital stay to address above goals. Rehabilitation Potential For Stated Goals: Fair     RECOMMENDED REHABILITATION/EQUIPMENT: (at time of discharge pending progress): Continue Skilled Therapy, Discussed with Case Management and will decide after today's assessments whether rehab vs home is the DC plan. Should he go home, he would need a RW and possible BSC.               HISTORY:   History of Present Injury/Illness (Reason for Referral): Admitted from [facility] with a UTI. He is S/P L TKA 8/2/18  Past Medical History/Comorbidities:   Mr. Raford Habermann  has a past medical history of Arthritis; BMI 30.0-30.9,adult; CAD (coronary artery disease); Chronic kidney disease; Chronic pain; Depression; Diabetes ([address] Enlarged prostate with urinary obstruction; Former smoker; GERD (gastroesophageal reflux disease); High cholesterol; Hypertension; Neurogenic bladder; Other ill-defined conditions(799.89); Overflow incontinence; Sciatica; Self-catheterizes urinary bladder; Skin cancer; Sleep apnea; Spondylolisthesis of lumbar region; Thromboembolus ([address] (2013); Urine retention; and Varicose veins of both lower extremities with complications. He also has no past medical history of Adverse effect of anesthesia; Aneurysm ([address] Arrhythmia; Asthma; Autoimmune disease ([address] Chronic obstructive pulmonary disease ([address] Coagulation disorder ([address] Difficult intubation; Endocarditis; Heart failure ([address] Ill-defined condition; Liver disease; Malignant hyperthermia due to anesthesia; Nausea & vomiting; Nicotine vapor product user; Non-nicotine vapor product user; Pseudocholinesterase deficiency; PUD (peptic ulcer disease); Rheumatic fever; Seizures ([address] Stroke [facility]); or Thyroid disease. Mr. Raford Habermann  has a past surgical history that includes hx hemorrhoidectomy (1971); hx shoulder arthroscopy (Left, 1971); and hx urological (06/2017). Social History/Living Environment:   Home Environment: Private residence  # Steps to Enter: 0  One/Two Story Residence: One story  Living Alone: Yes  Support Systems: Child(giovanni), Family member(s)  Patient Expects to be Discharged to[de-identified] Unknown  Current DME Used/Available at Home: Cane, straight  Tub or Shower Type: Shower  Prior Level of Function/Work/Activity:  Prior to L TKA he lived alone and could take care of his needs.  Since his L TKA 8/2, he has been in STR at [facility] and requires assist of 1-2 for transfers   Number of Personal Factors/Comorbidities that affect the Plan of Care: 3+: HIGH COMPLEXITY   EXAMINATION:   Most Recent Physical Functioning:      Gross Assessment  AROM: Generally decreased, functional (B UEs and R LE)  Strength: Generally decreased, functional (B UEs 3+-4; R LE 3-3+/5)  Sensation: Intact (B UEs and R LE)          LLE AROM  L Knee Flexion: 80 (seated)  L Knee Extension: -10 (supine)     LLE Strength  L Hip Flexion: 2-  L Hip ABduction: 2-  L Knee Extension: 2  L Ankle Dorsiflexion: 2+  L Ankle Plantar Flexion: 2+    Bed Mobility  Supine to Sit: Moderate assistance  Scooting: Moderate assistance    Transfers  Sit to Stand: Minimum assistance; Moderate assistance (will standing he got clean up)  Stand to Sit: Minimum assistance; Moderate assistance (will standiing he got clean up.)  Stand Pivot Transfers: Minimum assistance (with RW)  Bed to Chair: Minimum assistance (with RW)    Balance  Sitting: Intact; High guard  Standing: Impaired;Pull to stand; With support  Standing - Static: Fair  Standing - Dynamic : Poor    Posture  Posture Assessment: Forward head;Rounded shoulders         Weight Bearing Status  Left Side Weight Bearing: As tolerated  Distance (ft): 10 Feet (ft)  Ambulation - Level of Assistance: Minimal assistance  Assistive Device: Walker, rolling  Base of Support: Widened  Speed/Kenzie: Slow  Step Length: Right shortened  Stance: Left decreased  Gait Abnormalities: Antalgic; Steppage gait  Interventions: Safety awareness training     Braces/Orthotics: We worked on standing balance while standing at walker. He was dependent to have wet brief removed. He was able to widen his GIDEON while standing at walker. He worked on weight shifting to/from L LE while having the clean brief reapplied. Body Structures Involved:  1. Bones  2. Joints  3. Muscles Body Functions Affected:  1. Mental  2. Neuromusculoskeletal  3. Movement Related Activities and Participation Affected:  1.  General Tasks and Demands  2. Mobility  3. Self Care  4. Domestic Life   Number of elements that affect the Plan of Care: 4+: HIGH COMPLEXITY   CLINICAL PRESENTATION:   Presentation: Evolving clinical presentation with changing clinical characteristics: MODERATE COMPLEXITY   CLINICAL DECISION MAKIN [facility] Inpatient Short Form  How much difficulty does the patient currently have. .. Unable A Lot A Little None   1. Turning over in bed (including adjusting bedclothes, sheets and blankets)? [] 1   [] 2   [x] 3   [] 4   2. Sitting down on and standing up from a chair with arms ( e.g., wheelchair, bedside commode, etc.)   [] 1   [x] 2   [] 3   [] 4   3. Moving from lying on back to sitting on the side of the bed? [] 1   [x] 2   [] 3   [] 4   How much help from another person does the patient currently need. .. Total A Lot A Little None   4. Moving to and from a bed to a chair (including a wheelchair)? [] 1   [] 2   [x] 3   [] 4   5. Need to walk in hospital room? [] 1   [] 2   [x] 3   [] 4   6. Climbing 3-5 steps with a railing? [x] 1   [] 2   [] 3   [] 4   © , Trustees of [address] [place] [address], under license to [employer]. All rights reserved        Score:  Initial: 14 Most Recent: X (Date: -- )    Interpretation of Tool:  Represents activities that are increasingly more difficult (i.e. Bed mobility, Transfers, Gait). Score 24 23 22-20 19-15 14-10 9-7 6     Modifier CH CI CJ CK CL CM CN      ? Mobility - Walking and Moving Around:     - CURRENT STATUS: CL - 60%-79% impaired, limited or restricted    - GOAL STATUS: CK - 40%-59% impaired, limited or restricted    - D/C STATUS:  ---------------To be determined---------------  [place] / Product Type: Medicare /      Medical Necessity:     · Patient demonstrates fair rehab potential due to higher previous functional level.   Reason for Services/Other Comments:  · Patient continues to require present interventions due to patient's inability to perform functional mobility at a supervision level. Use of outcome tool(s) and clinical judgement create a POC that gives a: Questionable prediction of patient's progress: MODERATE COMPLEXITY            TREATMENT:   (In addition to Assessment/Re-Assessment sessions the following treatments were rendered)     Pre-treatment Symptoms/Complaints:  Scared to move L knee, tired  Pain: Initial:   Pain Intensity 1: 0  Post Session:       Therapeutic Activity: (  15 Minutes ):  Therapeutic activities including Bed transfers, Chair transfers and sit to supine, scooting, sit to stand, standing balance while holding walker to improve mobility, strength, balance and coordination. Required moderate Safety awareness training to complete activities safely and with least amt. of assistance. Therapeutic Exercise: (25 Minutes):  Exercises per grid below to improve mobility, strength and coordination. Required minimal visual, verbal and tactile cues to promote proper body alignment and promote proper body breathing techniques. Progressed range, repetitions and complexity of movement as indicated. Assessment: 15 minutes   Date:  8/16/18 Date:   Date:     ACTIVITY/EXERCISE AM PM AM PM AM PM   GROUP THERAPY  []  []  []  []  []  []   Ankle Pumps 20 20 x 2       Quad Sets 20 20 x 2       Gluteal Sets 20 20 x2       Hip ABd/ADduction 20AA 20 x 2 aa       Straight Leg Raises 10AA 20 x 2 aa       Knee Slides 10AA 20 x 2 aa       Short Arc Quads 10AA 20 x 2 aa       [employer]         Chair Slides 10AA 20                B = bilateral; AA = active assistive; A = active; P = passive    During exs., I made him focus on tightening and firing muscled for initiation of mvmt. He is fearful of bending the L knee. I encouraged to perform AP, Qs and GS throughout the day. Will provide a written HEP after session this pm  Treatment/Session Assessment:     Response to Treatment:  Very fearful and anxious. Did better once a rapport was built with therapist.    Education:  [x] Home Exercises  [x] Fall Precautions  [] Hip Precautions [] D/C Instruction Review  [] Knee/Hip Prosthesis Review  [x] Walker Management/Safety [] Adaptive Equipment as Needed       Interdisciplinary Collaboration:   o Physical Therapy Assistant  o Registered Nurse    After treatment position/precautions:   o Up in chair  o Bed/Chair-wheels locked  o Call light within reach  o Nurse at bedside    Compliance with Program/Exercises: Will assess as treatment progresses. Recommendations/Intent for next treatment session:  Treatment next visit will focus on increasing Mr. Kylah Rosenberg independence with bed mobility, transfers, gait training, strength/ROM exercises, modalities for pain, and patient education.       Total Treatment Duration:  PT Patient Time In/Time Out  Time In: 1400  Time Out: Lindsay Hannon [address], PTA

## 2018-08-16 NOTE — PROGRESS NOTES
2018         Post Op day: * No surgery found *     Admit Date: 8/15/2018  Admit Diagnosis: Fever        Subjective: Patient readmitted yesterday due to fever. Tests show a UTI, for which he is being treated for. Had a left TKA 2 weeks ago by Dr. Tiara Cuevas. Was at [facility] for rehab following surgery. Doing well otherwise in regards to left knee., No complaints, No SOB, No Chest Pain, No Nausea or Vomiting     Objective:   Vital Signs are Stable, No Acute Distress, Alert and Oriented, Dressing is Dry,  Neurovascular exam is normal.    Left knee - Steri-strips applied. No drainage. Overall is very benign. No bruising, swelling, erythema. Assessment / Plan :  Patient Active Problem List   Diagnosis Code    CKD (chronic kidney disease) stage 3, GFR 30-59 ml/min N18.3    Elevated white blood cell count D72.829    Abnormal urinalysis R82.90    Osteoarthritis M19.90    S/P total knee arthroplasty, left Z96.652    Fever R50.9    Volume depletion E86.9    Diabetes mellitus type 2, controlled ([address] E11.9    Hyperlipidemia E78.5    Essential hypertension I10    Sepsis ([address] A41.9    Acute cystitis without hematuria N30.00    Bipolar disorder ([address] F31.9    Patient Vitals for the past 8 hrs:   BP Temp Pulse Resp SpO2   18 0500 112/69 99 °F (37.2 °C) 95 16 92 %   18 0111 124/68 98.7 °F (37.1 °C) 98 18 91 %    Temp (24hrs), Av.1 °F (37.3 °C), Min:97.7 °F (36.5 °C), Max:102.2 °F (39 °C)    Body mass index is 30.52 kg/(m^2). Lab Results   Component Value Date/Time    HGB 10.5 (L) 2018 05:54 AM      Pt seen by and discussed with Supervising Physician   Continue PT, current pain meds  Patient does not need antibiotics for left knee. This is not the source of any infection. UTI is most likely culprit of fever. Discussed at length about patient going home or to rehab. If patient gets around well with PT, he could potentially go home with home health upon dc.    Will check patient tomorrow.          Signed By: CRISTINA Li

## 2018-08-16 NOTE — PROGRESS NOTES
Problem: Interdisciplinary Rounds  Goal: Interdisciplinary Rounds  Interdisciplinary team rounds were held 8/16/2018 with the following team members:Care Management, Nursing, Nutrition, Pharmacy, Physical Therapy and Physician and the patient. Plan of care discussed. See clinical pathway and/or care plan for interventions and desired outcomes.

## 2018-08-16 NOTE — PROGRESS NOTES
Pt assessment completed. Alert and oriented. Lungs CTA diminished in bases. Heart sounds regular. Abdomen soft and non tender with active bowel sounds. IV present and infusing without difficulty. Pt denies pain needs at this time. Steri strips present to left knee, clean dry and intact. All needs met at this time, will continue to monitor.

## 2018-08-16 NOTE — PROGRESS NOTES
Problem: Mobility Impaired (Adult and Pediatric)  Goal: *Acute Goals and Plan of Care (Insert Text)  GOALS (1-4 days):  (1.)Mr. Cutler will move from supine to sit and sit to supine  in bed with CONTACT GUARD ASSIST.  (2.)Mr. Cutler will transfer from bed to chair and chair to bed with CONTACT GUARD ASSIST using the least restrictive device. (3.)Mr. Cutler will ambulate with CONTACT GUARD ASSIST for 100 feet with the least restrictive device. (4.)Mr. Cutler will increase L LE strength 1/2 grade to improve mobility and ADLs. (5.)Mr. Cutler will increase left knee ROM to 5°-90°.  ________________________________________________________________________________________________    PHYSICAL THERAPY ORTHOPEDIC EVAL: Initial Assessment, AM 8/16/2018  INPATIENT: Hospital Day: 2  [place] / [place] / Product Type: Medicare /      NAME/AGE/GENDER: Brittney Amezcua is a 71 y.o. male   PRIMARY DIAGNOSIS:  UTI. Pt. Is S/P L TKA 8/2/18 with Dr. Hardeep Carr     ICD-10: Treatment Diagnosis:    · Pain in Left Knee (M25.562)  · Stiffness of Left Knee, Not elsewhere classified (E11.347)  · Other abnormalities of gait and mobility (R26.89)      ASSESSMENT:     Mr. Esau Allen presents from [facility] for STR for a UTI. He is S/P L TKA with Dr. Hardeep Carr on 8/2/18. He demonstrates a decline from his premorbid level of function. He would benefit from further PT while here to address the deficits of decreased general strength,  L LE ROM,  standing balance,  endurance, functional mobility, TKA awareness and increased anxiety while using the [address] [place] had addressed going home with HH vs. Back to rehab at [facility], but at this point I am uncertain whether he will reach a level to go home during his short stay here with the UTI. Per his dtr., he really hasn't made a lot of progress while at [facility]. He still requires assist fo 2 for transfers. I feel like his anxiety plays a major role in limiting his progress. He is afraid to move the L LE at all. I had a discussion with him about how forcing himself to exercise and gaining confidence will help him tremendously in his recovery time and outcome. PT will try to see hm bid. His dtr. Was on Speakerphone while I was in the room. She is concerned about him going home since she works and has school age children. He mentioned possibly hiring a private sitter 20 hrs a week. I have spoken with the Shelley French and Madyson COBIAN about this situation. During his next PT session, the therapist will instruct him in a HEP that he work on in his room. This section established at most recent assessment   PROBLEM LIST (Impairments causing functional limitations):  1. Decreased Strength  2. Decreased ADL/Functional Activities  3. Decreased Transfer Abilities  4. Decreased Ambulation Ability/Technique  5. Decreased Balance  6. Increased Pain  7. Decreased Activity Tolerance  8. Increased Fatigue  9. Decreased Flexibility/Joint Mobility  10. Decreased Knowledge of Precautions  11. Decreased [place] with Home Exercise Program  12. Increased anxiety   INTERVENTIONS PLANNED: (Benefits and precautions of physical therapy have been discussed with the patient.)  1. Balance Exercise  2. Bed Mobility  3. Cold  4. Family Education  5. Gait Training  6. Home Exercise Program (HEP)  7. Therapeutic Exercise/Strengthening  8. Transfer Training  9. Range of Motion: active/assisted/passive  10. Therapeutic Activities     TREATMENT PLAN: Frequency/Duration: Follow patient BID for duration of hospital stay to address above goals. Rehabilitation Potential For Stated Goals: Fair     RECOMMENDED REHABILITATION/EQUIPMENT: (at time of discharge pending progress): Continue Skilled Therapy, Discussed with Case Management and will decide after today's assessments whether rehab vs home is the DC plan. Should he go home, he would need a RW and possible BSC.               HISTORY:   History of Present Injury/Illness (Reason for Referral): Admitted from [address] [place] [address] with a UTI. He is S/P L TKA 8/2/18  Past Medical History/Comorbidities:   Mr. Yael Gustafson  has a past medical history of Arthritis; BMI 30.0-30.9,adult; CAD (coronary artery disease); Chronic kidney disease; Chronic pain; Depression; Diabetes ([address] Enlarged prostate with urinary obstruction; Former smoker; GERD (gastroesophageal reflux disease); High cholesterol; Hypertension; Neurogenic bladder; Other ill-defined conditions(799.89); Overflow incontinence; Sciatica; Self-catheterizes urinary bladder; Skin cancer; Sleep apnea; Spondylolisthesis of lumbar region; Thromboembolus ([address] (2013); Urine retention; and Varicose veins of both lower extremities with complications. He also has no past medical history of Adverse effect of anesthesia; Aneurysm ([address] Arrhythmia; Asthma; Autoimmune disease ([address] Chronic obstructive pulmonary disease ([address] Coagulation disorder ([address] Difficult intubation; Endocarditis; Heart failure ([address] Ill-defined condition; Liver disease; Malignant hyperthermia due to anesthesia; Nausea & vomiting; Nicotine vapor product user; Non-nicotine vapor product user; Pseudocholinesterase deficiency; PUD (peptic ulcer disease); Rheumatic fever; Seizures ([address] Stroke [facility]); or Thyroid disease. Mr. Yael Gustafson  has a past surgical history that includes hx hemorrhoidectomy (1971); hx shoulder arthroscopy (Left, 1971); and hx urological (06/2017). Social History/Living Environment:   Home Environment: Private residence  # Steps to Enter: 0  One/Two Story Residence: One story  Living Alone: Yes  Support Systems: Child(giovanni), Family member(s)  Patient Expects to be Discharged to[de-identified] Unknown (back to [facility] for rehab vs. home)  Current DME Used/Available at Home: Cane, straight  Tub or Shower Type: Shower  Prior Level of Function/Work/Activity:  Prior to L TKA he lived alone and could take care of his needs.  Since his L TKA 8/2, he has been in STR at [address] [place] [address] and requires assist of 1-2 for transfers   Number of Personal Factors/Comorbidities that affect the Plan of Care: 3+: HIGH COMPLEXITY   EXAMINATION:   Most Recent Physical Functioning:      Gross Assessment  AROM: Generally decreased, functional (B UEs and R LE)  Strength: Generally decreased, functional (B UEs 3+-4; R LE 3-3+/5)  Sensation: Intact (B UEs and R LE)          LLE AROM  L Knee Flexion: 80 (seated)  L Knee Extension: -10 (supine)     LLE Strength  L Hip Flexion: 2-  L Hip ABduction: 2-  L Knee Extension: 2  L Ankle Dorsiflexion: 2+  L Ankle Plantar Flexion: 2+    Bed Mobility  Supine to Sit: Moderate assistance  Scooting: Moderate assistance    Transfers  Sit to Stand: Moderate assistance (needs to rock and bring COG forward)  Stand to Sit: Minimum assistance  Stand Pivot Transfers: Minimum assistance (with RW)  Bed to Chair: Minimum assistance (with RW)    Balance  Sitting: Intact  Standing: Pull to stand; With support    Posture  Posture Assessment: Forward head;Rounded shoulders         Weight Bearing Status  Left Side Weight Bearing: As tolerated  Distance (ft): 35 Feet (ft)  Ambulation - Level of Assistance: Minimal assistance  Assistive Device: Walker, rolling  Base of Support: Widened  Speed/Kenzie: Slow  Step Length: Right shortened  Stance: Left decreased  Gait Abnormalities: Antalgic; Step to gait  Interventions: Safety awareness training;Verbal cues; Tactile cues; Visual/Demos     Braces/Orthotics: We worked on standing balance while standing at walker. He was dependent to have wet brief removed. He was able to widen his GIDEON while standing at walker. He worked on weight shifting to/from L LE while having the clean brief reapplied. Body Structures Involved:  1. Bones  2. Joints  3. Muscles Body Functions Affected:  1. Mental  2. Neuromusculoskeletal  3. Movement Related Activities and Participation Affected:  1. General Tasks and Demands  2. Mobility  3. Self Care  4.  Domestic Life Number of elements that affect the Plan of Care: 4+: HIGH COMPLEXITY   CLINICAL PRESENTATION:   Presentation: Evolving clinical presentation with changing clinical characteristics: MODERATE COMPLEXITY   CLINICAL DECISION MAKIN [facility] Mobility Inpatient Short Form  How much difficulty does the patient currently have. .. Unable A Lot A Little None   1. Turning over in bed (including adjusting bedclothes, sheets and blankets)? [] 1   [] 2   [x] 3   [] 4   2. Sitting down on and standing up from a chair with arms ( e.g., wheelchair, bedside commode, etc.)   [] 1   [x] 2   [] 3   [] 4   3. Moving from lying on back to sitting on the side of the bed? [] 1   [x] 2   [] 3   [] 4   How much help from another person does the patient currently need. .. Total A Lot A Little None   4. Moving to and from a bed to a chair (including a wheelchair)? [] 1   [] 2   [x] 3   [] 4   5. Need to walk in hospital room? [] 1   [] 2   [x] 3   [] 4   6. Climbing 3-5 steps with a railing? [x] 1   [] 2   [] 3   [] 4   © , Trustees of [facility] [employer], under license to [employer]. All rights reserved        Score:  Initial: 14 Most Recent: X (Date: -- )    Interpretation of Tool:  Represents activities that are increasingly more difficult (i.e. Bed mobility, Transfers, Gait). Score 24 23 22-20 19-15 14-10 9-7 6     Modifier CH CI CJ CK CL CM CN      ? Mobility - Walking and Moving Around:     - CURRENT STATUS: CL - 60%-79% impaired, limited or restricted    - GOAL STATUS: CK - 40%-59% impaired, limited or restricted    - D/C STATUS:  ---------------To be determined---------------  [place] / [place] / Product Type: Medicare /      Medical Necessity:     · Patient demonstrates fair rehab potential due to higher previous functional level.   Reason for Services/Other Comments:  · Patient continues to require present interventions due to patient's inability to perform functional mobility at a supervision level. Use of outcome tool(s) and clinical judgement create a POC that gives a: Questionable prediction of patient's progress: MODERATE COMPLEXITY            TREATMENT:   (In addition to Assessment/Re-Assessment sessions the following treatments were rendered)     Pre-treatment Symptoms/Complaints:  Scared to move L knee, tired  Pain: Initial:   Pain Intensity 1: 3  Pain Location 1: Knee  Pain Orientation 1: Left  Pain Intervention(s) 1: Ambulation/Increased Activity, Cold pack, Elevation, Emotional support, Exercise, Repositioned  Post Session:  2, applied ice pack to L knee, LE elevated with him up in recliner     Therapeutic Activity: (  15 Minutes ):  Therapeutic activities including Bed transfers, Chair transfers and sit to supine, scooting, sit to stand, standing balance while holding walker to improve mobility, strength, balance and coordination. Required moderate Safety awareness training;Verbal cues; Tactile cues; Visual/Demos to complete activities safely and with least amt. of assistance. Therapeutic Exercise: (15 Minutes):  Exercises per grid below to improve mobility, strength and coordination. Required minimal visual, verbal and tactile cues to promote proper body alignment and promote proper body breathing techniques. Progressed range, repetitions and complexity of movement as indicated. Assessment: 15 minutes   Date:  8/16/18 Date:   Date:     ACTIVITY/EXERCISE AM PM AM PM AM PM   GROUP THERAPY  []  []  []  []  []  []   Ankle Pumps 20        Quad Sets 20        Gluteal Sets 20        Hip ABd/ADduction 20AA        Straight Leg Raises 10AA        Knee Slides 10AA        Short Arc Quads 10AA        Long Arc Quads         Chair Slides 10AA                 B = bilateral; AA = active assistive; A = active; P = passive    During exs., I made him focus on tightening and firing muscled for initiation of mvmt.  He is fearful of bending the L knee. I encouraged to perform AP, Qs and GS throughout the day. Will provide a written HEP after session this pm  Treatment/Session Assessment:     Response to Treatment:  Very fearful and anxious. Did better once a rapport was built with therapist.    Education:  [x] Home Exercises  [x] Fall Precautions  [] Hip Precautions [] D/C Instruction Review  [] Knee/Hip Prosthesis Review  [x] Walker Management/Safety [] Adaptive Equipment as Needed       Interdisciplinary Collaboration:   o Physical Therapist  o Occupational Therapist  o Registered Nurse  o Physician  o   o Certified Nursing Assistant/Patient Care Technician    After treatment position/precautions:   o Up in chair  o Bed/Chair-wheels locked  o Call light within reach  o Nurse at bedside    Compliance with Program/Exercises: Will assess as treatment progresses. Recommendations/Intent for next treatment session:  Treatment next visit will focus on increasing Mr. Tamera Guadalupe independence with bed mobility, transfers, gait training, strength/ROM exercises, modalities for pain, and patient education.       Total Treatment Duration:  PT Patient Time In/Time Out  Time In: 0915  Time Out: [facility],

## 2018-08-16 NOTE — PROGRESS NOTES
Problem: Self Care Deficits Care Plan (Adult)  Goal: *Acute Goals and Plan of Care (Insert Text)  1. Patient will perform grooming with supervision. 2. Patient will perform Upper body dressing with supervision  3. Patient will perform lower body mod assist  4. Patient will perform upper body bathing with supervision. 5. Patient will perform lower body bathing with mod assistance  6. Patient will perform toilet transfers with min assist with RW  7. Patient will perform shower transfer with min assist with RW  8. Patient will participate in 30 + minutes of ADL/ therapeutic exercise/therapeutic activity with min rest breaks to increase activity tolerance for self care. 9. Patient will perform ADL functional mobility in room with min assist with RW. Goals to be achieved in 7 days. OCCUPATIONAL THERAPY: Initial Assessment, Treatment Day: 1st and AM 8/16/2018  INPATIENT: Hospital Day: 2  [place] / [place] / Product Type: Medicare /      NAME/AGE/GENDER: Jeffy Oscar is a 71 y.o. male   PRIMARY DIAGNOSIS:  Fever Sepsis ([facility] Sepsis ([facility]        ICD-10: Treatment Diagnosis:    · Pain in Left Knee (M25.562)  · Stiffness of Left Knee, Not elsewhere classified (M25.662)  · Generalized Muscle Weakness (M62.81)  · Other lack of cordination (R27.8)   Precautions/Allergies:     Oxycodone and Tramadol      ASSESSMENT:     Mr. Josue Newby presents sitting up in recliner. He was admitted with the above diagnosis and is s/p  L TKA on August 1, 2018. [facility]. He presents mod to min assist with sit to stand and transfers. He is min assist for UE ADLS and max to total assist for LE ADLs. He is alert and appropriate but has a very flat affect. He participated in OT eval and treatment to focus on increasing independence with ADLs and functional mobility.  He was educated on effective technique for transfers and appears receptive. BUt requires max cues to instill confidence to perform. He had a posterior lean initially when standing. He appears very fearful on using his L LE. He demonstrated progress with sit to stand with practice. He was incontinent of urine as he had to uriniate quickly. His brief was changed and he assisted with front abbie hygiene in standing with min assist for balance. Therapist donned new taped brief total assist. He declined shower at that time and was returned to sitting. All needs in reach. He would benefit form skilled OT services. Will follow. He lives alone and family is unable to assist fully at this time. Patient also reported he did not leave his home much. 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 He did not shower daily. Recommend his return to [facility] for continued therapy services. This section established at most recent assessment   PROBLEM LIST (Impairments causing functional limitations):  1. Decreased Strength  2. Decreased ADL/Functional Activities  3. Decreased Transfer Abilities  4. Decreased Ambulation Ability/Technique  5. Decreased Balance  6. Decreased Activity Tolerance  7. Decreased Flexibility/Joint Mobility  8. Decreased Skin Integrity/Hygeine   INTERVENTIONS PLANNED: (Benefits and precautions of occupational therapy have been discussed with the patient.)  1. Activities of daily living training  2. Adaptive equipment training  3. Balance training  4. Clothing management  5. Donning&doffing training  6. Neuromuscular re-eduation  7. Therapeutic activity  8. Therapeutic exercise     TREATMENT PLAN: Frequency/Duration: Follow patient 4 times a week to address above goals.   Rehabilitation Potential For Stated Goals: Good     RECOMMENDED REHABILITATION/EQUIPMENT: (at time of discharge pending progress): Due to the probability of continued deficits (see above) this patient will likely need continued skilled occupational therapy after discharge. Equipment:    None at this time              OCCUPATIONAL PROFILE AND HISTORY:   History of Present Injury/Illness (Reason for Referral):  Decreased self care and functional mobility  Past Medical History/Comorbidities:   Mr. Ladarius Ramirez  has a past medical history of Arthritis; BMI 30.0-30.9,adult; CAD (coronary artery disease); Chronic kidney disease; Chronic pain; Depression; Diabetes ([address] Enlarged prostate with urinary obstruction; Former smoker; GERD (gastroesophageal reflux disease); High cholesterol; Hypertension; Neurogenic bladder; Other ill-defined conditions(799.89); Overflow incontinence; Sciatica; Self-catheterizes urinary bladder; Skin cancer; Sleep apnea; Spondylolisthesis of lumbar region; Thromboembolus ([address] (2013); Urine retention; and Varicose veins of both lower extremities with complications. He also has no past medical history of Adverse effect of anesthesia; Aneurysm ([address] Arrhythmia; Asthma; Autoimmune disease ([address] Chronic obstructive pulmonary disease ([address] Coagulation disorder ([address] Difficult intubation; Endocarditis; Heart failure ([address] Ill-defined condition; Liver disease; Malignant hyperthermia due to anesthesia; Nausea & vomiting; Nicotine vapor product user; Non-nicotine vapor product user; Pseudocholinesterase deficiency; PUD (peptic ulcer disease); Rheumatic fever; Seizures ([address] Stroke [facility]); or Thyroid disease. Mr. Ladarius Ramirez  has a past surgical history that includes hx hemorrhoidectomy (1971); hx shoulder arthroscopy (Left, 1971); and hx urological (06/2017).   Social History/Living Environment:   Home Environment: Private residence  # Steps to Enter: 0  One/Two Story Residence: One story  Living Alone: Yes  Support Systems: Child(giovanni), Family member(s)  Patient Expects to be Discharged to[de-identified] Unknown  Current DME Used/Available at Home: Cane, straight  Tub or Shower Type: Shower  Prior Level of Function/Work/Activity:  Was mod I prior to knee surgery , but did not do a whole lot of activity at home. He reported he did not leave the house much and did not shower daily     Number of Personal Factors/Comorbidities that affect the Plan of Care: Expanded review of therapy/medical records (1-2):  MODERATE COMPLEXITY   ASSESSMENT OF OCCUPATIONAL PERFORMANCE[de-identified]   Activities of Daily Living:   Basic ADLs (From Assessment) Complex ADLs (From Assessment)   Feeding: Supervision  Oral Facial Hygiene/Grooming: Supervision  Bathing: Moderate assistance, Maximum assistance  Upper Body Dressing: Minimum assistance  Lower Body Dressing: Maximum assistance  Toileting: Maximum assistance, Total assistance (incontinent urine, changed brief, pt assisted hygiene) Instrumental ADL  Meal Preparation: Total assistance  Homemaking: Total assistance   Grooming/Bathing/Dressing Activities of Daily Living     Cognitive Retraining  Safety/Judgement: Awareness of environment; Fall prevention           Toileting  Toileting Assistance: Maximum assistance; Total assistance(dependent)  Bladder Hygiene: Maximum assistance  Clothing Management: Total assistance (dependent)  Adaptive Equipment: Walker           Bed/Mat Mobility  Supine to Sit: Moderate assistance  Sit to Stand: Minimum assistance; Moderate assistance  Bed to Chair: Minimum assistance (with RW)  Scooting: Moderate assistance       Most Recent Physical Functioning:   Gross Assessment:                  Posture:  Posture Assessment: Forward head, Rounded shoulders  Balance:  Sitting: Intact; High guard  Standing: Impaired;Pull to stand; With support  Standing - Static: Fair  Standing - Dynamic : Poor Bed Mobility:  Supine to Sit: Moderate assistance  Scooting: Moderate assistance  Wheelchair Mobility:     Transfers:  Sit to Stand: Minimum assistance; Moderate assistance  Stand to Sit: Minimum assistance  Bed to Chair: Minimum assistance (with RW)  Interventions: Safety awareness training; Tactile cues; Verbal cues; Visual cues  Duration: 15 Minutes Patient Vitals for the past 6 hrs:   BP BP Patient Position SpO2 Pulse   08/16/18 0826 129/69 At rest;Supine 94 % 94       Mental Status  Neurologic State: Alert  Orientation Level: Appropriate for age  Cognition: Appropriate decision making, Follows commands  Perception: Appears intact  Perseveration: No perseveration noted  Safety/Judgement: Awareness of environment, Fall prevention            LLE AROM  L Knee Flexion: 80 (seated)  L Knee Extension: -10 (supine)  LLE Strength  L Hip Flexion: 2-  L Hip ABduction: 2-  L Knee Extension: 2  L Ankle Dorsiflexion: 2+  L Ankle Plantar Flexion: 2+  LLE Sensation  Light Touch: No apparent deficit             Physical Skills Involved:  1. Range of Motion  2. Balance  3. Strength  4. Activity Tolerance  5. Pain (acute)  6. Pain (Chronic)  7. Edema  8. Skin Integrity Cognitive Skills Affected (resulting in the inability to perform in a timely and safe manner):  1. Perception Psychosocial Skills Affected:  1. Habits/Routines  2. Environmental Adaptation  3. Social Interaction  4. Self-Awareness   Number of elements that affect the Plan of Care: 5+:  HIGH COMPLEXITY   CLINICAL DECISION MAKING:   [facility] AM-PAC 6 Clicks   Daily Activity Inpatient Short Form  How much help from another person does the patient currently need. .. Total A Lot A Little None   1. Putting on and taking off regular lower body clothing? [] 1   [x] 2   [] 3   [] 4   2. Bathing (including washing, rinsing, drying)? [] 1   [x] 2   [] 3   [] 4   3. Toileting, which includes using toilet, bedpan or urinal?   [x] 1   [] 2   [] 3   [] 4   4. Putting on and taking off regular upper body clothing? [] 1   [] 2   [x] 3   [] 4   5. Taking care of personal grooming such as brushing teeth? [] 1   [] 2   [x] 3   [] 4   6. Eating meals? [] 1   [] 2   [] 3   [x] 4   © 2007, Trustees of [facility], under license to [employer].  All rights reserved      Score:  Initial: 15 Most Recent: X (Date: -- )    Interpretation of Tool:  Represents activities that are increasingly more difficult (i.e. Bed mobility, Transfers, Gait). Score 24 23 22-20 19-15 14-10 9-7 6     Modifier CH CI CJ CK CL CM CN      ? Self Care:     - CURRENT STATUS: CK - 40%-59% impaired, limited or restricted    - GOAL STATUS: CJ - 20%-39% impaired, limited or restricted    - D/C STATUS:  ---------------To be determined---------------  [place] / Product Type: Medicare /      Medical Necessity:     · Patient is expected to demonstrate progress in balance, coordination and functional technique to decrease assistance required with  self care and functional mobility and improve safety during  self care and functional mobility. Reason for Services/Other Comments:  · Patient continues to require skilled intervention due to decreased  self care and functional mobility. Use of outcome tool(s) and clinical judgement create a POC that gives a: MODERATE COMPLEXITY         TREATMENT:   (In addition to Assessment/Re-Assessment sessions the following treatments were rendered)     Pre-treatment Symptoms/Complaints:    Pain: Initial:   Pain Intensity 1: 0  Post Session:  0/10 rest     Self Care: (30): Procedure(s) (per grid) utilized to improve and/or restore self-care/home management as related to toileting. Required maximal visual, verbal, manual and tactile cueing to facilitate activities of daily living skills, compensatory activities and functional transfers.   Assessment/Reassessment only,     Braces/Orthotics/Lines/Etc:   · IV  · O2 Device: Room air  Treatment/Session Assessment:    · Response to Treatment:  Tolerated fair, requires a lot of encouragement, flat affect  · Interdisciplinary Collaboration:   o Physical Therapist  o Occupational Therapist  o Registered Nurse  o   o Certified Nursing Assistant/Patient Care Technician  · After treatment position/precautions:   o Up in chair  o Bed/Chair-wheels locked  o Bed in low position  o Call light within reach  o RN notified   · Compliance with Program/Exercises: compliant all of the time. · Recommendations/Intent for next treatment session: \"Next visit will focus on advancements to more challenging activities and reduction in assistance provided\".   Total Treatment Duration:30  OT Patient Time In/Time Out  Time In: 2140  Time Out: 100 [facility],

## 2018-08-16 NOTE — PROGRESS NOTES
Preventive Health Recommendations  Female Ages 40 to 49    Yearly exam:     See your health care provider every year in order to  1. Review health changes.   2. Discuss preventive care.    3. Review your medicines if your doctor prescribed any.      Get a Pap test every three years (unless you have an abnormal result and your provider advises testing more often).      If you get Pap tests with HPV test, you only need to test every 5 years, unless you have an abnormal result. You do not need a Pap test if your uterus was removed (hysterectomy) and you have not had cancer.      You should be tested each year for STDs (sexually transmitted diseases), if you're at risk.       Ask your doctor if you should have a mammogram.      Have a colonoscopy (test for colon cancer) if someone in your family has had colon cancer or polyps before age 50.       Have a cholesterol test every 5 years.       Have a diabetes test (fasting glucose) after age 45. If you are at risk for diabetes, you should have this test every 3 years.    Shots: Get a flu shot each year. Get a tetanus shot every 10 years.     Nutrition:     Eat at least 5 servings of fruits and vegetables each day.    Eat whole-grain bread, whole-wheat pasta and brown rice instead of white grains and rice.    Talk to your provider about Calcium and Vitamin D.     Lifestyle    Exercise at least 150 minutes a week (an average of 30 minutes a day, 5 days a week). This will help you control your weight and prevent disease.    Limit alcohol to one drink per day.    No smoking.     Wear sunscreen to prevent skin cancer.    See your dentist every six months for an exam and cleaning.   Spiritual Care initial visit made by [facility] [employer]. Prayer and support given. Tr naranjo. DOROTA Mcpherson Div  / Bereavement Coordinator

## 2018-08-16 NOTE — PROGRESS NOTES
8/17/18- Pt unable to dc today. PT made aware of bed hold. Pt reports he will pay bed hold and aware it is per day. TERRELL made Yamileth aware pt unable to dc today and pt will pay to hold private room. Yamileth states pt can return the weekend if he is medically stable, weekend SW just needs to call facility. SW following until transfer back to [facility]. Jennifer Hinders      8/16/18-TERRELL spoke with Aranza Fajardo from [facility] to inquire about pt's return. Yamileth reports she spoke with pt's insurance (he is a workers comp for knee surgery and SNF) and they are not paying to hold pt's bed due to his admission being for a UTI. Yamileth asked if pt wanted to pay to hold his private room in order to guarantee private room. MD reports likely dc back to [facility] in AM.  TERRELL  Called and left message for Yamileth to see if private pay hold is necessary if pt returns in AM.  TERRELL waiting for return call.   Jennifer Hinders

## 2018-08-17 NOTE — PROGRESS NOTES
Progress Note    Patient: Maverick Webb MRN: [id number]  SSN: [id number]    YOB: 1945  Age: 71 y.o. Sex: male      Admit Date: 8/15/2018    LOS: 2 days     Subjective:     No fever, but feeling tired today. Patient reports having had a lesion at mid back with drainage of brown liquid. He has had \"a boil\" there since 10 days ago. Objective:     Vitals:    08/16/18 2101 08/16/18 2344 08/17/18 0359 08/17/18 0735   BP: 113/59 128/75 155/72 122/80   Pulse: 98 89 93 85   Resp: 20 14 16 19   Temp: 98.6 °F (37 °C) 98.4 °F (36.9 °C) 98 °F (36.7 °C) 98.6 °F (37 °C)   SpO2: 96% 92% 92% 94%   Weight:       Height:            Intake and Output:  Current Shift:    Last three shifts: 08/15 1901 - 08/17 0700  In: 100 [I.V.:100]  Out: 650 [Urine:650]    Physical Exam:   General:                    The patient is a pleasant elderly male in no acute respiratory distress. Appears weak. Sitting up in bed. UQKB:                                   [id number]. Eyes:                                   No palpebral pallor or scleral icterus. ENT:                                    External auricular and nasal exam within normal limits.                                             ENOTYO membranes are very dry. Neck:                                   Supple, non-tender, no JVD. Lungs:                       Clear to auscultation bilaterally without wheezes or crackles.                                             No respiratory distress or accessory muscle use. Heart:                                  Regular rate and rhythm, without murmurs, rubs, or gallops. Abdomen:                  Soft, non-tender, non-distended with normoactive bowel sounds. Genitourinary:           No tenderness over the bladder or bilateral CVAs. Extremities:               Without clubbing, cyanosis, or edema. Left knee is slightly warn and less tender and swollen. Wound is intact and no redness. Skin:                                    Normal color, texture, and turgor. No rashes, or jaundice. At the mid back, there is an abscess with diameter around 1.5 cm with flat surface. When squeezed, thick brown fluid came out. Likely combination of pus and old blood. Pulses:                      Radial and dorsalis pedis pulses present 2+ bilaterally.                                               Capillary refill <2s. Neurologic:                CN II-XII grossly intact and symmetrical.                                               Moving all four extremities well with no focal deficits. Psychiatric:                Pleasant demeanor, appropriate affect. Alert and oriented x 3      Lab/Data Review:    Recent Results (from the past 24 hour(s))   GLUCOSE, POC    Collection Time: 08/16/18 11:42 AM   Result Value Ref Range    Glucose (POC) 174 (H) 65 - 100 mg/dL   GLUCOSE, POC    Collection Time: 08/16/18  4:32 PM   Result Value Ref Range    Glucose (POC) 186 (H) 65 - 100 mg/dL   GLUCOSE, POC    Collection Time: 08/16/18  8:51 PM   Result Value Ref Range    Glucose (POC) 159 (H) 65 - 100 mg/dL   CBC WITH AUTOMATED DIFF    Collection Time: 08/17/18  5:52 AM   Result Value Ref Range    WBC 23.0 (H) 4.3 - 11.1 K/uL    RBC 3.50 (L) 4.23 - 5.6 M/uL    HGB 10.1 (L) 13.6 - 17.2 g/dL    HCT 32.7 (L) 41.1 - 50.3 %    MCV 93.4 79.6 - 97.8 FL    MCH 28.9 26.1 - 32.9 PG    MCHC 30.9 (L) 31.4 - 35.0 g/dL    RDW 15.2 %    PLATELET 147 202 - 801 K/uL    MPV 9.5 9.4 - 12.3 FL    ABSOLUTE NRBC 0.00 0.0 - 0.2 K/uL    DF AUTOMATED      NEUTROPHILS 81 (H) 43 - 78 %    LYMPHOCYTES 10 (L) 13 - 44 %    MONOCYTES 7 4.0 - 12.0 %    EOSINOPHILS 1 0.5 - 7.8 %    BASOPHILS 0 0.0 - 2.0 %    IMMATURE GRANULOCYTES 1 0.0 - 5.0 %    ABS. NEUTROPHILS 18.7 K/UL    ABS. LYMPHOCYTES 2.3 K/UL    ABS. MONOCYTES 1.6 K/UL    ABS. EOSINOPHILS 0.2 K/UL    ABS. BASOPHILS 0.0 K/UL    ABS. IMM.  GRANS. 0.2 K/UL   METABOLIC PANEL, BASIC    Collection Time: 08/17/18  5:52 AM   Result Value Ref Range    Sodium 138 136 - 145 mmol/L    Potassium 4.1 3.5 - 5.1 mmol/L    Chloride 104 98 - 107 mmol/L    CO2 25 21 - 32 mmol/L    Anion gap 9 7 - 16 mmol/L    Glucose 96 65 - 100 mg/dL    BUN 29 (H) 8 - 23 MG/DL    Creatinine 1.28 0.8 - 1.5 MG/DL    GFR est AA >60 >60 ml/min/1.73m2    GFR est non-AA 59 (L) >60 ml/min/1.73m2    Calcium 8.8 8.3 - 10.4 MG/DL   GLUCOSE, POC    Collection Time: 08/17/18  7:41 AM   Result Value Ref Range    Glucose (POC) 108 (H) 65 - 100 mg/dL     All Micro Results     Procedure Component Value Units Date/Time    CULTURE, URINE [[id number]]  (Abnormal) Collected:  08/15/18 1436    Order Status:  Completed Specimen:  Urine from Clean catch Updated:  08/17/18 0833     Special Requests: NO SPECIAL REQUESTS        Culture result:         >100,000 COLONIES/mL GRAM NEGATIVE RODS SUBCULTURE IN PROGRESS (A)    CULTURE, BLOOD [[id number]] Collected:  08/15/18 1342    Order Status:  Completed Specimen:  Blood from Blood Updated:  08/16/18 0817     Special Requests: --        RIGHT  Antecubital       Culture result: NO GROWTH AFTER 18 HOURS       CULTURE, BLOOD [[id number]] Collected:  08/15/18 1404    Order Status:  Completed Specimen:  Blood from Blood Updated:  08/16/18 0817     Special Requests: --        LEFT  Antecubital       Culture result: NO GROWTH AFTER 18 HOURS       CULTURE, URINE [[id number]]     Order Status:  Canceled Specimen:  Urine from Clean catch         CXR  8/15/2018  IMPRESSION:   1.  No acute cardiopulmonary process evident on single frontal view of the  Chest.        Assessment:     Principal Problem:    Sepsis ([address] (8/15/2018)    Active Problems:    CKD (chronic kidney disease) stage 3, GFR 30-59 ml/min (5/8/2018)      Elevated white blood cell count (5/8/2018)      Osteoarthritis (8/2/2018)      S/P total knee arthroplasty, left (8/3/2018)      Fever (8/15/2018)      Volume depletion (8/15/2018)      Diabetes mellitus type 2, controlled ([facility] (8/15/2018)      Hyperlipidemia (8/15/2018)      Essential hypertension (8/15/2018)      Acute cystitis without hematuria (8/15/2018)      Bipolar disorder ([facility] (8/15/2018)    abscess of the back     Plan:     Sepsis   From UTI, acute cystitis. Recent left knee surgery, but this is unlikely the source of infection. Blood and urine cultures are negative thus far. Empiric  Zosyn day 3. Stop Vancomycin. Leukocytosis, but improving. Volume depletion and CKD stage 3  Improved. Discontinue IV fluid. Monitor renal function and intake and output. Avoid nephrotoxic agents. Hold ACEI or ARB for now. Check CXR to assess fluid status.      Diabetes mellitus type 2  Monitor blood sugar. Cover with insulin sliding scale accordingly for now.      Hypertension  Monitor blood pressure and manage accordingly.      hyperlipidemia  Continue home medications.      Bipolar disorder  Continue home medications.      Abscess at the back  Consult surgery for possible I&D.       I have discussed the plan of care with patient and nurse.       DVT prophylaxis : SCD           Signed By: Mariel Agee MD     August 17, 2018

## 2018-08-17 NOTE — PROGRESS NOTES
Assessment completed and documented. Pt resting in bedside recliner aaox4, MIVF infusing to left arm 20g, voiding in briefs and urinal,   pt is without complaints. VSS, lungs clear diminished on room air, left knee has intact steri strips and some swelling without drainage, pt needs 2 person assistance with walker, returned back to bed, no acute distress observed. Call bell in reach, bed locked and low, will continue to monitor hourly and as needed.

## 2018-08-17 NOTE — PROGRESS NOTES
Problem: Interdisciplinary Rounds  Goal: Interdisciplinary Rounds  Interdisciplinary team rounds were held 8/17/2018 with the following team members:Care Management, Nursing, Nutrition, Pharmacy, Physical Therapy and Physician and the patient. Plan of care discussed. See clinical pathway and/or care plan for interventions and desired outcomes.

## 2018-08-17 NOTE — PROGRESS NOTES
Problem: Mobility Impaired (Adult and Pediatric)  Goal: *Acute Goals and Plan of Care (Insert Text)  GOALS (1-4 days):  (1.)Mr. Cutler will move from supine to sit and sit to supine  in bed with CONTACT GUARD ASSIST.  (2.)Mr. Cutler will transfer from bed to chair and chair to bed with CONTACT GUARD ASSIST using the least restrictive device. (3.)Mr. Cutler will ambulate with CONTACT GUARD ASSIST for 100 feet with the least restrictive device. (4.)Mr. Cutler will increase L LE strength 1/2 grade to improve mobility and ADLs. (5.)Mr. Cutler will increase left knee ROM to 5°-90°.  ________________________________________________________________________________________________    PHYSICAL THERAPY ORTHOPEDIC EVAL: Daily Note, AM 8/17/2018  INPATIENT: Hospital Day: 3  [place] / Product Type: Medicare /      NAME/AGE/GENDER: Marcella Santos is a 71 y.o. male   PRIMARY DIAGNOSIS:  UTI. Pt. Is S/P L TKA 8/2/18 with Dr. Patrick Thrasher     ICD-10: Treatment Diagnosis:    · Pain in Left Knee (M25.562)  · Stiffness of Left Knee, Not elsewhere classified (W81.197)  · Other abnormalities of gait and mobility (R26.89)      ASSESSMENT:     Mr. Shania Zheng is supine in bed on arrival.  He is agreeable to PT and plans to go back to [facility] on discharge. Pt doing much better with gait distance this morning but requires lots of encouragement and verbal cues. This section established at most recent assessment   PROBLEM LIST (Impairments causing functional limitations):  1. Decreased Strength  2. Decreased ADL/Functional Activities  3. Decreased Transfer Abilities  4. Decreased Ambulation Ability/Technique  5. Decreased Balance  6. Increased Pain  7. Decreased Activity Tolerance  8. Increased Fatigue  9. Decreased Flexibility/Joint Mobility  10. Decreased Knowledge of Precautions  11. Decreased [place] with Home Exercise Program  12.  Increased anxiety   INTERVENTIONS PLANNED: (Benefits and precautions of physical therapy have been discussed with the patient.)  1. Balance Exercise  2. Bed Mobility  3. Cold  4. Family Education  5. Gait Training  6. Home Exercise Program (HEP)  7. Therapeutic Exercise/Strengthening  8. Transfer Training  9. Range of Motion: active/assisted/passive  10. Therapeutic Activities     TREATMENT PLAN: Frequency/Duration: Follow patient BID for duration of hospital stay to address above goals. Rehabilitation Potential For Stated Goals: Fair     RECOMMENDED REHABILITATION/EQUIPMENT: (at time of discharge pending progress): Continue Skilled Therapy, Discussed with Case Management and will decide after today's assessments whether rehab vs home is the DC plan. Should he go home, he would need a RW and possible BSC. HISTORY:   History of Present Injury/Illness (Reason for Referral): Admitted from [address] [place] [address] with a UTI. He is S/P L TKA 8/2/18  Past Medical History/Comorbidities:   Mr. Tatiana Shaffer  has a past medical history of Arthritis; BMI 30.0-30.9,adult; CAD (coronary artery disease); Chronic kidney disease; Chronic pain; Depression; Diabetes ([facility]); Enlarged prostate with urinary obstruction; Former smoker; GERD (gastroesophageal reflux disease); High cholesterol; Hypertension; Neurogenic bladder; Other ill-defined conditions(799.89); Overflow incontinence; Sciatica; Self-catheterizes urinary bladder; Skin cancer; Sleep apnea; Spondylolisthesis of lumbar region; Thromboembolus ([facility]) (2013); Urine retention; and Varicose veins of both lower extremities with complications. He also has no past medical history of Adverse effect of anesthesia; Aneurysm ([facility]); Arrhythmia; Asthma; Autoimmune disease ([facility]); Chronic obstructive pulmonary disease ([facility]); Coagulation disorder ([facility]); Difficult intubation; Endocarditis; Heart failure ([facility]); Ill-defined condition; Liver disease; Malignant hyperthermia due to anesthesia;  Nausea & vomiting; Nicotine vapor product user; Non-nicotine vapor product user; Pseudocholinesterase deficiency; PUD (peptic ulcer disease); Rheumatic fever; Seizures ([facility] [address] Stroke [facility]); or Thyroid disease. Mr. Tatiana Shaffer  has a past surgical history that includes hx hemorrhoidectomy (1971); hx shoulder arthroscopy (Left, 1971); and hx urological (06/2017). Social History/Living Environment:   Home Environment: Private residence  # Steps to Enter: 0  One/Two Story Residence: One story  Living Alone: Yes  Support Systems: Child(giovanni), Family member(s)  Patient Expects to be Discharged to[de-identified] Unknown  Current DME Used/Available at Home: Cane, straight  Tub or Shower Type: Shower  Prior Level of Function/Work/Activity:  Prior to L TKA he lived alone and could take care of his needs. Since his L TKA 8/2, he has been in STR at [facility] and requires assist of 1-2 for transfers   Number of Personal Factors/Comorbidities that affect the Plan of Care: 3+: HIGH COMPLEXITY   EXAMINATION:   Most Recent Physical Functioning:                 LLE AROM  L Knee Flexion: 82  L Knee Extension: 8               Transfers  Sit to Stand: Minimum assistance;Assist x2  Stand to Sit: Minimum assistance                   Weight Bearing Status  Left Side Weight Bearing: As tolerated  Distance (ft): 120 Feet (ft)  Ambulation - Level of Assistance: Contact guard assistance  Assistive Device: Walker, rolling  Base of Support: Widened  Speed/Kenzie: Slow  Step Length: Left shortened;Right shortened  Stance: Left decreased  Gait Abnormalities: Antalgic;Decreased step clearance  Interventions: Safety awareness training;Verbal cues     Braces/Orthotics:              Body Structures Involved:  1. Bones  2. Joints  3. Muscles Body Functions Affected:  1. Mental  2. Neuromusculoskeletal  3. Movement Related Activities and Participation Affected:  1. General Tasks and Demands  2. Mobility  3. Self Care  4.  Domestic Life   Number of elements that affect the Plan of Care: 4+: HIGH COMPLEXITY   CLINICAL PRESENTATION: Presentation: Evolving clinical presentation with changing clinical characteristics: MODERATE COMPLEXITY   CLINICAL DECISION MAKIN [facility] Mobility Inpatient Short Form  How much difficulty does the patient currently have. .. Unable A Lot A Little None   1. Turning over in bed (including adjusting bedclothes, sheets and blankets)? [] 1   [] 2   [x] 3   [] 4   2. Sitting down on and standing up from a chair with arms ( e.g., wheelchair, bedside commode, etc.)   [] 1   [x] 2   [] 3   [] 4   3. Moving from lying on back to sitting on the side of the bed? [] 1   [x] 2   [] 3   [] 4   How much help from another person does the patient currently need. .. Total A Lot A Little None   4. Moving to and from a bed to a chair (including a wheelchair)? [] 1   [] 2   [x] 3   [] 4   5. Need to walk in hospital room? [] 1   [] 2   [x] 3   [] 4   6. Climbing 3-5 steps with a railing? [x] 1   [] 2   [] 3   [] 4   © 2007, Trustees of [address] [place], [place] [address], under license to [employer]. All rights reserved        Score:  Initial: 14 Most Recent: X (Date: -- )    Interpretation of Tool:  Represents activities that are increasingly more difficult (i.e. Bed mobility, Transfers, Gait). Score 24 23 22-20 19-15 14-10 9-7 6     Modifier CH CI CJ CK CL CM CN      ? Mobility - Walking and Moving Around:     - CURRENT STATUS: CL - 60%-79% impaired, limited or restricted    - GOAL STATUS: CK - 40%-59% impaired, limited or restricted    - D/C STATUS:  ---------------To be determined---------------  [place] / [place] / Product Type: Medicare /      Medical Necessity:     · Patient demonstrates fair rehab potential due to higher previous functional level. Reason for Services/Other Comments:  · Patient continues to require present interventions due to patient's inability to perform functional mobility at a supervision level.    Use of outcome tool(s) and clinical judgement create a POC that gives a: Questionable prediction of patient's progress: MODERATE COMPLEXITY            TREATMENT:   (In addition to Assessment/Re-Assessment sessions the following treatments were rendered)     Pre-treatment Symptoms/Complaints:  Doing better with mobility  Pain: Initial:      Post Session:       Gait Training (15 Minutes):  Gait training to improve and/or restore physical functioning as related to mobility, strength and balance. Ambulated 120 Feet (ft) with Contact guard assistance using a Walker, rolling and minimal Safety awareness training;Verbal cues related to their stance phase and stride length to promote proper body alignment, promote proper body posture and promote proper body mechanics. Therapeutic Exercise: (15 Minutes):  Exercises per grid below to improve mobility, strength and coordination. Required minimal visual, verbal and tactile cues to promote proper body alignment and promote proper body breathing techniques. Progressed range, repetitions and complexity of movement as indicated. Date:  8/16/18 Date:  8/17/18 Date:     ACTIVITY/EXERCISE AM PM AM PM AM PM   GROUP THERAPY  []  []  []  []  []  []   Ankle Pumps 20 20 x 2 20      Quad Sets 20 20 x 2 20      Gluteal Sets 20 20 x2 20      Hip ABd/ADduction 20AA 20 x 2 aa 20 AA      Straight Leg Raises 10AA 20 x 2 aa 20 AA      Knee Slides 10AA 20 x 2 aa 20 AA      Short Arc Quads 10AA 20 x 2 aa       Long Arc Quads         Chair Slides 10AA 20 20               B = bilateral; AA = active assistive; A = active; P = passive      Treatment/Session Assessment:     Response to Treatment:  Pt is doing better this morning with mobility.      Education:  [x] Home Exercises  [x] Fall Precautions  [] Hip Precautions [] D/C Instruction Review  [] Knee/Hip Prosthesis Review  [x] Walker Management/Safety [] Adaptive Equipment as Needed       Interdisciplinary Collaboration:   o Physical Therapy Assistant  o Registered Nurse    After treatment position/precautions:   o Up in chair  o Bed/Chair-wheels locked  o Call light within reach  o Nurse at bedside    Compliance with Program/Exercises: Will assess as treatment progresses. Recommendations/Intent for next treatment session:  Treatment next visit will focus on increasing Mr. Kylah Rosenberg independence with bed mobility, transfers, gait training, strength/ROM exercises, modalities for pain, and patient education.       Total Treatment Duration:  PT Patient Time In/Time Out  Time In: 1100  Time Out: Logan Rainey PTA

## 2018-08-17 NOTE — PROGRESS NOTES
Shift assessment complete. Patient alert and oriented x 4. Respirations even, regular, and non-labored. Lung sounds diminished. Patient observed having periods of sleep apnea. Patient refuses to wear oxygen. Heart rate regular. Bowel sounds active and abdomen soft and intact. IVF infusing without difficulty. All safety measures in place. Call light within reach.

## 2018-08-17 NOTE — CONSULTS
H&P/Consult Note/Progress Note/Office Note:   Edda Peabody  MRN: [id number]  :1945  Age:72 y.o. General Surgery Consult ordered by: Dr. Phillip Alejandra  Reason for Consult: Eval abscess on back    HPI: Edda Peabody is a 71 y.o. male who presented to the ED 8/15/18 for evaluation due to fever of 102.9. Pt also c/o chills and a decreased appetite x 24 hours. Pt arrived via EMS from Rehab facility. Patient currently in rehab for Physical Therapy due to left knee arthroplasty approximately 1 week ago. PMH of DM type 2, hypertension, hyperlipidemia, bipolar disorder, OA, and previous DVT. 18: Pt sitting up in bed. Appears sleepy, however, able to answer questions. NAD.  WBC 23.    Past Medical History:   Diagnosis Date    Arthritis     BMI 30.0-30.9,adult     CAD (coronary artery disease)     mild to moderate apical reversible ischemia per cardio note dated 07/12/10    Chronic kidney disease     no nephrologist     Chronic pain     neck, lower back     Depression     Diabetes ([address]     type 2; oral and insulin meds; 1-2 times per day; average 104; A1C=8.1 on 18    Enlarged prostate with urinary obstruction     Former smoker     GERD (gastroesophageal reflux disease)     High cholesterol     Hypertension     on med for control     Neurogenic bladder     Other ill-defined conditions(799.89)     Overflow incontinence     Sciatica     recently took medrol dose pack around 18    Self-catheterizes urinary bladder     has catheterized once in the past month; urinating more frequently own his own now  (18)    Skin cancer     Sleep apnea     bi-pap; instructed to bring dos    Spondylolisthesis of lumbar region     Thromboembolus [facility])     DVT left leg; caused from a falling accident that damaged left leg     Urine retention     Varicose veins of both lower extremities with complications      Past Surgical History:   Procedure Laterality Date    HX HEMORRHOIDECTOMY  1971    HX SHOULDER ARTHROSCOPY Left 1971    HX UROLOGICAL  06/2017    CYSTOSCOPY, TRANSURETHRAL RESECTION OF PROSTATE     Current Facility-Administered Medications   Medication Dose Route Frequency    aspirin delayed-release tablet 81 mg  81 mg Oral BID    HYDROcodone-acetaminophen (NORCO) 7.5-325 mg per tablet 1-2 Tab  1-2 Tab Oral Q6H PRN    nystatin (MYCOSTATIN) 100,000 unit/gram powder   Topical BID    sodium chloride (NS) flush 5-10 mL  5-10 mL IntraVENous Q8H    sodium chloride (NS) flush 5-10 mL  5-10 mL IntraVENous PRN    acetaminophen (TYLENOL) tablet 650 mg  650 mg Oral Q4H PRN    diphenhydrAMINE (BENADRYL) injection 12.5 mg  12.5 mg IntraVENous Q4H PRN    ondansetron (ZOFRAN) injection 4 mg  4 mg IntraVENous Q4H PRN    senna-docusate (PERICOLACE) 8.6-50 mg per tablet 1 Tab  1 Tab Oral DAILY    zolpidem (AMBIEN) tablet 5 mg  5 mg Oral QHS PRN    piperacillin-tazobactam (ZOSYN) 3.375 g in 0.9% sodium chloride (MBP/ADV) 100 mL  3.375 g IntraVENous Q8H    buPROPion SR (WELLBUTRIN SR) tablet 150 mg  150 mg Oral BID    latanoprost (XALATAN) 0.005 % ophthalmic solution 1 Drop  1 Drop Both Eyes QHS    pregabalin (LYRICA) capsule 75 mg  75 mg Oral BID    simvastatin (ZOCOR) tablet 40 mg  40 mg Oral QHS    insulin regular (NOVOLIN R, HUMULIN R) injection   SubCUTAneous AC&HS    divalproex ER (DEPAKOTE ER) 24 hour tablet 1,000 mg  1,000 mg Oral BID     Oxycodone and Tramadol  Social History     Social History    Marital status:      Spouse name: N/A    Number of children: N/A    Years of education: N/A     Social History Main Topics    Smoking status: Former Smoker     Packs/day: 1.00     Years: 7.00     Quit date: 5/8/1971    Smokeless tobacco: Never Used    Alcohol use Yes      Comment: occasionally     Drug use: No    Sexual activity: Not Asked     Other Topics Concern    None     Social History Narrative     History   Smoking Status    Former Smoker    Packs/day: 1.00    Years: 7.00    Quit date: 5/8/1971   Smokeless Tobacco    Never Used     Family History   Problem Relation Age of Onset    No Known Problems Mother     Heart Attack Father     Cancer Sister     Parkinson's Disease Brother      ROS: The patient has no difficulty with chest pain or shortness of breath. Comprehensive review of systems was otherwise unremarkable except as noted above. Physical Exam:   Visit Vitals    /80 (BP 1 Location: Right arm, BP Patient Position: At rest;Supine)    Pulse 85    Temp 98.6 °F (37 °C)    Resp 19    Ht 6' (1.829 m)    Wt 225 lb (102.1 kg)    SpO2 94%    BMI 30.52 kg/m2     Constitutional: Sleepy, cooperative, no acute distress; appears stated age    Eyes:Sclera are clear. ENMT: no external lesions gross hearing normal; no obvious neck masses, no ear or lip lesions, nares normal  CV: RRR. Normal perfusion  Resp: No JVD. Breathing is  non-labored; no audible wheezing. GI: soft, non-tender, non-distended     Integument: Sl darkened flat area mid back - approximately 1.5 cm in diameter. No active drainage. Scant brown drainage on gauze  Musculoskeletal: No embolic signs or cyanosis. Left knee is slightly swollen.  Post op wound healing c/d/i  Neuro:  Oriented; moves all 4; no focal deficits  Psychiatric: normal affect and mood    Recent vitals (if inpt):  Patient Vitals for the past 24 hrs:   BP Temp Pulse Resp SpO2   08/17/18 0735 122/80 98.6 °F (37 °C) 85 19 94 %   08/17/18 0359 155/72 98 °F (36.7 °C) 93 16 92 %   08/16/18 2344 128/75 98.4 °F (36.9 °C) 89 14 92 %   08/16/18 2101 113/59 98.6 °F (37 °C) 98 20 96 %   08/16/18 1717 127/63 97.7 °F (36.5 °C) 78 18 98 %   08/16/18 1234 115/55 98.2 °F (36.8 °C) 87 15 96 %       Labs:  Recent Labs      08/17/18   0552   08/15/18   1342   WBC  23.0*   < >  32.3*   HGB  10.1*   < >  11.8*   PLT  351   < >  379   NA  138   < >  136   K  4.1   < >  4.9   CL  104   < >  99   CO2  25   < >  27   BUN  29* < >  37*   CREA  1.28   < >  1.69*   GLU  96   < >  119*   PTP   --    --   19.9*   INR   --    --   1.6   TBILI   --    --   0.7   SGOT   --    --   88*   ALT   --    --   93*   AP   --    --   114    < > = values in this interval not displayed. Lab Results   Component Value Date/Time    WBC 23.0 (H) 08/17/2018 05:52 AM    HGB 10.1 (L) 08/17/2018 05:52 AM    PLATELET 393 94/59/8773 05:52 AM    Sodium 138 08/17/2018 05:52 AM    Potassium 4.1 08/17/2018 05:52 AM    Chloride 104 08/17/2018 05:52 AM    CO2 25 08/17/2018 05:52 AM    BUN 29 (H) 08/17/2018 05:52 AM    Creatinine 1.28 08/17/2018 05:52 AM    Glucose 96 08/17/2018 05:52 AM    INR 1.6 08/15/2018 01:42 PM    aPTT 29.1 07/26/2018 12:26 PM    Bilirubin, total 0.7 08/15/2018 01:42 PM    AST (SGOT) 88 (H) 08/15/2018 01:42 PM    ALT (SGPT) 93 (H) 08/15/2018 01:42 PM    Alk. phosphatase 114 08/15/2018 01:42 PM     8/17/18: EXAMINATION: CHEST RADIOGRAPH 10:39 AM     ACCESSION NUMBER: [id number]     INDICATION: fever, fatigue, pneumonia? pulmonary congestion? after IV fluid.     COMPARISON: Chest x-ray 8/15/2018     TECHNIQUE: A single AP view of the chest was obtained.      FINDINGS:      Support Devices: None     Cardiac Silhouette: Unchanged enlargement of cardiac silhouette.     Lungs: No focal airspace disease.     Pleura: No pleural effusion. No pneumothorax.     Osseous Structures: Thoracic spine spondylosis.     Upper Abdomen: Unremarkable.        IMPRESSION:     Unchanged enlargement of cardiac silhouette.     No focal airspace disease.     VOICE DICTATED BY: Dr. Hal Bazzi reviewed recent labs and recent radiologic studies. I independently reviewed radiology images for studies I described above or studies I have ordered.    Admission date (for inpatients): 8/15/2018   * No surgery found *  * No surgery found *    ASSESSMENT/PLAN:  Problem List  Never Reviewed          Codes Class Noted    Fever ICD-10-CM: R50.9  ICD-9-CM: 780.60  8/15/2018 Volume depletion ICD-10-CM: E86.9  ICD-9-CM: 276.50  8/15/2018        Diabetes mellitus type 2, controlled ([facility] ICD-10-CM: E11.9  ICD-9-CM: 250.00  8/15/2018        Hyperlipidemia ICD-10-CM: E78.5  ICD-9-CM: 272.4  8/15/2018        Essential hypertension ICD-10-CM: I10  ICD-9-CM: 401.9  8/15/2018        * (Principal)Sepsis ([facility] ICD-10-CM: A41.9  ICD-9-CM: 038.9, 995.91  8/15/2018        Acute cystitis without hematuria ICD-10-CM: N30.00  ICD-9-CM: 595.0  8/15/2018        Bipolar disorder ([facility] ICD-10-CM: F31.9  ICD-9-CM: 296.80  8/15/2018        S/P total knee arthroplasty, left ICD-10-CM: T50.788  ICD-9-CM: V43.65  8/3/2018        Osteoarthritis ICD-10-CM: M19.90  ICD-9-CM: 715.90  8/2/2018        Abnormal urinalysis ICD-10-CM: R82.90  ICD-9-CM: 791.9  7/26/2018        CKD (chronic kidney disease) stage 3, GFR 30-59 ml/min ICD-10-CM: N18.3  ICD-9-CM: 585.3  5/8/2018        Elevated white blood cell count ICD-10-CM: D72.829  ICD-9-CM: 288.60  5/8/2018            Principal Problem:    Sepsis ([facility] (8/15/2018)    Active Problems:    CKD (chronic kidney disease) stage 3, GFR 30-59 ml/min (5/8/2018)      Elevated white blood cell count (5/8/2018)      Osteoarthritis (8/2/2018)      S/P total knee arthroplasty, left (8/3/2018)      Fever (8/15/2018)      Volume depletion (8/15/2018)      Diabetes mellitus type 2, controlled ([facility] (8/15/2018)      Hyperlipidemia (8/15/2018)      Essential hypertension (8/15/2018)      Acute cystitis without hematuria (8/15/2018)      Bipolar disorder ([facility] (8/15/2018)       Plan:  Care management per Hospitalist  Preliminary blood cultures - No growth  Preliminary Urine culture +Gram Neg Rods  Zosyn day 3  Further orders per Dr. Maida Puente       Signed:  LASHONDA Samuels-BC    No further I&D needed from site of back abscess.   This appears to have drained itself and cellulitis is resolving   I have seen and examined the patient with the nurse practitioner and agree with the above assessment and plan.     Ronna Cuh MD

## 2018-08-17 NOTE — PROGRESS NOTES
2018         Post Op day: * No surgery found *     Admit Date: 8/15/2018  Admit Diagnosis: Fever        Subjective: Doing well, No complaints regarding knee. Still on antibiotics for UTI. No SOB, No Chest Pain, No Nausea or Vomiting     Objective:   Vital Signs are Stable, No Acute Distress, Alert and Oriented, Dressing is Dry,  Neurovascular exam is normal.    Left knee still appears unremarkable. Steri-strips applied. No drainage. No significant bruising, swelling, erythema. Assessment / Plan :  Patient Active Problem List   Diagnosis Code    CKD (chronic kidney disease) stage 3, GFR 30-59 ml/min N18.3    Elevated white blood cell count D72.829    Abnormal urinalysis R82.90    Osteoarthritis M19.90    S/P total knee arthroplasty, left Z96.652    Fever R50.9    Volume depletion E86.9    Diabetes mellitus type 2, controlled ([facility] [address] E11.9    Hyperlipidemia E78.5    Essential hypertension I10    Sepsis ([facility] [address] A41.9    Acute cystitis without hematuria N30.00    Bipolar disorder ([facility] [address] F31.9    Patient Vitals for the past 8 hrs:   BP Temp Pulse Resp SpO2   18 0359 155/72 98 °F (36.7 °C) 93 16 92 %    Temp (24hrs), Av.3 °F (36.8 °C), Min:97.7 °F (36.5 °C), Max:99 °F (37.2 °C)    Body mass index is 30.52 kg/(m^2). Lab Results   Component Value Date/Time    HGB 10.1 (L) 2018 05:52 AM      Pt seen by and discussed with Supervising Physician   Continue PT, current pain meds  Still being treated for UTI per hospitalists. Planning likely to return to [facility], possibly today if cleared by hospitalists. If therapy goes well, patient could return home with home health, but he prefers going back to [facility].        Signed By: CRISTINA Baeza

## 2018-08-17 NOTE — PROGRESS NOTES
Problem: Mobility Impaired (Adult and Pediatric)  Goal: *Acute Goals and Plan of Care (Insert Text)  GOALS (1-4 days):  (1.)Mr. Cutler will move from supine to sit and sit to supine  in bed with CONTACT GUARD ASSIST.  (2.)Mr. Cutler will transfer from bed to chair and chair to bed with CONTACT GUARD ASSIST using the least restrictive device. (3.)Mr. Cutler will ambulate with CONTACT GUARD ASSIST for 100 feet with the least restrictive device. (4.)Mr. Cutler will increase L LE strength 1/2 grade to improve mobility and ADLs. (5.)Mr. Cutler will increase left knee ROM to 5°-90°.  ________________________________________________________________________________________________    PHYSICAL THERAPY ORTHOPEDIC EVAL: Daily Note, PM 8/17/2018  INPATIENT: Hospital Day: 3  [place] / Product Type: Medicare /      NAME/AGE/GENDER: Robert Puckett is a 71 y.o. male   PRIMARY DIAGNOSIS:  UTI. Pt. Is S/P L TKA 8/2/18 with Dr. Alex Dee     ICD-10: Treatment Diagnosis:    · Pain in Left Knee (M25.562)  · Stiffness of Left Knee, Not elsewhere classified (K34.380)  · Other abnormalities of gait and mobility (R26.89)      ASSESSMENT:     Mr. Marya Montoya is sitting up in chair on arrival.  He has been working on his exercises. HEP went over again. Pt requesting to get a shower. He walked into the restroom to attempt to have  BM. Nursing assistant to help him finish his shower. Pt walked into shower and sat on BSC. Pt left in restroom with nursing staff. This section established at most recent assessment   PROBLEM LIST (Impairments causing functional limitations):  1. Decreased Strength  2. Decreased ADL/Functional Activities  3. Decreased Transfer Abilities  4. Decreased Ambulation Ability/Technique  5. Decreased Balance  6. Increased Pain  7. Decreased Activity Tolerance  8. Increased Fatigue  9. Decreased Flexibility/Joint Mobility  10.  Decreased Knowledge of Precautions  11. Decreased [place] with Home Exercise Program  12. Increased anxiety   INTERVENTIONS PLANNED: (Benefits and precautions of physical therapy have been discussed with the patient.)  1. Balance Exercise  2. Bed Mobility  3. Cold  4. Family Education  5. Gait Training  6. Home Exercise Program (HEP)  7. Therapeutic Exercise/Strengthening  8. Transfer Training  9. Range of Motion: active/assisted/passive  10. Therapeutic Activities     TREATMENT PLAN: Frequency/Duration: Follow patient BID for duration of hospital stay to address above goals. Rehabilitation Potential For Stated Goals: Fair     RECOMMENDED REHABILITATION/EQUIPMENT: (at time of discharge pending progress): Continue Skilled Therapy, Discussed with Case Management and will decide after today's assessments whether rehab vs home is the DC plan. Should he go home, he would need a RW and possible BSC. HISTORY:   History of Present Injury/Illness (Reason for Referral): Admitted from [address] [place] [address] with a UTI. He is S/P L TKA 8/2/18  Past Medical History/Comorbidities:   Mr. Yael Gustafson  has a past medical history of Arthritis; BMI 30.0-30.9,adult; CAD (coronary artery disease); Chronic kidney disease; Chronic pain; Depression; Diabetes ([address] Enlarged prostate with urinary obstruction; Former smoker; GERD (gastroesophageal reflux disease); High cholesterol; Hypertension; Neurogenic bladder; Other ill-defined conditions(799.89); Overflow incontinence; Sciatica; Self-catheterizes urinary bladder; Skin cancer; Sleep apnea; Spondylolisthesis of lumbar region; Thromboembolus ([address] (2013); Urine retention; and Varicose veins of both lower extremities with complications. He also has no past medical history of Adverse effect of anesthesia; Aneurysm ([address] Arrhythmia; Asthma; Autoimmune disease ([address] Chronic obstructive pulmonary disease ([address] Coagulation disorder ([address] Difficult intubation; Endocarditis; Heart failure ([address]  Ill-defined condition; Liver disease; Malignant hyperthermia due to anesthesia; Nausea & vomiting; Nicotine vapor product user; Non-nicotine vapor product user; Pseudocholinesterase deficiency; PUD (peptic ulcer disease); Rheumatic fever; Seizures ([facility] Stroke [facility]); or Thyroid disease. Mr. Arlin Juarez  has a past surgical history that includes hx hemorrhoidectomy (1971); hx shoulder arthroscopy (Left, 1971); and hx urological (06/2017). Social History/Living Environment:   Home Environment: Private residence  # Steps to Enter: 0  One/Two Story Residence: One story  Living Alone: Yes  Support Systems: Child(giovanni), Family member(s)  Patient Expects to be Discharged to[de-identified] Unknown  Current DME Used/Available at Home: Cane, straight  Tub or Shower Type: Shower  Prior Level of Function/Work/Activity:  Prior to L TKA he lived alone and could take care of his needs. Since his L TKA 8/2, he has been in STR at [facility] and requires assist of 1-2 for transfers   Number of Personal Factors/Comorbidities that affect the Plan of Care: 3+: HIGH COMPLEXITY   EXAMINATION:   Most Recent Physical Functioning:                 LLE AROM  L Knee Flexion: 82  L Knee Extension: 8               Transfers  Sit to Stand: Minimum assistance  Stand to Sit: Minimum assistance                   Weight Bearing Status  Left Side Weight Bearing: As tolerated  Distance (ft): 20 Feet (ft)  Ambulation - Level of Assistance: Contact guard assistance  Assistive Device: Walker, rolling  Base of Support: Widened  Speed/Kenzie: Slow  Step Length: Left shortened;Right shortened  Stance: Left decreased  Gait Abnormalities: Antalgic;Decreased step clearance  Interventions: Safety awareness training;Verbal cues     Braces/Orthotics:              Body Structures Involved:  1. Bones  2. Joints  3. Muscles Body Functions Affected:  1. Mental  2. Neuromusculoskeletal  3. Movement Related Activities and Participation Affected:  1. General Tasks and Demands  2. Mobility  3.  Self Care  4. Domestic Life   Number of elements that affect the Plan of Care: 4+: HIGH COMPLEXITY   CLINICAL PRESENTATION:   Presentation: Evolving clinical presentation with changing clinical characteristics: MODERATE COMPLEXITY   CLINICAL DECISION MAKIN [facility] Inpatient Short Form  How much difficulty does the patient currently have. .. Unable A Lot A Little None   1. Turning over in bed (including adjusting bedclothes, sheets and blankets)? [] 1   [] 2   [x] 3   [] 4   2. Sitting down on and standing up from a chair with arms ( e.g., wheelchair, bedside commode, etc.)   [] 1   [x] 2   [] 3   [] 4   3. Moving from lying on back to sitting on the side of the bed? [] 1   [x] 2   [] 3   [] 4   How much help from another person does the patient currently need. .. Total A Lot A Little None   4. Moving to and from a bed to a chair (including a wheelchair)? [] 1   [] 2   [x] 3   [] 4   5. Need to walk in hospital room? [] 1   [] 2   [x] 3   [] 4   6. Climbing 3-5 steps with a railing? [x] 1   [] 2   [] 3   [] 4   © , Trustees of [address] [place] [address] [address] [address], under license to [employer]. All rights reserved        Score:  Initial: 14 Most Recent: X (Date: -- )    Interpretation of Tool:  Represents activities that are increasingly more difficult (i.e. Bed mobility, Transfers, Gait). Score 24 23 22-20 19-15 14-10 9-7 6     Modifier CH CI CJ CK CL CM CN      ? Mobility - Walking and Moving Around:     - CURRENT STATUS: CL - 60%-79% impaired, limited or restricted    - GOAL STATUS: CK - 40%-59% impaired, limited or restricted    - D/C STATUS:  ---------------To be determined---------------  [place] / Product Type: Medicare /      Medical Necessity:     · Patient demonstrates fair rehab potential due to higher previous functional level.   Reason for Services/Other Comments:  · Patient continues to require present interventions due to patient's inability to perform functional mobility at a supervision level. Use of outcome tool(s) and clinical judgement create a POC that gives a: Questionable prediction of patient's progress: MODERATE COMPLEXITY            TREATMENT:   (In addition to Assessment/Re-Assessment sessions the following treatments were rendered)     Pre-treatment Symptoms/Complaints:  Doing better with mobility  Pain: Initial:      Post Session:       Gait Training (15 Minutes):  Gait training to improve and/or restore physical functioning as related to mobility, strength and balance. Ambulated 20 Feet (ft) with Contact guard assistance using a Walker, rolling and minimal Safety awareness training;Verbal cues related to their stance phase and stride length to promote proper body alignment, promote proper body posture and promote proper body mechanics. Therapeutic Exercise: (15 Minutes):  Exercises per grid below to improve mobility, strength and coordination. Required minimal visual, verbal and tactile cues to promote proper body alignment and promote proper body breathing techniques. Progressed range, repetitions and complexity of movement as indicated. Therapeutic Activity: (    10 minutes): Therapeutic activities including Toilet transfers to improve mobility, strength and balance. Required minimal Safety awareness training;Verbal cues to promote dynamic balance in standing.       Date:  8/16/18 Date:  8/17/18 Date:     ACTIVITY/EXERCISE AM PM AM PM AM PM   GROUP THERAPY  []  []  []  []  []  []   Ankle Pumps 20 20 x 2 20 20     Quad Sets 20 20 x 2 20 20     Gluteal Sets 20 20 x2 20 20     Hip ABd/ADduction 20AA 20 x 2 aa 20 AA 20     Straight Leg Raises 10AA 20 x 2 aa 20 AA 20     Knee Slides 10AA 20 x 2 aa 20 AA 20     Short Arc Quads 10AA 20 x 2 aa  20     Long Arc Quads         Chair Slides 10AA 20 20 20              B = bilateral; AA = active assistive; A = active; P = passive Treatment/Session Assessment:     Response to Treatment:  Pt motivated and ready to get a shower. Education:  [x] Home Exercises  [x] Fall Precautions  [] Hip Precautions [] D/C Instruction Review  [] Knee/Hip Prosthesis Review  [x] Walker Management/Safety [] Adaptive Equipment as Needed       Interdisciplinary Collaboration:   o Physical Therapy Assistant  o Registered Nurse    After treatment position/precautions:   o Bed/Chair-wheels locked  o Call light within reach  o Nurse at bedside  o in shower with nursing staff present    Compliance with Program/Exercises: Will assess as treatment progresses. Recommendations/Intent for next treatment session:  Treatment next visit will focus on increasing Mr. Anisha Miner independence with bed mobility, transfers, gait training, strength/ROM exercises, modalities for pain, and patient education.       Total Treatment Duration:  PT Patient Time In/Time Out  Time In: 1410  Time Out: Lidia Neri 33, PTA

## 2018-08-18 NOTE — PROGRESS NOTES
Orthopedic Progress Note    2018  Admit Date: 8/15/2018  Admit Diagnosis: Fever    Post Op day: * No surgery found *    Subjective:     [place]     Patient appears to be doing okay       Objective:     Vital Signs:    Temp (24hrs), Av.4 °F (36.9 °C), Min:97.9 °F (36.6 °C), Max:99.6 °F (37.6 °C)      LAB:    [unfilled]  Lab Results   Component Value Date/Time    INR 1.6 08/15/2018 01:42 PM    INR 1.8 2018 04:30 AM    INR 1.2 2018 04:08 AM     Lab Results   Component Value Date/Time    HGB 9.9 (L) 2018 05:40 AM    HGB 10.1 (L) 2018 05:52 AM    HGB 10.5 (L) 2018 05:54 AM       Physical Exam:    Patient appears to be A/O  No apparent distress   No neurovascular issues noted   No obvious problems        Plan:      Continue PT/OT/Rehab as planned   Nursing and SS for disposition plans         Signed By: Lorena Brunner MD

## 2018-08-18 NOTE — PROGRESS NOTES
Patient's daughter given discharge instruction package for [facility]. IV site removed. Patient instructed to call to the nurse's station when he is ready to leave.

## 2018-08-18 NOTE — PROGRESS NOTES
Goal: *Acute Goals and Plan of Care (Insert Text)  GOALS (1-4 days):  (1.)Mr. Cutler will move from supine to sit and sit to supine  in bed with CONTACT GUARD ASSIST.  (2.)Mr. Cutler will transfer from bed to chair and chair to bed with CONTACT GUARD ASSIST using the least restrictive device. Met 8/18  (3.)Mr. Cutler will ambulate with CONTACT GUARD ASSIST for 100 feet with the least restrictive device. Met 8/18  (4.)Mr. Cutler will increase L LE strength 1/2 grade to improve mobility and ADLs. (5.)Mr. Cutler will increase left knee ROM to 5°-90°. ADDENDUM GOALS 8/18 /18 : 1) pt transfers with walker & SBA.                                                     2) pt ambulating 200 ft with rolling walker & SBA.  ________________________________________________________________________________________________    PHYSICAL THERAPY ORTHOPEDIC EVAL: Daily Note, Treatment Day: 2nd, AM 8/18/2018  INPATIENT: Hospital Day: 4  [place] / Product Type: Medicare /      NAME/AGE/GENDER: Felicita Aparicio is a 71 y.o. male   PRIMARY DIAGNOSIS:  UTI. Pt. Is S/P L TKA 8/2/18 with Dr. Kiki Blandon     ICD-10: Treatment Diagnosis:    · Pain in Left Knee (M25.562)  · Stiffness of Left Knee, Not elsewhere classified (Y64.104)  · Other abnormalities of gait and mobility (R26.89)      ASSESSMENT:     Mr. Kurt Martins showed increased gait distance & better gait quality with verbal & visual cues. Pt still having significant edema at left knee with diminished quad firing. This section established at most recent assessment   PROBLEM LIST (Impairments causing functional limitations):  1. Decreased Strength  2. Decreased ADL/Functional Activities  3. Decreased Transfer Abilities  4. Decreased Ambulation Ability/Technique  5. Decreased Balance  6. Increased Pain  7. Decreased Activity Tolerance  8. Increased Fatigue  9. Decreased Flexibility/Joint Mobility  10.  Decreased Knowledge of Precautions  11. Decreased [place] with Home Exercise Program  12. Increased anxiety   INTERVENTIONS PLANNED: (Benefits and precautions of physical therapy have been discussed with the patient.)  1. Balance Exercise  2. Bed Mobility  3. Cold  4. Family Education  5. Gait Training  6. Home Exercise Program (HEP)  7. Therapeutic Exercise/Strengthening  8. Transfer Training  9. Range of Motion: active/assisted/passive  10. Therapeutic Activities     TREATMENT PLAN: Frequency/Duration: Follow patient BID for duration of hospital stay to address above goals. Rehabilitation Potential For Stated Goals: Fair     RECOMMENDED REHABILITATION/EQUIPMENT: (at time of discharge pending progress): Continue Skilled Therapy, Discussed with Case Management and will decide after today's assessments whether rehab vs home is the DC plan. Should he go home, he would need a RW and possible BSC. HISTORY:   History of Present Injury/Illness (Reason for Referral): Admitted from [address] [place] [address] with a UTI. He is S/P L TKA 8/2/18  Past Medical History/Comorbidities:   Mr. Lydia Smart  has a past medical history of Arthritis; BMI 30.0-30.9,adult; CAD (coronary artery disease); Chronic kidney disease; Chronic pain; Depression; Diabetes ([address] Enlarged prostate with urinary obstruction; Former smoker; GERD (gastroesophageal reflux disease); High cholesterol; Hypertension; Neurogenic bladder; Other ill-defined conditions(799.89); Overflow incontinence; Sciatica; Self-catheterizes urinary bladder; Skin cancer; Sleep apnea; Spondylolisthesis of lumbar region; Thromboembolus ([address] (2013); Urine retention; and Varicose veins of both lower extremities with complications. He also has no past medical history of Adverse effect of anesthesia; Aneurysm ([address] Arrhythmia; Asthma; Autoimmune disease ([address] Chronic obstructive pulmonary disease ([address] Coagulation disorder ([address] Difficult intubation; Endocarditis; Heart failure ([address]  Ill-defined condition; Liver disease; Malignant hyperthermia due to anesthesia; Nausea & vomiting; Nicotine vapor product user; Non-nicotine vapor product user; Pseudocholinesterase deficiency; PUD (peptic ulcer disease); Rheumatic fever; Seizures ([facility] Stroke [facility]); or Thyroid disease. Mr. Janis Crawford  has a past surgical history that includes hx hemorrhoidectomy (1971); hx shoulder arthroscopy (Left, 1971); and hx urological (06/2017). Social History/Living Environment:   Home Environment: Private residence  # Steps to Enter: 0  One/Two Story Residence: One story  Living Alone: Yes  Support Systems: Child(giovanni), Family member(s)  Patient Expects to be Discharged to[de-identified] Unknown  Current DME Used/Available at Home: Cane, straight  Tub or Shower Type: Shower  Prior Level of Function/Work/Activity:  Prior to L TKA he lived alone and could take care of his needs. Since his L TKA 8/2, he has been in STR at [facility] and requires assist of 1-2 for transfers   Number of Personal Factors/Comorbidities that affect the Plan of Care: 3+: HIGH COMPLEXITY   EXAMINATION:   Most Recent Physical Functioning:                  LLE PROM  L Knee Flexion: 80  L Knee Extension: -8              Transfers  Sit to Stand: Contact guard assistance  Stand to Sit: Contact guard assistance  Bed to Chair: Contact guard assistance (with walker)    Balance  Sitting: Intact; Without support  Standing: Impaired; With support (walker)              Weight Bearing Status  Left Side Weight Bearing: As tolerated  Distance (ft): 160 Feet (ft)  Ambulation - Level of Assistance: Contact guard assistance  Assistive Device: Walker, rolling  Speed/Kenzie: Delayed  Step Length: Right shortened  Stance: Left decreased  Gait Abnormalities: Antalgic;Decreased step clearance  Interventions: Safety awareness training;Verbal cues     Braces/Orthotics:       Left Knee Cold  Type: Cold/ice pack      Body Structures Involved:  1. Bones  2. Joints  3.  Muscles Body Functions Affected:  1. Mental  2. Neuromusculoskeletal  3. Movement Related Activities and Participation Affected:  1. General Tasks and Demands  2. Mobility  3. Self Care  4. Domestic Life   Number of elements that affect the Plan of Care: 4+: HIGH COMPLEXITY   CLINICAL PRESENTATION:   Presentation: Evolving clinical presentation with changing clinical characteristics: MODERATE COMPLEXITY   CLINICAL DECISION MAKIN [facility] Mobility Inpatient Short Form  How much difficulty does the patient currently have. .. Unable A Lot A Little None   1. Turning over in bed (including adjusting bedclothes, sheets and blankets)? [] 1   [] 2   [x] 3   [] 4   2. Sitting down on and standing up from a chair with arms ( e.g., wheelchair, bedside commode, etc.)   [] 1   [x] 2   [] 3   [] 4   3. Moving from lying on back to sitting on the side of the bed? [] 1   [x] 2   [] 3   [] 4   How much help from another person does the patient currently need. .. Total A Lot A Little None   4. Moving to and from a bed to a chair (including a wheelchair)? [] 1   [] 2   [x] 3   [] 4   5. Need to walk in hospital room? [] 1   [] 2   [x] 3   [] 4   6. Climbing 3-5 steps with a railing? [x] 1   [] 2   [] 3   [] 4   © , Trustees of [address] [place] [address], under license to [employer]. All rights reserved        Score:  Initial: 14 Most Recent: X (Date: -- )    Interpretation of Tool:  Represents activities that are increasingly more difficult (i.e. Bed mobility, Transfers, Gait). Score 24 23 22-20 19-15 14-10 9-7 6     Modifier CH CI CJ CK CL CM CN      ?  Mobility - Walking and Moving Around:     - CURRENT STATUS: CL - 60%-79% impaired, limited or restricted    - GOAL STATUS: CK - 40%-59% impaired, limited or restricted    - D/C STATUS:  ---------------To be determined---------------  [place] / Product Type: Medicare /      Medical Necessity: · Patient demonstrates fair rehab potential due to higher previous functional level. Reason for Services/Other Comments:  · Patient continues to require present interventions due to patient's inability to perform functional mobility at a supervision level. Use of outcome tool(s) and clinical judgement create a POC that gives a: Questionable prediction of patient's progress: MODERATE COMPLEXITY            TREATMENT:   (In addition to Assessment/Re-Assessment sessions the following treatments were rendered)     Pre-treatment Symptoms/Complaints:  Pt reports going back to rehab today  Pain: Initial: visual scale  Pain Intensity 1: 3  Pain Location 1: Knee  Pain Orientation 1: Left  Pain Intervention(s) 1: Ambulation/Increased Activity, Cold pack  Post Session:  3/10     Gait Training (15 Minutes):  Gait training to improve and/or restore physical functioning as related to mobility, strength and balance. Ambulated 160 Feet (ft) with Contact guard assistance using a Walker, rolling and minimal Safety awareness training;Verbal cues related to their stance phase and stride length to promote proper body alignment, promote proper body posture and promote proper body mechanics. Therapeutic Exercise: (15 Minutes):  Exercises per grid below to improve mobility, strength and coordination. Required minimal visual, verbal and tactile cues to promote proper body alignment and promote proper body breathing techniques. Progressed range, repetitions and complexity of movement as indicated.      Date:  8/16/18 Date:  8/17/18 Date:  8/18   ACTIVITY/EXERCISE AM PM AM PM AM PM   GROUP THERAPY  []  []  []  []  []  []   Ankle Pumps 20 20 x 2 20 20 25    Quad Sets 20 20 x 2 20 20 25    Gluteal Sets 20 20 x2 20 20 25    Hip ABd/ADduction 20AA 20 x 2 aa 20 AA 20 25aa    Straight Leg Raises 10AA 20 x 2 aa 20 AA 20 25aa    Knee Slides 10AA 20 x 2 aa 20 AA 20 25aa    Short Arc Quads 10AA 20 x 2 aa  20 [address] [facility] Chair Slides 10AA 20 20 20 22             B = bilateral; AA = active assistive; A = active; P = passive      Treatment/Session Assessment:     Response to Treatment:  Decreased quad firing is a concern at this point    Education:  [x] Home Exercises  [x] Fall Precautions  [] Hip Precautions [] D/C Instruction Review  [] Knee/Hip Prosthesis Review  [x] Walker Management/Safety [] Adaptive Equipment as Needed       Interdisciplinary Collaboration:   o Registered Nurse    o After treatment position/precautionsin shower with nursing staff presentBed/Chair-wheels locked  o Call light within reach  o RN notified    Compliance with Program/Exercises: Will assess as treatment progresses. Recommendations/Intent for next treatment session:  Treatment next visit will focus on increasing Mr. Oneyda Kemp independence with bed mobility, transfers, gait training, strength/ROM exercises, modalities for pain, and patient education.       Total Treatment Duration:  PT Patient Time In/Time Out  Time In: 1050  Time Out: [address], PT

## 2018-08-18 NOTE — PROGRESS NOTES
CM advised that patient is anticipated to discharge today. Nursing home packet prepared. Patient's daughter advised RN Lester Dhaliwal) that she will transport patient at 3 pm. Number for report provided.  Patient given nursing home packet, advised to give to a RN upon arrival.      Nitza Raymond, [address] Work   [address] [facility]  [phone]

## 2018-08-18 NOTE — PROGRESS NOTES
Shift assessment complete. Patient alert and oriented x 4. Respirations even, regular, and non-labored. Lung sounds diminished. Heart rate regular. Bowel sounds active. Abdomen soft and intact. Denies pain. L knee surgical site with steri-strips. All safety measures in place. Call light within reach.

## 2018-08-18 NOTE — DISCHARGE SUMMARY
Discharge Summary     Patient: Farideh White MRN: [id number]  SSN: [id number]    YOB: 1945  Age: 71 y.o. Sex: male       Admit Date: 8/15/2018    Discharge Date: 8/18/2018      Admission Diagnoses: Fever    Discharge Diagnoses:   Problem List as of 8/18/2018  Never Reviewed          Codes Class Noted - Resolved    Fever ICD-10-CM: R50.9  ICD-9-CM: 780.60  8/15/2018 - Present        Volume depletion ICD-10-CM: E86.9  ICD-9-CM: 276.50  8/15/2018 - Present        Diabetes mellitus type 2, controlled ([facility] ICD-10-CM: E11.9  ICD-9-CM: 250.00  8/15/2018 - Present        Hyperlipidemia ICD-10-CM: E78.5  ICD-9-CM: 272.4  8/15/2018 - Present        Essential hypertension ICD-10-CM: I10  ICD-9-CM: 401.9  8/15/2018 - Present        * (Principal)Sepsis ([facility] ICD-10-CM: A41.9  ICD-9-CM: 038.9, 995.91  8/15/2018 - Present        Acute cystitis without hematuria ICD-10-CM: N30.00  ICD-9-CM: 595.0  8/15/2018 - Present        Bipolar disorder ([facility] ICD-10-CM: F31.9  ICD-9-CM: 296.80  8/15/2018 - Present        S/P total knee arthroplasty, left ICD-10-CM: B59.083  ICD-9-CM: V43.65  8/3/2018 - Present        Osteoarthritis ICD-10-CM: M19.90  ICD-9-CM: 715.90  8/2/2018 - Present        Abnormal urinalysis ICD-10-CM: R82.90  ICD-9-CM: 791.9  7/26/2018 - Present        CKD (chronic kidney disease) stage 3, GFR 30-59 ml/min ICD-10-CM: N18.3  ICD-9-CM: 585.3  5/8/2018 - Present        Elevated white blood cell count ICD-10-CM: D72.829  ICD-9-CM: 288.60  5/8/2018 - Present               Discharge Condition: Stable    Hospital Course:     From admission note :    \"Farideh White is a 71 y.o. male who was sent from a rehab facility due to fever.      Patient has PMH of DM type 2, hypertension, hyperlipidemia, bipolar disorder, OA, previous DVT.      Patient recently had left knee arthroplasty about a week ago.  He was sent to a rehab facility and was working with physical therapy.      Today he was found to have fever of 102+F and he felt chilled. He denies shortness of breath, chest pain, skin lesions, dysuria, abdominal pain, diarrhea. No recent sick contacts. He has not been eating well in the past 24 hours.      No hematuria. No blood per rectum. No sore throat.      Hospitalist service was consulted to see patient. He was found to have cloudy urine. \"    Patient was admitted and treated for sepsis and UTI, acute cystitis. Urine culture shows Gram negative harvey at the time of discharge. Patient received Zosyn and Vancomycin initially. He was found to have an abscess at the back that has drained spontaneously. Surgical consultation was obtained, but decision was made that he did not require additional incision and draining. Patient has been afebrile and wbc is down. He is deemed stable for discharge today. Physical Exam:   General:                    The patient is a pleasant elderly male in no acute respiratory distress. Appears more energetic and sitting up in bed. VBAJ:                                   [id number]/[id number]. Eyes:                                   No palpebral pallor or scleral icterus. ENT:                                    External auricular and nasal exam within normal limits.                                             BPPPZQ membranes are very dry. Neck:                                   Supple, non-tender, no JVD. Lungs:                       Clear to auscultation bilaterally without wheezes or crackles.                                             No respiratory distress or accessory muscle use. Heart:                                  Regular rate and rhythm, without murmurs, rubs, or gallops. Abdomen:                  Soft, non-tender, non-distended with normoactive bowel sounds. Genitourinary:           No tenderness over the bladder or bilateral CVAs. Extremities:               Without clubbing, cyanosis, or edema.  Left knee is mionimally warn and less tender and swollen. Wound is intact and no redness. Skin:                                    Normal color, texture, and turgor. No rashes, or jaundice. At the mid back, there is an abscess with diameter around 1.5 cm with flat surface. Old pus and discharge has drained   Pulses:                      Radial and dorsalis pedis pulses present 2+ bilaterally.                                               Capillary refill <2s. Neurologic:                CN II-XII grossly intact and symmetrical.                                               Moving all four extremities well with no focal deficits. Psychiatric:                Pleasant demeanor, appropriate affect. Alert and oriented x 3       Consults: General Surgery and orthepedics    Significant Diagnostic Studies:     CXR  8/15/2018  IMPRESSION:   1.  No acute cardiopulmonary process evident on single frontal view of the  Chest.    Doppler of both legs. 8/15/2018  IMPRESSION: Negative for sonographic evidence of deep venous thrombosis  bilateral lower extremity.     All Micro Results     Procedure Component Value Units Date/Time    CULTURE, BLOOD [[id number]] Collected:  08/15/18 1342    Order Status:  Completed Specimen:  Blood from Blood Updated:  08/18/18 0729     Special Requests: --        RIGHT  Antecubital       Culture result: NO GROWTH 3 DAYS       CULTURE, BLOOD [[id number]] Collected:  08/15/18 1404    Order Status:  Completed Specimen:  Blood from Blood Updated:  08/18/18 0729     Special Requests: --        LEFT  Antecubital       Culture result: NO GROWTH 3 DAYS       CULTURE, URINE [[id number]]  (Abnormal) Collected:  08/15/18 1436    Order Status:  Completed Specimen:  Urine from Clean catch Updated:  08/18/18 0653     Special Requests: NO SPECIAL REQUESTS        Culture result:         >100,000 COLONIES/mL GRAM NEGATIVE RODS IDENTIFICATION AND SUSCEPTIBILITY TO FOLLOW (A)    CULTURE, URINE [[id number]]     Order Status:  Canceled Specimen:  Urine from Clean catch         Recent Results (from the past 24 hour(s))   GLUCOSE, POC    Collection Time: 08/17/18 10:26 AM   Result Value Ref Range    Glucose (POC) 178 (H) 65 - 100 mg/dL   GLUCOSE, POC    Collection Time: 08/17/18 11:42 AM   Result Value Ref Range    Glucose (POC) 154 (H) 65 - 100 mg/dL   GLUCOSE, POC    Collection Time: 08/17/18  4:05 PM   Result Value Ref Range    Glucose (POC) 189 (H) 65 - 100 mg/dL   GLUCOSE, POC    Collection Time: 08/17/18 10:26 PM   Result Value Ref Range    Glucose (POC) 173 (H) 65 - 100 mg/dL   CBC WITH AUTOMATED DIFF    Collection Time: 08/18/18  5:40 AM   Result Value Ref Range    WBC 13.1 (H) 4.3 - 11.1 K/uL    RBC 3.43 (L) 4.23 - 5.6 M/uL    HGB 9.9 (L) 13.6 - 17.2 g/dL    HCT 32.1 (L) 41.1 - 50.3 %    MCV 93.6 79.6 - 97.8 FL    MCH 28.9 26.1 - 32.9 PG    MCHC 30.8 (L) 31.4 - 35.0 g/dL    RDW 15.1 %    PLATELET 785 535 - 703 K/uL    MPV 9.4 9.4 - 12.3 FL    ABSOLUTE NRBC 0.00 0.0 - 0.2 K/uL    DF AUTOMATED      NEUTROPHILS 75 43 - 78 %    LYMPHOCYTES 15 13 - 44 %    MONOCYTES 8 4.0 - 12.0 %    EOSINOPHILS 2 0.5 - 7.8 %    BASOPHILS 0 0.0 - 2.0 %    IMMATURE GRANULOCYTES 0 0.0 - 5.0 %    ABS. NEUTROPHILS 9.9 K/UL    ABS. LYMPHOCYTES 1.9 K/UL    ABS. MONOCYTES 1.0 K/UL    ABS. EOSINOPHILS 0.2 K/UL    ABS. BASOPHILS 0.0 K/UL    ABS. IMM. GRANS. 0.1 K/UL   GLUCOSE, POC    Collection Time: 08/18/18  8:04 AM   Result Value Ref Range    Glucose (POC) 116 (H) 65 - 100 mg/dL         Disposition: rehab facility    Discharge Medications:   Current Discharge Medication List      START taking these medications    Details   aspirin delayed-release 81 mg tablet Take 1 Tab by mouth two (2) times a day for 15 days. Qty: 30 Tab, Refills: 0      cefpodoxime (VANTIN) 200 mg tablet Take 1 Tab by mouth two (2) times a day for 7 days.   Qty: 14 Tab, Refills: 0         CONTINUE these medications which have NOT CHANGED    Details   buPROPion SR (WELLBUTRIN SR) 150 mg SR tablet Take 150 mg by mouth two (2) times a day. divalproex ER (DEPAKOTE ER) 500 mg ER tablet Take 1,000 mg by mouth two (2) times a day. pregabalin (LYRICA) 75 mg capsule Take 75 mg by mouth two (2) times a day. latanoprost (XALATAN) 0.005 % ophthalmic solution Administer 1 Drop to both eyes nightly. empagliflozin-metformin (SYNJARDY XR) 25-1,000 mg TBph Take 1 Tab by mouth daily. Indications: type 2 diabetes mellitus      multivitamin (ONE A DAY) tablet Take 1 Tab by mouth daily. furosemide (LASIX) 20 mg tablet Take 20 mg by mouth daily. glipiZIDE SR (GLUCOTROL XL) 10 mg CR tablet Take 10 mg by mouth daily. Indications: type 2 diabetes mellitus      Liraglutide (VICTOZA) 0.6 mg/0.1 mL (18 mg/3 mL) pnij 1.8 mg by SubCUTAneous route daily. Indications: type 2 diabetes mellitus      losartan (COZAAR) 100 mg tablet Take 100 mg by mouth daily. Indications: hypertension      nystatin (MYCOSTATIN) topical cream Apply  to affected area two (2) times daily as needed. calcium polycarbophil (FIBERCON) 625 mg tablet Take 625 mg by mouth daily. simvastatin (ZOCOR) 40 mg tablet Take 40 mg by mouth nightly. insulin degludec (TRESIBA FLEXTOUCH U-200) 200 unit/mL (3 mL) inpn 50 Units by SubCUTAneous route daily. Take / use 40 units the day before and AM day of surgery  per anesthesia protocols. Indications: type 2 diabetes mellitus         STOP taking these medications       doxycycline (MONODOX) 100 mg capsule Comments:   Reason for Stopping:               Activity: Activity as tolerated  Diet: Diabetic Diet  Wound Care: Keep wound clean and dry    Follow-up Appointments   Procedures    FOLLOW UP VISIT Appointment in: 3 - 5 Days See your primary doctor. See your primary doctor. Standing Status:   Standing     Number of Occurrences:   1     Order Specific Question:   Appointment in     Answer:   3 - 5 Days       I have discussed the plan of care with patient. Time spent on discharge is 40 minutes. Signed By: René Jovel MD     August 18, 2018

## 2018-08-18 NOTE — PROGRESS NOTES
Assessment completed and documented. Pt resting in bed aaox4, Left hand 20g intact,  voiding in briefs and urinal,  Protective cream applied to sacrum, pt likes to wear briefs,  pt is without complaints of pain. VSS, lungs clear diminished on room air, left knee has intact steri strips ,no acute distress observed. Call bell in reach, bed locked and low, will continue to monitor hourly and as needed.

## 2018-08-18 NOTE — PROGRESS NOTES
Problem: Self Care Deficits Care Plan (Adult)  Goal: *Acute Goals and Plan of Care (Insert Text)  1. Patient will perform grooming with supervision. 2. Patient will perform Upper body dressing with supervision  3. Patient will perform lower body mod assist  4. Patient will perform upper body bathing with supervision. 5. Patient will perform lower body bathing with mod assistance  6. Patient will perform toilet transfers with min assist with RW  7. Patient will perform shower transfer with min assist with RW  8. Patient will participate in 30 + minutes of ADL/ therapeutic exercise/therapeutic activity with min rest breaks to increase activity tolerance for self care. 9. Patient will perform ADL functional mobility in room with min assist with RW. Goals to be achieved in 7 days. OCCUPATIONAL THERAPY: Daily Note, Treatment Day: 2nd and AM 8/18/2018  INPATIENT: Hospital Day: 4  [place] / Product Type: Medicare /      NAME/AGE/GENDER: Gene Joshi is a 71 y.o. male   PRIMARY DIAGNOSIS:  Fever Sepsis ([facility] Sepsis ([facility]        ICD-10: Treatment Diagnosis:    · Pain in Left Knee (M25.562)  · Stiffness of Left Knee, Not elsewhere classified (M25.662)  · Generalized Muscle Weakness (M62.81)  · Other lack of cordination (R27.8)   Precautions/Allergies:     Oxycodone and Tramadol      ASSESSMENT:     Mr. Fiona Reyes presents sitting up in recliner. Pt completed grooming while standing at sink side. Continue POC. This section established at most recent assessment   PROBLEM LIST (Impairments causing functional limitations):  1. Decreased Strength  2. Decreased ADL/Functional Activities  3. Decreased Transfer Abilities  4. Decreased Ambulation Ability/Technique  5. Decreased Balance  6. Decreased Activity Tolerance  7. Decreased Flexibility/Joint Mobility  8.  Decreased Skin Integrity/Hygeine   INTERVENTIONS PLANNED: (Benefits and precautions of occupational therapy have been discussed with the patient.)  1. Activities of daily living training  2. Adaptive equipment training  3. Balance training  4. Clothing management  5. Donning&doffing training  6. Neuromuscular re-eduation  7. Therapeutic activity  8. Therapeutic exercise     TREATMENT PLAN: Frequency/Duration: Follow patient 4 times a week to address above goals. Rehabilitation Potential For Stated Goals: Good     RECOMMENDED REHABILITATION/EQUIPMENT: (at time of discharge pending progress): Due to the probability of continued deficits (see above) this patient will likely need continued skilled occupational therapy after discharge. Equipment:    None at this time              OCCUPATIONAL PROFILE AND HISTORY:   History of Present Injury/Illness (Reason for Referral):  Decreased self care and functional mobility  Past Medical History/Comorbidities:   Mr. Desean Nam  has a past medical history of Arthritis; BMI 30.0-30.9,adult; CAD (coronary artery disease); Chronic kidney disease; Chronic pain; Depression; Diabetes ([address] Enlarged prostate with urinary obstruction; Former smoker; GERD (gastroesophageal reflux disease); High cholesterol; Hypertension; Neurogenic bladder; Other ill-defined conditions(799.89); Overflow incontinence; Sciatica; Self-catheterizes urinary bladder; Skin cancer; Sleep apnea; Spondylolisthesis of lumbar region; Thromboembolus ([address] (2013); Urine retention; and Varicose veins of both lower extremities with complications. He also has no past medical history of Adverse effect of anesthesia; Aneurysm ([address] Arrhythmia; Asthma; Autoimmune disease ([address] Chronic obstructive pulmonary disease ([address] Coagulation disorder ([address] Difficult intubation; Endocarditis; Heart failure ([address] Ill-defined condition; Liver disease; Malignant hyperthermia due to anesthesia; Nausea & vomiting; Nicotine vapor product user; Non-nicotine vapor product user; Pseudocholinesterase deficiency; PUD (peptic ulcer disease);  Rheumatic fever; Seizures ([facility] Stroke [facility]); or Thyroid disease. Mr. Mick Diez  has a past surgical history that includes hx hemorrhoidectomy (1971); hx shoulder arthroscopy (Left, 1971); and hx urological (06/2017). Social History/Living Environment:   Home Environment: Private residence  # Steps to Enter: 0  One/Two Story Residence: One story  Living Alone: Yes  Support Systems: Child(giovanni), Family member(s)  Patient Expects to be Discharged to[de-identified] Unknown  Current DME Used/Available at Home: Cane, straight  Tub or Shower Type: Shower  Prior Level of Function/Work/Activity:  Was mod I prior to knee surgery , but did not do a whole lot of activity at home. He reported he did not leave the house much and did not shower daily     Number of Personal Factors/Comorbidities that affect the Plan of Care: Expanded review of therapy/medical records (1-2):  MODERATE COMPLEXITY   ASSESSMENT OF OCCUPATIONAL PERFORMANCE[de-identified]   Activities of Daily Living:   Basic ADLs (From Assessment) Complex ADLs (From Assessment)   Feeding: Supervision  Oral Facial Hygiene/Grooming: Supervision  Bathing: Moderate assistance, Maximum assistance  Upper Body Dressing: Minimum assistance  Lower Body Dressing: Maximum assistance  Toileting: Maximum assistance, Total assistance (incontinent urine, changed brief, pt assisted hygiene) Instrumental ADL  Meal Preparation: Total assistance  Homemaking: Total assistance   Grooming/Bathing/Dressing Activities of Daily Living   Grooming  Brushing Teeth: Supervision/set-up Cognitive Retraining  Safety/Judgement: Fall prevention                       Bed/Mat Mobility  Sit to Stand: Minimum assistance  Bed to Chair: Contact guard assistance (with walker)       Most Recent Physical Functioning:   Gross Assessment:                  Posture:  Posture Assessment: Forward head, Rounded shoulders  Balance:  Sitting: Intact; Without support  Standing: Impaired; With support (walker) Bed Mobility:     Wheelchair Mobility: Transfers:  Sit to Stand: Minimum assistance  Stand to Sit: Contact guard assistance  Bed to Chair: Contact guard assistance (with walker)            Patient Vitals for the past 6 hrs:   BP BP Patient Position SpO2 Pulse   18 0721 163/84 At rest (!) 1 % 86   18 1115 124/78 At rest 96 % 86       Mental Status  Neurologic State: Alert  Orientation Level: Oriented X4  Cognition: Appropriate decision making, Appropriate for age attention/concentration  Perception: Appears intact  Perseveration: No perseveration noted  Safety/Judgement: Fall prevention            LLE Assessment  LLE Assessment (WDL): Exception to WDL  LLE PROM  L Knee Flexion: 80  L Knee Extension: -8 RLE Assessment  RLE Assessment (WDL): Within defined limits           Physical Skills Involved:  1. Range of Motion  2. Balance  3. Strength  4. Activity Tolerance  5. Pain (acute)  6. Pain (Chronic)  7. Edema  8. Skin Integrity Cognitive Skills Affected (resulting in the inability to perform in a timely and safe manner):  1. Perception Psychosocial Skills Affected:  1. Habits/Routines  2. Environmental Adaptation  3. Social Interaction  4. Self-Awareness   Number of elements that affect the Plan of Care: 5+:  HIGH COMPLEXITY   CLINICAL DECISION MAKIN [facility] [address] [id number] AM-PAC 6 Clicks   Daily Activity Inpatient Short Form  How much help from another person does the patient currently need. .. Total A Lot A Little None   1. Putting on and taking off regular lower body clothing? [] 1   [x] 2   [] 3   [] 4   2. Bathing (including washing, rinsing, drying)? [] 1   [x] 2   [] 3   [] 4   3. Toileting, which includes using toilet, bedpan or urinal?   [x] 1   [] 2   [] 3   [] 4   4. Putting on and taking off regular upper body clothing? [] 1   [] 2   [x] 3   [] 4   5. Taking care of personal grooming such as brushing teeth? [] 1   [] 2   [x] 3   [] 4   6. Eating meals?    [] 1   [] 2   [] 3   [x] 4   © , Trustees of [facility], under license to [employer]. All rights reserved      Score:  Initial: 15 Most Recent: X (Date: -- )    Interpretation of Tool:  Represents activities that are increasingly more difficult (i.e. Bed mobility, Transfers, Gait). Score 24 23 22-20 19-15 14-10 9-7 6     Modifier CH CI CJ CK CL CM CN      ? Self Care:     - CURRENT STATUS: CK - 40%-59% impaired, limited or restricted    - GOAL STATUS: CJ - 20%-39% impaired, limited or restricted    - D/C STATUS:  ---------------To be determined---------------  [place] / Product Type: Medicare /      Medical Necessity:     · Patient is expected to demonstrate progress in balance, coordination and functional technique to decrease assistance required with  self care and functional mobility and improve safety during  self care and functional mobility. Reason for Services/Other Comments:  · Patient continues to require skilled intervention due to decreased  self care and functional mobility. Use of outcome tool(s) and clinical judgement create a POC that gives a: MODERATE COMPLEXITY         TREATMENT:   (In addition to Assessment/Re-Assessment sessions the following treatments were rendered)     Pre-treatment Symptoms/Complaints:    Pain: Initial:   Pain Intensity 1: 2  Pain Location 1: Knee  Post Session:  0/10 rest     Self Care: (23): Procedure(s) (per grid) utilized to improve and/or restore self-care/home management as related to grooming. Required min verbal and manual cueing to facilitate activities of daily living skills.     Braces/Orthotics/Lines/Etc:   · IV  · O2 Device: Room air  Treatment/Session Assessment:    · Response to Treatment:  Tolerated fair, requires a lot of encouragement, flat affect  · Interdisciplinary Collaboration:   o Physical Therapist  o Certified Occupational Therapy Assistant  o Registered Nurse  o Certified Nursing Assistant/Patient Care Technician  · After treatment position/precautions:   o Up in chair  o Bed/Chair-wheels locked  o Bed in low position  o Call light within reach  o RN notified   · Compliance with Program/Exercises: compliant all of the time. · Recommendations/Intent for next treatment session: \"Next visit will focus on advancements to more challenging activities and reduction in assistance provided\".   Total Treatment Duration:  OT Patient Time In/Time Out  Time In: 1007  Time Out: [address]

## 2018-08-20 NOTE — PROGRESS NOTES
This note will not be viewable in [address] Patient discharged to [facility] on 8/18/18. Patient discharged to a [facility] facility. Patient will be included in weekly care coordination calls. Information forwarded to Yovany Sanchez, [facility] Provider [facility] RN Care Manager.

## 2018-08-30 PROBLEM — I95.9 HYPOTENSION: Status: ACTIVE | Noted: 2018-01-01

## 2018-08-30 PROBLEM — N17.9 AKI (ACUTE KIDNEY INJURY) (HCC): Status: ACTIVE | Noted: 2018-01-01

## 2018-08-30 PROBLEM — R11.2 NAUSEA & VOMITING: Status: ACTIVE | Noted: 2018-01-01

## 2018-08-30 NOTE — ED PROVIDER NOTES
HPI Comments: 52-year-old male presents from nursing home with low blood pressure. He was sent to [facility] on August 5 following left knee replacement. He was sent back to the hospital August 15 through the 18th for fever and found to have a urinary tract infection with a back abscess that spontaneously drained. His urine grew out Pseudomonas. He was treated with Cefpodoxime followed by ciprofloxacin which finished yesterday. Patient states he had 4 days of projectile vomiting that resolved about 5 days ago. He feels as if his mouth is dry. He had increased confusion and altered mental status with hallucinations at the facility today and was found to have a blood pressure of 79/45. Patient denies any chest pain or shortness of breath. He denies any abdominal pain. No diarrhea. No other complaints. Patient states his Lasix was increased from 25 mg once a day to 75 mg twice a day, but his MAR does not support this change. He apparently is on 20 mg daily. Patient is a 71 y.o. male presenting with hypotension. The history is provided by the patient and the EMS personnel. Hypotension Pertinent negatives include no confusion and no weakness. Past Medical History:  
Diagnosis Date  Arthritis  BMI 30.0-30.9,adult  CAD (coronary artery disease)   
 mild to moderate apical reversible ischemia per cardio note dated 07/12/10  Chronic kidney disease   
 no nephrologist   
 Chronic pain   
 neck, lower back  Depression  Diabetes ([facility] [address] type 2; oral and insulin meds; 1-2 times per day; average 104; A1C=8.1 on 06/13/18  Enlarged prostate with urinary obstruction  Former smoker  GERD (gastroesophageal reflux disease)  High cholesterol  Hypertension   
 on med for control  Neurogenic bladder  Other ill-defined conditions(799.89)  Overflow incontinence  Sciatica   
 recently took medrol dose pack around 05/04/18  Self-catheterizes urinary bladder has catheterized once in the past month; urinating more frequently own his own now  (07/26/18)  Skin cancer  Sleep apnea   
 bi-pap; instructed to bring [facility]  Spondylolisthesis of lumbar region  Thromboembolus [facility]) 2013 DVT left leg; caused from a falling accident that damaged left leg  Urine retention  Varicose veins of both lower extremities with complications Past Surgical History:  
Procedure Laterality Date [address] [facility]  HX SHOULDER ARTHROSCOPY Left 1971  HX UROLOGICAL  06/2017 CYSTOSCOPY, TRANSURETHRAL RESECTION OF PROSTATE Family History:  
Problem Relation Age of Onset  No Known Problems Mother  Heart Attack Father  Cancer Sister  Parkinson's Disease Brother Social History Social History  Marital status:  Spouse name: N/A  
 Number of children: N/A  
 Years of education: N/A Occupational History  Not on file. Social History Main Topics  Smoking status: Former Smoker Packs/day: 1.00 Years: 7.00 Quit date: 5/8/1971  Smokeless tobacco: Never Used  Alcohol use Yes Comment: occasionally  Drug use: No  
 Sexual activity: Not on file Other Topics Concern  Not on file Social History Narrative ALLERGIES: Oxycodone and Tramadol Review of Systems Constitutional: Positive for appetite change and fatigue. Negative for chills and fever. HENT: Negative for hearing loss. Eyes: Negative for visual disturbance. Respiratory: Negative for cough and shortness of breath. Cardiovascular: Negative for chest pain and palpitations. Gastrointestinal: Positive for nausea and vomiting. Negative for abdominal pain and diarrhea. Genitourinary: Negative for dysuria. Musculoskeletal: Positive for arthralgias and joint swelling. Skin: Negative for rash. Neurological: Negative for weakness and headaches. Psychiatric/Behavioral: Negative for confusion. Vitals:  
 08/30/18 1213 08/30/18 1220 BP: 98/62 101/56 Pulse: 87 Resp: 20 Temp: 98.1 °F (36.7 °C) SpO2: 96% Weight: 93.9 kg (207 lb) Height: 6' (1.829 m) Physical Exam  
Constitutional: He appears well-developed and well-nourished. He appears ill.  
somnolent HENT:  
Head: Normocephalic and atraumatic. Right Ear: External ear normal.  
Left Ear: External ear normal.  
Nose: Nose normal.  
Mouth/Throat: Oropharynx is clear and moist. Mucous membranes are dry. Eyes: Conjunctivae are normal. Pupils are equal, round, and reactive to light. Neck: Normal range of motion. Neck supple. Cardiovascular: Regular rhythm, normal heart sounds and intact distal pulses. Pulmonary/Chest: Effort normal and breath sounds normal. No respiratory distress. He has no wheezes. Abdominal: Soft. Bowel sounds are normal. He exhibits no distension. There is no tenderness. Musculoskeletal:  
     Left knee: He exhibits decreased range of motion and swelling. He exhibits no erythema. No tenderness found. Legs: 
Neurological:  
Somnolent, speech slurred Skin: Skin is warm and dry. There is pallor. Psychiatric: His speech is slurred. He is slowed and withdrawn. Nursing note and vitals reviewed. MDM Number of Diagnoses or Management Options Diagnosis management comments: Parts of this document were created using dragon voice recognition software. The chart has been reviewed but errors may still be present. 1:31 PM 
Likely significant dehydration due to recent vomiting. Creatinine markedly elevated. Blood pressure low. Given fluids. Discussed with hospitalist for admission. Amount and/or Complexity of Data Reviewed Clinical lab tests: ordered and reviewed (Results for orders placed or performed during the hospital encounter of 08/30/18 
-CBC WITH AUTOMATED DIFF 
 Result                                            Value                         Ref Range WBC                                               12.3 (H)                      4.3 - 11.1 K/uL           
     RBC                                               3.92 (L)                      4.23 - 5.6 M/uL HGB                                               11.4 (L)                      13.6 - 17.2 g/dL HCT                                               36.8 (L)                      41.1 - 50.3 % MCV                                               93.9                          79.6 - 97.8 FL            
     MCH                                               29.1                          26.1 - 32.9 PG            
     MCHC                                              31.0 (L)                      31.4 - 35.0 g/dL RDW                                               15.0                          % PLATELET                                          196                           150 - 450 K/uL MPV                                               10.2                          9.4 - 12.3 FL ABSOLUTE NRBC                                     0.00                          0.0 - 0.2 K/uL            
     DF                                                AUTOMATED NEUTROPHILS                                       72                            43 - 78 % LYMPHOCYTES                                       18                            13 - 44 % MONOCYTES                                         9                             4.0 - 12.0 % EOSINOPHILS                                       1                             0.5 - 7.8 % BASOPHILS                                         0                             0.0 - 2.0 % IMMATURE GRANULOCYTES                             0                             0.0 - 5.0 %               
     ABS. NEUTROPHILS                                  8.9 (H)                       1.7 - 8.2 K/UL            
     ABS. LYMPHOCYTES                                  2.2                           0.5 - 4.6 K/UL            
     ABS. MONOCYTES                                    1.1                           0.1 - 1.3 K/UL            
     ABS. EOSINOPHILS                                  0.1                           0.0 - 0.8 K/UL            
     ABS. BASOPHILS                                    0.1                           0.0 - 0.2 K/UL            
     ABS. IMM. GRANS.                                  0.0                           0.0 - 0.5 K/UL            
-METABOLIC PANEL, COMPREHENSIVE Result                                            Value                         Ref Range Sodium                                            137                           136 - 145 mmol/L Potassium                                         4.3                           3.5 - 5.1 mmol/L Chloride                                          97 (L)                        98 - 107 mmol/L           
     CO2                                               30                            21 - 32 mmol/L Anion gap                                         10                            7 - 16 mmol/L Glucose                                           183 (H)                       65 - 100 mg/dL BUN                                               42 (H)                        8 - 23 MG/DL      Creatinine                                        3.99 (H)                      0.8 - 1.5 MG/DL           
 GFR est AA                                        19 (L)                        >60 ml/min/1.73m2 GFR est non-AA                                    16 (L)                        >60 ml/min/1.73m2 Calcium                                           8.8                           8.3 - 10.4 MG/DL Bilirubin, total                                  0.5                           0.2 - 1.1 MG/DL           
     ALT (SGPT)                                        59                            12 - 65 U/L               
     AST (SGOT)                                        54 (H)                        15 - 37 U/L Alk. phosphatase                                  105                           50 - 136 U/L Protein, total                                    7.8                           6.3 - 8.2 g/dL Albumin                                           2.9 (L)                       3.2 - 4.6 g/dL Globulin                                          4.9 (H)                       2.3 - 3.5 g/dL A-G Ratio                                         0.6 (L)                       1.2 - 3.5                 
-POC LACTIC ACID Result                                            Value                         Ref Range Lactic Acid (POC)                                 1.5                           0.5 - 1.9 mmol/L          
-GLUCOSE, POC Result                                            Value                         Ref Range Glucose (POC)                                     203 (H)                       65 - 100 mg/dL            
-EKG, 12 LEAD, INITIAL Result                                            Value                         Ref Range      Ventricular Rate                                  84 BPM                       
     Atrial Rate                                       277                           BPM                       
     QRS Duration                                      124                           ms Q-T Interval                                      376                           ms                        
     QTC Calculation (Bezet)                           444                           ms                        
     Calculated R Axis                                 -65                           degrees Calculated T Axis                                 85                            degrees Diagnosis                                                                                                 
 !! AGE AND GENDER SPECIFIC ECG ANALYSIS !! Atrial fibrillation with a competing junctional pacemaker Left anterior fascicular block Nonspecific ST and T wave abnormality , probably digitalis effect Abnormal ECG When compared with ECG of 15-AUG-2018 13:42, 
 Previous ECG has undetermined rhythm, needs review ) Tests in the radiology section of CPT®: ordered and reviewed (Xr Chest Pa Lat Result Date: 8/30/2018 EXAMINATION: CHEST RADIOGRAPH 8/30/2018 12:54 PM ACCESSION NUMBER: [id number] INDICATION: chest pain COMPARISON: Chest x-ray 8/17/2018 TECHNIQUE: PA and lateral views of the chest were obtained. FINDINGS: Cardiac Silhouette: There is unchanged mild enlargement of the cardiac silhouette. Lungs: No focal airspace disease. Pleura: No pleural effusion. No pneumothorax. Osseous Structures: Thoracic spine spondylosis. Upper Abdomen: Unremarkable. IMPRESSION: There is unchanged mild enlargement of the cardiac silhouette. No focal airspace disease. VOICE DICTATED BY: Dr. Frankie Mauricio ) Tests in the medicine section of CPT®: ordered and reviewed ED Course Procedures

## 2018-08-30 NOTE — PROGRESS NOTES
Pharmacokinetic Consult to Pharmacist 
 
Delbert Rob is a 71 y.o. male being treated for sepsis with Vancomycin and Zosyn. @HAIR(38)@  @IUOS(24)@ Lab Results Component Value Date/Time BUN 42 (H) 08/30/2018 12:28 PM  
 Creatinine 3.99 (H) 08/30/2018 12:28 PM  
 WBC 12.3 (H) 08/30/2018 12:28 PM  
 Procalcitonin 3.7 08/15/2018 01:42 PM  
 Lactic Acid (POC) 1.5 08/30/2018 12:29 PM  
  
Estimated Creatinine Clearance: 20.2 mL/min (based on Cr of 3.99). CULTURES: 
Blood - pending Urine - pending Day 1 of vancomycin. Goal trough is 15 - 20. Vancomycin dose initiated at 2 gm IV x 1 dose. Vancomycin dose continued at 1750 mg IV every 36 hours. Next dose is due 9/1 at 0400. Will continue to follow patient. Thank you, Laya Mcnamara, PharmD

## 2018-08-30 NOTE — PROGRESS NOTES
CRISTINA Pardo called regarding consult, informed of patient's knee condition.   Said PA to visit patient in the am.

## 2018-08-30 NOTE — PROGRESS NOTES
Patient received from ER to room 366  via stretcher. Patient drowsy , oriented to name, confused . Respirations easy & regular with O2 @ 3L via nasal cannula sats on 99%. Assessment completed. Redness to sacrum/coccyx noted, Allevyn pad applied. Left knee with healing incision from a previous knee surgery, few steri strips dry & intact. Denies pain. Bed low & locked, side rails x3 up with bed alarm set. Call light within reach, instructed to call for assistance as needed. Family member at bedside.

## 2018-08-30 NOTE — H&P
History and Physical 
 
Patient: Farideh White MRN: [id number]  SSN: [id number] YOB: 1945  Age: 71 y.o. Sex: male Subjective:  
Cc:\" I have been vomiting\" Farideh White is a 71 y.o. male who has a PMH of HTN, a fib, DM, CKD, s/p left knee Arthroplasty on 8/2/18, was referred from [facility] after he was noted with chills, hypotension of 79/45 mmHg, AMS, hallucinations and vomiting x 4 today. Of note, he had a pseudomonas UTI on 8/15/18, he was given a 7 day therapy on vantin, and eventually given ciprofloxacin back at the facility for his infection. He stated his left knee is tender and has not been able to walk properly. He believes his dehydration may be related to lasix use. Upon ER arrival VS BP 98/62  HR 87  RR 20  O2: 98% on room air. On my assessment his BP was 90/52  HR 85. Patient was alert with visual hallucinations and confused. He complained about left knee pain since his surgery. Labs included: normal lactic acid; cbc: wbc 12k, hb 11gr; chemistry: cr 3.9 ( baseline 1.2 on 8/17/18 ); UA: negative; EKG: A fib not in RVR; CXR: normal; left knee XR: preserved arthroplasty. Blood cultures taken in the ED. Hospitalist was contacted for admission due to sepsis and amelie on ckd. PMH: as above Social hx: prior smoker, quit in 1971 Family hx: his father had heart disease Past Medical History:  
Diagnosis Date  Arthritis  BMI 30.0-30.9,adult  CAD (coronary artery disease)   
 mild to moderate apical reversible ischemia per cardio note dated 07/12/10  Chronic kidney disease   
 no nephrologist   
 Chronic pain   
 neck, lower back  Depression  Diabetes ([facility] [address] type 2; oral and insulin meds; 1-2 times per day; average 104; A1C=8.1 on 06/13/18  Enlarged prostate with urinary obstruction  Former smoker  GERD (gastroesophageal reflux disease)  High cholesterol  Hypertension   
 on med for control  Nausea & vomiting 8/30/2018  Neurogenic bladder  Other ill-defined conditions(799.89)  Overflow incontinence  Sciatica   
 recently took medrol dose pack around 05/04/18  Self-catheterizes urinary bladder   
 has catheterized once in the past month; urinating more frequently own his own now  (07/26/18)  Skin cancer  Sleep apnea   
 bi-pap; instructed to bring [facility]  Spondylolisthesis of lumbar region  Thromboembolus [facility]) 2013 DVT left leg; caused from a falling accident that damaged left leg  Urine retention  Varicose veins of both lower extremities with complications Past Surgical History:  
Procedure Laterality Date [address]  HX SHOULDER ARTHROSCOPY Left 1971  HX UROLOGICAL  06/2017 CYSTOSCOPY, TRANSURETHRAL RESECTION OF PROSTATE Family History Problem Relation Age of Onset  No Known Problems Mother  Heart Attack Father  Cancer Sister  Parkinson's Disease Brother Social History Substance Use Topics  Smoking status: Former Smoker Packs/day: 1.00 Years: 7.00 Quit date: 5/8/1971  Smokeless tobacco: Never Used  Alcohol use Yes Comment: occasionally Prior to Admission medications Medication Sig Start Date End Date Taking? Authorizing Provider  
aspirin delayed-release 81 mg tablet Take 1 Tab by mouth two (2) times a day for 15 days. 8/18/18 9/2/18  Caleb Amin MD  
buPROPion SR (WELLBUTRIN SR) 150 mg SR tablet Take 150 mg by mouth two (2) times a day. Historical Provider  
divalproex ER (DEPAKOTE ER) 500 mg ER tablet Take 1,000 mg by mouth two (2) times a day. Historical Provider  
pregabalin (LYRICA) 75 mg capsule Take 75 mg by mouth two (2) times a day. Historical Provider  
latanoprost (XALATAN) 0.005 % ophthalmic solution Administer 1 Drop to both eyes nightly.     Historical Provider  
empagliflozin-metformin (SYNJARDY XR) 25-1,000 mg TBph Take 1 Tab by mouth daily. Indications: type 2 diabetes mellitus    Historical Provider  
multivitamin (ONE A DAY) tablet Take 1 Tab by mouth daily. Historical Provider  
furosemide (LASIX) 20 mg tablet Take 20 mg by mouth daily. Historical Provider  
glipiZIDE SR (GLUCOTROL XL) 10 mg CR tablet Take 10 mg by mouth daily. Indications: type 2 diabetes mellitus    Historical Provider Liraglutide (VICTOZA) 0.6 mg/0.1 mL (18 mg/3 mL) pnij 1.8 mg by SubCUTAneous route daily. Indications: type 2 diabetes mellitus    Historical Provider  
losartan (COZAAR) 100 mg tablet Take 100 mg by mouth daily. Indications: hypertension    Historical Provider  
nystatin (MYCOSTATIN) topical cream Apply  to affected area two (2) times daily as needed. Historical Provider  
calcium polycarbophil (FIBERCON) 625 mg tablet Take 625 mg by mouth daily. Historical Provider  
simvastatin (ZOCOR) 40 mg tablet Take 40 mg by mouth nightly. Historical Provider  
insulin degludec (TRESIBA FLEXTOUCH U-200) 200 unit/mL (3 mL) inpn 50 Units by SubCUTAneous route daily. Take / use 40 units the day before and AM day of surgery  per anesthesia protocols. Indications: type 2 diabetes mellitus    Historical Provider Allergies Allergen Reactions  Oxycodone Itching  Tramadol Itching Review of Systems: A comprehensive review of systems was negative except for that written in the History of Present Illness. Objective:  
 
Vitals:  
 08/30/18 1340 08/30/18 1347 08/30/18 1409 08/30/18 1424 BP:  91/59 (!) 87/53 90/52 Pulse:  84 84 85 Resp:  22 16 17 Temp:      
SpO2: 98% 97% 95% 100% Weight:      
Height:      
  
 
Physical Exam: 
GENERAL: alert, cooperative, mild distress due to left knee pain EYE: negative LYMPHATIC: Cervical, supraclavicular, and axillary nodes normal.  
THROAT & NECK: dry mucous membranes ; no exudates LUNG: clear to auscultation bilaterally HEART: regular rate and rhythm, S1, S2 normal, no murmur, click, rub or gallop ABDOMEN: soft, non-tender. Bowel sounds normal. No masses,  no organomegaly EXTREMITIES:  Left knee with healed scar, has edema, no erythema; limited ROM , tender to touch SKIN: Normal. 
NEUROLOGIC: oriented in person, place, not time; had episodes of visual hallucinations, able to move all 4 limbs Salmon cath with green-blue color PSYCHIATRIC: non focal 
 
Assessment:  
 
Hospital Problems  Date Reviewed: 8/30/2018 Codes Class Noted POA Hypotension ICD-10-CM: I95.9 ICD-9-CM: 458.9  8/30/2018 Unknown Nausea & vomiting ICD-10-CM: R11.2 ICD-9-CM: 787.01  8/30/2018 Unknown * (Principal)MELANI (acute kidney injury) ([facility] ICD-10-CM: N17.9 ICD-9-CM: 584.9  8/30/2018 Unknown Diabetes mellitus type 2, controlled ([facility] ICD-10-CM: E11.9 ICD-9-CM: 250.00  8/15/2018 Yes Sepsis ([facility] ICD-10-CM: A41.9 ICD-9-CM: 038.9, 995.91  8/15/2018 Unknown Bipolar disorder ([facility] ICD-10-CM: F31.9 ICD-9-CM: 296.80  8/15/2018 Yes S/P total knee arthroplasty, left ICD-10-CM: Z68.001 ICD-9-CM: V43.65  8/3/2018 Yes CKD (chronic kidney disease) stage 3, GFR 30-59 ml/min ICD-10-CM: N18.3 ICD-9-CM: 585.3  5/8/2018 Yes Plan: 1. Sepsis: hypotension + leukocytosis: possible bacteremia after a recent UTI 2. Non oliguric melani on ckd: likely related to dehydration / nausea and vomiting / ACEI and lasix use 3. Nausea and vomiting: gastritis - secondary to [facility] 4. S/P LKA on 8/2/18 5. A fib not in RVR Plan: 
Admit as inpatient Keep NPO for now Strict IO- on salmon cath with green-blue color urine, patient stated been taking a medication that will turn his urine green ( probable uribel ) Continue normal saline Start vanc and zosyn awaiting blood culture results Start protonix, zofran 
Pain control: tylenol, dilaudid Hold lisinopril, lasix Resume bipolar meds Avoid nephrotoxic meds Check urinary electrolytes Monitor chemistry daily F/U blood cultures Orthopedic consult PT/OT 
DVT ppx: heparin and GCS Code status: DNR/DNI. Confirmed with patient Estimated LOS > 2M Risk: high Estimated DC planning: back to [facility] Goals of care discussed with patient Signed By: Mitchell Pelayo MD   
 August 30, 2018

## 2018-08-30 NOTE — PROGRESS NOTES
08/30/18 1535 Dual Skin Pressure Injury Assessment Dual Skin Pressure Injury Assessment [facility] Second Care Provider (Based on Facility Policy) Jorge Napoles RN Gluteal Cleft  (redness on sacrum/coccyx; allevyn pad applied) Knee Left 
(healing incision w/ steri stris dry & intact)

## 2018-08-31 NOTE — PROGRESS NOTES
Problem: Interdisciplinary Rounds Goal: Interdisciplinary Rounds Interdisciplinary team rounds were held 8/31/2018 with the following team members:Care Management, Nursing, Nutrition, Pharmacy, Physical Therapy and Physician and the patient. Plan of care discussed. See clinical pathway and/or care plan for interventions and desired outcomes.

## 2018-08-31 NOTE — PROGRESS NOTES
Orthopedic Joint Progress Note 2018 Admit Date: 2018 Admit Diagnosis: MELANI (acute kidney injury) ([facility] Nausea & vomiting Hypotension Sepsis ([facility] [address] * No surgery found * Subjective:  
 
Nicolás Mcdaniels alert and alert/ readmitted from [facility] with dehydration and renal insufficiency Review of Systems: Pertinent items are noted in HPI. Objective:  
 
PT/OT:  
 
PATIENT MOBILITY Vital Signs:   
Blood pressure 103/58, pulse (!) 105, temperature 99.1 °F (37.3 °C), resp. rate 19, height 6' (1.829 m), weight 93.9 kg (207 lb), SpO2 95 %. Temp (24hrs), Av.1 °F (36.7 °C), Min:96.6 °F (35.9 °C), Max:99.3 °F (37.4 °C) Pain Control:  
Pain Assessment Pain Scale 1: Numeric (0 - 10) Pain Intensity 1: 0 Meds: 
Current Facility-Administered Medications Medication Dose Route Frequency  buPROPion SR [facility]) tablet 150 mg  150 mg Oral BID  divalproex ER (DEPAKOTE ER) 24 hour tablet 1,000 mg  1,000 mg Oral BID  latanoprost (XALATAN) 0.005 % ophthalmic solution 1 Drop  1 Drop Both Eyes QHS  simvastatin (ZOCOR) tablet 40 mg  40 mg Oral QHS  heparin (porcine) injection 5,000 Units  5,000 Units SubCUTAneous Q12H  
 0.9% sodium chloride infusion  100 mL/hr IntraVENous CONTINUOUS  
 acetaminophen (TYLENOL) tablet 650 mg  650 mg Oral Q6H PRN  pantoprazole (PROTONIX) 40 mg in sodium chloride 0.9% 10 mL injection  40 mg IntraVENous DAILY  ondansetron (ZOFRAN) injection 4 mg  4 mg IntraVENous Q8H PRN  piperacillin-tazobactam (ZOSYN) 4.5 g in 0.9% sodium chloride (MBP/ADV) 100 mL  4.5 g IntraVENous Q12H  
 naloxone (NARCAN) injection 0.4 mg  0.4 mg IntraVENous PRN  
 HYDROmorphone (PF) (DILAUDID) injection 0.5 mg  0.5 mg IntraVENous Q6H PRN  
 [START ON 2018] vancomycin (VANCOCIN) 1750 mg in  ml infusion  1,750 mg IntraVENous Q36H  
 dextrose (D50W) injection syrg 25 g  25 g IntraVENous PRN  
  
 
LAB:   
Lab Results Component Value Date/Time INR 1.6 08/15/2018 01:42 PM  
 INR 1.8 08/05/2018 04:30 AM  
 INR 1.2 08/04/2018 04:08 AM  
 
Lab Results Component Value Date/Time HGB 9.8 (L) 08/31/2018 05:41 AM  
 HGB 11.4 (L) 08/30/2018 12:28 PM  
 HGB 9.9 (L) 08/18/2018 05:40 AM  
 
 
Wound Knee Left (Active) Number of days:29 Physical Exam: 
Left knee with healing wound / no sign of infection/ ROM 15-90 Assessment:  
  
Principal Problem: 
  MELANI (acute kidney injury) ([address] (8/30/2018) Active Problems: 
  CKD (chronic kidney disease) stage 3, GFR 30-59 ml/min (5/8/2018) S/P total knee arthroplasty, left (8/3/2018) Diabetes mellitus type 2, controlled ([address] (8/15/2018) Sepsis ([address] (8/15/2018) Bipolar disorder ([address] (8/15/2018) Hypotension (8/30/2018) Nausea & vomiting (8/30/2018) Plan:  
 
Continue PT/OT/Rehab Consult: Rehab team including PT, OT, recreational therapy, and  Consult Urology with recurrent UTIs Mobilize with PT Consult Rehab - ? IRC now with significant medical issues and decline following TKA Cont with medical care with Hospitalist 
Now 4 weeks post -op - on coumadin given h/o DVT - now will DC coumadin and start low-dose ASA Patient Expects to be Discharged to[de-identified] Rehabilitation facility Signed By: Jeff Douglas MD

## 2018-08-31 NOTE — PROGRESS NOTES
Pt resting in bed and is alert and oriented to self. he denies pain and is on 3 L NC. RR even and unlabored. IVF infusing. Ramey patent and draining. Call light in reach and pt instructed to call for assistance if needed. Will monitor. Family at bedside.

## 2018-08-31 NOTE — PROGRESS NOTES
OT note: orders received, chart reviewed, spoke with Physical therapy.  Patient max assist and fatigued from PT eval. Nursing aware of patient status, will attempt eval in the am. Thank you for this referral. Lauren Lopez OTR/L

## 2018-08-31 NOTE — PROGRESS NOTES
Pt resting in bed and is alert and oriented x 3. he denies pain and is on 3 L NC. RR even and unlabored. IVF infusing. Ramey patent and draining. Call light in reach and pt instructed to call for assistance if needed. Will monitor. Bed alarm on.

## 2018-08-31 NOTE — PROGRESS NOTES
Patient bp is 84/51 with a HR of 85. He is still drowsy and oriented to self. Dr. Willow Be notified and order received for a one time 500 ml bolus.

## 2018-08-31 NOTE — PROGRESS NOTES
Vancomycin Consult (Day 2) MD ordering: Dori FIELD following? no Indication: sepsis DOT: < 7?  days Goal level(s): 15 -20 Ht Readings from Last 1 Encounters:  
18 6' (1.829 m) Wt Readings from Last 1 Encounters:  
18 93.9 kg (207 lb) Allergies as of 2018 - Review Complete 2018 Allergen Reaction Noted  Oxycodone Itching 2017  Tramadol Itching 2017 Current Antimicrobial Therapy (168h ago through future) Ordered     Start Stop  
 
 18 0951  vancomycin (VANCOCIN) 1500 mg in  ml infusion  1,500 mg,   IntraVENous,   EVERY 24 HOURS    
 18 1500 --  
 18 1506  piperacillin-tazobactam (ZOSYN) 4.5 g in 0.9% sodium chloride (MBP/ADV) 100 mL  4.5 g,   IntraVENous,   EVERY 12 HOURS    
 18 1600 -- All Micro Results Procedure Component Value Units Date/Time Mario Coates [[id number]] Collected:  18 1348 Order Status:  Completed Specimen:  Urine from Urine Updated:  18 3100 Special Requests: NO SPECIAL REQUESTS Culture result:      
  NO GROWTH AFTER SHORT PERIOD OF INCUBATION. FURTHER RESULTS TO FOLLOW AFTER OVERNIGHT INCUBATION. CULTURE, BLOOD [[id number]] Collected:  18 1256 Order Status:  Completed Specimen:  Whole Blood from Blood Updated:  18 5394 Special Requests: --     
  LEFT 
FOREARM Culture result: NO GROWTH AFTER 16 HOURS     
 CULTURE, BLOOD [[id number]] Collected:  18 1306 Order Status:  Completed Specimen:  Blood from Blood Updated:  18 5880 Special Requests: --     
  RIGHT 
HAND Culture result: NO GROWTH AFTER 16 HOURS Temp (24hrs), Av.2 °F (36.8 °C), Min:96.6 °F (35.9 °C), Max:99.3 °F (37.4 °C) UOP: mL/kg/hr Dosing weight: 93 kg (actual body weight) 71 y.o. Date:  Dose/Freq Admin Times Level/Time:  
 2 grams IV LD 1555  1500 mg IV every 24 hours  (15) Recent Labs 08/31/18 
 0541  08/30/18 
 1229  08/30/18 
 1228 BUN  36*   --   42* CREA  2.18*   --   3.99* WBC  11.0   --   12.3*  
LACPOC   --   1.5   --   
 
Estimated Creatinine Clearance: 37 mL/min (based on Cr of 2.18). A/P:  Vancomycin changed to 1500 mg IV every 24 hours. Bernabe Ji PharmD, BCPS 
[phone]

## 2018-08-31 NOTE — PROGRESS NOTES
Irregular heart rhythm reported to Dr. Anuj Bustos.  She stated to just continue to monitor patient since has a history of afib and to report if his heart rate goes greater than 100/bpm.

## 2018-08-31 NOTE — PROGRESS NOTES
Problem: Mobility Impaired (Adult and Pediatric) Goal: *Acute Goals and Plan of Care (Insert Text) GOALS FOR 1 WEEK RE-ASSESS 9/7/18 : 
(1.)Mr. Cutler will move from supine to sit and sit to supine  with MINIMAL ASSIST.  
(2.)Mr. Cutler will sit<>stand with minimal assist & walker. (3.)Mr. Cutler will perform standing wt-shift with walker & pre-gait exercises with minimal assist. 
4) pt performing fair sitting static balance for 2 min. 5) Pt performing sitting EOB wt-shifting on outstretched arms multiple directions with CGA. 6) pt performing AROM to UE & LE's on cue 15-20 reps. 
 
________________________________________________________________________________________________ PHYSICAL THERAPY: Initial Assessment, Treatment Day: Day of Assessment, AM 8/31/2018 INPATIENT: Hospital Day: 2 [place] / Product Type: Medicare /  
  
NAME/AGE/GENDER: Lorena Dickinson is a 71 y.o. male PRIMARY DIAGNOSIS: MELANI (acute kidney injury) ([facility] [address] Nausea & vomiting Hypotension Sepsis ([facility] [address] MELANI (acute kidney injury) ([facility] [address] MELANI (acute kidney injury) ([facility] [address] ICD-10: Treatment Diagnosis:  
 · Generalized Muscle Weakness (M62.81) · Other lack of cordination (R27.8) · Difficulty in walking, Not elsewhere classified (R26.2) · Other abnormalities of gait and mobility (R26.89) Precaution/Allergies: 
Oxycodone and Tramadol ASSESSMENT:  
 
Mr. Steven Dominguez presents as extremely weak, decreased sitting balance, not able to take steps on evaluation, while needing significant assist for low level skills. This pt had his rehab stay interrupted due to a set back with recurrent sepsis & MELANI. This pt will need extensive therapy follow up to target core stability in sitting & standing along with supplemental exercises to increase muscle activity, strength & endurance. This pt has taken a big functional decline since his last admission. Pt will need to return to SNF on hospital DC. This section established at most recent assessment PROBLEM LIST (Impairments causing functional limitations): 1. Decreased Strength 2. Decreased ADL/Functional Activities 3. Decreased Transfer Abilities 4. Decreased Balance 5. Decreased Activity Tolerance 6. Increased Shortness of Breath 7. Decreased Flexibility/Joint Mobility 8. Decreased [place]ber[place] with Home Exercise Program 
 INTERVENTIONS PLANNED: (Benefits and precautions of physical therapy have been discussed with the patient.) 1. Balance Exercise 2. Bed Mobility 3. Home Exercise Program (HEP) 4. Therapeutic Activites 5. Therapeutic Exercise/Strengthening 6. Transfer Training TREATMENT PLAN: Frequency/Duration: daily for duration of hospital stay Rehabilitation Potential For Stated Goals: Fair RECOMMENDED REHABILITATION/EQUIPMENT: (at time of discharge pending progress): Due to the probability of continued deficits (see above) this patient will likely need continued skilled physical therapy after discharge. Equipment:  
? to be determined HISTORY:  
History of Present Injury/Illness (Reason for Referral): 
Shanel Hernández is a 71 y.o. male who has a PMH of HTN, a fib, DM, CKD, s/p left knee Arthroplasty on 8/2/18, was referred from [facility] after he was noted with chills, hypotension of 79/45 mmHg, AMS, hallucinations and vomiting x 4 today. Of note, he had a pseudomonas UTI on 8/15/18, he was given a 7 day therapy on vantin, and eventually given ciprofloxacin back at the facility for his infection. He stated his left knee is tender and has not been able to walk properly. He believes his dehydration may be related to lasix use. Upon ER arrival VS BP 98/62  HR 87  RR 20  O2: 98% on room air. On my assessment his BP was 90/52  HR 85. Patient was alert with visual hallucinations and confused. He complained about left knee pain since his surgery.  Labs included: normal lactic acid; cbc: wbc 12k, hb 11gr; chemistry: cr 3.9 ( baseline 1.2 on 8/17/18 ); UA: negative; EKG: A fib not in RVR; CXR: normal; left knee XR: preserved arthroplasty. Blood cultures taken in the ED. Hospitalist was contacted for admission due to sepsis and amelie on ckd. 
  
Past Medical History/Comorbidities:  
Mr. Louann Dominguez  has a past medical history of Arthritis; BMI 30.0-30.9,adult; CAD (coronary artery disease); Chronic kidney disease; Chronic pain; Depression; Diabetes ([address] Enlarged prostate with urinary obstruction; Former smoker; GERD (gastroesophageal reflux disease); High cholesterol; Hypertension; Nausea & vomiting (8/30/2018); Neurogenic bladder; Other ill-defined conditions(799.89); Overflow incontinence; Sciatica; Self-catheterizes urinary bladder; Skin cancer; Sleep apnea; Spondylolisthesis of lumbar region; Thromboembolus ([address] (2013); Urine retention; and Varicose veins of both lower extremities with complications. He also has no past medical history of Adverse effect of anesthesia; Aneurysm ([address] Arrhythmia; Asthma; Autoimmune disease ([address] Chronic obstructive pulmonary disease ([address] Coagulation disorder ([address] Difficult intubation; Endocarditis; Heart failure ([address] Ill-defined condition; Liver disease; Malignant hyperthermia due to anesthesia; Nicotine vapor product user; Non-nicotine vapor product user; Pseudocholinesterase deficiency; PUD (peptic ulcer disease); Rheumatic fever; Seizures ([address] Stroke [facility]); or Thyroid disease. Mr. Louann Dominguez  has a past surgical history that includes hx hemorrhoidectomy (1971); hx shoulder arthroscopy (Left, 1971); and hx urological (06/2017). Social History/Living Environment:  
Home Environment: Skilled nursing facility Care Facility Name: [facility] # Steps to Enter: 0 One/Two Story Residence: One story Living Alone: No 
Support Systems:  (facility staff) Patient Expects to be Discharged to[de-identified] Skilled nursing facility Current DME Used/Available at Home:  (none) Prior Level of Function/Work/Activity: Pt was functioning with a rolling walker & limited assist in rehab prior to this set back & re-admit. Personal Factors: Other factors that influence how disability is experienced by the patient:  Current & PMH Number of Personal Factors/Comorbidities that affect the Plan of Care: 3+: HIGH COMPLEXITY EXAMINATION:  
Most Recent Physical Functioning:  
Gross Assessment: 
AROM: Grossly decreased, non-functional (all limbs & core) Strength: Grossly decreased, non-functional (all limbs & core) Coordination: Grossly decreased, non-functional (all limbs & core) LLE PROM 
L Knee Flexion: 60 (~) 
L Knee Extension: -30 (~) Balance: 
Sitting: Impaired; Without support Sitting - Static:  (fair-) Sitting - Dynamic:  (fair-) Standing: Impaired;Pull to stand; With support (walker) Standing - Static:  (poor with walker) Standing - Dynamic :  (NT) Bed Mobility: 
Supine to Sit: Maximum assistance Sit to Supine: Maximum assistance Scooting: Maximum assistance Transfers: 
Sit to Stand: Maximum assistance Stand to Sit: Maximum assistance Bed to Chair: Maximum assistance (with walker) Gait: NA on evaluation Left Side Weight Bearing: As tolerated Functional Mobility:  
      Gait/Ambulation:  NA Transfers:  max Bed Mobility:  max Body Structures Involved: 1. Metabolic 2. Joints 3. Muscles Body Functions Affected: 1. Sensory/Pain 2. Movement Related 3. Metobolic/Endocrine Activities and Participation Affected: 1. General Tasks and Demands 2. Mobility Number of elements that affect the Plan of Care: 4+: HIGH COMPLEXITY CLINICAL PRESENTATION:  
Presentation: Evolving clinical presentation with unstable and unpredictable characteristics: HIGH COMPLEXITY CLINICAL DECISION MAKIN [facility] [address] [id number] AM-PAC 6 Clicks Basic Mobility Inpatient Short Form How much difficulty does the patient currently have. ..  Unable A Lot A Little None 1. Turning over in bed (including adjusting bedclothes, sheets and blankets)? [] 1   [x] 2   [] 3   [] 4  
2. Sitting down on and standing up from a chair with arms ( e.g., wheelchair, bedside commode, etc.)   [] 1   [x] 2   [] 3   [] 4  
3. Moving from lying on back to sitting on the side of the bed? [] 1   [x] 2   [] 3   [] 4 How much help from another person does the patient currently need. .. Total A Lot A Little None 4. Moving to and from a bed to a chair (including a wheelchair)? [x] 1   [] 2   [] 3   [] 4  
5. Need to walk in hospital room? [x] 1   [] 2   [] 3   [] 4  
6. Climbing 3-5 steps with a railing? [x] 1   [] 2   [] 3   [] 4  
© 2007, Trustees of [address] [place] [address], under license to [employer]. All rights reserved Score:  Initial: 9 Most Recent: X (Date: -- ) Interpretation of Tool:  Represents activities that are increasingly more difficult (i.e. Bed mobility, Transfers, Gait). Score 24 23 22-20 19-15 14-10 9-7 6 Modifier CH CI CJ CK CL CM CN   
 
? Mobility - Walking and Moving Around:  
  - CURRENT STATUS: CM - 80%-99% impaired, limited or restricted  - GOAL STATUS: CL - 60%-79% impaired, limited or restricted  - D/C STATUS:  ---------------To be determined--------------- [place] / [place] / Product Type: Medicare /   
 
Medical Necessity:    
· Patient is expected to demonstrate progress in strength, range of motion, balance, coordination and functional technique to decrease assistance required with bed mobility, balance & transfers with progression to gait as able. Reason for Services/Other Comments: 
· Patient continues to require skilled intervention due to severe debility.   
Use of outcome tool(s) and clinical judgement create a POC that gives a: Difficult prediction of patient's progress: HIGH COMPLEXITY  
  
 
 
 
TREATMENT:  
 (In addition to Assessment/Re-Assessment sessions the following treatments were rendered) Pre-treatment Symptoms/Complaints:  Feeling extremely weak Pain: Initial: visual scale Pain Intensity 1: 0  Post Session:  0/10 Therapeutic Activity: (    13): Therapeutic activities including bed mobility, sitting static balance, wt-shifting EOB R<>L, F<>B, repeated sit<>stand with walker to encourage [address] through arms & legs to improve mobility, strength, balance, coordination and dynamic movement of arm - bilateral, leg - bilateral and core to improve functional WB potential.  Required moderate verbal & manual cues to promote upright posture. Assessment/13 Braces/Orthotics/Lines/Etc:  
· IV 
· salmon catheter · O2 Device: Nasal cannula Treatment/Session Assessment:   
· Response to Treatment:   Easily SOB with minimal exertion. · Interdisciplinary Collaboration:  
o Registered Nurse 
o Certified Nursing Assistant/Patient Care Technician · After treatment position/precautions:  
o Supine in bed 
o Bed/Chair-wheels locked 
o Bed in low position 
o Call light within reach 
o RN notified 
o pt in slight trendelenburg with left heel propped, HOB 45 deg · Compliance with Program/Exercises: Will assess as treatment progresses. · Recommendations/Intent for next treatment session: \"Next visit will focus on reduction in assistance provided\". Total Treatment Duration: PT Patient Time In/Time Out Time In: 5592 Time Out: 3901 [address] PT

## 2018-08-31 NOTE — PROGRESS NOTES
Patient bs is 62. He is still drowsy but is able to talk to this nurse and is NPO. Dr. Gifford Fraction notified and order received for D50 IVP.

## 2018-08-31 NOTE — PROGRESS NOTES
Progress Note Patient: Gene Joshi MRN: [id number]  SSN: [id number] YOB: 1945  Age: 71 y.o. Sex: male Admit Date: 8/30/2018 LOS: 1 day Subjective:  
Cc: \" I still feel weak\" Patient examined at bedside. He was noted in mild distress due to weakness. He ate a little today. Received 500 ml bolus of NS due to hypotension. Appreciate orthopedics evaluation. Objective:  
 
Vitals:  
 08/30/18 2303 08/31/18 0011 08/31/18 0404 08/31/18 0422 BP:  90/58  103/58 Pulse:  99 (!) 104 (!) 105 Resp:  22 19 Temp:  99.3 °F (37.4 °C) 99.1 °F (37.3 °C) SpO2: 93% 100% 95% Weight:      
Height:      
  
 
Intake and Output: 
Current Shift:   
Last three shifts: 08/29 1901 - 08/31 0700 In: -  
Out: Chelsea Fajardo [[id number]] Physical Exam:  
GENERAL: alert, cooperative EYE: negative LYMPHATIC: Cervical, supraclavicular, and axillary nodes normal.  
THROAT & NECK: dry mucous membranes ; no exudates LUNG: clear to auscultation bilaterally HEART: regular rate and rhythm, S1, S2 normal, no murmur, click, rub or gallop ABDOMEN: soft, non-tender. Bowel sounds normal. No masses,  no organomegaly EXTREMITIES:  Left knee with healed scar, has edema, no erythema; limited ROM SKIN: Normal. 
NEUROLOGIC: oriented x 3, able to move all 4 limbs Ramey cath: clear urine PSYCHIATRIC: non focal 
 
Lab/Data Review: All lab results for the last 24 hours reviewed. Assessment:  
 
Principal Problem: 
  MELANI (acute kidney injury) ([address] (8/30/2018) Active Problems: 
  CKD (chronic kidney disease) stage 3, GFR 30-59 ml/min (5/8/2018) S/P total knee arthroplasty, left (8/3/2018) Diabetes mellitus type 2, controlled ([address] (8/15/2018) Sepsis ([address] (8/15/2018) Bipolar disorder ([address] (8/15/2018) Hypotension (8/30/2018) Nausea & vomiting (8/30/2018) Plan: 1.  Sepsis: hypotension + leukocytosis: possible bacteremia after a recent UTI: his BP readings are much stable - continue IVF hydration - vanc and zosyn for now-  monitor cultures 2. Non oliguric amelie on ckd: likely related to dehydration / nausea and vomiting / ACEI and lasix use: FeNa: 1,9%: possible intrinsic kidney disease: continue salmon cath and strict IO - avoid nephrotoxic meds 3. Nausea and vomiting: gastritis - secondary to amelie: resolved - ppi and antiemetics 4. S/P LKA on 8/2/18: pain control- PT/OT- appreciate orthopedics eval  
 
5. A fib not in RVR: stable, monitor 6. Bipolar disorder: home meds  
  
DVT ppx: heparin and GCS 
  
Code status: DNR/DNI. 
  
Disposition: STR versus IRC.  on board. Signed By: Precious Baker MD   
 August 31, 2018

## 2018-08-31 NOTE — PROGRESS NOTES
Consult noted. Mr. Vicenta Nguyen was admitted under the hospitalist service for sepsis and possible bacteremia after a recent UTI to which they are managing. He is S/P LKA on 8/2/18. We were consulted for recurrent UTIs by Dr. Colin Cabrera. Most recent UA is negative for infection from 8/30/18. Case discussed with Dr. Neftali Dao (on-call urologist today) and Dr. Clementina Rogers (primary MD for patient). We recommend that he be seen as outpatient in about 2-3 weeks with NP in the office for further workup should he still need it. Currently, he has no active UTI per results. Will request appt for him and office will call with date/time.

## 2018-08-31 NOTE — PROGRESS NOTES
Patient's BP 89/69. Reported to Dr. Dustin Yarbrough and 500ml NS bolus ordered. Will administer and reassess patient.

## 2018-08-31 NOTE — CONSULTS
Physical Medicine & Rehabilitation Note-consult Patient: Chase Jones MRN: [id number]  SSN: [id number] YOB: 1945  Age: 71 y.o. Sex: male Admit Date: 8/30/2018 Admitting Physician: Stew Fernandez MD 
 
Medical Decision Making/Plan/Recommend: Functional deficit, mobility, gait impairment. Continue acute PT, OT as tolerated. Continue gait training, transfer training, balance activities, exercises to improve strength and balance to maximize ADLs and functional mobility. PT will focus on L knee ROM, strength. Admission to [facility] rehab program is reasonable and necessary due to ongoing medical conditions and functional deficits. Patient will benefit from daily multi disciplinary  inpatient rehabilitation program and the availability of all the needed medical and nursing care. Patient will require continued monitoring for renal injury/ renal function, infection, L TKA management, DVT risk, pain management, possible  Additional treatment depending on result of work up Patient has twice failed rehab/ medical care at SNF sub acute rehab. Real has had repeated medical problems requiring readmissions to hospital from SNF. His L TKA rehab as result of medical issues encountered at SNF, has been limited and inadequate. His knee ROM is signigficantly below where he should be. For these ongoing issues, further rehabilitation services could not be safely provided at a lower level of care such as a skilled nursing facility or nursing home. The patient has shown the ability to tolerate and benefit from 3 hours of therapy daily and is being admitted to a comprehensive acute inpatient rehabilitation program consisting of at least 3 hours of combined physical and occupational therapies. planning intensive Physical Therapy for a minimum of 1.5 hours a day, at least 5 out of 7 days per week to address bed mobility, transfers, ambulation, strengthening, balance, and endurance. Planning intensive Occupational Therapy for a minimum of 1.5 hours a day, at least 5 out of 7 days per week to address ADL ( bathing, LE dressing, toileting) and adaptive equipment as needed. 
  
Patient was independent at baseline with mobility and ADLs, with use of a cane/ walker.  
  
Expect patient will improve to mod I level with mobility and gait as pain and LLE strength improve. Expect 14 days length of stay and patient is expected to return home with half sister and continued rehabilitation with home health therapy, out pt PT. 
  
  
Thank you for the opportunity to participate in the care of this patient. Thank you for the opportunity to participate in the care of this patient. Chief Complaint : Gait dysfunction secondary to below. Admit Diagnosis: Unilateral primary osteoarthritis, left knee [M17.12] MELANI (acute kidney injury) ([address] (8/30/2018) CKD (chronic kidney disease) stage 3, GFR 30-59 ml/min (5/8/2018) S/P total knee arthroplasty, left (8/3/2018) Diabetes mellitus type 2, controlled ([address] (8/15/2018) Sepsis ([address] (8/15/2018) Bipolar disorder ([address] (8/15/2018) Hypotension (8/30/2018) Nausea & vomiting (8/30/2018) Pain DVT risk Post op acute blood loss anemia Hypertension Diabetes ([address] Spondylolisthesis of lumbar region Neurogenic bladder  -has catheterized once in the past month; urinating more frequently own his own at baseline Acute Rehab Dx: 
Gait impairment Debility   
Mobility and ambulation deficits Self Care/ADL deficits Medical Dx: 
Past Medical History:  
Diagnosis Date  Arthritis  BMI 30.0-30.9,adult  CAD (coronary artery disease)   
 mild to moderate apical reversible ischemia per cardio note dated 07/12/10  Chronic kidney disease   
 no nephrologist   
 Chronic pain   
 neck, lower back  Depression  Diabetes ([address] type 2; oral and insulin meds; 1-2 times per day; average 104; A1C=8.1 on 06/13/18  Enlarged prostate with urinary obstruction  Former smoker  GERD (gastroesophageal reflux disease)  High cholesterol  Hypertension   
 on med for control  Nausea & vomiting 8/30/2018  Neurogenic bladder  Other ill-defined conditions(799.89)  Overflow incontinence  Sciatica   
 recently took medrol dose pack around 05/04/18  Self-catheterizes urinary bladder   
 has catheterized once in the past month; urinating more frequently own his own now  (07/26/18)  Skin cancer  Sleep apnea   
 bi-pap; instructed to bring [id number]  Spondylolisthesis of lumbar region  Thromboembolus [facility]) 2013 DVT left leg; caused from a falling accident that damaged left leg  Urine retention  Varicose veins of both lower extremities with complications Subjective:  
 
Date of Evaluation:  August 31, 2018 HPI: Mariluz Simpson a 71 y. o. male patient at [address] [facility] was admitted on 8/2/2018  by [address] below mentioned medical history, is being seen for Physical Medicine and Rehabilitation. Armando Cutler intially presented with severe left knee pain due to end stage DJD and underwent a left total knee arthroplasty per Dr. Meg Nicole [place] [id number] 8/2/2018. Patient was discharged to a SNF for sub acute rehab program subsequently. He was readmitted to [facility] on 8/15 from SNF with a fever of 102+F, malaise, chills, confusion. He was found to have a pseudomonas UTI, sepsis and acute cystitis, treated and again discharged to SNF on vantin. He is re-admitted to [facility] on 8/30 from SNF due to chills, hypotension, 79/45. 98/61, 90/52 mmHg, AMS, hallucinations and vomiting. Patient was admitted with diagnosis of possible sepsis, MELANI, with Cr 3.99. Cultures were sent. Patient is started on empiric iv antibiotics, ivf. Diuretics and ACEI has been held. Mariluz Simpson seen and examined today. Medical Records reviewed.  PT assessment noted. He requires ax asit for mobility and transfers today. He is limited by pain with weight bearing on the surgical limb. His active and passive left knee flexion and extensions are unfortunately very limited. Rebecca Calderon is seen and examined today. Medical Records reviewed. Patient has been independent with ambulation, prior to admission, but limited by left knee pain. Current Level of Function:   bed mobility - max A, transfers - max A, decreased balance , ambulation - NT 
 
Prior Level of Function/Work/Activity: Pt was functioning with a rolling walker & limited assist in rehab prior to this set back & re-admit. Family History Problem Relation Age of Onset  No Known Problems Mother  Heart Attack Father  Cancer Sister  Parkinson's Disease Brother Social History Substance Use Topics  Smoking status: Former Smoker Packs/day: 1.00 Years: 7.00 Quit date: 5/8/1971  Smokeless tobacco: Never Used  Alcohol use Yes Comment: occasionally Past Surgical History:  
Procedure Laterality Date [address]  HX SHOULDER ARTHROSCOPY Left 1971  HX UROLOGICAL  06/2017 CYSTOSCOPY, TRANSURETHRAL RESECTION OF PROSTATE Prior to Admission medications Medication Sig Start Date End Date Taking? Authorizing Provider  
aspirin delayed-release 81 mg tablet Take 1 Tab by mouth two (2) times a day for 15 days. 8/18/18 9/2/18  Brook Luna MD  
buPROPion SR (WELLBUTRIN SR) 150 mg SR tablet Take 150 mg by mouth two (2) times a day. Historical Provider  
divalproex ER (DEPAKOTE ER) 500 mg ER tablet Take 1,000 mg by mouth two (2) times a day. Historical Provider  
pregabalin (LYRICA) 75 mg capsule Take 75 mg by mouth two (2) times a day. Historical Provider  
latanoprost (XALATAN) 0.005 % ophthalmic solution Administer 1 Drop to both eyes nightly. Historical Provider empagliflozin-metformin (SYNJARDY XR) 25-1,000 mg TBph Take 1 Tab by mouth daily. Indications: type 2 diabetes mellitus    Historical Provider  
multivitamin (ONE A DAY) tablet Take 1 Tab by mouth daily. Historical Provider  
furosemide (LASIX) 20 mg tablet Take 20 mg by mouth daily. Historical Provider  
glipiZIDE SR (GLUCOTROL XL) 10 mg CR tablet Take 10 mg by mouth daily. Indications: type 2 diabetes mellitus    Historical Provider Liraglutide (VICTOZA) 0.6 mg/0.1 mL (18 mg/3 mL) pnij 1.8 mg by SubCUTAneous route daily. Indications: type 2 diabetes mellitus    Historical Provider  
losartan (COZAAR) 100 mg tablet Take 100 mg by mouth daily. Indications: hypertension    Historical Provider  
nystatin (MYCOSTATIN) topical cream Apply  to affected area two (2) times daily as needed. Historical Provider  
calcium polycarbophil (FIBERCON) 625 mg tablet Take 625 mg by mouth daily. Historical Provider  
simvastatin (ZOCOR) 40 mg tablet Take 40 mg by mouth nightly. Historical Provider  
insulin degludec (TRESIBA FLEXTOUCH U-200) 200 unit/mL (3 mL) inpn 50 Units by SubCUTAneous route daily. Take / use 40 units the day before and AM day of surgery  per anesthesia protocols. Indications: type 2 diabetes mellitus    Historical Provider Allergies Allergen Reactions  Oxycodone Itching  Tramadol Itching Review of Systems: Denies chest pain, shortness of breath, cough, headache, visual problems, abdominal pain, dysurea, calf pain. Pertinent positives are as noted in the medical records and unremarkable otherwise. Objective:  
 
Vitals: 
Blood pressure 124/69, pulse 91, temperature 97.9 °F (36.6 °C), resp. rate 18, height 6' (1.829 m), weight 207 lb (93.9 kg), SpO2 95 %. Temp (24hrs), Av.2 °F (36.8 °C), Min:96.6 °F (35.9 °C), Max:99.3 °F (37.4 °C) BMI (calculated): 28.1 (18 1213) Intake and Output: 
 1901 -  0700 In: -  
Out: Meagan Carty [[id number]] Physical Exam: General: Confused. Oriented to place, person. No acute cardio respiratory distress. HEENT: Normocephalic,no scleral icterus Oral mucosa moist without cyanosis, No bruit, No JVD. Lungs: Clear to auscultation. Respiration even and unlabored Heart: Regular rate and rhythm, S1, S2 No  murmurs, clicks, rub or gallops Abdomen: Soft, non-tender, nondistended. Bowel sounds present. No organomegaly. Genitourinary: Benign . Neuromuscular: PERRL, EOMI Follows simple commands consistently. Able to identify, recall repeat. Mood appropriate. Insight, judgement poor 
+ generalized weakness. Sensory - intact No cerebellar signs. Plantars - down going No atrophy, no fasciculations, no tremors. Skin/extremity: No rashes, no erythema. Calf non tender BLE. boggy L knee. LLE PROM 
L Knee Flexion: 60 (~) 
L Knee Extension: -30 (~) Labs/Studies: 
Recent Results (from the past 72 hour(s)) EKG, 12 LEAD, INITIAL Collection Time: 08/30/18 12:22 PM  
Result Value Ref Range Ventricular Rate 84 BPM  
 Atrial Rate 277 BPM  
 QRS Duration 124 ms Q-T Interval 376 ms QTC Calculation (Bezet) 444 ms Calculated R Axis -65 degrees Calculated T Axis 85 degrees Diagnosis    
  !! AGE AND GENDER SPECIFIC ECG ANALYSIS !! Atrial fibrillation with a competing junctional pacemaker Left anterior fascicular block Nonspecific ST and T wave abnormality , probably digitalis effect Abnormal ECG When compared with ECG of 15-AUG-2018 13:42, 
Previous ECG has undetermined rhythm, needs review Confirmed by Cordell Lewis MD (), Jonathan Alvarado ([id number]) on 8/30/2018 2:22:30 PM 
  
GLUCOSE, POC Collection Time: 08/30/18 12:25 PM  
Result Value Ref Range Glucose (POC) 203 (H) 65 - 100 mg/dL CBC WITH AUTOMATED DIFF Collection Time: 08/30/18 12:28 PM  
Result Value Ref Range WBC 12.3 (H) 4.3 - 11.1 K/uL RBC 3.92 (L) 4.23 - 5.6 M/uL  
 HGB 11.4 (L) 13.6 - 17.2 g/dL HCT 36.8 (L) 41.1 - 50.3 % MCV 93.9 79.6 - 97.8 FL  
 MCH 29.1 26.1 - 32.9 PG  
 MCHC 31.0 (L) 31.4 - 35.0 g/dL  
 RDW 15.0 % PLATELET 887 510 - 214 K/uL MPV 10.2 9.4 - 12.3 FL ABSOLUTE NRBC 0.00 0.0 - 0.2 K/uL  
 DF AUTOMATED NEUTROPHILS 72 43 - 78 % LYMPHOCYTES 18 13 - 44 % MONOCYTES 9 4.0 - 12.0 % EOSINOPHILS 1 0.5 - 7.8 % BASOPHILS 0 0.0 - 2.0 % IMMATURE GRANULOCYTES 0 0.0 - 5.0 %  
 ABS. NEUTROPHILS 8.9 (H) 1.7 - 8.2 K/UL  
 ABS. LYMPHOCYTES 2.2 0.5 - 4.6 K/UL  
 ABS. MONOCYTES 1.1 0.1 - 1.3 K/UL  
 ABS. EOSINOPHILS 0.1 0.0 - 0.8 K/UL  
 ABS. BASOPHILS 0.1 0.0 - 0.2 K/UL  
 ABS. IMM. GRANS. 0.0 0.0 - 0.5 K/UL METABOLIC PANEL, COMPREHENSIVE Collection Time: 08/30/18 12:28 PM  
Result Value Ref Range Sodium 137 136 - 145 mmol/L Potassium 4.3 3.5 - 5.1 mmol/L Chloride 97 (L) 98 - 107 mmol/L  
 CO2 30 21 - 32 mmol/L Anion gap 10 7 - 16 mmol/L Glucose 183 (H) 65 - 100 mg/dL BUN 42 (H) 8 - 23 MG/DL Creatinine 3.99 (H) 0.8 - 1.5 MG/DL  
 GFR est AA 19 (L) >60 ml/min/1.73m2 GFR est non-AA 16 (L) >60 ml/min/1.73m2 Calcium 8.8 8.3 - 10.4 MG/DL Bilirubin, total 0.5 0.2 - 1.1 MG/DL  
 ALT (SGPT) 59 12 - 65 U/L  
 AST (SGOT) 54 (H) 15 - 37 U/L Alk. phosphatase 105 50 - 136 U/L Protein, total 7.8 6.3 - 8.2 g/dL Albumin 2.9 (L) 3.2 - 4.6 g/dL Globulin 4.9 (H) 2.3 - 3.5 g/dL A-G Ratio 0.6 (L) 1.2 - 3.5 POC LACTIC ACID Collection Time: 08/30/18 12:29 PM  
Result Value Ref Range Lactic Acid (POC) 1.5 0.5 - 1.9 mmol/L  
CULTURE, BLOOD Collection Time: 08/30/18 12:56 PM  
Result Value Ref Range Special Requests: LEFT 
FOREARM Culture result: NO GROWTH AFTER 16 HOURS    
CULTURE, BLOOD Collection Time: 08/30/18  1:06 PM  
Result Value Ref Range Special Requests: RIGHT 
HAND  Culture result: NO GROWTH AFTER 16 HOURS    
URINALYSIS W/ RFLX MICROSCOPIC  
 Collection Time: 08/30/18  1:48 PM  
Result Value Ref Range Color GREEN Appearance CLEAR Specific gravity 1.021 1.001 - 1.023    
 pH (UA) 6.0 5.0 - 9.0 Protein NEGATIVE  NEG mg/dL Glucose >1000 mg/dL Ketone NEGATIVE  NEG mg/dL Bilirubin NEGATIVE  NEG Blood NEGATIVE  NEG Urobilinogen 0.2 0.2 - 1.0 EU/dL Nitrites NEGATIVE  NEG Leukocyte Esterase NEGATIVE  NEG    
CULTURE, URINE Collection Time: 08/30/18  1:48 PM  
Result Value Ref Range Special Requests: NO SPECIAL REQUESTS Culture result:     
  NO GROWTH AFTER SHORT PERIOD OF INCUBATION. FURTHER RESULTS TO FOLLOW AFTER OVERNIGHT INCUBATION. SODIUM, UR, RANDOM Collection Time: 08/30/18  1:48 PM  
Result Value Ref Range Sodium,urine random 59 MMOL/L  
CREATININE, UR, RANDOM Collection Time: 08/30/18  1:48 PM  
Result Value Ref Range Creatinine, urine 87.56 mg/dL CHLORIDE, UR, RANDOM Collection Time: 08/30/18  1:48 PM  
Result Value Ref Range Chloride,urine random 58 MMOL/L  
GLUCOSE, POC Collection Time: 08/30/18  3:31 PM  
Result Value Ref Range Glucose (POC) 112 (H) 65 - 100 mg/dL GLUCOSE, POC Collection Time: 08/30/18  9:32 PM  
Result Value Ref Range Glucose (POC) 57 (L) 65 - 100 mg/dL GLUCOSE, POC Collection Time: 08/30/18 10:18 PM  
Result Value Ref Range Glucose (POC) 76 65 - 100 mg/dL GLUCOSE, POC Collection Time: 08/30/18 10:52 PM  
Result Value Ref Range Glucose (POC) 131 (H) 65 - 100 mg/dL GLUCOSE, POC Collection Time: 08/31/18  4:49 AM  
Result Value Ref Range Glucose (POC) 99 65 - 100 mg/dL METABOLIC PANEL, BASIC Collection Time: 08/31/18  5:41 AM  
Result Value Ref Range Sodium 139 136 - 145 mmol/L Potassium 4.6 3.5 - 5.1 mmol/L Chloride 105 98 - 107 mmol/L  
 CO2 25 21 - 32 mmol/L Anion gap 9 7 - 16 mmol/L Glucose 93 65 - 100 mg/dL BUN 36 (H) 8 - 23 MG/DL  Creatinine 2.18 (H) 0.8 - 1.5 MG/DL  
 GFR est AA 39 (L) >60 ml/min/1.73m2 GFR est non-AA 32 (L) >60 ml/min/1.73m2 Calcium 8.3 8.3 - 10.4 MG/DL  
CBC WITH AUTOMATED DIFF Collection Time: 08/31/18  5:41 AM  
Result Value Ref Range WBC 11.0 4.3 - 11.1 K/uL  
 RBC 3.44 (L) 4.23 - 5.6 M/uL HGB 9.8 (L) 13.6 - 17.2 g/dL HCT 31.5 (L) 41.1 - 50.3 % MCV 91.6 79.6 - 97.8 FL  
 MCH 28.5 26.1 - 32.9 PG  
 MCHC 31.1 (L) 31.4 - 35.0 g/dL  
 RDW 14.9 % PLATELET 850 042 - 996 K/uL MPV 10.2 9.4 - 12.3 FL ABSOLUTE NRBC 0.00 0.0 - 0.2 K/uL  
 DF AUTOMATED NEUTROPHILS 76 43 - 78 % LYMPHOCYTES 13 13 - 44 % MONOCYTES 10 4.0 - 12.0 % EOSINOPHILS 0 (L) 0.5 - 7.8 % BASOPHILS 0 0.0 - 2.0 % IMMATURE GRANULOCYTES 1 0.0 - 5.0 %  
 ABS. NEUTROPHILS 8.4 (H) 1.7 - 8.2 K/UL  
 ABS. LYMPHOCYTES 1.5 0.5 - 4.6 K/UL  
 ABS. MONOCYTES 1.1 0.1 - 1.3 K/UL  
 ABS. EOSINOPHILS 0.0 0.0 - 0.8 K/UL  
 ABS. BASOPHILS 0.0 0.0 - 0.2 K/UL  
 ABS. IMM. GRANS. 0.1 0.0 - 0.5 K/UL GLUCOSE, POC Collection Time: 08/31/18 11:08 AM  
Result Value Ref Range Glucose (POC) 95 65 - 100 mg/dL Functional Assessment: 
Reviewed participation and progress in therapies Gross Assessment AROM: Grossly decreased, non-functional (all limbs & core) (08/31/18 1200) Strength: Grossly decreased, non-functional (all limbs & core) (08/31/18 1200) Coordination: Grossly decreased, non-functional (all limbs & core) (08/31/18 1200) Bed Mobility Supine to Sit: Maximum assistance (08/31/18 1200) Sit to Supine: Maximum assistance (08/31/18 1200) Scooting: Maximum assistance (08/31/18 1200) Balance Sitting: Impaired; Without support (08/31/18 1200) Sitting - Static:  (fair-) (08/31/18 1200) Sitting - Dynamic:  (fair-) (08/31/18 1200) Standing: Impaired;Pull to stand; With support (walker) (08/31/18 1200) Standing - Static:  (poor with walker) (08/31/18 1200) Standing - Dynamic :  (NT) (08/31/18 1200) Bed/Mat Mobility Supine to Sit: Maximum assistance (08/31/18 1200) Sit to Supine: Maximum assistance (08/31/18 1200) Sit to Stand: Maximum assistance (08/31/18 1200) Bed to Chair: Maximum assistance (with walker) (08/31/18 1200) Scooting: Maximum assistance (08/31/18 1200) Ambulation: 
  
 
Impression/Plan:  
 
Principal Problem: 
  MELANI (acute kidney injury) ([email or web address] (8/30/2018) Active Problems: 
  CKD (chronic kidney disease) stage 3, GFR 30-59 ml/min (5/8/2018) S/P total knee arthroplasty, left (8/3/2018) Diabetes mellitus type 2, controlled ([facility] [[email or web address] (8/15/2018) Sepsis ([facility] (8/15/2018) Bipolar disorder ([email or web address] (8/15/2018) Hypotension (8/30/2018) Nausea & vomiting (8/30/2018) Current Facility-Administered Medications Medication Dose Route Frequency Provider Last Rate Last Dose  
 HYDROcodone-acetaminophen (NORCO) 7.5-325 mg per tablet 2 Tab  2 Tab Oral Q6H PRN Radhames Irvin MD      
 aspirin delayed-release tablet 81 mg  81 mg Oral BID Radhames Irvin MD   81 mg at 08/31/18 1247  
 HYDROcodone-acetaminophen (NORCO) 7.5-325 mg per tablet 1 Tab  1 Tab Oral Q6H PRN Norene Mcardle, MD      
 vancomycin (VANCOCIN) 1500 mg in  ml infusion  1,500 mg IntraVENous Q24H Norene Mcardle, MD      
 piperacillin-tazobactam (ZOSYN) 4.5 g in 0.9% sodium chloride (MBP/ADV) 100 mL  4.5 g IntraVENous Mitchtamika Hubbard MD      
 buPROPion SR [facility]) tablet 150 mg  150 mg Oral BID Norene Mcardle, MD   150 mg at 08/31/18 1671  divalproex ER (DEPAKOTE ER) 24 hour tablet 1,000 mg  1,000 mg Oral BID Norene Mcardle, MD   1,000 mg at 08/31/18 5674  latanoprost (XALATAN) 0.005 % ophthalmic solution 1 Drop  1 Drop Both Eyes QHS Norene Mcardle, MD   1 Drop at 08/30/18 2158  simvastatin (ZOCOR) tablet 40 mg  40 mg Oral QHS Norene Mcardle, MD   Stopped at 08/30/18 2200  
 heparin (porcine) injection 5,000 Units  5,000 Units SubCUTAneous Q12H Smitha Johnson MD   5,000 Units at 08/31/18 0456  
 0.9% sodium chloride infusion  100 mL/hr IntraVENous CONTINUOUS Smitha Johnson  mL/hr at 08/31/18 0459 100 mL/hr at 08/31/18 2026  acetaminophen (TYLENOL) tablet 650 mg  650 mg Oral Q6H PRN Smitha Johnson MD      
 pantoprazole (PROTONIX) 40 mg in sodium chloride 0.9% 10 mL injection  40 mg IntraVENous DAILY Smitha Johnson MD   40 mg at 08/31/18 1438  ondansetron (ZOFRAN) injection 4 mg  4 mg IntraVENous Q8H PRN Smitha Johnson MD      
 naloxone [facility]) injection 0.4 mg  0.4 mg IntraVENous PRN Smitha Johnson MD      
 HYDROmorphone (PF) (DILAUDID) injection 0.5 mg  0.5 mg IntraVENous Q6H PRN Smitha Johnson MD      
 dextrose (D50W) injection syrg 25 g  25 g IntraVENous PRN Colletta Merl, MD   25 g at 08/30/18 1578 Thank you for the opportunity to participate in the care of this patient. Signed By: Vianca Mcnally MD   
 August 31, 2018

## 2018-09-01 NOTE — PROGRESS NOTES
Pt resting in bed and is alert and oriented x 3. he denies pain and is on 2 L NC. RR even and unlabored. Call light in reach and pt instructed to call for assistance if needed. Will monitor. Bed alarm on.

## 2018-09-01 NOTE — PROGRESS NOTES
Subjective:  
 
Post-Operative Status Post left Total Knee Arthroplasty Systemic or Specific Complaints:No Complaints Objective:  
 
Patient Vitals for the past 24 hrs: 
 BP Temp Pulse Resp SpO2 Weight 09/01/18 0801 142/65 99.1 °F (37.3 °C) 91 20 96 % -  
09/01/18 0414 151/73 98.7 °F (37.1 °C) 91 24 97 % -  
09/01/18 0331 - - - - 98 % -  
08/31/18 2257 124/68 99.2 °F (37.3 °C) 92 16 98 % -  
08/31/18 1934 118/69 97.9 °F (36.6 °C) 92 16 92 % -  
08/31/18 1702 127/69 98 °F (36.7 °C) 96 18 97 % -  
08/31/18 1545 - - - - - 93.9 kg (207 lb) 08/31/18 1526 - - - - - 102.5 kg (225 lb 14.4 oz) 08/31/18 1212 124/69 97.9 °F (36.6 °C) 91 18 95 % -  
 
 
General: alert, cooperative, no distress, appears stated age Wound: Wound clean and dry no evidence of infection. Motion: Extension: Full Extension DVT Exam: No evidence of DVT seen on physical exam.  
 
Data Review:   
Recent Results (from the past 24 hour(s)) GLUCOSE, POC Collection Time: 08/31/18  6:26 PM  
Result Value Ref Range Glucose (POC) 69 65 - 100 mg/dL GLUCOSE, POC Collection Time: 08/31/18  6:58 PM  
Result Value Ref Range Glucose (POC) 112 (H) 65 - 100 mg/dL GLUCOSE, POC Collection Time: 08/31/18 10:11 PM  
Result Value Ref Range Glucose (POC) 80 65 - 100 mg/dL GLUCOSE, POC Collection Time: 09/01/18  4:25 AM  
Result Value Ref Range Glucose (POC) 71 65 - 100 mg/dL GLUCOSE, POC Collection Time: 09/01/18  4:58 AM  
Result Value Ref Range Glucose (POC) 80 65 - 100 mg/dL METABOLIC PANEL, BASIC Collection Time: 09/01/18  5:43 AM  
Result Value Ref Range Sodium 139 136 - 145 mmol/L Potassium 4.4 3.5 - 5.1 mmol/L Chloride 105 98 - 107 mmol/L  
 CO2 27 21 - 32 mmol/L Anion gap 7 7 - 16 mmol/L Glucose 75 65 - 100 mg/dL BUN 19 8 - 23 MG/DL Creatinine 1.19 0.8 - 1.5 MG/DL  
 GFR est AA >60 >60 ml/min/1.73m2 GFR est non-AA >60 >60 ml/min/1.73m2  Calcium 8.8 8.3 - 10.4 MG/DL  
 CBC WITH AUTOMATED DIFF Collection Time: 09/01/18  5:43 AM  
Result Value Ref Range WBC 10.3 4.3 - 11.1 K/uL  
 RBC 3.48 (L) 4.23 - 5.6 M/uL  
 HGB 10.0 (L) 13.6 - 17.2 g/dL HCT 32.7 (L) 41.1 - 50.3 % MCV 94.0 79.6 - 97.8 FL  
 MCH 28.7 26.1 - 32.9 PG  
 MCHC 30.6 (L) 31.4 - 35.0 g/dL  
 RDW 14.5 % PLATELET 922 (L) 846 - 450 K/uL MPV 10.6 9.4 - 12.3 FL ABSOLUTE NRBC 0.00 0.0 - 0.2 K/uL  
 DF AUTOMATED NEUTROPHILS 68 43 - 78 % LYMPHOCYTES 19 13 - 44 % MONOCYTES 11 4.0 - 12.0 % EOSINOPHILS 1 0.5 - 7.8 % BASOPHILS 0 0.0 - 2.0 % IMMATURE GRANULOCYTES 1 0.0 - 5.0 %  
 ABS. NEUTROPHILS 7.0 1.7 - 8.2 K/UL  
 ABS. LYMPHOCYTES 2.0 0.5 - 4.6 K/UL  
 ABS. MONOCYTES 1.1 0.1 - 1.3 K/UL  
 ABS. EOSINOPHILS 0.1 0.0 - 0.8 K/UL  
 ABS. BASOPHILS 0.0 0.0 - 0.2 K/UL  
 ABS. IMM. GRANS. 0.1 0.0 - 0.5 K/UL MAGNESIUM Collection Time: 09/01/18  5:43 AM  
Result Value Ref Range Magnesium 1.6 (L) 1.8 - 2.4 mg/dL Assessment:  
 
Status Post left Total Knee Arthroplasty. Plan:  
 
Continues current post-op course. Rehab soon.

## 2018-09-01 NOTE — PROGRESS NOTES
Problem: Self Care Deficits Care Plan (Adult) Goal: *Acute Goals and Plan of Care (Insert Text) 1. Patient will complete lower body bathing and dressing with minimal assist and adaptive equipment as needed. 2. Patient will complete toileting with CGA. 3. Patient will tolerate 35 minutes of OT treatment with appropriate and self incorporated rest breaks to increase activity tolerance for ADLs. 4. Patient will complete functional transfers with minimal assist and adaptive equipment as needed. 5. Patient will complete UE exercises to increase overall shoulder strength to 4+/5 MMT to increase UE support using walker during ADL's. Timeframe: 7 visits JOINT CAMP OCCUPATIONAL THERAPY TKA: Initial Assessment 9/1/2018 INPATIENT: Hospital Day: 3 [place] / Product Type: Medicare /  
  
NAME/AGE/GENDER: Josseline Deras is a 71 y.o. male PRIMARY DIAGNOSIS:  * No surgery found * 
 * Surgery not found * (Cannot find OR record) ICD-10: Treatment Diagnosis:  
 · Pain in Left Knee (M25.562) · Stiffness of Left Knee, Not elsewhere classified (M25.662) · Generalized Muscle Weakness (M62.81) · Difficulty in walking, Not elsewhere classified (R26.2) ASSESSMENT:  
 
Mr. Mary Dc is s/p left TKA (8/02/2018) and presents with decreased weight bearing on L LE and decreased independence with functional mobility and activities of daily living as compared to baseline level of function and safety. He has been to [facility] 2 times and is now considering [address] which would be appropriate. Prior to L TKA he was independent and living alone. During Evaluation he is moderate/maximal assist for all transfers and ADL's. He needs intensive rehab in order to return home. He is motivated but has also dealt with minor depression which is understanable given his last 4 weeks. Initiate OT POC. This section established at most recent assessment PROBLEM LIST (Impairments causing functional limitations): 1. Decreased Strength 2. Decreased ADL/Functional Activities 3. Decreased Transfer Abilities 4. Increased Pain 5. Increased Fatigue 6. Decreased Flexibility/Joint Mobility 7. Decreased Knowledge of Precautions INTERVENTIONS PLANNED: (Benefits and precautions of occupational therapy have been discussed with the patient.) 1. Activities of daily living training 2. Adaptive equipment training 3. Balance training 4. Clothing management 5. Donning&doffing training 6. Theraputic activity TREATMENT PLAN: Frequency/Duration: Follow patient 5x a week to address above goals. Rehabilitation Potential For Stated Goals: Good RECOMMENDED REHABILITATION/EQUIPMENT: (at time of discharge pending progress): Continue Skilled Therapy and Rehab. OCCUPATIONAL PROFILE AND HISTORY:  
History of Present Injury/Illness (Reason for Referral): Pt presents this date s/p (left) TKA and sepsis. Past Medical History/Comorbidities:  
Mr. Emanuel Bermeo  has a past medical history of Arthritis; BMI 30.0-30.9,adult; CAD (coronary artery disease); Chronic kidney disease; Chronic pain; Depression; Diabetes ([address] Enlarged prostate with urinary obstruction; Former smoker; GERD (gastroesophageal reflux disease); High cholesterol; Hypertension; Nausea & vomiting (8/30/2018); Neurogenic bladder; Other ill-defined conditions(799.89); Overflow incontinence; Sciatica; Self-catheterizes urinary bladder; Skin cancer; Sleep apnea; Spondylolisthesis of lumbar region; Thromboembolus ([address] (2013); Urine retention; and Varicose veins of both lower extremities with complications. He also has no past medical history of Adverse effect of anesthesia; Aneurysm ([address] Arrhythmia; Asthma; Autoimmune disease ([address] Chronic obstructive pulmonary disease ([address] Coagulation disorder ([address] Difficult intubation; Endocarditis; Heart failure ([address]  Ill-defined condition; Liver disease; Malignant hyperthermia due to anesthesia; Nicotine vapor product user; Non-nicotine vapor product user; Pseudocholinesterase deficiency; PUD (peptic ulcer disease); Rheumatic fever; Seizures ([facility] Stroke [facility]); or Thyroid disease. Mr. Mick Diez  has a past surgical history that includes hx hemorrhoidectomy (1971); hx shoulder arthroscopy (Left, 1971); and hx urological (06/2017). Social History/Living Environment:  
Home Environment: Skilled nursing facility Care Facility Name: [facility] # Steps to Enter: 0 One/Two Story Residence: One story Living Alone: Yes Support Systems:  (facility staff) Patient Expects to be Discharged to[de-identified] Rehabilitation facility Current DME Used/Available at Home:  (none) Prior Level of Function/Work/Activity: 
Independent 4 weeks ago. Mod/max assist last few weeks. Number of Personal Factors/Comorbidities that affect the Plan of Care: Expanded review of therapy/medical records (1-2):  MODERATE COMPLEXITY ASSESSMENT OF OCCUPATIONAL PERFORMANCE[de-identified]  
Most Recent Physical Functioning:  
Balance Sitting: Intact Sitting - Static:  (fair) Sitting - Dynamic:  (fair to fair-) Standing: Impaired; With support Standing - Static:  (fair- with walker) Standing - Dynamic :  (fair- to poor with walker) LLE Assessment LLE Assessment (WDL): Exception to [facility] Coordination Fine Motor Skills-Upper: Left Intact; Right Intact Gross Motor Skills-Upper: Left Impaired;Right Impaired Mental Status Neurologic State: Alert Orientation Level: Oriented X4 Cognition: Follows commands RLE Assessment RLE Assessment (WDL): Exceptions to [facility] Basic ADLs (From Assessment) Complex ADLs (From Assessment) Basic ADL Feeding: Setup Oral Facial Hygiene/Grooming: Supervision Bathing: Moderate assistance Type of Bath: Chlorhexidine (CHG), Full, Bath Pack Upper Body Dressing: Minimum assistance Lower Body Dressing: Total assistance Toileting: Maximum assistance Grooming/Bathing/Dressing Activities of Daily Living Functional Transfers Toilet Transfer : Maximum assistance Shower Transfer: Maximum assistance Bed/Mat Mobility Supine to Sit: Maximum assistance Sit to Supine:  (NT) Sit to Stand: Moderate assistance Bed to Chair: Moderate assistance Scooting: Maximum assistance Physical Skills Involved: 1. Range of Motion 2. Balance 3. Strength Cognitive Skills Affected (resulting in the inability to perform in a timely and safe manner): 1. Divided Attention Psychosocial Skills Affected: 1. Self-Awareness Number of elements that affect the Plan of Care: 3-5:  MODERATE COMPLEXITY CLINICAL DECISION MAKIN[address] [place] [address] AM-PAC 6 Clicks Daily Activity Inpatient Short Form How much help from another person does the patient currently need. .. Total A Lot A Little None 1. Putting on and taking off regular lower body clothing? [x] 1   [] 2   [] 3   [] 4  
2. Bathing (including washing, rinsing, drying)? [] 1   [x] 2   [] 3   [] 4  
3. Toileting, which includes using toilet, bedpan or urinal?   [] 1   [x] 2   [] 3   [] 4  
4. Putting on and taking off regular upper body clothing? [] 1   [x] 2   [] 3   [] 4  
5. Taking care of personal grooming such as brushing teeth? [] 1   [] 2   [x] 3   [] 4  
6. Eating meals? [] 1   [] 2   [] 3   [x] 4  
© , Trustees of [address] [place] [address], under license to [employer]. All rights reserved Score:  Initial: 14 Most Recent: X (Date: -- ) Interpretation of Tool:  Represents activities that are increasingly more difficult (i.e. Bed mobility, Transfers, Gait). Score 24 23 22-20 19-15 14-10 9-7 6 Modifier CH CI CJ CK CL CM CN   
 
? Self Care:  
  - CURRENT STATUS: CL - 60%-79% impaired, limited or restricted  - GOAL STATUS: CK - 40%-59% impaired, limited or restricted  - D/C STATUS:  ---------------To be determined--------------- [place] / [place] / Product Type: Medicare /   
 
Medical Necessity:    
· Skilled intervention continues to be required due to Deficits noted above. Reason for Services/Other Comments: 
· Patient continues to require skilled intervention due to sepsis and L TKA. Use of outcome tool(s) and clinical judgement create a POC that gives a: MODERATE COMPLEXITY  
  
 
 
 
TREATMENT:  
(In addition to Assessment/Re-Assessment sessions the following treatments were rendered) Pre-treatment Symptoms/Complaints:   
Pain: Initial:  
Pain Intensity 1: 0  Post Session:  0 Assessment/Reassessment only, no treatment provided today Treatment/Session Assessment:   
 Response to Treatment:  Good, sitting up in recliner. Education: 
[] Home Exercises [x] Fall Precautions [] Hip Precautions [] Going Home Video [x] Knee/Hip Prosthesis Review [x] Walker Management/Safety [x] Adaptive Equipment as Needed Interdisciplinary Collaboration:  
o Physical Therapist 
o Occupational Therapist 
o Registered Nurse After treatment position/precautions:  
o Up in chair 
o Bed/Chair-wheels locked 
o Caregiver at bedside 
o Call light within reach 
o RN notified Compliance with Program/Exercises: compliant all of the time. Recommendations/Intent for next treatment session:  Treatment next visit will focus on increasing Mr. Anisha Miner independence with bed mobility, transfers, self care, functional mobility, modalities for pain, and patient education. Total Treatment Duration: OT Patient Time In/Time Out Time In: 0759 Time Out: 9981 Osmani De León OT

## 2018-09-01 NOTE — PROGRESS NOTES
09/01/18 8496 Oxygen Therapy O2 Sat (%) 98 % Pulse via Oximetry 86 beats per minute O2 Device Room air  
checked on PT. Resting well.  Pt still refused to wear home CPAP

## 2018-09-01 NOTE — PROGRESS NOTES
Progress Note Patient: Gene Joshi MRN: [id number]  SSN: [id number] YOB: 1945  Age: 71 y.o. Sex: male Admit Date: 8/30/2018 LOS: 2 days Subjective:  
Cc: \" I feel down\" Patient examined at bedside. He was noted in no distress, but he expressed feeling depressed since he was been through so much. Denies having fever, chills, vomiting, diarrhea. Objective:  
 
Vitals:  
 08/31/18 1934 08/31/18 2257 09/01/18 3110 09/01/18 9765 BP: 118/69 124/68  151/73 Pulse: 92 92  91 Resp: 16 16  24 Temp: 97.9 °F (36.6 °C) 99.2 °F (37.3 °C)  98.7 °F (37.1 °C) SpO2: 92% 98% 98% 97% Weight:      
Height:      
  
 
Intake and Output: 
Current Shift:   
Last three shifts: 08/30 1901 - 09/01 0700 In: 2062 [P.O.:1080; I.V.:982] Out: 6809 [[id number]] Physical Exam:  
GENERAL: alert, cooperative EYE: negative LYMPHATIC: Cervical, supraclavicular, and axillary nodes normal.  
THROAT & NECK: dry mucous membranes ; no exudates LUNG: clear to auscultation bilaterally HEART: regular rate and rhythm, S1, S2 normal, no murmur, click, rub or gallop ABDOMEN: soft, non-tender. Bowel sounds normal. No masses,  no organomegaly EXTREMITIES:  Left knee with healed scar, has edema, no erythema; limited ROM SKIN: Normal. 
NEUROLOGIC: oriented x 3, able to move all 4 limbs Ramey cath: clear urine PSYCHIATRIC: non suicidal nor homicidal  
 
Lab/Data Review: All lab results for the last 24 hours reviewed. Assessment:  
 
Principal Problem: 
  MELANI (acute kidney injury) ([address] (8/30/2018) Active Problems: 
  CKD (chronic kidney disease) stage 3, GFR 30-59 ml/min (5/8/2018) S/P total knee arthroplasty, left (8/3/2018) Diabetes mellitus type 2, controlled ([address] (8/15/2018) Sepsis ([address] (8/15/2018) Bipolar disorder ([facility] (8/15/2018) Hypotension (8/30/2018) Nausea & vomiting (8/30/2018) Plan: 1. Sepsis: hypotension + leukocytosis: possible bacteremia after a recent UTI: his BP readings are much stable - continue IVF hydration - vanc and zosyn for now-  monitor cultures 2. Non oliguric amelie on ckd: likely related to dehydration / nausea and vomiting / ACEI and lasix use: FeNa: 1,9%: possible intrinsic kidney disease: resolved - remove salmon cath -  avoid nephrotoxic meds 3. Nausea and vomiting: gastritis - secondary to amelie: resolved - ppi and antiemetics 4. S/P LKA on 8/2/18: pain control- PT/OT- appreciate orthopedics eval  
 
5. A fib not in RVR: stable, monitor 6. Bipolar disorder: feels depressed - con wellbutrin - start remeron 7. Hypomagnesemia: replaced  
  
DVT ppx: heparin and GCS 
  
Code status: DNR/DNI. 
  
Disposition: STR versus IRC.  on board. Signed By: Sarah Mcfadden MD   
 September 1, 2018

## 2018-09-01 NOTE — PROGRESS NOTES
Vancomycin Consult (Day 2) MD ordering: Anuj Bustos  
ID following? no Indication: sepsis DOT: < 7?  days Goal level(s): 15 -20 Ht Readings from Last 1 Encounters:  
18 6' (1.829 m) Wt Readings from Last 1 Encounters:  
18 93.9 kg (207 lb) Allergies as of 2018 - Review Complete 2018 Allergen Reaction Noted  Oxycodone Itching 2017  Tramadol Itching 2017 Current Antimicrobial Therapy (168h ago through future) Ordered     Start Stop  
 
 18 0951  vancomycin (VANCOCIN) 1500 mg in  ml infusion  1,500 mg,   IntraVENous,   EVERY 24 HOURS    
 18 1500 --  
 18 1506  piperacillin-tazobactam (ZOSYN) 4.5 g in 0.9% sodium chloride (MBP/ADV) 100 mL  4.5 g,   IntraVENous,   EVERY 12 HOURS    
 18 1600 -- All Micro Results Procedure Component Value Units Date/Time Kasandra Bhatia [[id number]] Collected:  18 1348 Order Status:  Completed Specimen:  Urine from Urine Updated:  18 3839 Special Requests: NO SPECIAL REQUESTS Culture result:      
  NO GROWTH AFTER SHORT PERIOD OF INCUBATION. FURTHER RESULTS TO FOLLOW AFTER OVERNIGHT INCUBATION. CULTURE, BLOOD [[id number]] Collected:  18 1256 Order Status:  Completed Specimen:  Whole Blood from Blood Updated:  18 6758 Special Requests: --     
  LEFT 
FOREARM Culture result: NO GROWTH AFTER 16 HOURS     
 CULTURE, BLOOD [[id number]] Collected:  18 1306 Order Status:  Completed Specimen:  Blood from Blood Updated:  18 0032 Special Requests: --     
  RIGHT 
HAND Culture result: NO GROWTH AFTER 16 HOURS Temp (24hrs), Av.2 °F (36.8 °C), Min:96.6 °F (35.9 °C), Max:99.3 °F (37.4 °C) UOP: mL/kg/hr Dosing weight: 93 kg (actual body weight) 71 y.o. Date:  Dose/Freq Admin Times Level/Time:  
 2 grams IV LD 1555  1500 mg IV every 24 hours  1526 9/1 1500 mg IV every 12 hours (13)   
9/2 1500 mg IV every 12 hours (01)  (13) Recent Labs 08/31/18 
 0541  08/30/18 
 1229  08/30/18 
 1228 BUN  36*   --   42* CREA  2.18*   --   3.99* WBC  11.0   --   12.3*  
LACPOC   --   1.5   --   
 
Estimated Creatinine Clearance: 37 mL/min (based on Cr of 2.18). A/P:  Vancomycin changed to 1500 mg IV every 12 hours. Genevieve Cameron PharmD, BCPS 
[phone]

## 2018-09-01 NOTE — PROGRESS NOTES
Problem: Mobility Impaired (Adult and Pediatric) Goal: *Acute Goals and Plan of Care (Insert Text) GOALS FOR 1 WEEK RE-ASSESS 9/7/18 : 
(1.)Mr. Cutler will move from supine to sit and sit to supine  with MINIMAL ASSIST.  
(2.)Mr. Cutler will sit<>stand with minimal assist & walker. (3.)Mr. Cutler will perform standing wt-shift with walker & pre-gait exercises with minimal assist. 
4) pt performing fair sitting static balance for 2 min. 5) Pt performing sitting EOB wt-shifting on outstretched arms multiple directions with CGA. 6) pt performing AROM to UE & LE's on cue 15-20 reps. 
 
________________________________________________________________________________________________ PHYSICAL THERAPY: Daily Note, Treatment Day: 1st, AM 9/1/2018 INPATIENT: Hospital Day: 3 [place] / Product Type: Medicare /  
  
NAME/AGE/GENDER: Whit Malcolm is a 71 y.o. male PRIMARY DIAGNOSIS: MELANI (acute kidney injury) ([facility] [address] Nausea & vomiting Hypotension Sepsis ([facility] [address] MELANI (acute kidney injury) ([facility] [address] MELANI (acute kidney injury) ([facility] [address] ICD-10: Treatment Diagnosis:  
 · Generalized Muscle Weakness (M62.81) · Other lack of cordination (R27.8) · Difficulty in walking, Not elsewhere classified (R26.2) · Other abnormalities of gait and mobility (R26.89) Precaution/Allergies: 
Oxycodone and Tramadol ASSESSMENT:  
 
Mr. Tatiana Shaffer participated better with bed mobility, showed improved level of assist for transfers, along with improved sitting balance. Pt was also able to take steps for the first time along with engaging in exercises with limited assist. Pt appears very discouraged with recurrent medical set backs. MD informed that. pt is participating & progressing. This section established at most recent assessment PROBLEM LIST (Impairments causing functional limitations): 1. Decreased Strength 2. Decreased ADL/Functional Activities 3. Decreased Transfer Abilities 4. Decreased Balance 5. Decreased Activity Tolerance 6. Increased Shortness of Breath 7. Decreased Flexibility/Joint Mobility 8. Decreased [place] with Home Exercise Program 
 INTERVENTIONS PLANNED: (Benefits and precautions of physical therapy have been discussed with the patient.) 1. Balance Exercise 2. Bed Mobility 3. Home Exercise Program (HEP) 4. Therapeutic Activites 5. Therapeutic Exercise/Strengthening 6. Transfer Training TREATMENT PLAN: Frequency/Duration: daily for duration of hospital stay Rehabilitation Potential For Stated Goals: GOOD  
 
RECOMMENDED REHABILITATION/EQUIPMENT: (at time of discharge pending progress): Due to the probability of continued deficits (see above) this patient will likely need continued skilled physical therapy after discharge. Equipment:  
? to be determined HISTORY:  
History of Present Injury/Illness (Reason for Referral): 
Janette Wagoner is a 71 y.o. male who has a PMH of HTN, a fib, DM, CKD, s/p left knee Arthroplasty on 8/2/18, was referred from [facility] after he was noted with chills, hypotension of 79/45 mmHg, AMS, hallucinations and vomiting x 4 today. Of note, he had a pseudomonas UTI on 8/15/18, he was given a 7 day therapy on vantin, and eventually given ciprofloxacin back at the facility for his infection. He stated his left knee is tender and has not been able to walk properly. He believes his dehydration may be related to lasix use. Upon ER arrival VS BP 98/62  HR 87  RR 20  O2: 98% on room air. On my assessment his BP was 90/52  HR 85. Patient was alert with visual hallucinations and confused. He complained about left knee pain since his surgery. Labs included: normal lactic acid; cbc: wbc 12k, hb 11gr; chemistry: cr 3.9 ( baseline 1.2 on 8/17/18 ); UA: negative; EKG: A fib not in RVR; CXR: normal; left knee XR: preserved arthroplasty. Blood cultures taken in the ED.  Hospitalist was contacted for admission due to sepsis and amelie on ckd. 
  
Past Medical History/Comorbidities:  
Mr. Louann Dominguez  has a past medical history of Arthritis; BMI 30.0-30.9,adult; CAD (coronary artery disease); Chronic kidney disease; Chronic pain; Depression; Diabetes ([address] Enlarged prostate with urinary obstruction; Former smoker; GERD (gastroesophageal reflux disease); High cholesterol; Hypertension; Nausea & vomiting (8/30/2018); Neurogenic bladder; Other ill-defined conditions(799.89); Overflow incontinence; Sciatica; Self-catheterizes urinary bladder; Skin cancer; Sleep apnea; Spondylolisthesis of lumbar region; Thromboembolus ([address] (2013); Urine retention; and Varicose veins of both lower extremities with complications. He also has no past medical history of Adverse effect of anesthesia; Aneurysm ([address] Arrhythmia; Asthma; Autoimmune disease ([address] Chronic obstructive pulmonary disease ([address] Coagulation disorder ([address] Difficult intubation; Endocarditis; Heart failure ([address] Ill-defined condition; Liver disease; Malignant hyperthermia due to anesthesia; Nicotine vapor product user; Non-nicotine vapor product user; Pseudocholinesterase deficiency; PUD (peptic ulcer disease); Rheumatic fever; Seizures ([address] Stroke [facility]); or Thyroid disease. Mr. Louann Dominguez  has a past surgical history that includes hx hemorrhoidectomy (1971); hx shoulder arthroscopy (Left, 1971); and hx urological (06/2017). Social History/Living Environment:  
Home Environment: Skilled nursing facility Care Facility Name: [facility] # Steps to Enter: 0 One/Two Story Residence: One story Living Alone: No 
Support Systems:  (facility staff) Patient Expects to be Discharged to[de-identified] Skilled nursing facility Current DME Used/Available at Home:  (none) Prior Level of Function/Work/Activity: Pt was functioning with a rolling walker & limited assist in rehab prior to this set back & re-admit. Personal Factors: Other factors that influence how disability is experienced by the patient:  Current & PMH Number of Personal Factors/Comorbidities that affect the Plan of Care: 3+: HIGH COMPLEXITY EXAMINATION:  
Most Recent Physical Functioning:  
Gross Assessment: 
AROM: Grossly decreased, non-functional (all limbs & core) Strength: Grossly decreased, non-functional (all limbs & core) Coordination: Grossly decreased, non-functional (all limbs & core) Balance: 
Sitting: Intact; Without support Sitting - Static:  (fair) Sitting - Dynamic:  (fair to fair-) Standing: Impaired;Pull to stand; With support (walker) Standing - Static:  (fair- with walker) Standing - Dynamic :  (fair- to poor with walker) Bed Mobility: 
Supine to Sit: Maximum assistance Sit to Supine:  (NT) Scooting: Maximum assistance Transfers: 
Sit to Stand: Moderate assistance Stand to Sit: Moderate assistance Bed to Chair: Moderate assistance (with walker) Duration: 23 Minutes (extra time to work through activity noted) Gait: NA on evaluation Left Side Weight Bearing: As tolerated Functional Mobility:  
      Gait/Ambulation:  NA Transfers:  max Bed Mobility:  max Body Structures Involved: 1. Metabolic 2. Joints 3. Muscles Body Functions Affected: 1. Sensory/Pain 2. Movement Related 3. Metobolic/Endocrine Activities and Participation Affected: 1. General Tasks and Demands 2. Mobility Number of elements that affect the Plan of Care: 4+: HIGH COMPLEXITY CLINICAL PRESENTATION:  
Presentation: Evolving clinical presentation with unstable and unpredictable characteristics: HIGH COMPLEXITY CLINICAL DECISION MAKIN [facility] [address] AM-PAC 6 Clicks Basic Mobility Inpatient Short Form How much difficulty does the patient currently have. .. Unable A Lot A Little None 1. Turning over in bed (including adjusting bedclothes, sheets and blankets)?    [] 1   [x] 2   [] 3   [] 4  
 2.  Sitting down on and standing up from a chair with arms ( e.g., wheelchair, bedside commode, etc.)   [] 1   [x] 2   [] 3   [] 4  
3. Moving from lying on back to sitting on the side of the bed? [] 1   [x] 2   [] 3   [] 4 How much help from another person does the patient currently need. .. Total A Lot A Little None 4. Moving to and from a bed to a chair (including a wheelchair)? [x] 1   [] 2   [] 3   [] 4  
5. Need to walk in hospital room? [x] 1   [] 2   [] 3   [] 4  
6. Climbing 3-5 steps with a railing? [x] 1   [] 2   [] 3   [] 4  
© 2007, Trustees of [address] [place] [address] [address] [address], under license to [employer]. All rights reserved Score:  Initial: 9 Most Recent: X (Date: -- ) Interpretation of Tool:  Represents activities that are increasingly more difficult (i.e. Bed mobility, Transfers, Gait). Score 24 23 22-20 19-15 14-10 9-7 6 Modifier CH CI CJ CK CL CM CN   
 
? Mobility - Walking and Moving Around:  
  - CURRENT STATUS: CM - 80%-99% impaired, limited or restricted  - GOAL STATUS: CL - 60%-79% impaired, limited or restricted  - D/C STATUS:  ---------------To be determined--------------- [place] / [place] / Product Type: Medicare /   
 
Medical Necessity:    
· Patient is expected to demonstrate progress in strength, range of motion, balance, coordination and functional technique to decrease assistance required with bed mobility, balance & transfers with progression to gait as able. Reason for Services/Other Comments: 
· Patient continues to require skilled intervention due to severe debility. Use of outcome tool(s) and clinical judgement create a POC that gives a: Difficult prediction of patient's progress: HIGH COMPLEXITY  
  
 
 
 
TREATMENT:  
(In addition to Assessment/Re-Assessment sessions the following treatments were rendered) Pre-treatment Symptoms/Complaints:  Feeling extremely weak Pain: Initial: visual scale Pain Intensity 1: 0  Post Session:  0/10 Therapeutic Activity: (  23 Minutes (extra time to work through activity noted) 13): Therapeutic activities including warm up exercises to both LE's x 15 each  : ankle pumps, quad sets, gluteal sets, heel slides, hip ABD-ADD, SAQ's, SLR's with assist bed mobility, sitting static balance, wt-shifting EOB R<>L, F<>B, sit<>stand with walker, wt-shifting F<>B & R<>L with walker with progression to gait with rolling walker to encourage [address] through arms & legs to improve mobility, strength, balance, coordination and dynamic movement of arm - bilateral, leg - bilateral and core to improve functional WB potential.  Required moderate verbal & manual cues to promote upright posture. Braces/Orthotics/Lines/Etc:  
· IV 
· salmon catheter · O2 Device: Nasal cannula Treatment/Session Assessment:   
· Response to Treatment:   Pt was pleased to get out of bed. · Interdisciplinary Collaboration:  
o Registered Nurse · After treatment position/precautions:  
o Up in chair 
o Bed/Chair-wheels locked 
o Call light within reach 
o RN notified 
o Nurse at bedside · Compliance with Program/Exercises: Will assess as treatment progresses. · Recommendations/Intent for next treatment session: \"Next visit will focus on reduction in assistance provided\". Total Treatment Duration: PT Patient Time In/Time Out Time In: 5 Time Out: 1032 Duc Burns PT

## 2018-09-01 NOTE — PROGRESS NOTES
Assessment done via doc flow sheet. Pt resting quietly in bed, alert & oriented x2 resp easy & regular, lungs CTA. R[place]y patent, draining clear yellow urine. Denies pain. Bed low & locked side rails x3 up with call light within reach, pt instructed to call for assistance as needed.

## 2018-09-02 NOTE — PROGRESS NOTES
Patient still has not voided since last straight cath at 0000. Bladder scan showed he is retaining 485 ml. Dr. Willow Be notified and order received to put in a salmon catheter.

## 2018-09-02 NOTE — PROGRESS NOTES
Problem: Mobility Impaired (Adult and Pediatric) Goal: *Acute Goals and Plan of Care (Insert Text) GOALS FOR 1 WEEK RE-ASSESS 9/7/18 : 
(1.)Mr. Cutler will move from supine to sit and sit to supine  with MINIMAL ASSIST. Met 9/2 
(2.)Mr. Cutler will sit<>stand with minimal assist & walker. (3.)Mr. Cutler will perform standing wt-shift with walker & pre-gait exercises with minimal assist. Met 9/2 
4) pt performing fair sitting static balance for 2 min. Met 9/2 
5) Pt performing sitting EOB wt-shifting on outstretched arms multiple directions with CGA. Met 9/2 
6) pt performing AROM to UE & LE's on cue 15-20 reps. ADDENDUM GOALS 9/2/18 : 1) supine<>sit with minimal assist (consistently) & bed modified. 2) pt ambulating 50-75 ft with rolling walker & minimal assist ( consistently). 3) PCT's comfortable transferring pt to [facility]. 
________________________________________________________________________________________________ PHYSICAL THERAPY: Daily Note, Treatment Day: 2nd, AM 9/2/2018 INPATIENT: Hospital Day: 4 [place] / Product Type: Medicare /  
  
NAME/AGE/GENDER: Miguel Angel Inman is a 71 y.o. male PRIMARY DIAGNOSIS: MELANI (acute kidney injury) ([facility] [address] Nausea & vomiting Hypotension Sepsis ([facility] [address] MELANI (acute kidney injury) ([facility] [address] MELANI (acute kidney injury) ([facility] [address] ICD-10: Treatment Diagnosis:  
 · Generalized Muscle Weakness (M62.81) · Other lack of cordination (R27.8) · Difficulty in walking, Not elsewhere classified (R26.2) · Other abnormalities of gait and mobility (R26.89) Precaution/Allergies: 
Oxycodone and Tramadol ASSESSMENT:  
 
Mr. Violeta Madison showed improved level of assist for bed mobility & able to take steps with rolling walker with much encouragement. Pt showed more core stability on EOB & in standing.  This pt will need extensive post acute rehab stay to allow enough time for pt to be functional & safe to return home alone. This section established at most recent assessment PROBLEM LIST (Impairments causing functional limitations): 1. Decreased Strength 2. Decreased ADL/Functional Activities 3. Decreased Transfer Abilities 4. Decreased Balance 5. Decreased Activity Tolerance 6. Increased Shortness of Breath 7. Decreased Flexibility/Joint Mobility 8. Decreased [place] with Home Exercise Program 
 INTERVENTIONS PLANNED: (Benefits and precautions of physical therapy have been discussed with the patient.) 1. Balance Exercise 2. Bed Mobility 3. Home Exercise Program (HEP) 4. Therapeutic Activites 5. Therapeutic Exercise/Strengthening 6. Transfer Training TREATMENT PLAN: Frequency/Duration: daily for duration of hospital stay Rehabilitation Potential For Stated Goals: GOOD  
 
RECOMMENDED REHABILITATION/EQUIPMENT: (at time of discharge pending progress): Due to the probability of continued deficits (see above) this patient will likely need continued skilled physical therapy after discharge. Equipment:  
? to be determined HISTORY:  
History of Present Injury/Illness (Reason for Referral): 
Amparo Sosa is a 71 y.o. male who has a PMH of HTN, a fib, DM, CKD, s/p left knee Arthroplasty on 8/2/18, was referred from [facility] after he was noted with chills, hypotension of 79/45 mmHg, AMS, hallucinations and vomiting x 4 today. Of note, he had a pseudomonas UTI on 8/15/18, he was given a 7 day therapy on vantin, and eventually given ciprofloxacin back at the facility for his infection. He stated his left knee is tender and has not been able to walk properly. He believes his dehydration may be related to lasix use. Upon ER arrival VS BP 98/62  HR 87  RR 20  O2: 98% on room air. On my assessment his BP was 90/52  HR 85.  Patient was alert with visual hallucinations and confused. He complained about left knee pain since his surgery. Labs included: normal lactic acid; cbc: wbc 12k, hb 11gr; chemistry: cr 3.9 ( baseline 1.2 on 8/17/18 ); UA: negative; EKG: A fib not in RVR; CXR: normal; left knee XR: preserved arthroplasty. Blood cultures taken in the ED. Hospitalist was contacted for admission due to sepsis and amelie on ckd. 
  
Past Medical History/Comorbidities:  
Mr. Ирина Carrillo  has a past medical history of Arthritis; BMI 30.0-30.9,adult; CAD (coronary artery disease); Chronic kidney disease; Chronic pain; Depression; Diabetes ([address] Enlarged prostate with urinary obstruction; Former smoker; GERD (gastroesophageal reflux disease); High cholesterol; Hypertension; Nausea & vomiting (8/30/2018); Neurogenic bladder; Other ill-defined conditions(799.89); Overflow incontinence; Sciatica; Self-catheterizes urinary bladder; Skin cancer; Sleep apnea; Spondylolisthesis of lumbar region; Thromboembolus ([address] (2013); Urine retention; and Varicose veins of both lower extremities with complications. He also has no past medical history of Adverse effect of anesthesia; Aneurysm ([address] Arrhythmia; Asthma; Autoimmune disease ([address] Chronic obstructive pulmonary disease ([address] Coagulation disorder ([address] Difficult intubation; Endocarditis; Heart failure ([address] Ill-defined condition; Liver disease; Malignant hyperthermia due to anesthesia; Nicotine vapor product user; Non-nicotine vapor product user; Pseudocholinesterase deficiency; PUD (peptic ulcer disease); Rheumatic fever; Seizures ([address] Stroke [facility]); or Thyroid disease. Mr. Ирина Carrillo  has a past surgical history that includes hx hemorrhoidectomy (1971); hx shoulder arthroscopy (Left, 1971); and hx urological (06/2017). Social History/Living Environment:  
Home Environment: Skilled nursing facility Care Facility Name: [facility] # Steps to Enter: 0 One/Two Story Residence: One story Living Alone:  Yes 
 Support Systems:  (facility staff) Patient Expects to be Discharged to[de-identified] Rehabilitation facility Current DME Used/Available at Home:  (none) Prior Level of Function/Work/Activity: Pt was functioning with a rolling walker & limited assist in rehab prior to this set back & re-admit. Personal Factors: Other factors that influence how disability is experienced by the patient:  Current & PMH Number of Personal Factors/Comorbidities that affect the Plan of Care: 3+: HIGH COMPLEXITY EXAMINATION:  
Most Recent Physical Functioning:  
Gross Assessment: 
AROM: Grossly decreased, non-functional (all limbs & core) Strength: Grossly decreased, non-functional (all limbs & core) Coordination: Grossly decreased, non-functional (all limbs & core) Balance: 
Sitting: Intact; Without support Sitting - Static:  (fair) Sitting - Dynamic:  (fair) Standing: Impaired; With support (walker) Standing - Static:  (fair- with walker) Standing - Dynamic :  (fair- to poor with walker) Bed Mobility: 
Supine to Sit: Minimum assistance Sit to Supine:  (NT) Scooting: Maximum assistance Transfers: 
Sit to Stand: Moderate assistance Stand to Sit: Moderate assistance Bed to Chair: Moderate assistance (with walker) Duration: 46 Minutes (extra time to work through activity noted) Gait: NA on evaluation Left Side Weight Bearing: As tolerated Speed/Kenzie: Shuffled; Slow Step Length: Left shortened;Right shortened Gait Abnormalities: Antalgic;Decreased step clearance;Shuffling gait;Trunk sway increased Distance (ft): 20 Feet (ft) Assistive Device: Walker, rolling Ambulation - Level of Assistance: Moderate assistance Functional Mobility:  
      Gait/Ambulation:  mod Transfers:  mod Bed Mobility:  min Body Structures Involved: 1. Metabolic 2. Joints 3. Muscles Body Functions Affected: 1. Sensory/Pain 2. Movement Related 3. Metobolic/Endocrine Activities and Participation Affected: 1. General Tasks and Demands 2. Mobility Number of elements that affect the Plan of Care: 4+: HIGH COMPLEXITY CLINICAL PRESENTATION:  
Presentation: Evolving clinical presentation with unstable and unpredictable characteristics: HIGH COMPLEXITY CLINICAL DECISION MAKIN [facility] [address] [address] AM-PAC 6 Clicks Basic Mobility Inpatient Short Form How much difficulty does the patient currently have. .. Unable A Lot A Little None 1. Turning over in bed (including adjusting bedclothes, sheets and blankets)? [] 1   [x] 2   [] 3   [] 4  
2. Sitting down on and standing up from a chair with arms ( e.g., wheelchair, bedside commode, etc.)   [] 1   [x] 2   [] 3   [] 4  
3. Moving from lying on back to sitting on the side of the bed? [] 1   [x] 2   [] 3   [] 4 How much help from another person does the patient currently need. .. Total A Lot A Little None 4. Moving to and from a bed to a chair (including a wheelchair)? [x] 1   [] 2   [] 3   [] 4  
5. Need to walk in hospital room? [x] 1   [] 2   [] 3   [] 4  
6. Climbing 3-5 steps with a railing? [x] 1   [] 2   [] 3   [] 4  
© , Trustees of [address] [place] [address] [address] [address], under license to [employer]. All rights reserved Score:  Initial: 9 Most Recent: X (Date: -- ) Interpretation of Tool:  Represents activities that are increasingly more difficult (i.e. Bed mobility, Transfers, Gait). Score 24 23 22-20 19-15 14-10 9-7 6 Modifier CH CI CJ CK CL CM CN   
 
? Mobility - Walking and Moving Around:  
  - CURRENT STATUS: CM - 80%-99% impaired, limited or restricted  - GOAL STATUS: CL - 60%-79% impaired, limited or restricted  - D/C STATUS:  ---------------To be determined--------------- [place] / [place] / Product Type: Medicare /   
 
Medical Necessity:    
· Patient is expected to demonstrate progress in strength, range of motion, balance, coordination and functional technique to decrease assistance required with bed mobility, balance & transfers with progression to gait as able. Reason for Services/Other Comments: 
· Patient continues to require skilled intervention due to severe debility. Use of outcome tool(s) and clinical judgement create a POC that gives a: Difficult prediction of patient's progress: HIGH COMPLEXITY  
  
 
 
 
TREATMENT:  
(In addition to Assessment/Re-Assessment sessions the following treatments were rendered) Pre-treatment Symptoms/Complaints:  Pt was agreeable to therapy Pain: Initial: visual scale Pain Intensity 1: 3 Pain Location 1: Knee Pain Orientation 1: Left Pain Intervention(s) 1: Ambulation/Increased Activity  Post Session:  3/10 Therapeutic Activity: (  46 Minutes (extra time to work through activity noted) 13): Therapeutic activities including instructing pt on how to initiate a dip press-up while in chair for pressure relief to be done 10 x hourly, cool down exercises to both UE's - overhead press & UE push & pul x 15 each, seated hip flex on left bilateral LAQ's done simultaneously & bilateral hip ABD-ADD , sitting static balance, then wt-shifting EOB R<>L, F<>B, & trunk rotation without UE support, repeated sit<>stand with walker & worked on prolonged static standing then wt-shifting F<>B & R<>L with walker with progression to gait with rolling walker to encourage [address] through arms & legs to improve mobility, strength, balance, coordination and dynamic movement of arm - bilateral, leg - bilateral and core to improve functional WB potential.  Required moderate verbal & manual cues to promote upright posture. Braces/Orthotics/Lines/Etc:  
· IV Treatment/Session Assessment:   
· Response to Treatment:   Pt fatigued easily, needed frequent breaks to get through all activity · Interdisciplinary Collaboration:  
o Registered Nurse · After treatment position/precautions: o Up in chair 
o Bed/Chair-wheels locked 
o Call light within reach 
o RN notified · Compliance with Program/Exercises: Will assess as treatment progresses. · Recommendations/Intent for next treatment session: \"Next visit will focus on reduction in assistance provided\". Total Treatment Duration: PT Patient Time In/Time Out Time In: 0325 Time Out: 1023 Israel Urias, PT

## 2018-09-02 NOTE — PROGRESS NOTES
Bladder scan completed and it showed >900 mls in bladder call placed to urology. Patient did have a large BM today.

## 2018-09-02 NOTE — PROGRESS NOTES
PT  Said pt wet all over himself and the bed. PT also said they were going to get hjm into the shower

## 2018-09-02 NOTE — CONSULTS
Hx of TURP and chronic urinary retention and was on self cath. Nurses could not pass salmon and his PVR 1000 ml. F&F used and ? dialated prostate bladder neck using 2% xylocain jelly up to # 25 F and # 18 coude' salmon easily placed. Large amount of urine returned. I suspect chronic urinary retention because he had no significant discomfort with full bladder.  
 
Hammad HERNANDEZ

## 2018-09-02 NOTE — PROGRESS NOTES
Problem: Self Care Deficits Care Plan (Adult) Goal: *Acute Goals and Plan of Care (Insert Text) 1. Patient will complete lower body bathing and dressing with minimal assist and adaptive equipment as needed. 2. Patient will complete toileting with CGA. 3. Patient will tolerate 35 minutes of OT treatment with appropriate and self incorporated rest breaks to increase activity tolerance for ADLs. 4. Patient will complete functional transfers with minimal assist and adaptive equipment as needed. 5. Patient will complete UE exercises to increase overall shoulder strength to 4+/5 MMT to increase UE support using walker during ADL's. Timeframe: 7 visits JOINT CAMP OCCUPATIONAL THERAPY TKA: Treatment Day: 1st 9/2/2018 INPATIENT: Hospital Day: 4 [place] / Product Type: Medicare /  
  
NAME/AGE/GENDER: Shanel Hernández is a 71 y.o. male PRIMARY DIAGNOSIS:  * No surgery found * 
 * Surgery not found * (Cannot find OR record) ICD-10: Treatment Diagnosis:  
 · Pain in Left Knee (M25.562) · Stiffness of Left Knee, Not elsewhere classified (M25.662) · Generalized Muscle Weakness (M62.81) · Difficulty in walking, Not elsewhere classified (R26.2) ASSESSMENT:  
 
Mr. Zachery Srivastava is s/p left TKA (8/02/2018). Able to ambulate ~20' using a RW to bathroom for toileting. Requested sitting on standard toilet. Required significant boost to come into stance. At first agreed to shower after sitting on the toilet for ~6 minutes no longer agreeable to shower despite encouragement. Ambulated very slowly back to recliner ~8' using RW. Patient is self limiting at times, with continued therapy effort this should slowly get better as he regains his independence. Continue OT POC. This section established at most recent assessment PROBLEM LIST (Impairments causing functional limitations): 1. Decreased Strength 2. Decreased ADL/Functional Activities 3. Decreased Transfer Abilities 4. Increased Pain 5. Increased Fatigue 6. Decreased Flexibility/Joint Mobility 7. Decreased Knowledge of Precautions INTERVENTIONS PLANNED: (Benefits and precautions of occupational therapy have been discussed with the patient.) 1. Activities of daily living training 2. Adaptive equipment training 3. Balance training 4. Clothing management 5. Donning&doffing training 6. Theraputic activity TREATMENT PLAN: Frequency/Duration: Follow patient 5x a week to address above goals. Rehabilitation Potential For Stated Goals: Good RECOMMENDED REHABILITATION/EQUIPMENT: (at time of discharge pending progress): Continue Skilled Therapy and Rehab. OCCUPATIONAL PROFILE AND HISTORY:  
History of Present Injury/Illness (Reason for Referral): Pt presents this date s/p (left) TKA and sepsis. Past Medical History/Comorbidities:  
Mr. Janis Crawford  has a past medical history of Arthritis; BMI 30.0-30.9,adult; CAD (coronary artery disease); Chronic kidney disease; Chronic pain; Depression; Diabetes ([address] Enlarged prostate with urinary obstruction; Former smoker; GERD (gastroesophageal reflux disease); High cholesterol; Hypertension; Nausea & vomiting (8/30/2018); Neurogenic bladder; Other ill-defined conditions(799.89); Overflow incontinence; Sciatica; Self-catheterizes urinary bladder; Skin cancer; Sleep apnea; Spondylolisthesis of lumbar region; Thromboembolus ([address] (2013); Urine retention; and Varicose veins of both lower extremities with complications. He also has no past medical history of Adverse effect of anesthesia; Aneurysm ([address] Arrhythmia; Asthma; Autoimmune disease ([address] Chronic obstructive pulmonary disease ([address] Coagulation disorder ([address] Difficult intubation; Endocarditis; Heart failure ([address] Ill-defined condition; Liver disease; Malignant hyperthermia due to anesthesia;  Nicotine vapor product user; Non-nicotine vapor product user; Pseudocholinesterase deficiency; PUD (peptic ulcer disease); Rheumatic fever; Seizures ([facility] [address] Stroke [facility]); or Thyroid disease. Mr. Derek Marin  has a past surgical history that includes hx hemorrhoidectomy (1971); hx shoulder arthroscopy (Left, 1971); and hx urological (06/2017). Social History/Living Environment:  
Home Environment: Skilled nursing facility Care Facility Name: [facility] # Steps to Enter: 0 One/Two Story Residence: One story Living Alone: Yes Support Systems:  (facility staff) Patient Expects to be Discharged to[de-identified] Rehabilitation facility Current DME Used/Available at Home:  (none) Prior Level of Function/Work/Activity: 
Independent 4 weeks ago. Mod/max assist last few weeks. Number of Personal Factors/Comorbidities that affect the Plan of Care: Expanded review of therapy/medical records (1-2):  MODERATE COMPLEXITY ASSESSMENT OF OCCUPATIONAL PERFORMANCE[de-identified]  
Most Recent Physical Functioning:  
Balance Sitting: Intact; Without support Sitting - Static:  (fair) Sitting - Dynamic:  (fair) Standing: Impaired; With support (walker) Standing - Static:  (fair- with walker) Standing - Dynamic :  (fair- to poor with walker) LLE Assessment LLE Assessment (WDL): Exception to [facility]   
 
  
 
  
 
   
 
RLE Assessment RLE Assessment (WDL): Exceptions to [facility] Basic ADLs (From Assessment) Complex ADLs (From Assessment) Basic ADL Feeding: Setup Oral Facial Hygiene/Grooming: Supervision Bathing: Moderate assistance Type of Bath: Chlorhexidine (CHG), Full, Bath Pack Upper Body Dressing: Minimum assistance Lower Body Dressing: Total assistance Toileting: Maximum assistance Grooming/Bathing/Dressing Activities of Daily Living Toileting Toileting Assistance: Maximum assistance Functional Transfers Bathroom Mobility: Moderate assistance Toilet Transfer : Maximum assistance Bed/Mat Mobility Supine to Sit: Minimum assistance Sit to Supine:  (NT) Sit to Stand: Moderate assistance Bed to Chair: Moderate assistance (with walker) Scooting: Maximum assistance Physical Skills Involved: 1. Range of Motion 2. Balance 3. Strength Cognitive Skills Affected (resulting in the inability to perform in a timely and safe manner): 1. Divided Attention Psychosocial Skills Affected: 1. Self-Awareness Number of elements that affect the Plan of Care: 3-5:  MODERATE COMPLEXITY CLINICAL DECISION MAKIN[address] [place] [address] AM-PAC 6 Clicks Daily Activity Inpatient Short Form How much help from another person does the patient currently need. .. Total A Lot A Little None 1. Putting on and taking off regular lower body clothing? [x] 1   [] 2   [] 3   [] 4  
2. Bathing (including washing, rinsing, drying)? [] 1   [x] 2   [] 3   [] 4  
3. Toileting, which includes using toilet, bedpan or urinal?   [] 1   [x] 2   [] 3   [] 4  
4. Putting on and taking off regular upper body clothing? [] 1   [x] 2   [] 3   [] 4  
5. Taking care of personal grooming such as brushing teeth? [] 1   [] 2   [x] 3   [] 4  
6. Eating meals? [] 1   [] 2   [] 3   [x] 4  
© , Trustees of [address] [place] [address], under license to [employer]. All rights reserved Score:  Initial: 14 Most Recent: X (Date: -- ) Interpretation of Tool:  Represents activities that are increasingly more difficult (i.e. Bed mobility, Transfers, Gait). Score 24 23 22-20 19-15 14-10 9-7 6 Modifier CH CI CJ CK CL CM CN   
 
? Self Care:  
  - CURRENT STATUS: CL - 60%-79% impaired, limited or restricted  - GOAL STATUS: CK - 40%-59% impaired, limited or restricted  - D/C STATUS:  ---------------To be determined--------------- [place] / [place] / Product Type: Medicare /   
 
Medical Necessity:    
· Skilled intervention continues to be required due to Deficits noted above. Reason for Services/Other Comments: · Patient continues to require skilled intervention due to sepsis and L TKA. Use of outcome tool(s) and clinical judgement create a POC that gives a: MODERATE COMPLEXITY  
  
 
 
 
TREATMENT:  
(In addition to Assessment/Re-Assessment sessions the following treatments were rendered) Pre-treatment Symptoms/Complaints:   
Pain: Initial:  
Pain Intensity 1: 3  3 Self Care: (30): Procedure(s) (per grid) utilized to improve and/or restore self-care/home management as related to dressing and toileting. Required maximal visual, verbal and tactile cueing to facilitate activities of daily living skills. Therapeutic Activity: (  15 minutes): Therapeutic activities including Chair transfers and Ambulation on level ground to improve mobility and strength. Required moderate   to promote coordination of bilateral, upper extremity(s), lower extremity(s) and promote motor control of bilateral, upper extremity(s), lower extremity(s). Treatment/Session Assessment:   
 Response to Treatment:  Fair, sitting up in recliner. Education: 
[] Home Exercises [x] Fall Precautions [] Hip Precautions [] Going Home Video [x] Knee/Hip Prosthesis Review [x] Walker Management/Safety [x] Adaptive Equipment as Needed Interdisciplinary Collaboration:  
o Physical Therapist 
o Occupational Therapist 
o Registered Nurse After treatment position/precautions:  
o Up in chair 
o Bed/Chair-wheels locked 
o Caregiver at bedside 
o Call light within reach 
o RN notified Compliance with Program/Exercises: compliant all of the time. Recommendations/Intent for next treatment session:  Treatment next visit will focus on increasing Mr. Mica Massey independence with bed mobility, transfers, self care, functional mobility, modalities for pain, and patient education. Total Treatment Duration: OT Patient Time In/Time Out Time In: 5667 Time Out: 1130 Lalita Bennett OT

## 2018-09-02 NOTE — PROGRESS NOTES
2018 Post Op day: * No surgery found * Admit Date: 2018 Admit Diagnosis: MELANI (acute kidney injury) ([facility] [address] Nausea & vomiting Hypotension Sepsis ([facility] [address] Subjective: Doing well, No complaints other than issues with salmon, No SOB, No Chest Pain, No Nausea or Vomitting. PT/OT:  
  
  
  
  
  
  
  
LLE PROM 
L Knee Flexion: 60 (~) 
L Knee Extension: -30 (~) Weight Bearing Status: WBAT 
BMP: 
Recent Labs  
   18 
 0543  18 
 0541 CREA  1.10  1.19  2.18* BUN  15  19  36* NA  137  139  139  
K  4.3  4.4  4.6 CL  104  105  105 CO2  27  27  25 AGAP  6*  7  9 GLU  80  75  93 Patient Vitals for the past 8 hrs: 
 BP Temp Pulse Resp SpO2  
18 0530 140/68 - - - -  
18 0502 176/73 98.1 °F (36.7 °C) 92 20 95 % 18 0116 138/71 98.5 °F (36.9 °C) 92 12 95 % Temp (24hrs), Av.1 °F (36.7 °C), Min:97.8 °F (36.6 °C), Max:98.5 °F (36.9 °C) CBC: 
Recent Labs  
   18 
 0543  18 
 0541 WBC  9.1  10.3  11.0 GRANS  64  68  76 MONOS  10  11  10 EOS  1  1  0* ANEU  5.8  7.0  8.4* ABL  2.2  2.0  1.5 HGB  10.3*  10.0*  9.8* HCT  33.3*  32.7*  31.5* PLT  149*  141*  150 Microbiology:  
 
All Micro Results Procedure Component Value Units Date/Time Mukesh Garg [[id number]] Collected:  18 1348 Order Status:  Completed Specimen:  Urine from Urine Updated:  18 5690 Special Requests: NO SPECIAL REQUESTS Culture result: NO GROWTH 2 DAYS     
 CULTURE, BLOOD [[id number]] Collected:  18 1256 Order Status:  Completed Specimen:  Whole Blood from Blood Updated:  18 3740 Special Requests: --     
  LEFT 
FOREARM Culture result: NO GROWTH AFTER 16 HOURS     
 CULTURE, BLOOD [[id number]] Collected:  18 1306 Order Status:  Completed Specimen:  Blood from Blood Updated:  18 6539 Special Requests: --     
  RIGHT 
HAND Culture result: NO GROWTH AFTER 16 HOURS Objective: Vital Signs are Stable, No Acute Distress, Alert and Oriented Dressing is Dry,  Neurovascular exam is normal. 
 
Assessment: 
Patient Active Problem List  
Diagnosis Code  CKD (chronic kidney disease) stage 3, GFR 30-59 ml/min N18.3  Elevated white blood cell count D72.829  Abnormal urinalysis R82.90  
 Osteoarthritis M19.90  
 S/P total knee arthroplasty, left Z19.490  Fever R50.9  Volume depletion E86.9  Diabetes mellitus type 2, controlled ([address] E11.9  Hyperlipidemia E78.5  Essential hypertension I10  Sepsis ([address] A41.9  Acute cystitis without hematuria N30.00  Bipolar disorder ([address] F31.9  Hypotension I95.9  Nausea & vomiting R11.2  MELANI (acute kidney injury) ([address] N17.9 Plan: Continue Physical Therapy Monitor Hbg and labs. following Signed By: Lynn Sharp MD

## 2018-09-02 NOTE — PROGRESS NOTES
Dr. Modesta Boss notified that patient has not voided since 1700 today after salmon removal. Bladder scan showed he is retaining 745 ml. Order received for a one time straight cath.

## 2018-09-02 NOTE — PROGRESS NOTES
Unable to cath patient after two attempts. Dr. Modesta Boss notified and order received to consult urology in order to put catheter in patient. Patient wants a little break in between trying to cath him again due to discomfort.

## 2018-09-02 NOTE — CONSULTS
[address] 
MR#: [id number] : 1945 ACCOUNT #: [de-identified] DATE OF SERVICE: 2018 HISTORY OF PRESENT ILLNESS:  The patient is a 43-year-old male who had a recent total knee surgery. He was readmitted for rehab and history of recurrent UTIs. The patient's nurses had difficulty passing a catheter and he had a large amount of urine in his bladder. Several nurses tried last night to place coude catheters. His recent history is that he had the left knee arthroplasty back in the beginning of August and has had some failure to thrive, possible urinary tract infections. His past medical history is significant, urologically, in that Dr. Ifeoma Velasquez about 2 years ago did a procedure on his prostate that sounds like a transurethral resection of the prostate. The patient has had difficulty voiding after that and he was on intermittent catheterization for a good period of time until he started voiding on his own and then for the past 8 months he has not had to catheterize himself. 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 MEDICATIONS:  Patient is on multiple medications at this time including insulin, Victoza Glucotrol-XL, metformin, Depakote, Wellbutrin, aspirin, Lasix. ALLERGIES:  HE IS ALLERGIC TO OXYCODONE AND TRAMADOL. LABORATORY DATA:  His urinalysis from 2018 was negative. Urinalysis from 08/15/2018 had a lot of white cells and red cells and trace bacteria. Urine culture was pseudomonas back on 2018 on the final report. A urine culture is pending at this time with no growth at 2 days.   Patient's serum creatinine is 1.1 with hydration. His white count is normal today at 9.1 and was 12.3 on admission. After hydration, his hemoglobin went from 8.4-10.3. I came to see the patient to possibly put a catheter in. The patient was found to have voided right before I came to see him. It was not blood-tinged, but there was a good volume and he was feeling comfortable. He had voided into his pull-up undergarment. At the present time, he does not have any discomfort related to the bladder. I discussed with him his history. Evidently he had BPH with some urinary retention and had a TURP without consequence except he could not void afterwards and had to do self-cath daily. He is followed by Dr. Katie Dhillon at [facility]. Eventually, about 8 months ago, he started voiding spontaneously and has not had to catheterize since that time. He said that the nurses had difficulty trying to pass a coude catheter, that he has had to use the curved tip catheter for self-catheterization in the past.  He denies any blood that he could tell from the attempt at placing the catheter. IMPRESSION:  The patient admitted for rehab evaluation status post knee procedure with a history of BPH, TURP, chronic urinary retention on previous self-catheterization with the nurses having difficulty passing a coude catheter. He had a urine infection a couple weeks ago that the urinalysis from admission on this visit does not show a persistent infection. He had been urinating well until he came in and he may have a chronic urine retention. At the present time, he is comfortable and if I have trouble putting in a catheter at the [address], I do not have the equipment I would need to do the catheter placement as easily as if I was doing it at [address] [facility].  
 
PLAN:  Therefore, since he is comfortable, I am going to wait; if I have to come back I might have to sedate him a little bit and use some anesthetic in the room and, if that is not successful, I will have to take him to surgery to put in a catheter. Since he is comfortable I do not think any of these are necessary at this point. We will continue to follow with you and will check back with the patient tomorrow and see how he is doing. I discussed this with the nurse and told her to contact me if there are any issues. MD OSWALD Hennessy / [place] 
D: 09/02/2018 10:32    
T: 09/02/2018 11:33 
JOB #: [id number]

## 2018-09-02 NOTE — PROGRESS NOTES
Progress Note Patient: Lorena Dickinson MRN: [id number]  SSN: [id number] YOB: 1945  Age: 71 y.o. Sex: male Admit Date: 8/30/2018 LOS: 3 days Subjective:  
Cc: \" I feel weak\" Patient examined at bedside. He was noted in no distress, he felt weak and is looking forward to go to rehab soon. Objective:  
 
Vitals:  
 09/01/18 2120 09/02/18 0116 09/02/18 0502 09/02/18 0530 BP:  138/71 176/73 140/68 Pulse:  92 92 Resp:  12 20 Temp:  98.5 °F (36.9 °C) 98.1 °F (36.7 °C) SpO2: 98% 95% 95% Weight:      
Height:      
  
 
Intake and Output: 
Current Shift:   
Last three shifts: 08/31 1901 - 09/02 0700 In: 440 [P.O.:440] Out: 2450 [Urine:2450] Physical Exam:  
GENERAL: alert, cooperative EYE: negative LYMPHATIC: Cervical, supraclavicular, and axillary nodes normal.  
THROAT & NECK: dry mucous membranes ; no exudates LUNG: clear to auscultation bilaterally HEART: regular rate and rhythm, S1, S2 normal, no murmur, click, rub or gallop ABDOMEN: soft, non-tender. Bowel sounds normal. No masses,  no organomegaly EXTREMITIES:  Left knee with healed scar, has edema, no erythema; limited ROM SKIN: Normal. 
NEUROLOGIC: oriented x 3, able to move all 4 limbs Ramey cath: clear urine PSYCHIATRIC: non suicidal nor homicidal  
 
Lab/Data Review: All lab results for the last 24 hours reviewed. Assessment:  
 
Principal Problem: 
  MELANI (acute kidney injury) ([address] (8/30/2018) Active Problems: 
  CKD (chronic kidney disease) stage 3, GFR 30-59 ml/min (5/8/2018) S/P total knee arthroplasty, left (8/3/2018) Diabetes mellitus type 2, controlled ([address] (8/15/2018) Sepsis ([address] (8/15/2018) Bipolar disorder ([address] (8/15/2018) Hypotension (8/30/2018) Nausea & vomiting (8/30/2018) Plan: 1. Sepsis:  bacteremia and UTI ruled out, possible secondary to dehydration: DC antibiotics and IVF - BC are negative 2.Non oliguric amelie on ckd: likely related to dehydration / nausea and vomiting / ACEI and lasix use: FeNa: 1,9%: possible intrinsic kidney disease: resolved 3. Nausea and vomiting: gastritis - secondary to amelie: resolved - ppi and antiemetics 4. S/P LKA on 8/2/18: pain control- PT/OT- appreciate orthopedics eval  
 
5. A fib not in RVR: stable, monitor 6. Bipolar disorder: feels depressed - con wellbutrin - on remeron 7. Hypomagnesemia: replaced   
  
DVT ppx: heparin and GCS 
  
Code status: DNR/DNI. 
  
Disposition: STR versus IRC.  on board. Signed By: Bessy Arizmendi MD   
 September 2, 2018

## 2018-09-02 NOTE — CONSULTS
Urology Consult Subjective:  
 
Date of Consultation:  September 2, 2018 Reason for Consultation:  Urine retention  See dictated consult Signed By: Jose David Singh MD  
                      September 2, 2018

## 2018-09-03 NOTE — PROGRESS NOTES
Orthopedic Joint Progress Note September 3, 2018 Admit Date: 2018 Admit Diagnosis: MELANI (acute kidney injury) ([facility] [address] Nausea & vomiting Hypotension Sepsis ([facility] [address] * No surgery found * Subjective:  
 
Gene Joshi  
 
Review of Systems: Pertinent items are noted in HPI. Objective:  
 
PT/OT:  
 
PATIENT MOBILITY Bed Mobility Supine to Sit: Minimum assistance Sit to Supine:  (NT) Scooting: Maximum assistance Transfers Sit to Stand: Moderate assistance Stand to Sit: Moderate assistance Bed to Chair: Moderate assistance (with walker) Gait Speed/Kenzie: Shuffled, Slow Step Length: Left shortened, Right shortened Gait Abnormalities: Antalgic, Decreased step clearance, Shuffling gait, Trunk sway increased Ambulation - Level of Assistance: Moderate assistance Distance (ft): 20 Feet (ft) Assistive Device: Walker, rolling Weight Bearing Status Left Side Weight Bearing: As tolerated Vital Signs:   
Blood pressure 188/84, pulse 94, temperature 97.9 °F (36.6 °C), resp. rate 20, height 6' (1.829 m), weight 90.5 kg (199 lb 9.6 oz), SpO2 94 %. Temp (24hrs), Av °F (36.7 °C), Min:97.4 °F (36.3 °C), Max:98.5 °F (36.9 °C) Pain Control:  
Pain Assessment Pain Scale 1: Numeric (0 - 10) Pain Intensity 1: 3 Pain Onset 1: chronic Pain Location 1: Knee Pain Orientation 1: Left Pain Description 1: Aching Pain Intervention(s) 1: Ambulation/Increased Activity Meds: 
Current Facility-Administered Medications Medication Dose Route Frequency  mirtazapine (REMERON) tablet 15 mg  15 mg Oral QHS  
 HYDROcodone-acetaminophen (NORCO) 7.5-325 mg per tablet 2 Tab  2 Tab Oral Q6H PRN  
 aspirin delayed-release tablet 81 mg  81 mg Oral BID  buPROPion [facility]) tablet 150 mg  150 mg Oral BID  divalproex ER (DEPAKOTE ER) 24 hour tablet 1,000 mg  1,000 mg Oral BID  latanoprost (XALATAN) 0.005 % ophthalmic solution 1 Drop  1 Drop Both Eyes QHS  simvastatin (ZOCOR) tablet 40 mg  40 mg Oral QHS  heparin (porcine) injection 5,000 Units  5,000 Units SubCUTAneous Q12H  
 acetaminophen (TYLENOL) tablet 650 mg  650 mg Oral Q6H PRN  pantoprazole (PROTONIX) 40 mg in sodium chloride 0.9% 10 mL injection  40 mg IntraVENous DAILY  ondansetron (ZOFRAN) injection 4 mg  4 mg IntraVENous Q8H PRN  
 naloxone (NARCAN) injection 0.4 mg  0.4 mg IntraVENous PRN  
 HYDROmorphone (PF) (DILAUDID) injection 0.5 mg  0.5 mg IntraVENous Q6H PRN  
 dextrose (D50W) injection syrg 25 g  25 g IntraVENous PRN  
  
 
LAB:   
Lab Results Component Value Date/Time INR 1.6 08/15/2018 01:42 PM  
 INR 1.8 08/05/2018 04:30 AM  
 INR 1.2 08/04/2018 04:08 AM  
 
Lab Results Component Value Date/Time HGB 10.3 (L) 09/02/2018 04:57 AM  
 HGB 10.0 (L) 09/01/2018 05:43 AM  
 HGB 9.8 (L) 08/31/2018 05:41 AM  
 
 
Wound Knee Left (Active) Number of days:32 Physical Exam: No significant changes Assessment:  
  
Principal Problem: 
  MELANI (acute kidney injury) ([address] (8/30/2018) Active Problems: 
  CKD (chronic kidney disease) stage 3, GFR 30-59 ml/min (5/8/2018) S/P total knee arthroplasty, left (8/3/2018) Diabetes mellitus type 2, controlled ([address] (8/15/2018) Sepsis ([address] (8/15/2018) Bipolar disorder ([address] (8/15/2018) Hypotension (8/30/2018) Nausea & vomiting (8/30/2018) Plan:  
 
Continue PT/OT/Rehab Consult: Rehab team including PT, OT, recreational therapy, and  Patient Expects to be Discharged to[de-identified] Rehabilitation facility Signed By: Duane Duncan MD

## 2018-09-03 NOTE — CONSULTS
Keep salmon for now. Will add tamsulosin and could have salmon out in next couple of days and see how he does.  
 
Hammad HERNANDEZ

## 2018-09-03 NOTE — PROGRESS NOTES
Shift assessment complete. A&O. Denies pain. No complaints at this time. Respirations even and unlabored. Ramey patent and draining. Iv patent and flushes without difficulty. SCDs in place. Call light within reach. Encouraged to gabino for help.

## 2018-09-03 NOTE — PROGRESS NOTES
Care Management Interventions PCP Verified by CM: Yes Mode of Transport at Discharge: BLS Transition of Care Consult (CM Consult): Other (9th floor) Current Support Network: Own Home Confirm Follow Up Transport: Family Plan discussed with Pt/Family/Caregiver: Yes Freedom of Choice Offered: Yes Discharge Location Discharge Placement: Rehab hospital/unit acute Spoke with Lloyd Davis @ 9th floor, they have submitted clinicals to Workers Comp and are waiting to hear back, patient on board with the plan. Will plan to possible d/[facility], does live alone but dtr and grown grandchildren are close by.

## 2018-09-03 NOTE — PROGRESS NOTES
Problem: Mobility Impaired (Adult and Pediatric) Goal: *Acute Goals and Plan of Care (Insert Text) GOALS FOR 1 WEEK RE-ASSESS 9/7/18 : 
(1.)Mr. Cutler will move from supine to sit and sit to supine  with MINIMAL ASSIST. Met 9/2 
(2.)Mr. Cutler will sit<>stand with minimal assist & walker. (3.)Mr. Cutler will perform standing wt-shift with walker & pre-gait exercises with minimal assist. Met 9/2 
4) pt performing fair sitting static balance for 2 min. Met 9/2 
5) Pt performing sitting EOB wt-shifting on outstretched arms multiple directions with CGA. Met 9/2 
6) pt performing AROM to UE & LE's on cue 15-20 reps. ADDENDUM GOALS 9/2/18 : 1) supine<>sit with minimal assist (consistently) & bed modified. 2) pt ambulating 50-75 ft with rolling walker & minimal assist ( consistently). 3) PCT's comfortable transferring pt to [facility]. 
________________________________________________________________________________________________ PHYSICAL THERAPY: Daily Note, Treatment Day: 3rd, AM 9/3/2018 INPATIENT: Hospital Day: 5 [place] / Product Type: Medicare /  
  
NAME/AGE/GENDER: Felicita Aparicio is a 71 y.o. male PRIMARY DIAGNOSIS: MELANI (acute kidney injury) ([facility] Nausea & vomiting Hypotension Sepsis ([facility] MELANI (acute kidney injury) ([facility] MELANI (acute kidney injury) ([facility] ICD-10: Treatment Diagnosis:  
 · Generalized Muscle Weakness (M62.81) · Other lack of cordination (R27.8) · Difficulty in walking, Not elsewhere classified (R26.2) · Other abnormalities of gait and mobility (R26.89) Precaution/Allergies: 
Oxycodone and Tramadol ASSESSMENT:  
 
Mr. Kurt Martins continues to show slow but steady gains. Pt targeted core stability & WB trough limbs in sitting & standing. This pt will need extensive post acute rehab on hospital DC. This section established at most recent assessment PROBLEM LIST (Impairments causing functional limitations): 1. Decreased Strength 2. Decreased ADL/Functional Activities 3. Decreased Transfer Abilities 4. Decreased Balance 5. Decreased Activity Tolerance 6. Increased Shortness of Breath 7. Decreased Flexibility/Joint Mobility 8. Decreased [place] with Home Exercise Program 
 INTERVENTIONS PLANNED: (Benefits and precautions of physical therapy have been discussed with the patient.) 1. Balance Exercise 2. Bed Mobility 3. Home Exercise Program (HEP) 4. Therapeutic Activites 5. Therapeutic Exercise/Strengthening 6. Transfer Training TREATMENT PLAN: Frequency/Duration: daily for duration of hospital stay Rehabilitation Potential For Stated Goals: GOOD  
 
RECOMMENDED REHABILITATION/EQUIPMENT: (at time of discharge pending progress): Due to the probability of continued deficits (see above) this patient will likely need continued skilled physical therapy after discharge. Equipment:  
? to be determined HISTORY:  
History of Present Injury/Illness (Reason for Referral): 
Whit Malcolm is a 71 y.o. male who has a PMH of HTN, a fib, DM, CKD, s/p left knee Arthroplasty on 8/2/18, was referred from [facility] after he was noted with chills, hypotension of 79/45 mmHg, AMS, hallucinations and vomiting x 4 today. Of note, he had a pseudomonas UTI on 8/15/18, he was given a 7 day therapy on vantin, and eventually given ciprofloxacin back at the facility for his infection. He stated his left knee is tender and has not been able to walk properly. He believes his dehydration may be related to lasix use. Upon ER arrival VS BP 98/62  HR 87  RR 20  O2: 98% on room air. On my assessment his BP was 90/52  HR 85. Patient was alert with visual hallucinations and confused. He complained about left knee pain since his surgery.  Labs included: normal lactic acid; cbc: wbc 12k, hb 11gr; chemistry: cr 3.9 ( baseline 1.2 on 8/17/18 ); UA: negative; EKG: A fib not in RVR; CXR: normal; left knee XR: preserved arthroplasty. Blood cultures taken in the ED. Hospitalist was contacted for admission due to sepsis and amelie on ckd. 
  
Past Medical History/Comorbidities:  
Mr. Ladarius Ramirez  has a past medical history of Arthritis; BMI 30.0-30.9,adult; CAD (coronary artery disease); Chronic kidney disease; Chronic pain; Depression; Diabetes ([address] Enlarged prostate with urinary obstruction; Former smoker; GERD (gastroesophageal reflux disease); High cholesterol; Hypertension; Nausea & vomiting (8/30/2018); Neurogenic bladder; Other ill-defined conditions(799.89); Overflow incontinence; Sciatica; Self-catheterizes urinary bladder; Skin cancer; Sleep apnea; Spondylolisthesis of lumbar region; Thromboembolus ([address] (2013); Urine retention; and Varicose veins of both lower extremities with complications. He also has no past medical history of Adverse effect of anesthesia; Aneurysm ([address] Arrhythmia; Asthma; Autoimmune disease ([address] Chronic obstructive pulmonary disease ([address] Coagulation disorder ([address] Difficult intubation; Endocarditis; Heart failure ([address] Ill-defined condition; Liver disease; Malignant hyperthermia due to anesthesia; Nicotine vapor product user; Non-nicotine vapor product user; Pseudocholinesterase deficiency; PUD (peptic ulcer disease); Rheumatic fever; Seizures ([address] Stroke [facility]); or Thyroid disease. Mr. Ladarius Ramirez  has a past surgical history that includes hx hemorrhoidectomy (1971); hx shoulder arthroscopy (Left, 1971); and hx urological (06/2017). Social History/Living Environment:  
Home Environment: Skilled nursing facility Care Facility Name: [facility] # Steps to Enter: 0 One/Two Story Residence: One story Living Alone: Yes Support Systems:  (facility staff) Patient Expects to be Discharged to[de-identified] Rehabilitation facility Current DME Used/Available at Home:  (none) Prior Level of Function/Work/Activity: Pt was functioning with a rolling walker & limited assist in rehab prior to this set back & re-admit. Personal Factors: Other factors that influence how disability is experienced by the patient:  Current & PMH Number of Personal Factors/Comorbidities that affect the Plan of Care: 3+: HIGH COMPLEXITY EXAMINATION:  
Most Recent Physical Functioning:  
Gross Assessment: 
AROM: Grossly decreased, non-functional (all limbs & core) Strength: Grossly decreased, non-functional (all limbs & core) Coordination: Grossly decreased, non-functional (all limbs & core) Balance: 
Sitting: Intact; Without support Sitting - Static:  (fair) Sitting - Dynamic:  (fair) Standing: Impaired; With support Standing - Static:  (fair- with walker) Standing - Dynamic :  (fair- with walker) Bed Mobility: 
Supine to Sit: Contact guard assistance; Additional time (HOB ~ 30 deg & rail) Sit to Supine:  (NT) Scooting: Minimum assistance Transfers: 
Sit to Stand: Moderate assistance Stand to Sit: Moderate assistance Bed to Chair: Moderate assistance (with walker) Duration: 43 Minutes (extra time to work through activity noted) Gait: NA on evaluation Left Side Weight Bearing: As tolerated Speed/Kenzie: Shuffled; Slow Step Length: Left shortened;Right shortened Gait Abnormalities: Antalgic;Decreased step clearance Distance (ft): 20 Feet (ft) Assistive Device: Walker, rolling Ambulation - Level of Assistance: Moderate assistance Functional Mobility:  
      Gait/Ambulation:  mod Transfers:  mod Bed Mobility:  SBA Body Structures Involved: 1. Metabolic 2. Joints 3. Muscles Body Functions Affected: 1. Sensory/Pain 2. Movement Related 3. Metobolic/Endocrine Activities and Participation Affected: 1. General Tasks and Demands 2. Mobility Number of elements that affect the Plan of Care: 4+: HIGH COMPLEXITY CLINICAL PRESENTATION:  
 Presentation: Evolving clinical presentation with unstable and unpredictable characteristics: HIGH COMPLEXITY CLINICAL DECISION MAKING:  
[facility] AM-PAC 6 Clicks Basic Mobility Inpatient Short Form How much difficulty does the patient currently have. .. Unable A Lot A Little None 1. Turning over in bed (including adjusting bedclothes, sheets and blankets)? [] 1   [x] 2   [] 3   [] 4  
2. Sitting down on and standing up from a chair with arms ( e.g., wheelchair, bedside commode, etc.)   [] 1   [x] 2   [] 3   [] 4  
3. Moving from lying on back to sitting on the side of the bed? [] 1   [x] 2   [] 3   [] 4 How much help from another person does the patient currently need. .. Total A Lot A Little None 4. Moving to and from a bed to a chair (including a wheelchair)? [x] 1   [] 2   [] 3   [] 4  
5. Need to walk in hospital room? [x] 1   [] 2   [] 3   [] 4  
6. Climbing 3-5 steps with a railing? [x] 1   [] 2   [] 3   [] 4  
© 2007, Trustees of [facility], under license to [employer]. All rights reserved Score:  Initial: 9 Most Recent: X (Date: -- ) Interpretation of Tool:  Represents activities that are increasingly more difficult (i.e. Bed mobility, Transfers, Gait). Score 24 23 22-20 19-15 14-10 9-7 6 Modifier CH CI CJ CK CL CM CN   
 
? Mobility - Walking and Moving Around:  
  - CURRENT STATUS: CM - 80%-99% impaired, limited or restricted  - GOAL STATUS: CL - 60%-79% impaired, limited or restricted  - D/C STATUS:  ---------------To be determined--------------- [place] / [place] A [place] B / Product Type: Medicare /   
 
Medical Necessity:    
· Patient is expected to demonstrate progress in strength, range of motion, balance, coordination and functional technique to decrease assistance required with bed mobility, balance & transfers with progression to gait as able. Reason for Services/Other Comments: · Patient continues to require skilled intervention due to severe debility. Use of outcome tool(s) and clinical judgement create a POC that gives a: Difficult prediction of patient's progress: HIGH COMPLEXITY  
  
 
 
 
TREATMENT:  
(In addition to Assessment/Re-Assessment sessions the following treatments were rendered) Pre-treatment Symptoms/Complaints: pt reported fatiguing quickly Pain: Initial: visual scale Pain Intensity 1: 3 Pain Location 1: Knee Pain Orientation 1: Left Pain Intervention(s) 1: Ambulation/Increased Activity  Post Session:  3/10 Therapeutic Activity: (  43 Minutes (extra time to work through activity noted) : Therapeutic activities including ,  Bed mobility with additional time to work through task, EOB wt-shifting R<>L, F<>B  & trunk rotation while EOB. exercises to both UE's  While sitting EOB- overhead press & UE push & pull (alternating) , seated hip flex on left  & bilateral hip ABD-ADD , repeated sit<>stand with walker & worked on prolonged static standing then wt-shifting F<>B & R<>L with walker while also performing gluteal & quad sets in closed chain with progression to gait with rolling walker to encourage [address] through arms & legs to improve mobility, strength, balance, coordination and dynamic movement of arm - bilateral, leg - bilateral and core to improve functional WB potential.  Required moderate verbal & manual cues to promote upright posture. Braces/Orthotics/Lines/Etc:  
· IV Treatment/Session Assessment:   
· Response to Treatment:   Pt needed much encouragement to complete all activity noted. · Interdisciplinary Collaboration:  
o Registered Nurse · After treatment position/precautions:  
o Up in chair 
o Bed/Chair-wheels locked 
o Call light within reach 
o RN notified · Compliance with Program/Exercises: Will assess as treatment progresses. · Recommendations/Intent for next treatment session:   \"Next visit will focus on reduction in assistance provided\". Total Treatment Duration: PT Patient Time In/Time Out Time In: 0356 Time Out: 1189 Dora Burleson PT

## 2018-09-03 NOTE — PROGRESS NOTES
Working with PT and OT. PT/OT  assisted to bathroom x3 people using a walker and a gait belt. OT Assisted with bath.

## 2018-09-03 NOTE — PROGRESS NOTES
Progress Note Patient: Janette Wagoner MRN: [id number]  SSN: [id number] YOB: 1945  Age: 71 y.o. Sex: male Admit Date: 8/30/2018 LOS: 4 days Subjective:  
Mr. Janette Wagoner is a 71 y.o. male who has a PMH of HTN, a fib, DM, CKD, s/p left knee Arthroplasty on 8/2/18, was referred from [address] [place] [address] after he was noted with sepsis criteria. He had  hypotension of 79/45 mmHg, AMS, hallucinations. Of note, patient was diagnosed with pseudomonas UTI on 8/15/18, and given a 7 day therapy on vantin, and eventually on ciprofloxacin back at the facility for his infection. Upon ER arrival he was noted with melani and hypotension. MELANI believed to be related to poor oral intake, sepsis or lasix use. He was started on broad spectrum antibiotics, IVFs and lasix was held. Cultures at the end were unremarkable and antibiotics stopped. His hallucinations resolved. Patient is now participating with PT/OT with plan to transfer him to inpatient rehab 9th floor at [facility] once case is approved. His clinical evolution was complicated with urinary retention on 9/2/18, urology placed salmon cath and started on flomax. Salmon cath be removed in 2 days from today ( 9/3/18 ). On my assessment today he was found in no distress, able to speak in full sentences. No fever, no chills. He is eager to go to rehab. Objective:  
 
Vitals:  
 09/02/18 1953 09/02/18 2359 09/03/18 0335 09/03/18 8012 BP: 138/71 172/86 188/84 Pulse: 89 96 94 Resp: 20 22 20 Temp: 98.3 °F (36.8 °C) 97.5 °F (36.4 °C) 97.9 °F (36.6 °C) SpO2: 95% 95% 94% Weight:    90.5 kg (199 lb 9.6 oz) Height:      
  
 
Intake and Output: 
Current Shift:   
Last three shifts: 09/01 1901 - 09/03 0700 In: 955 [I.V.:955] Out: 5550 [UJOSK:7127] Physical Exam:  
GENERAL: alert, cooperative EYE: negative LYMPHATIC: Cervical, supraclavicular, and axillary nodes normal.  
THROAT & NECK: dry mucous membranes ; no exudates LUNG: clear to auscultation bilaterally HEART: regular rate and rhythm, S1, S2 normal, no murmur, click, rub or gallop ABDOMEN: soft, non-tender. Bowel sounds normal. No masses,  no organomegaly EXTREMITIES:  Left knee with healed scar, has edema, no erythema; limited ROM SKIN: Normal. 
NEUROLOGIC: oriented x 3, able to move all 4 limbs Salmon cath: clear urine PSYCHIATRIC: non suicidal nor homicidal  
 
Lab/Data Review: All lab results for the last 24 hours reviewed. Assessment:  
 
Principal Problem: 
  MELANI (acute kidney injury) ([facility] [address] (8/30/2018) Active Problems: 
  CKD (chronic kidney disease) stage 3, GFR 30-59 ml/min (5/8/2018) S/P total knee arthroplasty, left (8/3/2018) Diabetes mellitus type 2, controlled ([facility] [address] (8/15/2018) Sepsis ([facility] [address] (8/15/2018) Bipolar disorder ([facility] [address] (8/15/2018) Hypotension (8/30/2018) Nausea & vomiting (8/30/2018) Plan:  
 
-Urinary retention: on salmon cath and flomax - salmon can be removed in 2 days by Dr. Franci Lebron orders - monitor 1. Sepsis:  bacteremia and UTI ruled out, possible secondary to dehydration: off antibiotics since cultures were normal  
 
2. Non oliguric melani on ckd: likely related to dehydration / nausea and vomiting / ACEI and lasix use: resolved 3. Nausea and vomiting: gastritis - secondary to melani: resolved - ppi and antiemetics 4. S/P LKA on 8/2/18: pain control- PT/OT- appreciate orthopedics eval  
 
5. A fib not in RVR: stable, monitor 6. Bipolar disorder: feels depressed - con wellbutrin - on remeron  
 
  
DVT ppx: heparin and GCS 
  
Code status: DNR/DNI. 
  
Disposition: STR versus IRC.  on board. Signed By: Arnaldo Haddad MD   
 September 3, 2018

## 2018-09-03 NOTE — PROGRESS NOTES
Problem: Self Care Deficits Care Plan (Adult) Goal: *Acute Goals and Plan of Care (Insert Text) 1. Patient will complete lower body bathing and dressing with minimal assist and adaptive equipment as needed. Progressing 9/3/2018 2. Patient will complete toileting with CGA. 3. Patient will tolerate 35 minutes of OT treatment with appropriate and self incorporated rest breaks to increase activity tolerance for ADLs. Met 9/3/2018 4. Patient will complete functional transfers with minimal assist and adaptive equipment as needed. Progressing 9/3/2018 5. Patient will complete UE exercises to increase overall shoulder strength to 4+/5 MMT to increase UE support using walker during ADL's. Timeframe: 7 visits JOINT CAMP OCCUPATIONAL THERAPY TKA: Daily Note, Treatment Day: 2nd and AM 9/3/2018 INPATIENT: Hospital Day: 5 [place] / [place] / Product Type: Medicare /  
  
NAME/AGE/GENDER: Delbert Rivas is a 71 y.o. male PRIMARY DIAGNOSIS:  * No surgery found * 
 * Surgery not found * (Cannot find OR record) ICD-10: Treatment Diagnosis:  
 · Pain in Left Knee (M25.562) · Stiffness of Left Knee, Not elsewhere classified (M25.662) · Generalized Muscle Weakness (M62.81) · Difficulty in walking, Not elsewhere classified (R26.2) ASSESSMENT:  
 
Mr. Db Oliver is s/p left TKA (8/02/2018). Able to ambulate ~20' using a RW to bathroom for toileting. Requested sitting on standard toilet. Required significant boost to come into stance. At first agreed to shower after sitting on the toilet for ~6 minutes no longer agreeable to shower despite encouragement. Ambulated very slowly back to recliner ~8' using RW. Patient is self limiting at times, with continued therapy effort this should slowly get better as he regains his independence. Continue OT POC. 
 
9/3/18  Pt ambulated to bathroom with Physical Therapy.  Pt transferred to shower chair with minimal assist. Pt completed bathing, dressing and grooming this am. See chart below for assist level. Pt ambulated back to chair at door of bathroom with minimal assist and additional time. Pt fatigues very quickly. Pt setup with breakfast tray and needs in reach. Pt with good progress this am. Continue POC. This section established at most recent assessment PROBLEM LIST (Impairments causing functional limitations): 1. Decreased Strength 2. Decreased ADL/Functional Activities 3. Decreased Transfer Abilities 4. Increased Pain 5. Increased Fatigue 6. Decreased Flexibility/Joint Mobility 7. Decreased Knowledge of Precautions INTERVENTIONS PLANNED: (Benefits and precautions of occupational therapy have been discussed with the patient.) 1. Activities of daily living training 2. Adaptive equipment training 3. Balance training 4. Clothing management 5. Donning&doffing training 6. Theraputic activity TREATMENT PLAN: Frequency/Duration: Follow patient 5x a week to address above goals. Rehabilitation Potential For Stated Goals: Good RECOMMENDED REHABILITATION/EQUIPMENT: (at time of discharge pending progress): Continue Skilled Therapy and Rehab. OCCUPATIONAL PROFILE AND HISTORY:  
History of Present Injury/Illness (Reason for Referral): Pt presents this date s/p (left) TKA and sepsis. Past Medical History/Comorbidities:  
Mr. Shania Zheng  has a past medical history of Arthritis; BMI 30.0-30.9,adult; CAD (coronary artery disease); Chronic kidney disease; Chronic pain; Depression; Diabetes ([facility] [address] Enlarged prostate with urinary obstruction; Former smoker; GERD (gastroesophageal reflux disease); High cholesterol; Hypertension; Nausea & vomiting (8/30/2018); Neurogenic bladder; Other ill-defined conditions(799.89); Overflow incontinence; Sciatica;  Self-catheterizes urinary bladder; Skin cancer; Sleep apnea; Spondylolisthesis of lumbar region; Thromboembolus ([facility] [address] (2013); Urine retention; and Varicose veins of both lower extremities with complications. He also has no past medical history of Adverse effect of anesthesia; Aneurysm ([facility] [address] Arrhythmia; Asthma; Autoimmune disease ([address] Chronic obstructive pulmonary disease ([address] Coagulation disorder ([facility] [address] Difficult intubation; Endocarditis; Heart failure ([facility] [address] Ill-defined condition; Liver disease; Malignant hyperthermia due to anesthesia; Nicotine vapor product user; Non-nicotine vapor product user; Pseudocholinesterase deficiency; PUD (peptic ulcer disease); Rheumatic fever; Seizures ([facility] [address] Stroke [facility]); or Thyroid disease. Mr. Emanuel Bermeo  has a past surgical history that includes hx hemorrhoidectomy (1971); hx shoulder arthroscopy (Left, 1971); and hx urological (06/2017). Social History/Living Environment:  
Home Environment: Skilled nursing facility Care Facility Name: [address] [place] [address] # Steps to Enter: 0 One/Two Story Residence: One story Living Alone: Yes Support Systems:  (facility staff) Patient Expects to be Discharged to[de-identified] Rehabilitation facility Current DME Used/Available at Home:  (none) Prior Level of Function/Work/Activity: 
Independent 4 weeks ago. Mod/max assist last few weeks. Number of Personal Factors/Comorbidities that affect the Plan of Care: Expanded review of therapy/medical records (1-2):  MODERATE COMPLEXITY ASSESSMENT OF OCCUPATIONAL PERFORMANCE[de-identified]  
Most Recent Physical Functioning:  
Balance Sitting: Intact; Without support Sitting - Static:  (fair) Sitting - Dynamic:  (fair) Standing: Impaired; With support Standing - Static: Fair Standing - Dynamic :  (fair- with walker) LLE Assessment LLE Assessment (WDL): Exception to [facility] Mental Status Neurologic State: Alert Orientation Level: Oriented X4 Cognition: Follows commands Perception: Appears intact Perseveration: No perseveration noted Safety/Judgement: Awareness of environment; Fall prevention RLE Assessment RLE Assessment (WDL): Exceptions to [facility] Basic ADLs (From Assessment) Complex ADLs (From Assessment) Basic ADL Feeding: Setup Oral Facial Hygiene/Grooming: Supervision Bathing: Moderate assistance Type of Bath: Chlorhexidine (CHG), Full, Shower Upper Body Dressing: Minimum assistance Lower Body Dressing: Total assistance Toileting: Maximum assistance Grooming/Bathing/Dressing Activities of Daily Living Grooming Grooming Assistance: Supervision/set up Washing Face: Supervision/set-up Brushing Teeth: Supervision/set-up Shaving: Supervision/set-up Brushing/Combing Hair: Supervision/set-up Cognitive Retraining Safety/Judgement: Awareness of environment; Fall prevention Upper Body Bathing Bathing Assistance: Supervision/set-up Position Performed: Seated in chair Cues: Verbal cues provided Adaptive Equipment: Grab bar; Shower chair Feeding Feeding Assistance: Supervision/set-up Lower Body Bathing Bathing Assistance: Contact guard assistance Perineal  : Supervision/set-up Position Performed: Seated in chair Cues: Verbal cues provided Adaptive Equipment: Grab bar Lower Body : Contact guard assistance Position Performed: Seated in chair;Bending forward method Cues: Verbal cues provided Adaptive Equipment: Grab bar; Shower chair Upper Body Dressing Assistance Dressing Assistance: Supervision/set-up Hospital Gown: Supervision/ set-up Functional Transfers Bathroom Mobility: Minimum assistance Shower Transfer: Minimum assistance;Assist x1 Adaptive Equipment: Shower chair with back; [place] (comment) Lower Body Dressing Assistance Socks: Compensatory technique training ( socks) Adaptive Equipment Used: Grab bar;Walker Bed/Mat Mobility Supine to Sit: Contact guard assistance; Additional time (HOB ~ 30 deg & rail) Sit to Supine:  (NT) Sit to Stand:  Moderate assistance;Assist x1 
 Bed to Chair: Minimum assistance;Assist x2 Scooting: Minimum assistance Physical Skills Involved: 1. Range of Motion 2. Balance 3. Strength Cognitive Skills Affected (resulting in the inability to perform in a timely and safe manner): 1. Divided Attention Psychosocial Skills Affected: 1. Self-Awareness Number of elements that affect the Plan of Care: 3-5:  MODERATE COMPLEXITY CLINICAL DECISION MAKING:  
[employer] AM-PAC 6 Clicks Daily Activity Inpatient Short Form How much help from another person does the patient currently need. .. Total A Lot A Little None 1. Putting on and taking off regular lower body clothing? [x] 1   [] 2   [] 3   [] 4  
2. Bathing (including washing, rinsing, drying)? [] 1   [x] 2   [] 3   [] 4  
3. Toileting, which includes using toilet, bedpan or urinal?   [] 1   [x] 2   [] 3   [] 4  
4. Putting on and taking off regular upper body clothing? [] 1   [x] 2   [] 3   [] 4  
5. Taking care of personal grooming such as brushing teeth? [] 1   [] 2   [x] 3   [] 4  
6. Eating meals? [] 1   [] 2   [] 3   [x] 4  
© 2007, Trustees of [facility] [employer], under license to [employer]. All rights reserved Score:  Initial: 14 Most Recent: X (Date: -- ) Interpretation of Tool:  Represents activities that are increasingly more difficult (i.e. Bed mobility, Transfers, Gait). Score 24 23 22-20 19-15 14-10 9-7 6 Modifier CH CI CJ CK CL CM CN   
 
? Self Care:  
  - CURRENT STATUS: CL - 60%-79% impaired, limited or restricted  - GOAL STATUS: CK - 40%-59% impaired, limited or restricted  - D/C STATUS:  ---------------To be determined--------------- [place] / [place] / Product Type: Medicare /   
 
Medical Necessity:    
· Skilled intervention continues to be required due to Deficits noted above. Reason for Services/Other Comments: · Patient continues to require skilled intervention due to sepsis and L TKA. Use of outcome tool(s) and clinical judgement create a POC that gives a: MODERATE COMPLEXITY  
  
 
 
 
TREATMENT:  
(In addition to Assessment/Re-Assessment sessions the following treatments were rendered) Pre-treatment Symptoms/Complaints: Tolerated shower; fatigued quickly Pain: Initial:  
Pain Intensity 1: 0  3 Self Care: (60): Procedure(s) (per grid) utilized to improve and/or restore self-care/home management as related to dressing, bathing, grooming and self feeding. Required maximal verbal and tactile cueing to facilitate activities of daily living skills. Treatment/Session Assessment:   
 Response to Treatment:  sitting up in recliner. Education: 
[] Home Exercises [x] Fall Precautions [] Hip Precautions [] Going Home Video [x] Knee/Hip Prosthesis Review [x] Walker Management/Safety [x] Adaptive Equipment as Needed Interdisciplinary Collaboration:  
o Physical Therapist 
o Occupational Therapist 
o Registered Nurse After treatment position/precautions:  
o Up in chair 
o Bed/Chair-wheels locked 
o Call light within reach 
o RN notified Compliance with Program/Exercises: compliant all of the time. Recommendations/Intent for next treatment session:  Treatment next visit will focus on increasing Mr. Gaby Cowart independence with bed mobility, transfers, self care, functional mobility, modalities for pain, and patient education. Total Treatment Duration: OT Patient Time In/Time Out Time In: 0830 Time Out: 0930 Donald Alpers, OT

## 2018-09-04 NOTE — PROGRESS NOTES
Problem: Falls - Risk of 
Goal: *Absence of Falls Document Clide Failing Fall Risk and appropriate interventions in the flowsheet. Outcome: Progressing Towards Goal 
Fall Risk Interventions: 
Mobility Interventions: Assess mobility with egress test, Communicate number of staff needed for ambulation/transfer, Mechanical lift, PT Consult for mobility concerns, PT Consult for assist device competence, Strengthening exercises (ROM-active/passive), Utilize walker, cane, or other assistive device Mentation Interventions: Adequate sleep, hydration, pain control, Bed/chair exit alarm, Door open when patient unattended, Eyeglasses and hearing aids, Familiar objects from home, HELP ([address]) if available, More frequent rounding, Reorient patient, Room close to nurse's station, Self-releasing belt Medication Interventions: Bed/chair exit alarm, Evaluate medications/consider consulting pharmacy, Patient to call before getting OOB, Teach patient to arise slowly, Utilize gait belt for transfers/ambulation, Assess postural VS orthostatic hypotension Elimination Interventions: Bed/chair exit alarm, Elevated toilet seat, Patient to call for help with toileting needs, Toilet paper/wipes in reach, Toileting schedule/hourly rounds

## 2018-09-04 NOTE — PROGRESS NOTES
PM assessment complete. Respirations present, non-labored. HR irregular. No signs of distress noted. Healing L-Knee incision. Ramey draining at bedside. PIV intact. Will continue to monitor.

## 2018-09-04 NOTE — PROGRESS NOTES
Progress Note Patient: René Narvaez MRN: [id number]  SSN: [id number] YOB: 1945  Age: 71 y.o. Sex: male Admit Date: 8/30/2018 LOS: 5 days Subjective: PMH of HTN, a fib, DM, CKD, s/p left knee Arthroplasty on 8/2/18, was referred from [employer] after he was noted with sepsis criteria. He reports no pain today. Ramey's catheter was placed on 9/2/2018 and is draining clear greenish-yellow urine. No blood. Objective:  
 
Vitals:  
 09/03/18 2000 09/04/18 0010 09/04/18 0411 09/04/18 0840 BP:  156/76 154/79 (!) 160/92 Pulse:  96 94 (!) 59 Resp:  18 17 18 Temp:  97.3 °F (36.3 °C) 97.9 °F (36.6 °C) 96.6 °F (35.9 °C) SpO2: 97% 96% 94% 94% Weight:      
Height:      
  
 
Intake and Output: 
Current Shift:   
Last three shifts: 09/02 1901 - 09/04 0700 In: -  
Out: 2010 [Urine:2010] Physical Exam:  
 
General:                    The patient is in no acute distress. Head:                                   Normocephalic/atraumatic. Eyes:                                    palpebral pallor, no scleral icterus. ENT:                                    External auricular and nasal exam within normal limits. Mucous membranes are moist. 
Neck:                                   Supple, non-tender, no JVD. Lungs:                       Clear to auscultation bilaterally without wheezes or crackles. No respiratory distress or accessory muscle use. Heart:                                  Regular rate and rhythm, without murmurs, rubs, or gallops. Abdomen:                  Soft, non-tender, non-distended with normoactive bowel sounds. Genitourinary:           No tenderness over the bladder or bilateral CVAs. Extremities:               Without clubbing, cyanosis, or edema. Left knee with healed scar, some limitation of ROM. Skin:                                    Normal color, texture, and turgor. No rashes, lesions, or jaundice. Pulses:                      Radial and dorsalis pedis pulses present 2+ bilaterally. Capillary refill <2s. Neurologic:                CN II-XII grossly intact and symmetrical.  
                                            Moving all four extremities well with no focal deficits. Psychiatric:                Pleasant demeanor, appropriate affect. Alert and oriented x 3 Lab/Data Review: 
 
Recent Results (from the past 24 hour(s)) GLUCOSE, POC Collection Time: 09/03/18 12:01 PM  
Result Value Ref Range Glucose (POC) 173 (H) 65 - 100 mg/dL GLUCOSE, POC Collection Time: 09/04/18 12:12 AM  
Result Value Ref Range Glucose (POC) 95 65 - 100 mg/dL GLUCOSE, POC Collection Time: 09/04/18  7:51 AM  
Result Value Ref Range Glucose (POC) 84 65 - 100 mg/dL  
 
9/2/2018  5:47 AM - Ricardo, Lab In [facility]  
Component Results Component Value Flag Ref Range Units Status WBC 9.1  4.3 - 11.1 K/uL Final  
RBC 3.65 (L) 4.23 - 5.6 M/uL Final  
HGB 10.3 (L) 13.6 - 17.2 g/dL Final  
HCT 33.3 (L) 41.1 - 50.3 % Final  
MCV 91.2  79.6 - 97.8 FL Final  
MCH 28.2  26.1 - 32.9 PG Final  
MCHC 30.9 (L) 31.4 - 35.0 g/dL Final  
RDW 14.4   % Final  
PLATELET 348 (L) 062 - 450 K/uL Final  
MPV 10.9  9.4 - 12.3 FL Final  
ABSOLUTE NRBC 0.00  0.0 - 0.2 K/uL Final  
Comment: **Note: Absolute NRBC parameter is now reported with Hemogram**  
DF AUTOMATED     Final  
NEUTROPHILS 64  43 - 78 % Final  
LYMPHOCYTES 24  13 - 44 % Final  
MONOCYTES 10  4.0 - 12.0 % Final  
EOSINOPHILS 1  0.5 - 7.8 % Final  
BASOPHILS 0  0.0 - 2.0 % Final  
IMMATURE GRANULOCYTES 0  0.0 - 5.0 % Final  
ABS. NEUTROPHILS 5.8  1.7 - 8.2 K/UL Final  
ABS. LYMPHOCYTES 2.2  0.5 - 4.6 K/UL Final  
ABS. MONOCYTES 0.9  0.1 - 1.3 K/UL Final  
ABS.  EOSINOPHILS 0.1  0.0 - 0.8 K/UL Final  
 ABS. BASOPHILS 0.0  0.0 - 0.2 K/UL Final  
ABS. IMM. GRANS. 0.0  0.0 - 0.5 K/UL Final  
 
9/2/2018  6:14 AM - Ricardo, Lab In [facility]  
Component Results Component Value Flag Ref Range Units Status Sodium 137  136 - 145 mmol/L Final  
Potassium 4.3  3.5 - 5.1 mmol/L Final  
Chloride 104  98 - 107 mmol/L Final  
CO2 27  21 - 32 mmol/L Final  
Anion gap 6 (L) 7 - 16 mmol/L Final  
Glucose 80  65 - 100 mg/dL Final  
Comment:  
47 - 60 mg/dl Consistent with, but not fully diagnostic of hypoglycemia. 101 - 125 mg/dl Impaired fasting glucose/consistent with pre-diabetes mellitus  
> 126 mg/dl Fasting glucose consistent with overt diabetes mellitus BUN 15  8 - 23 MG/DL Final  
Creatinine 1.10  0.8 - 1.5 MG/DL Final  
GFR est AA >60  >60 ml/min/1.73m2 Final  
GFR est non-AA >60  >60 ml/min/1.73m2 Final  
Calcium 9.3  8.3 - 10.4 MG/DL Final  
 
 
Current Facility-Administered Medications:  
  tamsulosin (FLOMAX) capsule 0.4 mg, 0.4 mg, Oral, QHS, Alex Blackburn MD, 0.4 mg at 09/03/18 2243   mirtazapine (REMERON) tablet 15 mg, 15 mg, Oral, QHS, Norman Diaz MD, 15 mg at 09/03/18 2243 
  HYDROcodone-acetaminophen (NORCO) 7.5-325 mg per tablet 2 Tab, 2 Tab, Oral, Q6H PRN, Brianna Wallace MD, 2 Tab at 09/03/18 1949   aspirin delayed-release tablet 81 mg, 81 mg, Oral, BID, Brianna Wallace MD, 81 mg at 09/04/18 4597   buPROPion SR [facility]) tablet 150 mg, 150 mg, Oral, BID, Norman Diaz MD, 150 mg at 09/04/18 8004   divalproex ER (DEPAKOTE ER) 24 hour tablet 1,000 mg, 1,000 mg, Oral, BID, Norman Diaz MD, 1,000 mg at 09/04/18 8857   latanoprost (XALATAN) 0.005 % ophthalmic solution 1 Drop, 1 Drop, Both Eyes, QHS, Norman Diaz MD, 1 Drop at 09/03/18 2245   simvastatin (ZOCOR) tablet 40 mg, 40 mg, Oral, QHS, Norman Diaz MD, 40 mg at 09/03/18 2243   heparin (porcine) injection 5,000 Units, 5,000 Units, SubCUTAneous, Q12H, Norman Diaz MD, 5,000 Units at 09/04/18 1198   acetaminophen (TYLENOL) tablet 650 mg, 650 mg, Oral, Q6H PRN, Chapito Ricci MD, 650 mg at 09/02/18 1611   pantoprazole (PROTONIX) 40 mg in sodium chloride 0.9% 10 mL injection, 40 mg, IntraVENous, DAILY, Chapito Ricci MD, 40 mg at 09/04/18 6747   ondansetron (ZOFRAN) injection 4 mg, 4 mg, IntraVENous, Q8H PRN, Chapito Ricci MD 
  naloxone [facility]) injection 0.4 mg, 0.4 mg, IntraVENous, PRN, Chapito Ricci MD 
  HYDROmorphone (PF) (DILAUDID) injection 0.5 mg, 0.5 mg, IntraVENous, Q6H PRN, Chapito Ricci MD 
  dextrose (D50W) injection syrg 25 g, 25 g, IntraVENous, PRN, Willie Bautista MD, 25 g at 08/30/18 3434 Assessment:  
 
Principal Problem: 
  MELANI (acute kidney injury) ([facility] [address] (8/30/2018) Active Problems: 
  CKD (chronic kidney disease) stage 3, GFR 30-59 ml/min (5/8/2018) S/P total knee arthroplasty, left (8/3/2018) Diabetes mellitus type 2, controlled ([facility] [address] (8/15/2018) Sepsis ([facility] [address] (8/15/2018) Bipolar disorder ([facility] [address] (8/15/2018) Hypotension (8/30/2018) Nausea & vomiting (8/30/2018) Plan: MELANI Improving with hydration. Monitor renal function and intake and output. Avoid nephrotoxic agents. Hypotension, resolved. Diabetes mellitus type 2 Monitor blood sugar. Cover with insulin sliding scale accordingly. So far, no positive culture result. No sign of sepsis now. No antibiotics. S/P total knee arthroplasty, left. Noted. Continue physical therapy. I have discussed the plan of care with patient. DVT prophylaxis : heparin [place] Disposition plan : to a rehab facility. Signed By: Tim Ni MD   
 September 4, 2018

## 2018-09-04 NOTE — PROGRESS NOTES
Orthopedic Joint Progress Note 2018 Admit Date: 2018 Admit Diagnosis: MELANI (acute kidney injury) ([facility] [address] Nausea & vomiting Hypotension Sepsis ([facility] [address] * No surgery found * Subjective:  
 
Tio Prows awake and alert/ feels better Review of Systems: Pertinent items are noted in HPI. Objective:  
 
PT/OT:  
 
PATIENT MOBILITY Bed Mobility Supine to Sit: Contact guard assistance, Additional time (HOB ~ 30 deg & rail) Sit to Supine:  (NT) Scooting: Minimum assistance Transfers Sit to Stand: Moderate assistance, Assist x1 Stand to Sit: Moderate assistance Bed to Chair: Minimum assistance, Assist x2 Gait Speed/Kenzie: Shuffled, Slow Step Length: Left shortened, Right shortened Gait Abnormalities: Antalgic, Decreased step clearance Ambulation - Level of Assistance: Moderate assistance Distance (ft): 20 Feet (ft) Assistive Device: Walker, rolling Weight Bearing Status Left Side Weight Bearing: As tolerated Vital Signs:   
Blood pressure 154/79, pulse 94, temperature 97.9 °F (36.6 °C), resp. rate 17, height 6' (1.829 m), weight 90.5 kg (199 lb 9.6 oz), SpO2 94 %. Temp (24hrs), Av.5 °F (36.4 °C), Min:96.2 °F (35.7 °C), Max:98 °F (36.7 °C) Pain Control:  
Pain Assessment Pain Scale 1: Visual 
Pain Intensity 1: 0 Pain Onset 1: chronic Pain Location 1: Knee Pain Orientation 1: Left Pain Description 1: Aching Pain Intervention(s) 1: Medication (see MAR) Meds: 
Current Facility-Administered Medications Medication Dose Route Frequency  tamsulosin (FLOMAX) capsule 0.4 mg  0.4 mg Oral QHS  mirtazapine (REMERON) tablet 15 mg  15 mg Oral QHS  
 HYDROcodone-acetaminophen (NORCO) 7.5-325 mg per tablet 2 Tab  2 Tab Oral Q6H PRN  
 aspirin delayed-release tablet 81 mg  81 mg Oral BID  buPROPion SR [facility] SR) tablet 150 mg  150 mg Oral BID  divalproex ER (DEPAKOTE ER) 24 hour tablet 1,000 mg  1,000 mg Oral BID  
  latanoprost (XALATAN) 0.005 % ophthalmic solution 1 Drop  1 Drop Both Eyes QHS  simvastatin (ZOCOR) tablet 40 mg  40 mg Oral QHS  heparin (porcine) injection 5,000 Units  5,000 Units SubCUTAneous Q12H  
 acetaminophen (TYLENOL) tablet 650 mg  650 mg Oral Q6H PRN  pantoprazole (PROTONIX) 40 mg in sodium chloride 0.9% 10 mL injection  40 mg IntraVENous DAILY  ondansetron (ZOFRAN) injection 4 mg  4 mg IntraVENous Q8H PRN  
 naloxone (NARCAN) injection 0.4 mg  0.4 mg IntraVENous PRN  
 HYDROmorphone (PF) (DILAUDID) injection 0.5 mg  0.5 mg IntraVENous Q6H PRN  
 dextrose (D50W) injection syrg 25 g  25 g IntraVENous PRN  
  
 
LAB:   
Lab Results Component Value Date/Time INR 1.6 08/15/2018 01:42 PM  
 INR 1.8 08/05/2018 04:30 AM  
 INR 1.2 08/04/2018 04:08 AM  
 
Lab Results Component Value Date/Time HGB 10.3 (L) 09/02/2018 04:57 AM  
 HGB 10.0 (L) 09/01/2018 05:43 AM  
 HGB 9.8 (L) 08/31/2018 05:41 AM  
 
 
Wound Knee Left (Active) Number of days:33 Physical Exam: 
Left knee with healing incision/ min swelling Assessment:  
  
Principal Problem: 
  MELANI (acute kidney injury) ([facility] [address] (8/30/2018) Active Problems: 
  CKD (chronic kidney disease) stage 3, GFR 30-59 ml/min (5/8/2018) S/P total knee arthroplasty, left (8/3/2018) Diabetes mellitus type 2, controlled ([address] (8/15/2018) Sepsis ([address] (8/15/2018) Bipolar disorder ([address] (8/15/2018) Hypotension (8/30/2018) Nausea & vomiting (8/30/2018) Plan:  
 
Continue PT/OT/Rehab Consult: Rehab team including PT, OT, recreational therapy, and  Appreciate Hospitalist and  care Appears to be situation of failure to thrive/ Urinary retention/and dehydration  - no evidence of sepsis or UTI on cx Very debilitated Hopefully to [facility] soon Patient Expects to be Discharged to[de-identified] Rehabilitation facility Signed By: Ponce Forbes MD

## 2018-09-04 NOTE — PROGRESS NOTES
Problem: Mobility Impaired (Adult and Pediatric) Goal: *Acute Goals and Plan of Care (Insert Text) GOALS FOR 1 WEEK RE-ASSESS 9/7/18 : 
(1.)Mr. Cutler will move from supine to sit and sit to supine  with MINIMAL ASSIST. Met 9/2 
(2.)Mr. Cutler will sit<>stand with minimal assist & walker. (3.)Mr. Cutler will perform standing wt-shift with walker & pre-gait exercises with minimal assist. Met 9/2 
4) pt performing fair sitting static balance for 2 min. Met 9/2 
5) Pt performing sitting EOB wt-shifting on outstretched arms multiple directions with CGA. Met 9/2 
6) pt performing AROM to UE & LE's on cue 15-20 reps. ADDENDUM GOALS 9/2/18 : 1) supine<>sit with minimal assist (consistently) & bed modified. 2) pt ambulating 50-75 ft with rolling walker & minimal assist ( consistently). 3) PCT's comfortable transferring pt to [facility]. 
________________________________________________________________________________________________ PHYSICAL THERAPY: Daily Note, Treatment Day: 4th, PM 9/4/2018 INPATIENT: Hospital Day: 6 [place] / Product Type: Medicare /  
  
NAME/AGE/GENDER: Kendrick Rose is a 71 y.o. male PRIMARY DIAGNOSIS: MELANI (acute kidney injury) ([facility] Nausea & vomiting Hypotension Sepsis ([facility] [address] [facility] MELANI (acute kidney injury) ([facility] [address] [facility] MELANI (acute kidney injury) ([facility] [address] ICD-10: Treatment Diagnosis:  
 · Generalized Muscle Weakness (M62.81) · Other lack of cordination (R27.8) · Difficulty in walking, Not elsewhere classified (R26.2) · Other abnormalities of gait and mobility (R26.89) Precaution/Allergies: 
Oxycodone and Tramadol ASSESSMENT:  
 
Mr. Nellie Marin continues to make slow progress. He is a Mod A x 2 with all bed mobility and gait. He will continue to work toward his goals. This section established at most recent assessment PROBLEM LIST (Impairments causing functional limitations): 1. Decreased Strength 2. Decreased ADL/Functional Activities 3. Decreased Transfer Abilities 4. Decreased Balance 5. Decreased Activity Tolerance 6. Increased Shortness of Breath 7. Decreased Flexibility/Joint Mobility 8. Decreased [place] with Home Exercise Program 
 INTERVENTIONS PLANNED: (Benefits and precautions of physical therapy have been discussed with the patient.) 1. Balance Exercise 2. Bed Mobility 3. Home Exercise Program (HEP) 4. Therapeutic Activites 5. Therapeutic Exercise/Strengthening 6. Transfer Training TREATMENT PLAN: Frequency/Duration: daily for duration of hospital stay Rehabilitation Potential For Stated Goals: GOOD  
 
RECOMMENDED REHABILITATION/EQUIPMENT: (at time of discharge pending progress): Due to the probability of continued deficits (see above) this patient will likely need continued skilled physical therapy after discharge. Equipment:  
? to be determined HISTORY:  
History of Present Injury/Illness (Reason for Referral): 
Tammy Ga is a 71 y.o. male who has a PMH of HTN, a fib, DM, CKD, s/p left knee Arthroplasty on 8/2/18, was referred from [facility] after he was noted with chills, hypotension of 79/45 mmHg, AMS, hallucinations and vomiting x 4 today. Of note, he had a pseudomonas UTI on 8/15/18, he was given a 7 day therapy on vantin, and eventually given ciprofloxacin back at the facility for his infection. He stated his left knee is tender and has not been able to walk properly. He believes his dehydration may be related to lasix use. Upon ER arrival VS BP 98/62  HR 87  RR 20  O2: 98% on room air. On my assessment his BP was 90/52  HR 85. Patient was alert with visual hallucinations and confused. He complained about left knee pain since his surgery.  Labs included: normal lactic acid; cbc: wbc 12k, hb 11gr; chemistry: cr 3.9 ( baseline 1.2 on 8/17/18 ); UA: negative; EKG: A fib not in RVR; CXR: normal; left knee XR: preserved arthroplasty. Blood cultures taken in the ED. Hospitalist was contacted for admission due to sepsis and amelie on ckd. 
  
Past Medical History/Comorbidities:  
Mr. Marya Montoya  has a past medical history of Arthritis; BMI 30.0-30.9,adult; CAD (coronary artery disease); Chronic kidney disease; Chronic pain; Depression; Diabetes ([address] Enlarged prostate with urinary obstruction; Former smoker; GERD (gastroesophageal reflux disease); High cholesterol; Hypertension; Nausea & vomiting (8/30/2018); Neurogenic bladder; Other ill-defined conditions(799.89); Overflow incontinence; Sciatica; Self-catheterizes urinary bladder; Skin cancer; Sleep apnea; Spondylolisthesis of lumbar region; Thromboembolus ([address] (2013); Urine retention; and Varicose veins of both lower extremities with complications. He also has no past medical history of Adverse effect of anesthesia; Aneurysm ([address] Arrhythmia; Asthma; Autoimmune disease ([address] Chronic obstructive pulmonary disease ([address] Coagulation disorder ([address] Difficult intubation; Endocarditis; Heart failure ([address] Ill-defined condition; Liver disease; Malignant hyperthermia due to anesthesia; Nicotine vapor product user; Non-nicotine vapor product user; Pseudocholinesterase deficiency; PUD (peptic ulcer disease); Rheumatic fever; Seizures ([address] Stroke [facility]); or Thyroid disease. Mr. Marya Montoya  has a past surgical history that includes hx hemorrhoidectomy (1971); hx shoulder arthroscopy (Left, 1971); and hx urological (06/2017). Social History/Living Environment:  
Home Environment: Skilled nursing facility Care Facility Name: [facility] # Steps to Enter: 0 One/Two Story Residence: One story Living Alone: Yes Support Systems:  (facility staff) Patient Expects to be Discharged to[de-identified] Rehabilitation facility Current DME Used/Available at Home:  (none) Prior Level of Function/Work/Activity: Pt was functioning with a rolling walker & limited assist in rehab prior to this set back & re-admit. Personal Factors: Other factors that influence how disability is experienced by the patient:  Current & PMH Number of Personal Factors/Comorbidities that affect the Plan of Care: 3+: HIGH COMPLEXITY EXAMINATION:  
Most Recent Physical Functioning:  
Gross Assessment: 
  
         
  
  
Balance: 
  Bed Mobility: 
Supine to Sit: Minimum assistance;Assist x2 Scooting: Minimum assistance;Assist x2 Transfers: 
Sit to Stand: Moderate assistance;Assist x2 Stand to Sit: Moderate assistance;Assist x2 Bed to Chair: Minimum assistance;Assist x2 Duration: 25 Minutes Gait: NA on evaluation Left Side Weight Bearing: As tolerated Speed/Kenzie: Shuffled; Slow Step Length: Left shortened;Right shortened Gait Abnormalities: Antalgic;Decreased step clearance Distance (ft): 20 Feet (ft) Assistive Device: Walker, rolling Ambulation - Level of Assistance: Moderate assistance;Assist x2 Functional Mobility:  
      Gait/Ambulation:  mod Transfers:  mod Bed Mobility:  SBA Body Structures Involved: 1. Metabolic 2. Joints 3. Muscles Body Functions Affected: 1. Sensory/Pain 2. Movement Related 3. Metobolic/Endocrine Activities and Participation Affected: 1. General Tasks and Demands 2. Mobility Number of elements that affect the Plan of Care: 4+: HIGH COMPLEXITY CLINICAL PRESENTATION:  
Presentation: Evolving clinical presentation with unstable and unpredictable characteristics: HIGH COMPLEXITY CLINICAL DECISION MAKIN [facility] [place] [address] [address] AM-PAC 6 Clicks Basic Mobility Inpatient Short Form How much difficulty does the patient currently have. .. Unable A Lot A Little None 1. Turning over in bed (including adjusting bedclothes, sheets and blankets)? [] 1   [x] 2   [] 3   [] 4  
2.   Sitting down on and standing up from a chair with arms ( e.g., wheelchair, bedside commode, etc.)   [] 1   [x] 2   [] 3   [] 4  
3. Moving from lying on back to sitting on the side of the bed? [] 1   [x] 2   [] 3   [] 4 How much help from another person does the patient currently need. .. Total A Lot A Little None 4. Moving to and from a bed to a chair (including a wheelchair)? [x] 1   [] 2   [] 3   [] 4  
5. Need to walk in hospital room? [x] 1   [] 2   [] 3   [] 4  
6. Climbing 3-5 steps with a railing? [x] 1   [] 2   [] 3   [] 4  
© 2007, Trustees of [address] [place] [address] [address] [address], under license to [employer]. All rights reserved Score:  Initial: 9 Most Recent: X (Date: -- ) Interpretation of Tool:  Represents activities that are increasingly more difficult (i.e. Bed mobility, Transfers, Gait). Score 24 23 22-20 19-15 14-10 9-7 6 Modifier CH CI CJ CK CL CM CN   
 
? Mobility - Walking and Moving Around:  
  - CURRENT STATUS: CM - 80%-99% impaired, limited or restricted  - GOAL STATUS: CL - 60%-79% impaired, limited or restricted  - D/C STATUS:  ---------------To be determined--------------- [place] / [place] B / Product Type: Medicare /   
 
Medical Necessity:    
· Patient is expected to demonstrate progress in strength, range of motion, balance, coordination and functional technique to decrease assistance required with bed mobility, balance & transfers with progression to gait as able. Reason for Services/Other Comments: 
· Patient continues to require skilled intervention due to severe debility. Use of outcome tool(s) and clinical judgement create a POC that gives a: Difficult prediction of patient's progress: HIGH COMPLEXITY  
  
 
 
 
TREATMENT:  
(In addition to Assessment/Re-Assessment sessions the following treatments were rendered) Pre-treatment Symptoms/Complaints: pt reported fatiguing quickly Pain: Initial: visual scale Pain Intensity 1: 0 (0/10 after therapy)  Post Session: Therapeutic Activity: (  25 Minutes : Therapeutic activities including ,  Bed mobility with additional time to work through task, EOB wt-shifting R<>L, F<>B  & trunk rotation while EOB. exercises to both UE's  Work on sit to stand x 3 with Mod A x 2 Braces/Orthotics/Lines/Etc:  
· IV Treatment/Session Assessment:   
· Response to Treatment:   Pt needed much encouragement to complete all activity noted. · Interdisciplinary Collaboration:  
o Registered Nurse · After treatment position/precautions:  
o Up in chair 
o Bed/Chair-wheels locked 
o Call light within reach 
o RN notified · Compliance with Program/Exercises: Will assess as treatment progresses. · Recommendations/Intent for next treatment session: \"Next visit will focus on reduction in assistance provided\". Total Treatment Duration: PT Patient Time In/Time Out Time In: 1300 Time Out: 1325 Sade Guillory, PTA

## 2018-09-04 NOTE — PROGRESS NOTES
09/03/18 2000 Oxygen Therapy O2 Sat (%) 97 % Pulse via Oximetry 63 beats per minute O2 Device CPAP mask (home cpap) FIO2 (%) 21 % PT. RESTING WELL ON HOME CPAP

## 2018-09-04 NOTE — PROGRESS NOTES
Problem: Nutrition Deficit Goal: *Optimize nutritional status Nutrition Reason for assessment: Length of stay Assessment:  
Admitted with MELANI, recent total knee arthroplasty, hx DM. Food/Nutrition Patient History:  Lying in bed, oriented times 4. Limited historian overall, delayed responses, difficulty with word finding at times during visit, c/o being sleepy and frequently closes his eyes throughout visit. AM tray at bedside with <25% consumed. Pt c/o vomiting orange juice pta and really hasn't been interested in eating or even liked the taste of items. Speaks of someone getting him chocolate cake at another facility which he didn't care for but unable to recall items consumed here outside of what is on tray at bedside. Reports current intake reflective of his recent intake. Noted pt not wearing lower partial, denies texture modification needs secondary to dentition. Indicates he requested regular diet to improve intake, questions if using an oral supplement is appropriate with diabetes. Agrees to try glucerna, then asks if he can get more than a serving per meal as \"his theory is if something is good for you, more is better. \" Diet order(s): Regular Anthropometrics:Height: 6' (182.9 cm),  Weight: 90.5 kg (199 lb 9.6 oz), Weight Source: Bed, Body mass index is 27.07 kg/(m^2). BMI class of normal weight for age. WT / BMI WEIGHT  
9/3/2018 199 lb 9.6 oz  
8/15/2018 225 lb  
8/1/2018 222 lb  
7/26/2018 222 lb  
5/8/2018 228 lb 14.4 oz Weight hx per EMR (Based on connect care functionality, RD cannot know if these weight are actual versus stated) Pt denies change in weight but is unable to provide his UBW. Macronutrient needs: EER:  2539-7695 kcal /day (20-25 kcal/kg CBW) 
EPR:   grams protein/day (1-1.2 grams/kg CBW) Intake/Comparative Standards: Average intake for past 3 recorded meal(s): 32%. This potentially meets ~35% of kcal and ~25% of protein needs Nutrition Diagnosis: Inadequate oral intake related to poor appetite, disinterest in eating as evidenced by self report of barriers consuming <40% of liberalized meals provided. Intervention: 
Meals and snacks: Continue current diet. Nutrition Supplement Therapy: Add Glucerna TID. Discharge Nutrition Plan: Too soon to determine. [place], [address], [place] [address]

## 2018-09-04 NOTE — PROGRESS NOTES
Problem: Interdisciplinary Rounds Goal: Interdisciplinary Rounds Interdisciplinary team rounds were held 9/4/2018 with the following team members:Care Management, Nursing, Nutrition, Pharmacy, Physical Therapy and Physician and the patient was busy. Plan of care discussed. See clinical pathway and/or care plan for interventions and desired outcomes.

## 2018-09-05 NOTE — PROGRESS NOTES
Progress Note Patient: Robert Puckett MRN: [id number]  SSN: [id number] YOB: 1945  Age: 71 y.o. Sex: male Admit Date: 8/30/2018 LOS: 6 days Subjective: PMH of HTN, a fib, DM, CKD, s/p left knee Arthroplasty on 8/2/18, was referred from [facility] after he was noted with sepsis criteria. Patient is resting well. No fever. No shortness of breath. No chills. Urine is draining in clear greenish yellow color. Objective:  
 
Vitals:  
 09/05/18 0157 09/05/18 4562 09/05/18 0809 09/05/18 4220 BP: 163/73 126/76 165/80 Pulse: (!) 110 98 100 Resp: 28 24 21 Temp: 97.5 °F (36.4 °C) 97.8 °F (36.6 °C) 98.1 °F (36.7 °C) SpO2: 93% 96% 97% 97% Weight:      
Height:      
  
 
Intake and Output: 
Current Shift: 09/05 0701 - 09/05 1900 In: -  
Out: 520 [Urine:520] Last three shifts: 09/03 1901 - 09/05 0700 In: -  
Out: 1330 [FNPIN:7407] Physical Exam:  
 
General:                    The patient is in no acute distress. Head:                                   Normocephalic/atraumatic. Eyes:                                    palpebral pallor, no scleral icterus. ENT:                                    External auricular and nasal exam within normal limits. Mucous membranes are moist. 
Neck:                                   Supple, non-tender, no JVD. Lungs:                       Clear to auscultation bilaterally without wheezes or crackles. No respiratory distress or accessory muscle use. Heart:                                  Regular rate and rhythm, without murmurs, rubs, or gallops. Abdomen:                  Soft, non-tender, non-distended with normoactive bowel sounds. Genitourinary:           No tenderness over the bladder or bilateral CVAs. Presence of Ramey's catheter with connection to urine bag. Extremities:               Without clubbing, cyanosis, or edema. Left knee with healed scar, some limitation of ROM. Skin:                                    Normal color, texture, and turgor. No rashes, lesions, or jaundice. Pulses:                      Radial and dorsalis pedis pulses present 2+ bilaterally. Capillary refill <2s. Neurologic:                Moving all four extremities well with no focal deficits. Lab/Data Review: 
 
Recent Results (from the past 24 hour(s)) GLUCOSE, POC Collection Time: 09/04/18 12:50 PM  
Result Value Ref Range Glucose (POC) 94 65 - 100 mg/dL GLUCOSE, POC Collection Time: 09/04/18  4:08 PM  
Result Value Ref Range Glucose (POC) 116 (H) 65 - 100 mg/dL GLUCOSE, POC Collection Time: 09/04/18  9:57 PM  
Result Value Ref Range Glucose (POC) 150 (H) 65 - 100 mg/dL GLUCOSE, POC Collection Time: 09/05/18  8:13 AM  
Result Value Ref Range Glucose (POC) 111 (H) 65 - 100 mg/dL  
 
9/2/2018  5:47 AM - Ricardo, Lab In [facility]  
Component Results Component Value Flag Ref Range Units Status WBC 9.1  4.3 - 11.1 K/uL Final  
RBC 3.65 (L) 4.23 - 5.6 M/uL Final  
HGB 10.3 (L) 13.6 - 17.2 g/dL Final  
HCT 33.3 (L) 41.1 - 50.3 % Final  
MCV 91.2  79.6 - 97.8 FL Final  
MCH 28.2  26.1 - 32.9 PG Final  
MCHC 30.9 (L) 31.4 - 35.0 g/dL Final  
RDW 14.4   % Final  
PLATELET 140 (L) 970 - 450 K/uL Final  
MPV 10.9  9.4 - 12.3 FL Final  
ABSOLUTE NRBC 0.00  0.0 - 0.2 K/uL Final  
Comment: **Note: Absolute NRBC parameter is now reported with Hemogram**  
DF AUTOMATED     Final  
NEUTROPHILS 64  43 - 78 % Final  
LYMPHOCYTES 24  13 - 44 % Final  
MONOCYTES 10  4.0 - 12.0 % Final  
EOSINOPHILS 1  0.5 - 7.8 % Final  
BASOPHILS 0  0.0 - 2.0 % Final  
IMMATURE GRANULOCYTES 0  0.0 - 5.0 % Final  
ABS. NEUTROPHILS 5.8  1.7 - 8.2 K/UL Final  
ABS. LYMPHOCYTES 2.2  0.5 - 4.6 K/UL Final  
ABS.  MONOCYTES 0.9  0.1 - 1.3 K/UL Final  
 ABS. EOSINOPHILS 0.1  0.0 - 0.8 K/UL Final  
ABS. BASOPHILS 0.0  0.0 - 0.2 K/UL Final  
ABS. IMM. GRANS. 0.0  0.0 - 0.5 K/UL Final  
 
9/2/2018  6:14 AM - Ricardo, Lab In [facility]  
Component Results Component Value Flag Ref Range Units Status Sodium 137  136 - 145 mmol/L Final  
Potassium 4.3  3.5 - 5.1 mmol/L Final  
Chloride 104  98 - 107 mmol/L Final  
CO2 27  21 - 32 mmol/L Final  
Anion gap 6 (L) 7 - 16 mmol/L Final  
Glucose 80  65 - 100 mg/dL Final  
Comment:  
47 - 60 mg/dl Consistent with, but not fully diagnostic of hypoglycemia. 101 - 125 mg/dl Impaired fasting glucose/consistent with pre-diabetes mellitus  
> 126 mg/dl Fasting glucose consistent with overt diabetes mellitus BUN 15  8 - 23 MG/DL Final  
Creatinine 1.10  0.8 - 1.5 MG/DL Final  
GFR est AA >60  >60 ml/min/1.73m2 Final  
GFR est non-AA >60  >60 ml/min/1.73m2 Final  
Calcium 9.3  8.3 - 10.4 MG/DL Final  
 
 
Current Facility-Administered Medications:  
  tamsulosin (FLOMAX) capsule 0.4 mg, 0.4 mg, Oral, QHS, Ed Roberson MD, 0.4 mg at 09/04/18 2157   mirtazapine (REMERON) tablet 15 mg, 15 mg, Oral, QHS, Jayce Metz MD, 15 mg at 09/04/18 2157 
  HYDROcodone-acetaminophen (NORCO) 7.5-325 mg per tablet 2 Tab, 2 Tab, Oral, Q6H PRN, Javi Aquino MD, 2 Tab at 09/03/18 1949   aspirin delayed-release tablet 81 mg, 81 mg, Oral, BID, Javi Aquino MD, 81 mg at 09/05/18 2197   buPROPion SR [facility] SR) tablet 150 mg, 150 mg, Oral, BID, Jayce Metz MD, 150 mg at 09/05/18 1499   divalproex ER (DEPAKOTE ER) 24 hour tablet 1,000 mg, 1,000 mg, Oral, BID, Jayce Metz MD, 1,000 mg at 09/04/18 2020   latanoprost (XALATAN) 0.005 % ophthalmic solution 1 Drop, 1 Drop, Both Eyes, QHS, Jayce Metz MD, 1 Drop at 09/04/18 2157   simvastatin (ZOCOR) tablet 40 mg, 40 mg, Oral, QHS, Jayce Metz MD, 40 mg at 09/04/18 2157   heparin (porcine) injection 5,000 Units, 5,000 Units, SubCUTAneous, Pilar May MD, 5,000 Units at 09/05/18 7816   acetaminophen (TYLENOL) tablet 650 mg, 650 mg, Oral, Q6H PRN, Stew Fernandez MD, 650 mg at 09/02/18 1611   pantoprazole (PROTONIX) 40 mg in sodium chloride 0.9% 10 mL injection, 40 mg, IntraVENous, DAILY, Stew Fernandez MD, 40 mg at 09/05/18 0930 
  ondansetron [facility]) injection 4 mg, 4 mg, IntraVENous, Q8H PRN, Stew Fernandez MD 
  [facility]) injection 0.4 mg, 0.4 mg, IntraVENous, PRN, Stew Fernandez MD 
  HYDROmorphone (PF) (DILAUDID) injection 0.5 mg, 0.5 mg, IntraVENous, Q6H PRN, Stew Fernandez MD 
  dextrose (D50W) injection syrg 25 g, 25 g, IntraVENous, PRN, Coco Greenberg MD, 25 g at 08/30/18 6455 Assessment:  
 
Principal Problem: 
  MELANI (acute kidney injury) ([facility] [address] (8/30/2018) Active Problems: 
  CKD (chronic kidney disease) stage 3, GFR 30-59 ml/min (5/8/2018) S/P total knee arthroplasty, left (8/3/2018) Diabetes mellitus type 2, controlled ([facility] [address] (8/15/2018) Sepsis ([facility] [address] (8/15/2018) Bipolar disorder ([facility] [address] (8/15/2018) Hypotension (8/30/2018) Nausea & vomiting (8/30/2018) Plan: MELANI Improving with hydration. Monitor renal function and intake and output. Avoid nephrotoxic agents. Hypotension, resolved. On Ramey's catheter. Will try removal soon and see if he can urinate on his own. Diabetes mellitus type 2 Monitor blood sugar. Cover with insulin sliding scale accordingly. So far, no positive culture result. No sign of sepsis now. No antibiotics. S/P total knee arthroplasty, left. Noted. Continue physical therapy. I have discussed the plan of care with patient. DVT prophylaxis : heparin SC Disposition plan : to a rehab facility. Signed By: Raj Sanchez MD   
 September 5, 2018

## 2018-09-05 NOTE — PROGRESS NOTES
BP of 86/58 reported to Dr. Alec Higginbotham. He is coming to assess patient. Manual BP currently being obtained.

## 2018-09-05 NOTE — PROGRESS NOTES
Assessment completed and documented. Patient oriented to person, time and situation. Knew the year but not the month. Had to be reoriented that he was in the hospital. Stated he thought he was in the hotel room. No complaints of pain unless left leg is moved. Incision to left knee is clean, dry and well approximated. Lung sounds clear. Heart sounds irregular. SCD's in place. Trace edema noted to BLE. Bowel sounds hypoactive. Abdomen soft. Patient currently resting in bed. Will continue to monitor with hourly rounding.

## 2018-09-05 NOTE — PROGRESS NOTES
Dr. Freitas Warfield at bedside to assess patient's BP. He stated to chart 110/80 BP according to his assessment. No further orders received.

## 2018-09-05 NOTE — PROGRESS NOTES
Problem: Self Care Deficits Care Plan (Adult) Goal: *Acute Goals and Plan of Care (Insert Text) 1. Patient will complete lower body bathing and dressing with minimal assist and adaptive equipment as needed. Progressing 9/5/2018 2. Patient will complete toileting with CGA. 3. Patient will tolerate 35 minutes of OT treatment with appropriate and self incorporated rest breaks to increase activity tolerance for ADLs. Met 9/5/2018 4. Patient will complete functional transfers with minimal assist and adaptive equipment as needed. Progressing 9/5/2018 5. Patient will complete UE exercises to increase overall shoulder strength to 4+/5 MMT to increase UE support using walker during ADL's. Timeframe: 7 visits JOINT CAMP OCCUPATIONAL THERAPY TKA: Daily Note, Treatment Day: 4th and PM 9/5/2018 INPATIENT: Hospital Day: 7 [place] / Product Type: Medicare /  
  
NAME/AGE/GENDER: Laurie Putnam is a 71 y.o. male PRIMARY DIAGNOSIS:  * No surgery found * 
 * Surgery not found * (Cannot find OR record) ICD-10: Treatment Diagnosis:  
 · Pain in Left Knee (M25.562) · Stiffness of Left Knee, Not elsewhere classified (M25.662) · Generalized Muscle Weakness (M62.81) · Difficulty in walking, Not elsewhere classified (R26.2) ASSESSMENT:  
 
Mr. Janis Crawford is s/p left TKA (8/02/2018). Able to ambulate ~20' using a RW to bathroom for toileting. Requested sitting on standard toilet. Required significant boost to come into stance. At first agreed to shower after sitting on the toilet for ~6 minutes no longer agreeable to shower despite encouragement. Ambulated very slowly back to recliner ~8' using RW. Patient is self limiting at times, with continued therapy effort this should slowly get better as he regains his independence. Continue OT POC. 
 
9/3/18  Pt ambulated to bathroom with Physical Therapy.  Pt transferred to shower chair with minimal assist. Pt completed bathing, dressing and grooming this am. See chart below for assist level. Pt ambulated back to chair at door of bathroom with minimal assist and additional time. Pt fatigues very quickly. Pt setup with breakfast tray and needs in reach. Pt with good progress this am. Continue POC.  
 
9/5/18 patient supine in bed, max assist supine to sit and to scoot to EOB. Sat on EOB with SBA, mod assist x 2 sit to stand, min x 1 to take steps to [facility], catheter and total assist with brief management. Min x 2 stand to sit, mod assist x 2 sit to stand and min x 1 to take steps to recliner. Patient fatigues quickly. Ordered him lunch and is up  In the recliner with all needs in reach. Continue with POC. 
 
9/5/18 15:10 Patient up recliner and wanting to get back to bed. Patient incontinent urine catheter removed. He performed front abbie hygiene seated with supervision and cues. He stood with mod assist x 2 staff washed rear abbie area and donned clean brief. Patient max x2 to side step with RW To EOB, poor standing balance and poor safety awareness with transfer. Posterior lean with max cues. He sat on EOB and scooted with mod/max assist and was max assist sit to supine, mod assist rolling to right side. Nursing present with patient. Will continue with established POC. This section established at most recent assessment PROBLEM LIST (Impairments causing functional limitations): 1. Decreased Strength 2. Decreased ADL/Functional Activities 3. Decreased Transfer Abilities 4. Increased Pain 5. Increased Fatigue 6. Decreased Flexibility/Joint Mobility 7. Decreased Knowledge of Precautions INTERVENTIONS PLANNED: (Benefits and precautions of occupational therapy have been discussed with the patient.) 1. Activities of daily living training 2. Adaptive equipment training 3. Balance training 4. Clothing management 5. Donning&doffing training 6. Theraputic activity TREATMENT PLAN: Frequency/Duration: Follow patient 5x a week to address above goals. Rehabilitation Potential For Stated Goals: Good RECOMMENDED REHABILITATION/EQUIPMENT: (at time of discharge pending progress): Continue Skilled Therapy and Rehab. OCCUPATIONAL PROFILE AND HISTORY:  
History of Present Injury/Illness (Reason for Referral): Pt presents this date s/p (left) TKA and sepsis. Past Medical History/Comorbidities:  
Mr. Shania Zheng  has a past medical history of Arthritis; BMI 30.0-30.9,adult; CAD (coronary artery disease); Chronic kidney disease; Chronic pain; Depression; Diabetes ([address] Enlarged prostate with urinary obstruction; Former smoker; GERD (gastroesophageal reflux disease); High cholesterol; Hypertension; Nausea & vomiting (8/30/2018); Neurogenic bladder; Other ill-defined conditions(799.89); Overflow incontinence; Sciatica; Self-catheterizes urinary bladder; Skin cancer; Sleep apnea; Spondylolisthesis of lumbar region; Thromboembolus ([address] (2013); Urine retention; and Varicose veins of both lower extremities with complications. He also has no past medical history of Adverse effect of anesthesia; Aneurysm ([address] Arrhythmia; Asthma; Autoimmune disease ([address] Chronic obstructive pulmonary disease ([address] Coagulation disorder ([address] Difficult intubation; Endocarditis; Heart failure ([address] Ill-defined condition; Liver disease; Malignant hyperthermia due to anesthesia; Nicotine vapor product user; Non-nicotine vapor product user; Pseudocholinesterase deficiency; PUD (peptic ulcer disease); Rheumatic fever; Seizures ([address] Stroke [facility]); or Thyroid disease. Mr. Shania Zheng  has a past surgical history that includes hx hemorrhoidectomy (1971); hx shoulder arthroscopy (Left, 1971); and hx urological (06/2017). Social History/Living Environment:  
Home Environment[facility] [place] [facility] to [facility]: 0 One/Two Story Residence: One story Living Alone: Yes Support Systems:  (facility staff) Patient Expects to be Discharged to[de-identified] Rehabilitation facility Current DME Used/Available at Home:  (none) Prior Level of Function/Work/Activity: 
Independent 4 weeks ago. Mod/max assist last few weeks. Number of Personal Factors/Comorbidities that affect the Plan of Care: Expanded review of therapy/medical records (1-2):  MODERATE COMPLEXITY ASSESSMENT OF OCCUPATIONAL PERFORMANCE[de-identified]  
Most Recent Physical Functioning:  
Balance Sitting: Intact Sitting - Static: Good (unsupported) Sitting - Dynamic: Fair (occasional) Standing: With support; Without support Standing - Static: Poor Standing - Dynamic : Poor Gross Assessment AROM: Generally decreased, functional (R LE) Strength: Generally decreased, functional (R LE) Coordination: Generally decreased, functional 
 
   
 
LLE AROM 
L Knee Flexion: 69 L Knee Extension: 15 LLE Strength L Hip Flexion: 2 L Knee Flexion: 2 L Knee Extension: 2-   
 
  
 
Mental Status Neurologic State: Alert; Appropriate for age Orientation Level: Appropriate for age Cognition: Follows commands;Poor safety awareness Perception: Cues to maintain midline in standing Perseveration: No perseveration noted Safety/Judgement: Decreased awareness of environment;Decreased awareness of need for safety; Fall prevention Basic ADLs (From Assessment) Complex ADLs (From Assessment) Basic ADL Feeding: Setup Oral Facial Hygiene/Grooming: Supervision Bathing: Moderate assistance Type of Bath: Chlorhexidine (CHG), Full, Shower Upper Body Dressing: Minimum assistance Lower Body Dressing: Total assistance Toileting: Maximum assistance Grooming/Bathing/Dressing Activities of Daily Living Grooming Washing Hands: Supervision/set-up Cognitive Retraining Safety/Judgement: Decreased awareness of environment;Decreased awareness of need for safety; Fall prevention Toileting Toileting Assistance: Moderate assistance;Maximum assistance (catheter out, pt washed front abbie area, assist withbrief) Bladder Hygiene: Supervision/set-up (seated using wipes) Bowel Hygiene: Total assistance (dependent) Clothing Management: Total assistance (dependent) (taped brief management) Adaptive Equipment: [address] Functional Transfers Bathroom Mobility: Minimum assistance; Moderate assistance (mod x 2 sit to stand and min x 1 to transfer) Toilet Transfer : Minimum assistance; Moderate assistance;Assist x2 (mod x 2 sit to stand and min x 1 to transfer) Bed/Mat Mobility Supine to Sit: Moderate assistance;Maximum assistance Sit to Supine: Maximum assistance Sit to Stand: Moderate assistance;Assist x2 (SPT with rw, back to EOB, poor standing balance ) Bed to Chair: Moderate assistance;Maximum assistance;Assist x2 Scooting: Moderate assistance;Maximum assistance Physical Skills Involved: 1. Range of Motion 2. Balance 3. Strength Cognitive Skills Affected (resulting in the inability to perform in a timely and safe manner): 1. Divided Attention Psychosocial Skills Affected: 1. Self-Awareness Number of elements that affect the Plan of Care: 3-5:  MODERATE COMPLEXITY CLINICAL DECISION MAKING:  
[facility] [place] [facility] 6 Clicks Daily Activity Inpatient Short Form How much help from another person does the patient currently need. .. Total A Lot A Little None 1. Putting on and taking off regular lower body clothing? [x] 1   [] 2   [] 3   [] 4  
2. Bathing (including washing, rinsing, drying)? [] 1   [x] 2   [] 3   [] 4  
3. Toileting, which includes using toilet, bedpan or urinal?   [] 1   [x] 2   [] 3   [] 4  
4. Putting on and taking off regular upper body clothing? [] 1   [x] 2   [] 3   [] 4  
5. Taking care of personal grooming such as brushing teeth? [] 1   [] 2   [x] 3   [] 4  
6. Eating meals? [] 1   [] 2   [] 3   [x] 4 © 2007, Trustees of [address] [place] [address] [address] [address], under license to [employer]. All rights reserved Score:  Initial: 14 Most Recent: X (Date: -- ) Interpretation of Tool:  Represents activities that are increasingly more difficult (i.e. Bed mobility, Transfers, Gait). Score 24 23 22-20 19-15 14-10 9-7 6 Modifier CH CI CJ CK CL CM CN   
 
? Self Care:  
  - CURRENT STATUS: CL - 60%-79% impaired, limited or restricted  - GOAL STATUS: CK - 40%-59% impaired, limited or restricted  - D/C STATUS:  ---------------To be determined--------------- [place] / [place] / Product Type: Medicare /   
 
Medical Necessity:    
· Skilled intervention continues to be required due to Deficits noted above. Reason for Services/Other Comments: 
· Patient continues to require skilled intervention due to sepsis and L TKA. Use of outcome tool(s) and clinical judgement create a POC that gives a: MODERATE COMPLEXITY  
  
 
 
 
TREATMENT:  
(In addition to Assessment/Re-Assessment sessions the following treatments were rendered) Pre-treatment Symptoms/Complaints: Tolerated shower; fatigued quickly Pain: Initial:  
Pain Intensity 1: 0  0/10 Self Care: (13): Procedure(s) (per grid) utilized to improve and/or restore self-care/home management as related to toileting. Required maximal verbal and tactile cueing to facilitate activities of daily living skills. Treatment/Session Assessment:   
 Response to Treatment:  Sidelying in bed with nursing present Education: 
[] Home Exercises [x] Fall Precautions [] Hip Precautions [] Going Home Video [x] Knee/Hip Prosthesis Review [x] Walker Management/Safety [x] Adaptive Equipment as Needed Interdisciplinary Collaboration:  
o Occupational Therapist 
o Registered Nurse 
o Certified Nursing Assistant/Patient Care Technician After treatment position/precautions:  
o Nurse at bedside o sidelying in bed with nurse present Compliance with Program/Exercises: compliant all of the time. Recommendations/Intent for next treatment session:  Treatment next visit will focus on increasing Mr. Royce Lozano independence with bed mobility, transfers, self care, functional mobility, modalities for pain, and patient education. Total Treatment Duration:13 OT Patient Time In/Time Out Time In: 5884 Time Out: 1523 Leonel Beaver OT

## 2018-09-05 NOTE — PROGRESS NOTES
09/05/18 1302 Incentive Spirometry Treatment Level of Service Initial  
Actual Volume (ml) 1250 ml Treatment Tolerance Patient tolerated Pulmonary Toilet Pulmonary Toilet Incentive Spirometry

## 2018-09-05 NOTE — PROGRESS NOTES
Shift assessment completed via doc flow sheet. Patient is alert and oriented x 3. Respirations even and unlabored on room air. Lung sounds CTA bilaterally. Heart sounds S1, S2 auscultated and regular. Abdomen semi- soft and non tender. Bowel sounds hypoactive to all 4 quadrants. F/Cath is intact draining clear greenish urine. Patient denies pain and other needs at this time. Bed is locked and in low position. Bed rails x 3. Patient is encouraged to call for assistance. Call light within reach.

## 2018-09-05 NOTE — PROGRESS NOTES
Problem: Interdisciplinary Rounds Goal: Interdisciplinary Rounds Interdisciplinary team rounds were held 9/5/2018 with the following team members:Care Management, Nursing, Nutrition, Pharmacy, Physical Therapy and Physician and the patient. Plan of care discussed. See clinical pathway and/or care plan for interventions and desired outcomes.

## 2018-09-05 NOTE — PROGRESS NOTES
Problem: Mobility Impaired (Adult and Pediatric) Goal: *Acute Goals and Plan of Care (Insert Text) GOALS FOR 1 WEEK RE-ASSESS 9/7/18 : 
(1.)Mr. Cutler will move from supine to sit and sit to supine  with MINIMAL ASSIST. Met 9/2 
(2.)Mr. Cutler will sit<>stand with minimal assist & walker. (3.)Mr. Cutler will perform standing wt-shift with walker & pre-gait exercises with minimal assist. Met 9/2 
4) pt performing fair sitting static balance for 2 min. Met 9/2 
5) Pt performing sitting EOB wt-shifting on outstretched arms multiple directions with CGA. Met 9/2 
6) pt performing AROM to UE & LE's on cue 15-20 reps. ADDENDUM GOALS 9/2/18 : 1) supine<>sit with minimal assist (consistently) & bed modified. 2) pt ambulating 50-75 ft with rolling walker & minimal assist ( consistently). 3) PCT's comfortable transferring pt to [facility]. 
________________________________________________________________________________________________ PHYSICAL THERAPY: Daily Note, Treatment Day: 5th, AM 9/5/2018 INPATIENT: Hospital Day: 7 [place] / Product Type: Medicare /  
  
NAME/AGE/GENDER: Farideh White is a 71 y.o. male PRIMARY DIAGNOSIS: MELANI (acute kidney injury) ([facility] Nausea & vomiting Hypotension Sepsis ([facility] MELANI (acute kidney injury) ([facility] MELANI (acute kidney injury) ([facility] ICD-10: Treatment Diagnosis:  
 · Generalized Muscle Weakness (M62.81) · Other lack of cordination (R27.8) · Difficulty in walking, Not elsewhere classified (R26.2) · Other abnormalities of gait and mobility (R26.89) Precaution/Allergies: 
Oxycodone and Tramadol ASSESSMENT:  
 
Mr. Sonya Cabello demonstrates significant weakness and limited ROM in L knee/LE. Still requiring increased assist for sit <> stand transfers - more than just after his TKA. Gait distance/tolerance very limited. Needed repeated cues/encouragement to ambulate 20'. Very limited tolerance for activity. Will continue therapy to address these deficits. This section established at most recent assessment PROBLEM LIST (Impairments causing functional limitations): 1. Decreased Strength 2. Decreased ADL/Functional Activities 3. Decreased Transfer Abilities 4. Decreased Balance 5. Decreased Activity Tolerance 6. Increased Shortness of Breath 7. Decreased Flexibility/Joint Mobility 8. Decreased Emmons with Home Exercise Program 
 INTERVENTIONS PLANNED: (Benefits and precautions of physical therapy have been discussed with the patient.) 1. Balance Exercise 2. Bed Mobility 3. Home Exercise Program (HEP) 4. Therapeutic Activites 5. Therapeutic Exercise/Strengthening 6. Transfer Training TREATMENT PLAN: Frequency/Duration: daily for duration of hospital stay Rehabilitation Potential For Stated Goals: GOOD  
 
RECOMMENDED REHABILITATION/EQUIPMENT: (at time of discharge pending progress): Due to the probability of continued deficits (see above) this patient will likely need continued skilled physical therapy after discharge. Equipment:  
? to be determined HISTORY:  
History of Present Injury/Illness (Reason for Referral): 
Jennifer Mayes is a 71 y.o. male who has a PMH of HTN, a fib, DM, CKD, s/p left knee Arthroplasty on 8/2/18, was referred from [facility] after he was noted with chills, hypotension of 79/45 mmHg, AMS, hallucinations and vomiting x 4 today. Of note, he had a pseudomonas UTI on 8/15/18, he was given a 7 day therapy on vantin, and eventually given ciprofloxacin back at the facility for his infection. He stated his left knee is tender and has not been able to walk properly. He believes his dehydration may be related to lasix use. Upon ER arrival VS BP 98/62  HR 87  RR 20  O2: 98% on room air. On my assessment his BP was 90/52  HR 85.  Patient was alert with visual hallucinations and confused. He complained about left knee pain since his surgery. Labs included: normal lactic acid; cbc: wbc 12k, hb 11gr; chemistry: cr 3.9 ( baseline 1.2 on 8/17/18 ); UA: negative; EKG: A fib not in RVR; CXR: normal; left knee XR: preserved arthroplasty. Blood cultures taken in the ED. Hospitalist was contacted for admission due to sepsis and amelie on ckd. 
  
Past Medical History/Comorbidities:  
Mr. Steven Dominguez  has a past medical history of Arthritis; BMI 30.0-30.9,adult; CAD (coronary artery disease); Chronic kidney disease; Chronic pain; Depression; Diabetes ([address] Enlarged prostate with urinary obstruction; Former smoker; GERD (gastroesophageal reflux disease); High cholesterol; Hypertension; Nausea & vomiting (8/30/2018); Neurogenic bladder; Other ill-defined conditions(799.89); Overflow incontinence; Sciatica; Self-catheterizes urinary bladder; Skin cancer; Sleep apnea; Spondylolisthesis of lumbar region; Thromboembolus ([address] (2013); Urine retention; and Varicose veins of both lower extremities with complications. He also has no past medical history of Adverse effect of anesthesia; Aneurysm ([address] Arrhythmia; Asthma; Autoimmune disease ([address] Chronic obstructive pulmonary disease ([address] Coagulation disorder ([address] Difficult intubation; Endocarditis; Heart failure ([address] Ill-defined condition; Liver disease; Malignant hyperthermia due to anesthesia; Nicotine vapor product user; Non-nicotine vapor product user; Pseudocholinesterase deficiency; PUD (peptic ulcer disease); Rheumatic fever; Seizures ([address] Stroke [facility]); or Thyroid disease. Mr. Steven Dominguez  has a past surgical history that includes hx hemorrhoidectomy (1971); hx shoulder arthroscopy (Left, 1971); and hx urological (06/2017). Social History/Living Environment:  
Home Environment: Skilled nursing facility Care Facility Name: [facility] # Steps to Enter: 0 One/Two Story Residence: One story Living Alone:  Yes 
 Support Systems:  (facility staff) Patient Expects to be Discharged to[de-identified] Rehabilitation facility Current DME Used/Available at Home:  (none) Prior Level of Function/Work/Activity: Pt was functioning with a rolling walker & limited assist in rehab prior to this set back & re-admit. Personal Factors: Other factors that influence how disability is experienced by the patient:  Current & PMH Number of Personal Factors/Comorbidities that affect the Plan of Care: 3+: HIGH COMPLEXITY EXAMINATION:  
Most Recent Physical Functioning:  
Gross Assessment: 
AROM: Generally decreased, functional (R LE) Strength: Generally decreased, functional (R LE) Coordination: Generally decreased, functional 
         
LLE Strength L Hip Flexion: 2 L Knee Flexion: 2 L Knee Extension: 2- 
  
Balance: 
Sitting - Static: Good (unsupported) Sitting - Dynamic: Fair (occasional) Standing - Static: Fair (with support) Standing - Dynamic : Fair (with support) Bed Mobility: 
  
 
  
Transfers: 
Sit to Stand: Moderate assistance;Assist x2 Stand to Sit: Moderate assistance;Assist x2 Gait: NA on evaluation Left Side Weight Bearing: As tolerated Speed/Kenzie: Shuffled; Slow Step Length: Right shortened Stance: Left decreased Gait Abnormalities: Decreased step clearance; Step to gait Distance (ft): 20 Feet (ft) Assistive Device: Walker, rolling Ambulation - Level of Assistance: Minimal assistance Interventions: Verbal cues Functional Mobility:  
      Gait/Ambulation:  mod Transfers:  mod Bed Mobility:  SBA Body Structures Involved: 1. Metabolic 2. Joints 3. Muscles Body Functions Affected: 1. Sensory/Pain 2. Movement Related 3. Metobolic/Endocrine Activities and Participation Affected: 1. General Tasks and Demands 2. Mobility Number of elements that affect the Plan of Care: 4+: HIGH COMPLEXITY CLINICAL PRESENTATION:  
Presentation: Evolving clinical presentation with unstable and unpredictable characteristics: HIGH COMPLEXITY CLINICAL DECISION MAKING:  
[facility] AM-PAC 6 Clicks Basic Mobility Inpatient Short Form How much difficulty does the patient currently have. .. Unable A Lot A Little None 1. Turning over in bed (including adjusting bedclothes, sheets and blankets)? [] 1   [x] 2   [] 3   [] 4  
2. Sitting down on and standing up from a chair with arms ( e.g., wheelchair, bedside commode, etc.)   [] 1   [x] 2   [] 3   [] 4  
3. Moving from lying on back to sitting on the side of the bed? [] 1   [x] 2   [] 3   [] 4 How much help from another person does the patient currently need. .. Total A Lot A Little None 4. Moving to and from a bed to a chair (including a wheelchair)? [x] 1   [] 2   [] 3   [] 4  
5. Need to walk in hospital room? [x] 1   [] 2   [] 3   [] 4  
6. Climbing 3-5 steps with a railing? [x] 1   [] 2   [] 3   [] 4  
© 2007, Trustees of [facility], under license to [employer]. All rights reserved Score:  Initial: 9 Most Recent: X (Date: -- ) Interpretation of Tool:  Represents activities that are increasingly more difficult (i.e. Bed mobility, Transfers, Gait). Score 24 23 22-20 19-15 14-10 9-7 6 Modifier CH CI CJ CK CL CM CN   
 
? Mobility - Walking and Moving Around:  
  - CURRENT STATUS: CM - 80%-99% impaired, limited or restricted  - GOAL STATUS: CL - 60%-79% impaired, limited or restricted  - D/C STATUS:  ---------------To be determined--------------- [place] / [place] / Product Type: Medicare /   
 
Medical Necessity:    
· Patient is expected to demonstrate progress in strength, range of motion, balance, coordination and functional technique to decrease assistance required with bed mobility, balance & transfers with progression to gait as able.  
Reason for Services/Other Comments: 
· Patient continues to require skilled intervention due to severe debility. Use of outcome tool(s) and clinical judgement create a POC that gives a: Difficult prediction of patient's progress: HIGH COMPLEXITY  
  
 
 
 
TREATMENT:  
(In addition to Assessment/Re-Assessment sessions the following treatments were rendered) Pre-treatment Symptoms/Complaints: Patient reports lunch will be coming shortly. Pain: Initial:  
Pain Intensity 1: 0  Post Session:  0 - no complaints of pain, just tired. Therapeutic Activity: (    30 minutes): Therapeutic activities including Chair transfers and Ambulation on level ground to improve mobility, strength, balance and coordination. Required moderate Verbal cues to promote static and dynamic balance in standing and promote coordination of left, lower extremity(s). Date: 
9/5/18 Date: 
 Date: Activity/Exercise Parameters Parameters Parameters Chair slides on floor 20 Heel slides 10 Quad sets 10 Braces/Orthotics/Lines/Etc:  
· none Treatment/Session Assessment:   
· Response to Treatment:   Patient needing lots of encouragment. Poor knee strength and ROM on L.   
· Interdisciplinary Collaboration:  
o Physical Therapist 
o Occupational Therapist 
o Registered Nurse · After treatment position/precautions:  
o Up in chair 
o Bed/Chair-wheels locked 
o Call light within reach · Compliance with Program/Exercises: compliant some of the time. · Recommendations/Intent for next treatment session: \"Next visit will focus on advancements to more challenging activities and reduction in assistance provided\". Total Treatment Duration: PT Patient Time In/Time Out Time In: 1115 Time Out: 1145 Lorelei Lazo PT

## 2018-09-05 NOTE — PROGRESS NOTES
Problem: Self Care Deficits Care Plan (Adult) Goal: *Acute Goals and Plan of Care (Insert Text) 1. Patient will complete lower body bathing and dressing with minimal assist and adaptive equipment as needed. Progressing 9/5/2018 2. Patient will complete toileting with CGA. 3. Patient will tolerate 35 minutes of OT treatment with appropriate and self incorporated rest breaks to increase activity tolerance for ADLs. Met 9/5/2018 4. Patient will complete functional transfers with minimal assist and adaptive equipment as needed. Progressing 9/5/2018 5. Patient will complete UE exercises to increase overall shoulder strength to 4+/5 MMT to increase UE support using walker during ADL's. Timeframe: 7 visits JOINT CAMP OCCUPATIONAL THERAPY TKA: Daily Note, Treatment Day: 3rd and AM 9/5/2018 INPATIENT: Hospital Day: 7 [place] / Product Type: Medicare /  
  
NAME/AGE/GENDER: Felicita Aparicio is a 71 y.o. male PRIMARY DIAGNOSIS:  * No surgery found * 
 * Surgery not found * (Cannot find OR record) ICD-10: Treatment Diagnosis:  
 · Pain in Left Knee (M25.562) · Stiffness of Left Knee, Not elsewhere classified (M25.662) · Generalized Muscle Weakness (M62.81) · Difficulty in walking, Not elsewhere classified (R26.2) ASSESSMENT:  
 
Mr. Kurt Martins is s/p left TKA (8/02/2018). Able to ambulate ~20' using a RW to bathroom for toileting. Requested sitting on standard toilet. Required significant boost to come into stance. At first agreed to shower after sitting on the toilet for ~6 minutes no longer agreeable to shower despite encouragement. Ambulated very slowly back to recliner ~8' using RW. Patient is self limiting at times, with continued therapy effort this should slowly get better as he regains his independence. Continue OT POC. 
 
9/3/18  Pt ambulated to bathroom with Physical Therapy.  Pt transferred to shower chair with minimal assist. Pt completed bathing, dressing and grooming this am. See chart below for assist level. Pt ambulated back to chair at door of bathroom with minimal assist and additional time. Pt fatigues very quickly. Pt setup with breakfast tray and needs in reach. Pt with good progress this am. Continue POC.  
 
9/5/18 patient supine in bed, max assist supine to sit and to scoot to EOB. Sat on EOB with SBA, mod assist x 2 sit to stand, min x 1 to take steps to [facility], catheter and total assist with brief management. Min x 2 stand to sit, mod assist x 2 sit to stand and min x 1 to take steps to recliner. Patient fatigues quickly. Ordered him lunch and is up  In the recliner with all needs in reach. Continue with POC. This section established at most recent assessment PROBLEM LIST (Impairments causing functional limitations): 1. Decreased Strength 2. Decreased ADL/Functional Activities 3. Decreased Transfer Abilities 4. Increased Pain 5. Increased Fatigue 6. Decreased Flexibility/Joint Mobility 7. Decreased Knowledge of Precautions INTERVENTIONS PLANNED: (Benefits and precautions of occupational therapy have been discussed with the patient.) 1. Activities of daily living training 2. Adaptive equipment training 3. Balance training 4. Clothing management 5. Donning&doffing training 6. Theraputic activity TREATMENT PLAN: Frequency/Duration: Follow patient 5x a week to address above goals. Rehabilitation Potential For Stated Goals: Good RECOMMENDED REHABILITATION/EQUIPMENT: (at time of discharge pending progress): Continue Skilled Therapy and Rehab. OCCUPATIONAL PROFILE AND HISTORY:  
History of Present Injury/Illness (Reason for Referral): Pt presents this date s/p (left) TKA and sepsis.  
Past Medical History/Comorbidities:  
Mr. Desean Nam  has a past medical history of Arthritis; BMI 30.0-30.9,adult; CAD (coronary artery disease); Chronic kidney disease; Chronic pain; Depression; Diabetes ([address] Enlarged prostate with urinary obstruction; Former smoker; GERD (gastroesophageal reflux disease); High cholesterol; Hypertension; Nausea & vomiting (2018); Neurogenic bladder; Other ill-defined conditions(799.89); Overflow incontinence; Sciatica; Self-catheterizes urinary bladder; Skin cancer; Sleep apnea; Spondylolisthesis of lumbar region; Thromboembolus ([address] (); Urine retention; and Varicose veins of both lower extremities with complications. He also has no past medical history of Adverse effect of anesthesia; Aneurysm ([address] Arrhythmia; Asthma; Autoimmune disease ([address] Chronic obstructive pulmonary disease ([address] Coagulation disorder ([address] Difficult intubation; Endocarditis; Heart failure ([address] Ill-defined condition; Liver disease; Malignant hyperthermia due to anesthesia; Nicotine vapor product user; Non-nicotine vapor product user; Pseudocholinesterase deficiency; PUD (peptic ulcer disease); Rheumatic fever; Seizures ([address] Stroke [facility]); or Thyroid disease. Mr. Deidre James  has a past surgical history that includes hx hemorrhoidectomy (); hx shoulder arthroscopy (Left, ); and hx urological (2017). Social History/Living Environment:  
Home Environment: Skilled nursing facility Care Facility Name: [facility] # Steps to Enter: 0 One/Two Story Residence: One story Living Alone: Yes Support Systems:  (facility staff) Patient Expects to be Discharged to[de-identified] Rehabilitation facility Current DME Used/Available at Home:  (none) Prior Level of Function/Work/Activity: 
Independent 4 weeks ago. Mod/max assist last few weeks. Number of Personal Factors/Comorbidities that affect the Plan of Care: Expanded review of therapy/medical records (1-2):  MODERATE COMPLEXITY ASSESSMENT OF OCCUPATIONAL PERFORMANCE[de-identified]  
Most Recent Physical Functioning:  
Balance Sitting: Intact; Without support Standing: Pull to stand; With support Standing - Static: Fair Standing - Dynamic : Poor Mental Status Neurologic State: Alert; Appropriate for age Orientation Level: Appropriate for age Cognition: Follows commands Perception: Cues to maintain midline in sitting;Cues to maintain midline in standing Perseveration: No perseveration noted Safety/Judgement: Fall prevention Basic ADLs (From Assessment) Complex ADLs (From Assessment) Basic ADL Feeding: Setup Oral Facial Hygiene/Grooming: Supervision Bathing: Moderate assistance Type of Bath: Chlorhexidine (CHG), Full, Shower Upper Body Dressing: Minimum assistance Lower Body Dressing: Total assistance Toileting: Maximum assistance Grooming/Bathing/Dressing Activities of Daily Living Grooming Washing Hands: Supervision/set-up Cognitive Retraining Safety/Judgement: Fall prevention Toileting Toileting Assistance: Maximum assistance; Total assistance(dependent) Bladder Hygiene:  (catheter) Bowel Hygiene: Total assistance (dependent) Clothing Management: Total assistance (dependent) (taped brief in standing) Adaptive Equipment: Irina Cerrato; Other (comment) ([facility]) Functional Transfers Bathroom Mobility: Minimum assistance; Moderate assistance (mod x 2 sit to stand and min x 1 to transfer) Toilet Transfer : Minimum assistance; Moderate assistance;Assist x2 (mod x 2 sit to stand and min x 1 to transfer) Bed/Mat Mobility Supine to Sit: Moderate assistance;Maximum assistance Sit to Stand: Moderate assistance;Assist x2 Bed to Chair: Moderate assistance;Assist x2 (mod x 2 sit to stand and min x 1 to transfer) Scooting: Maximum assistance Physical Skills Involved: 1. Range of Motion 2. Balance 3. Strength Cognitive Skills Affected (resulting in the inability to perform in a timely and safe manner): 1. Divided Attention Psychosocial Skills Affected: 1. Self-Awareness Number of elements that affect the Plan of Care: 3-5:  MODERATE COMPLEXITY CLINICAL DECISION MAKING:  
[facility] AM-PAC 6 Clicks Daily Activity Inpatient Short Form How much help from another person does the patient currently need. .. Total A Lot A Little None 1. Putting on and taking off regular lower body clothing? [x] 1   [] 2   [] 3   [] 4  
2. Bathing (including washing, rinsing, drying)? [] 1   [x] 2   [] 3   [] 4  
3. Toileting, which includes using toilet, bedpan or urinal?   [] 1   [x] 2   [] 3   [] 4  
4. Putting on and taking off regular upper body clothing? [] 1   [x] 2   [] 3   [] 4  
5. Taking care of personal grooming such as brushing teeth? [] 1   [] 2   [x] 3   [] 4  
6. Eating meals? [] 1   [] 2   [] 3   [x] 4  
© 2007, Trustees of [facility], under license to [employer]. All rights reserved Score:  Initial: 14 Most Recent: X (Date: -- ) Interpretation of Tool:  Represents activities that are increasingly more difficult (i.e. Bed mobility, Transfers, Gait). Score 24 23 22-20 19-15 14-10 9-7 6 Modifier CH CI CJ CK CL CM CN   
 
? Self Care:  
  - CURRENT STATUS: CL - 60%-79% impaired, limited or restricted  - GOAL STATUS: CK - 40%-59% impaired, limited or restricted  - D/C STATUS:  ---------------To be determined--------------- [place] / Product Type: Medicare /   
 
Medical Necessity:    
· Skilled intervention continues to be required due to Deficits noted above. Reason for Services/Other Comments: 
· Patient continues to require skilled intervention due to sepsis and L TKA. Use of outcome tool(s) and clinical judgement create a POC that gives a: MODERATE COMPLEXITY  
  
 
 
 
TREATMENT:  
(In addition to Assessment/Re-Assessment sessions the following treatments were rendered) Pre-treatment Symptoms/Complaints: Tolerated shower; fatigued quickly Pain: Initial: Pain Intensity 1: 0  0/10 Self Care: (30): Procedure(s) (per grid) utilized to improve and/or restore self-care/home management as related to toileting and grooming. Required maximal verbal and tactile cueing to facilitate activities of daily living skills. Treatment/Session Assessment:   
 Response to Treatment:  sitting up in recliner, getting ready for lunch Education: 
[] Home Exercises [x] Fall Precautions [] Hip Precautions [] Going Home Video [x] Knee/Hip Prosthesis Review [x] Walker Management/Safety [x] Adaptive Equipment as Needed Interdisciplinary Collaboration:  
o Physical Therapist 
o Occupational Therapist 
o Registered Nurse 
o Rehabilitation Attendant After treatment position/precautions:  
o Up in chair 
o Bed/Chair-wheels locked 
o Bed in low position 
o Call light within reach 
o RN notified Compliance with Program/Exercises: compliant all of the time. Recommendations/Intent for next treatment session:  Treatment next visit will focus on increasing Mr. Andrew Due independence with bed mobility, transfers, self care, functional mobility, modalities for pain, and patient education. Total Treatment Duration:30 
OT Patient Time In/Time Out Time In: 5268 Time Out: 1105 Aniket Gonzalez OT

## 2018-09-05 NOTE — PROGRESS NOTES
E REHAB PROGRESS NOTE Tye Singer 
Admit Date: 8/30/2018 Admit Diagnosis: MELANI (acute kidney injury) ([address] Nausea & vomiting;Hypotensi* Chief Complaint : Gait dysfunction secondary to below. Admit Diagnosis: Unilateral primary osteoarthritis, left knee [M17.12] MELANI (acute kidney injury) ([address] (8/30/2018) CKD (chronic kidney disease) stage 3, GFR 30-59 ml/min (5/8/2018) S/P total knee arthroplasty, left (8/3/2018) Diabetes mellitus type 2, controlled ([address] (8/15/2018) Sepsis ([address] (8/15/2018) Bipolar disorder ([address] (8/15/2018) Hypotension (8/30/2018) Nausea & vomiting (8/30/2018) Pain DVT risk Post op acute blood loss anemia Hypertension Diabetes ([facility] Spondylolisthesis of lumbar region Neurogenic bladder  -has catheterized once in the past month; urinating more frequently own his own at baseline Acute Rehab Dx: 
Gait impairment Debility   
Mobility and ambulation deficits Self Care/ADL deficits Subjective Patient progressing slowly. Major barriers are pain, weakness, poor ROM R knee. Mark Moreno is participating fairly. States he is motivated. Ambulating 20' with min assist using a RW. Objective:  
 
Current Facility-Administered Medications Medication Dose Route Frequency  insulin regular (NOVOLIN R, HUMULIN R) injection   SubCUTAneous AC&HS  tamsulosin (FLOMAX) capsule 0.4 mg  0.4 mg Oral QHS  mirtazapine (REMERON) tablet 15 mg  15 mg Oral QHS  
 HYDROcodone-acetaminophen (NORCO) 7.5-325 mg per tablet 2 Tab  2 Tab Oral Q6H PRN  
 aspirin delayed-release tablet 81 mg  81 mg Oral BID  buPROPion SR [facility] SR) tablet 150 mg  150 mg Oral BID  divalproex ER (DEPAKOTE ER) 24 hour tablet 1,000 mg  1,000 mg Oral BID  latanoprost (XALATAN) 0.005 % ophthalmic solution 1 Drop  1 Drop Both Eyes QHS  simvastatin (ZOCOR) tablet 40 mg  40 mg Oral QHS  heparin (porcine) injection 5,000 Units  5,000 Units SubCUTAneous Q12H  acetaminophen (TYLENOL) tablet 650 mg  650 mg Oral Q6H PRN  pantoprazole (PROTONIX) 40 mg in sodium chloride 0.9% 10 mL injection  40 mg IntraVENous DAILY  ondansetron (ZOFRAN) injection 4 mg  4 mg IntraVENous Q8H PRN  
 naloxone (NARCAN) injection 0.4 mg  0.4 mg IntraVENous PRN  
 HYDROmorphone (PF) (DILAUDID) injection 0.5 mg  0.5 mg IntraVENous Q6H PRN  
 dextrose (D50W) injection syrg 25 g  25 g IntraVENous PRN Visit Vitals  /72 (BP 1 Location: Left arm, BP Patient Position: At rest)  Pulse (!) 101  Temp 98.4 °F (36.9 °C)  Resp 18  Ht 6' (1.829 m)  Wt 199 lb 14.4 oz (90.7 kg)  SpO2 95%  BMI 27.11 kg/m2 Review of Systems: +antalgic gait. Denies chest pain, shortness of breath, cough, headache, visual problems, abdominal pain, dysurea, calf pain. Pertinent positives are as noted in the medical records and unremarkable otherwise. Physical Exam:  
General: Alert and oriented x 2. Mild confusion. No acute cardio respiratory distress. HEENT: Normocephalic, no scleral icterus, no conjunctival pallor. Oral mucosa moist without cyanosis Lungs: Clear to auscultation  bilaterally. Respiration even and unlabored Heart: Regular rate and rhythm, S1, S2 No  murmurs, no JVD. Abdomen: Soft, non-tender, non-distended. Genitourinary: defered Neuromuscular:  
 
 Grossly no focal motor deficits noted. Moves ankles. Left knee extension 3-/5 
 left knee flexion 3-/5 Ankle dorsiflexion 5/5 Ankle plantarflexion 5/5 No sensory deficits distally. Exam limited by pain. Skin/extremity: No calf tenderness BLE Wound covered. Dressing clean, dry. No rashes, no marginal erythema. Functional Assessment: 
Gross Assessment AROM: Generally decreased, functional (R LE) (09/05/18 1200) Strength: Generally decreased, functional (R LE) (09/05/18 1200) Coordination: Generally decreased, functional (09/05/18 1200) Bed Mobility Supine to Sit: Moderate assistance;Maximum assistance (09/05/18 1035) Sit to Supine: Maximum assistance (09/05/18 1510) Scooting: Moderate assistance;Maximum assistance (09/05/18 1510) Balance Sitting: Intact (09/05/18 1510) Sitting - Static: Good (unsupported) (09/05/18 1200) Sitting - Dynamic: Fair (occasional) (09/05/18 1200) Standing: With support; Without support (09/05/18 1510) Standing - Static: Poor (09/05/18 1510) Standing - Dynamic : Poor (09/05/18 1510) Grooming Grooming Assistance: Supervision/set up (09/03/18 0539) Washing Face: Supervision/set-up (09/03/18 3622) Washing Hands: Supervision/set-up (09/05/18 1035) Brushing Teeth: Supervision/set-up (09/03/18 1285) Shaving: Supervision/set-up (09/03/18 8128) Brushing/Combing Hair: Supervision/set-up (09/03/18 0224) Feeding Feeding Assistance: Supervision/set-up (09/03/18 0941) Toileting Toileting Assistance: Moderate assistance;Maximum assistance (catheter out, pt washed front abbie area, assist withbrief) (09/05/18 1510) Bladder Hygiene: Supervision/set-up (seated using wipes) (09/05/18 1510) Bowel Hygiene: Total assistance (dependent) (09/05/18 1035) Clothing Management: Total assistance (dependent) (taped brief management) (09/05/18 1510) Adaptive Equipment: Walker (09/05/18 1510) Bed/Mat Mobility Supine to Sit: Moderate assistance;Maximum assistance (09/05/18 1035) Sit to Supine: Maximum assistance (09/05/18 1510) Sit to Stand: Moderate assistance;Assist x2 (SPT with rw, back to EOB, poor standing balance ) (09/05/18 1510) Bed to Chair: Moderate assistance;Maximum assistance;Assist x2 (09/05/18 1510) Scooting: Moderate assistance;Maximum assistance (09/05/18 1510) Durenda Anthnoy Fall Risk Assessment: 
Renard Encarnacion Risk Mobility: Unable to ambulate or transfer (09/05/18 9862) Mobility Interventions: PT Consult for mobility concerns (09/05/18 0735) Mentation: Periodic confusion (09/05/18 0735) Mentation Interventions: Reorient patient (09/05/18 0735) Medication: Patient receiving anticonvulsants, sedatives(tranquilizers), psychotropics or hypnotics, hypoglycemics, narcotics, sleep aids, antihypertensives, laxatives, or diuretics (09/05/18 0735) Medication Interventions: Patient to call before getting OOB (09/05/18 0735) Elimination: Incontinence (09/05/18 0735) Elimination Interventions: Toileting schedule/hourly rounds; Patient to call for help with toileting needs (09/05/18 0735) Prior Fall History: Unknown (09/05/18 0735) Total Score: 3 (09/05/18 0735) Standard Fall Precautions: Yes (09/05/18 0735) High Fall Risk: Yes (09/05/18 0735) Speech Assessment: 
    
 
Ambulation: 
Gait Speed/Kenzie: Shuffled; Slow (09/05/18 1200) Step Length: Right shortened (09/05/18 1200) Stance: Left decreased (09/05/18 1200) Gait Abnormalities: Decreased step clearance; Step to gait (09/05/18 1200) Ambulation - Level of Assistance: Minimal assistance (09/05/18 1200) Distance (ft): 20 Feet (ft) (09/05/18 1200) Assistive Device: Walker, rolling (09/05/18 1200) Interventions: Verbal cues (09/05/18 1200) Labs/Studies: 
Recent Results (from the past 72 hour(s)) GLUCOSE, POC Collection Time: 09/03/18  1:47 AM  
Result Value Ref Range Glucose (POC) 92 65 - 100 mg/dL GLUCOSE, POC Collection Time: 09/03/18  9:22 AM  
Result Value Ref Range Glucose (POC) 183 (H) 65 - 100 mg/dL GLUCOSE, POC Collection Time: 09/03/18 12:01 PM  
Result Value Ref Range Glucose (POC) 173 (H) 65 - 100 mg/dL GLUCOSE, POC Collection Time: 09/04/18 12:12 AM  
Result Value Ref Range Glucose (POC) 95 65 - 100 mg/dL GLUCOSE, POC Collection Time: 09/04/18  7:51 AM  
Result Value Ref Range Glucose (POC) 84 65 - 100 mg/dL GLUCOSE, POC  Collection Time: 09/04/18 12:50 PM  
 Result Value Ref Range Glucose (POC) 94 65 - 100 mg/dL GLUCOSE, POC Collection Time: 09/04/18  4:08 PM  
Result Value Ref Range Glucose (POC) 116 (H) 65 - 100 mg/dL GLUCOSE, POC Collection Time: 09/04/18  9:57 PM  
Result Value Ref Range Glucose (POC) 150 (H) 65 - 100 mg/dL GLUCOSE, POC Collection Time: 09/05/18  8:13 AM  
Result Value Ref Range Glucose (POC) 111 (H) 65 - 100 mg/dL GLUCOSE, POC Collection Time: 09/05/18 11:59 AM  
Result Value Ref Range Glucose (POC) 293 (H) 65 - 100 mg/dL GLUCOSE, POC Collection Time: 09/05/18  4:38 PM  
Result Value Ref Range Glucose (POC) 344 (H) 65 - 100 mg/dL Assessment:  
 
Problem List as of 9/5/2018  Date Reviewed: 8/30/2018 Codes Class Noted - Resolved Hypotension ICD-10-CM: I95.9 ICD-9-CM: 458.9  8/30/2018 - Present Nausea & vomiting ICD-10-CM: R11.2 ICD-9-CM: 787.01  8/30/2018 - Present * (Principal)MELANI (acute kidney injury) ([facility] ICD-10-CM: N17.9 ICD-9-CM: 584.9  8/30/2018 - Present Fever ICD-10-CM: R50.9 ICD-9-CM: 780.60  8/15/2018 - Present Volume depletion ICD-10-CM: E86.9 ICD-9-CM: 276.50  8/15/2018 - Present Diabetes mellitus type 2, controlled ([facility] ICD-10-CM: E11.9 ICD-9-CM: 250.00  8/15/2018 - Present Hyperlipidemia ICD-10-CM: E78.5 ICD-9-CM: 272.4  8/15/2018 - Present Essential hypertension ICD-10-CM: I10 
ICD-9-CM: 401.9  8/15/2018 - Present Sepsis ([facility] ICD-10-CM: A41.9 ICD-9-CM: 038.9, 995.91  8/15/2018 - Present Acute cystitis without hematuria ICD-10-CM: N30.00 ICD-9-CM: 595.0  8/15/2018 - Present Bipolar disorder ([facility] ICD-10-CM: F31.9 ICD-9-CM: 296.80  8/15/2018 - Present S/P total knee arthroplasty, left ICD-10-CM: B99.902 ICD-9-CM: V43.65  8/3/2018 - Present Osteoarthritis ICD-10-CM: M19.90 ICD-9-CM: 715.90  8/2/2018 - Present Abnormal urinalysis ICD-10-CM: R82.90 ICD-9-CM: 791.9  7/26/2018 - Present CKD (chronic kidney disease) stage 3, GFR 30-59 ml/min ICD-10-CM: N18.3 ICD-9-CM: 585.3  5/8/2018 - Present Elevated white blood cell count ICD-10-CM: G76.194 ICD-9-CM: 288.60  5/8/2018 - Present Plan: S/p L TKA  (8/3/2018). Stable exam.  
- overall poor functional progress due to medical setbacks, immobility. Continue orthopedics nursing, acute PT, OT.  
DVT Prophylaxis - [id number] bid as well as heparin sq. Recommend discontinuing one of those. Will d/c aspirin at [facility]. Post hemorrhagic anemia-monitor. WBAT LLE Pain Control: fair control with prn opiates, tylenol. Will plan to add celebrex Urinary retention - [place] for now per urology. flomax started. Will start voiding trials while at [facility]. Transfer with [facility] please. Insurance approved. Plan transfer to [facility] tomorrow. Signed By: Chayito Morrison MD   
 September 5, 2018

## 2018-09-05 NOTE — PROGRESS NOTES
Saw pt in interdisciplinarily rounds, plan of care and discharge date/ location discussed. Per the hospitalitis plan to d/c pt to 9th floor on thurs 9/6. Bernard Herrera has reached out to inform me that workers comp has accepted pt and he can come tomorrow, Dr. No wilson ns on board.

## 2018-09-06 PROBLEM — R53.81 DEBILITY: Status: ACTIVE | Noted: 2018-01-01

## 2018-09-06 PROBLEM — Z96.652 TOTAL KNEE REPLACEMENT STATUS, LEFT: Status: ACTIVE | Noted: 2018-01-01

## 2018-09-06 NOTE — PROGRESS NOTES
Patient has not voided since cath removal yesterday. Bladder scan revealed 786 ml. Dr Melany Dawson notified and orders received for urinalysis and straight cath.

## 2018-09-06 NOTE — PROGRESS NOTES
TRANSFER - IN REPORT: 
 
Verbal report received from [facility] on [place]  being received from [facility] for routine progression of care Report consisted of patients Situation, Background, Assessment and  
Recommendations(SBAR). Information from the following report(s) SBAR was reviewed with the receiving nurse. Opportunity for questions and clarification was provided. Assessment completed upon patients arrival to unit and care assumed.

## 2018-09-06 NOTE — PROGRESS NOTES
PHYSICAL THERAPY EXAMINATION 
TIME IN 1429 TIME OUT 1531 Patient Name: Peyton Cortes 
Patient Age: 71 y.o. Past Medical History:  
Past Medical History:  
Diagnosis Date  Arthritis  BMI 30.0-30.9,adult  CAD (coronary artery disease)   
 mild to moderate apical reversible ischemia per cardio note dated 07/12/10  Chronic kidney disease   
 no nephrologist   
 Chronic pain   
 neck, lower back  Depression  Diabetes ([facility] type 2; oral and insulin meds; 1-2 times per day; average 104; A1C=8.1 on 06/13/18  Enlarged prostate with urinary obstruction  Former smoker  GERD (gastroesophageal reflux disease)  High cholesterol  Hypertension   
 on med for control  Nausea & vomiting 8/30/2018  Neurogenic bladder  Other ill-defined conditions(799.89)  Overflow incontinence  Sciatica   
 recently took medrol dose pack around 05/04/18  Self-catheterizes urinary bladder   
 has catheterized once in the past month; urinating more frequently own his own now  (07/26/18)  Skin cancer  Sleep apnea   
 bi-pap; instructed to bring [facility]  Spondylolisthesis of lumbar region  Thromboembolus [facility]) 2013 DVT left leg; caused from a falling accident that damaged left leg  Urine retention  Varicose veins of both lower extremities with complications Medical Diagnosis:  Rehab Total knee replacement status, left Sepsis ([facility] Debility <principal problem not specified> Precautions at Admission: Other (comment) (fall precautions  WBAT LLE) Therapy Diagnosis:  
Difficulty with bed mobility  [x] Difficulty with functional transfers  [x] Difficulty with ambulation  [x] Difficulty with stair negotiations  [x] Problem List:   
Decreased strength B LE  [x] Decreased strength trunk/core  [x] Decreased AROM   [x] Decreased PROM  [x] Decreased endurance  [x] Decreased balance sitting  [x] Decreased balance standing  [x] Pain   [x] Unable to ambulate  [x] Decreased coordination  [x] Decreased safety awareness  [x] Functional Limitations:  
Decreased independence with bed mobility  [x] Decreased independence with functional transfers  [x] Decreased independence with ambulation  [x] Decreased independence with stair negotiation  [x] Previous Functional Level: Patient reporting he was ambulating independently inside and outside his home with a quad cane. Reports limited ambulation due to left knee pain. Reports independent with ADLs Home Environment: Home Environment: Skilled nursing facility # Steps to Enter: 1 Rails to Enter: No 
Wheelchair Ramp: No 
One/Two Story Residence: One story Living Alone: Yes Support Systems: Child(giovanni) Patient Expects to be Discharged to[de-identified] Skilled nursing facility Current DME Used/Available at Home: None Outcome Measures: Vital Signs: 
Patient Vitals for the past 12 hrs: 
 Temp Pulse Resp BP SpO2  
09/06/18 1624 98.6 °F (37 °C) 87 16 137/80 92 % 09/06/18 1208 97.8 °F (36.6 °C) 99 20 (!) 145/98 98 % Pain level: 4 out of 10 at rest, 8 out of 10 with initiation of ROM and weight bearing Pain location:left knee Pain interventions:Pain medication per RN, rest, positioning,repetitive ROM decreased pain Patient education:PT POC and goals, Bed mobility training,transfer training, attempted gait training, fall precautions, activity pacing, skin precautions for pressure relief of heels, w/c mobility and parts management Interdisciplinary Communication:spoke with RN regarding functional status and patient request for pain medication. RN issued patient pain medication and RN assisting with functional mobility. MD in to assess patient. Discussed current functional level and extent of rigidity. Discussed need for prevalon boots for pressure relief of heels MMT Initial Asssessment Right Lower Extremity Left Lower Extremity Hip Flexion 2+ 2-  
Knee Extension 4 2-  
Knee Flexion 4- 2- Ankle Dorsiflexion 3- 1  
0/5 No palpable muscle contraction 1/5 Palpable muscle contraction, no joint movement 2-/5 Less than full range of motion in gravity eliminated position 2/5 Able to complete full range of motion in gravity eliminated position 2+/5 Able to initiate movement against gravity 3-/5 More than half but not full range of motion against gravity 3/5 Able to complete full range of motion against gravity 3+/5 Completes full range of motion against gravity with minimal resistance 4-/5 Completes full range of motion against gravity with minimal-moderate resistance 4/5 Completes full range of motion against gravity with moderate resistance 4+/5 Completes full range of motion against gravity with moderate-maximum resistance 5/5 Completes full range of motion against gravity with maximum resistance AROM:Bilatral LE non functional at this time secondary to extent of rigidity in LEs, slow to initiate movement, pain in left knee,Left knee PROM -15 to 60 FIM SCORES Initial Assessment Bed/Chair/Wheelchair Transfers 1 Wheelchair Mobility 0 Walking [place] 0 Steps/Stairs 0 PRIMARY MODE OF LOCOMOTION: ambulation Please see IRC Interdisciplinary Eval: Coordination/Balance Section for details regarding FIM score description. BED/CHAIR/WHEELCHAIR TRANSFERS Initial Assessment Rolling Right 1 (Total assistance) Rolling Left 1 (Total assistance) Supine to Sit 1 (Total assistance) Sit to Stand Total assistance (mod assist x 2) Sit to Supine 1 (Total assistance) (max assist x 2) Transfer Assist Score 1 Transfer Type SPT with walker Comments Increased time and effort to complete with altered body mechanics. Significant limitations in UE and LE AROM with rigidity noted in all 4 extremities, LEs>UEs.  Motor planning deficits noted with significant delay in initiating functional movement. Tremor noted in UEs and LEs during functional mobility Car Transfer Not tested Car Type WHEELCHAIR MOBILITY/MANAGEMENT Initial Assessment Able to Propel 0 feet Functional Level 0 Curbs/ramps assistance required 0 (Not tested) Wheelchair set up assistance required 0 (Not tested) Wheelchair management  NA  
 
 
WALKING INDEPENDENCE Initial Assessment Assistive device Walker, rolling, Gait belt Ambulation assistance - level surface 1 (Dependent/total assistance) Distance 0 Feet (ft) Functional Level 0 Comments Attempted to initiate gait training with RW. Patient able to stand and take 1 step forward. patient began to tremor and demonstrate flexed posture. requesting to sit back down on bed. patient took 1 step backwards and returned to sitting. Unable to tolerate gait training this PM. Patient reluctant to weight bear on LLE and demonstrated 1 to 2 slow shuffling steps Ambulation assistance - unlevel surface 0 (Not tested) STEPS/STAIRS Initial Assessment Steps/Stairs ambulated 0 Rail Use  NA Functional Level 0 Comments Unable to ambulate up/down steps at this time secondary to UE LE and trunk rigidity, UE,LE and trunk weakness, impaired balance and impaired motor planning Curbs/Ramps 0 (Not tested) SUPINE EXERCISES Sets Reps Comments, max assist to complete LE HEP Ankle Pumps 1 10 Quad Sets 1 10 Difficulty initiating quad set Heel Slides 2 10 Hip Abduction 2 10 Short Arc Quad 2 10 Straight Leg Raise 2 10 QUALITY INDICATOR ASSIST COMMENTS Walk 10 feet Unable to ambulate up/down steps at this time  secondary to UE LE and trunk rigidity, UE,LE and trunk weakness, impaired balance and impaired motor planning Walk 50 feet with 2 turns NA Walk 150 feet NA Walk 10 feet on uneven  NA   
1 step/curb NA   
4 steps NA   
12 steps NA   
  object  Unable secondary to UE LE and trunk rigidity, UE,LE and trunk weakness, impaired balance and impaired motor planning Wheel 50' w/2 turns Unable to propel w/c secondary to UE weakness, UE rigidity with limited ROM, motor planning deficits Wheel 150' NA   
 
 
  
 
PHYSICAL THERAPY PLAN OF CARE Therapy Diagnosis:   Please see table above Order received from MD for physical therapy services and chart reviewed. Pt to be seen at least 5 times per week for at least 1.5 hours of physical therapy per day for 3 weeks. Thank you for the referral. 
 
LTGs: STG 1. Patient transfer supine<>sit with mod assist in 1 week LTG 1. Patient transfer supine<>sit with modified independence in 3 weeks STG 2. Patient transfer sit<>stand and perform stand pivot transfer with RW and mod assist in 1 week LTG 2. Patient transfer sit<>stand and perform stand pivot transfer with RW and modified independence in 3 weeks STG 3. Patient ambulate 25 feet with RW and mod assist in 1 week LTG 3. Patient ambulate 100 feet with RW and modified independence in 3 weeks STG 4. Patient ambulate up/down 4 steps with hand rails and mod assist in 2 weeks LTG 4. Patient ambulate up/down 4 steps with hand rails and SBA  in 3 weeks STG 5. Patient ambulate up/down 1 step with RW and mod assist in 2 weeks LTG 5. Patient ambulate up/down 1 step with RW and CGA in 3 weeks Pt would benefit from skilled physical therapy in order to improve independent functional mobility within the home. Interventions may include range of motion (AROM, PROM B LE/trunk), motor function (B LE/trunk strengthening/coordination), activity tolerance (vitals, oxygen saturation levels), bed mobility training, balance activities, gait training (progressive ambulation program), and functional transfer training. Please see IRC;  Interdisciplinary Eval, Care Plan, and Patient Education for further information regarding physical therapy examination and plan of care. Patient remained in room supine in bed with bed rails up x 2 and needs placed in reach of patient. LEs elevated on pillows with heels floating Kyara Moreira, PT 
9/6/2018

## 2018-09-06 NOTE — PROGRESS NOTES
Assessment completed and documented. Lung sounds diminished. IS utilized. Heart sounds irregularly regular. SCD's on. Abdomen soft. Bowel sounds active. No BM since 9/2. Oriented to person, time and situation. Generalized weakness. Pain to left leg with movement. Patient currently resting in bed. Will continue to monitor with hourly rounding.

## 2018-09-06 NOTE — DISCHARGE SUMMARY
Discharge Summary Patient: Gene Joshi MRN: [id number]  SSN: [id number] YOB: 1945  Age: 71 y.o. Sex: male Admit Date: 8/30/2018 Discharge Date: 9/6/2018 Admission Diagnoses: MELANI (acute kidney injury) ([facility] Nausea & vomiting Hypotension Sepsis ([place] [address] Discharge Diagnoses:  
Problem List as of 9/6/2018  Date Reviewed: 8/30/2018 Codes Class Noted - Resolved Hypotension ICD-10-CM: I95.9 ICD-9-CM: 458.9  8/30/2018 - Present Nausea & vomiting ICD-10-CM: R11.2 ICD-9-CM: 787.01  8/30/2018 - Present * (Principal)MELANI (acute kidney injury) ([place] [address] ICD-10-CM: N17.9 ICD-9-CM: 584.9  8/30/2018 - Present Fever ICD-10-CM: R50.9 ICD-9-CM: 780.60  8/15/2018 - Present Volume depletion ICD-10-CM: E86.9 ICD-9-CM: 276.50  8/15/2018 - Present Diabetes mellitus type 2, controlled ([place] [address].) ICD-10-CM: E11.9 ICD-9-CM: 250.00  8/15/2018 - Present Hyperlipidemia ICD-10-CM: E78.5 ICD-9-CM: 272.4  8/15/2018 - Present Essential hypertension ICD-10-CM: I10 
ICD-9-CM: 401.9  8/15/2018 - Present Sepsis ([facility] ICD-10-CM: A41.9 ICD-9-CM: 038.9, 995.91  8/15/2018 - Present Acute cystitis without hematuria ICD-10-CM: N30.00 ICD-9-CM: 595.0  8/15/2018 - Present Bipolar disorder ([facility] ICD-10-CM: F31.9 ICD-9-CM: 296.80  8/15/2018 - Present S/P total knee arthroplasty, left ICD-10-CM: K29.350 ICD-9-CM: V43.65  8/3/2018 - Present Osteoarthritis ICD-10-CM: M19.90 ICD-9-CM: 715.90  8/2/2018 - Present Abnormal urinalysis ICD-10-CM: R82.90 ICD-9-CM: 791.9  7/26/2018 - Present CKD (chronic kidney disease) stage 3, GFR 30-59 ml/min ICD-10-CM: N18.3 ICD-9-CM: 585.3  5/8/2018 - Present Elevated white blood cell count ICD-10-CM: E23.907 ICD-9-CM: 288.60  5/8/2018 - Present Discharge Condition: Stable Hospital Course:  
 
From admission note : 
 
 \" Kaz Grimm is a 71 y.o. male who has a PMH of HTN, a fib, DM, CKD, s/p left knee Arthroplasty on 8/2/18, was referred from [facility] after he was noted with chills, hypotension of 79/45 mmHg, AMS, hallucinations and vomiting x 4 today. Of note, he had a pseudomonas UTI on 8/15/18, he was given a 7 day therapy on vantin, and eventually given ciprofloxacin back at the facility for his infection. He stated his left knee is tender and has not been able to walk properly. He believes his dehydration may be related to lasix use. Upon ER arrival VS BP 98/62  HR 87  RR 20  O2: 98% on room air. On my assessment his BP was 90/52  HR 85. Patient was alert with visual hallucinations and confused. He complained about left knee pain since his surgery. Labs included: normal lactic acid; cbc: wbc 12k, hb 11gr; chemistry: cr 3.9 ( baseline 1.2 on 8/17/18 ); UA: negative; EKG: A fib not in RVR; CXR: normal; left knee XR: preserved arthroplasty. Blood cultures taken in the ED. Hospitalist was contacted for admission due to sepsis and melani on ckd. \" 
 
Patient was admitted. He was found to have MELANI. He was given IV fluid. Diuretic and ACEI were stopped. He was given empiric antibiotics. Cultures did not show positive results and antibiotics were later stopped. MELANI was improved. Renal function is normalized. He had difficulty voiding. Urology was consulted. He had Ramey's catheter insertion. That was removed on 9/5/2018 with approval of the urologist. He then could not urinate and had to have intermittent urinary catheterization. Flomax was ordered since 9/2/2018. He is being discharged to in-patient rehab facility. He can continue intermittent urinary catheterization or have re-consultation to urology if the problem recurs. Dr. Jeronimo Mathur saw the patient on 9/5/2018 and discontinued  Aspirin. Patient's regimen for DVT prophylaxis will be determined by the rehab facility physician.   
 
 Physical Exam:  
  
 General:                    The patient is in no acute distress.   
Head:                                   Normocephalic/atraumatic. Eyes:                                    palpebral pallor, no scleral icterus. ENT:                                    External auricular and nasal exam within normal limits.                                             ICBLUK membranes are moist. 
Neck:                                   Supple, non-tender, no JVD. Lungs:                       Clear to auscultation bilaterally without wheezes or crackles.                                             No respiratory distress or accessory muscle use. Heart:                                  Regular rate and rhythm, without murmurs, rubs, or gallops. Abdomen:                  Soft, non-tender, non-distended with normoactive bowel sounds. Genitourinary:           No tenderness over the bladder or bilateral CVAs. Extremities:               Without clubbing, cyanosis, or edema. Left knee with healed scar, some limitation of ROM. Skin:                                    Normal color, texture, and turgor. No rashes, lesions, or jaundice. Pulses:                      Radial and dorsalis pedis pulses present 2+ bilaterally.  
                                            Capillary refill <2s. Neurologic:                Moving all four extremities well with no focal deficits. Consults: Urology, physiatrist 
 
Significant Diagnostic Studies:  
 
All Micro Results Procedure Component Value Units Date/Time CULTURE, BLOOD [[id number]] Collected:  08/30/18 1256 Order Status:  Completed Specimen:  Whole Blood from Blood Updated:  09/04/18 7545 Special Requests: --     
  LEFT 
FOREARM Culture result: NO GROWTH 5 DAYS     
 CULTURE, BLOOD [[id number]] Collected:  08/30/18 1306 Order Status:  Completed Specimen:  Blood from Blood Updated:  09/04/18 6038 Special Requests: --     
  RIGHT 
HAND Culture result: NO GROWTH 5 DAYS     
 CULTURE, URINE [[id number]] Collected:  08/30/18 1348 Order Status:  Completed Specimen:  Urine from Urine Updated:  09/01/18 9659 Special Requests: NO SPECIAL REQUESTS Culture result: NO GROWTH 2 DAYS Disposition: [facility] facility Discharge Medications:  
Current Discharge Medication List  
  
START taking these medications Details  
tamsulosin (FLOMAX) 0.4 mg capsule Take 1 Cap by mouth nightly for 15 days. Qty: 15 Cap, Refills: 0 CONTINUE these medications which have NOT CHANGED Details buPROPion SR (WELLBUTRIN SR) 150 mg SR tablet Take 150 mg by mouth two (2) times a day. divalproex ER (DEPAKOTE ER) 500 mg ER tablet Take 1,000 mg by mouth two (2) times a day. pregabalin (LYRICA) 75 mg capsule Take 75 mg by mouth two (2) times a day. latanoprost (XALATAN) 0.005 % ophthalmic solution Administer 1 Drop to both eyes nightly. glipiZIDE SR (GLUCOTROL XL) 10 mg CR tablet Take 10 mg by mouth daily. Indications: type 2 diabetes mellitus  
  
calcium polycarbophil (FIBERCON) 625 mg tablet Take 625 mg by mouth daily. simvastatin (ZOCOR) 40 mg tablet Take 40 mg by mouth nightly. STOP taking these medications  
  
 aspirin delayed-release 81 mg tablet Comments:  
Reason for Stopping:   
   
 empagliflozin-metformin (SYNJARDY XR) 25-1,000 mg TBph Comments:  
Reason for Stopping:   
   
 multivitamin (ONE A DAY) tablet Comments:  
Reason for Stopping:   
   
 furosemide (LASIX) 20 mg tablet Comments:  
Reason for Stopping:   
   
 Liraglutide (VICTOZA) 0.6 mg/0.1 mL (18 mg/3 mL) pnij Comments:  
Reason for Stopping:   
   
 losartan (COZAAR) 100 mg tablet Comments:  
Reason for Stopping:   
   
 nystatin (MYCOSTATIN) topical cream Comments:  
Reason for Stopping:   
   
 insulin degludec (TRESIBA FLEXTOUCH U-200) 200 unit/mL (3 mL) inpn Comments:  
Reason for Stopping: Activity: Activity as tolerated Diet: Diabetic Diet Wound Care: Keep wound clean and dry Follow-up Appointments Procedures  FOLLOW UP VISIT Appointment in: Two Weeks See your primary doctor after being discharged from rehab facility. See your primary doctor after being discharged from rehab facility. Standing Status:   Standing Number of Occurrences:   1 Order Specific Question:   Appointment in Answer: Two Weeks I have discussed the plan of care with patient. Time spent on discharge is 36 minutes. Signed By: Llewellyn Lundborg, MD   
 September 6, 2018

## 2018-09-06 NOTE — PROGRESS NOTES
TRANSFER - OUT REPORT: 
 
Verbal report given to Max(name) on Kiana Lovett  being transferred to 9th floor rehab SFD(unit) for routine progression of care Report consisted of patients Situation, Background, Assessment and  
Recommendations(SBAR). Information from the following report(s) SBAR was reviewed with the receiving nurse. Lines:  
Peripheral IV 08/31/18 Left Wrist (Active) Site Assessment Clean, dry, & intact 9/6/2018  7:36 AM  
Phlebitis Assessment 0 9/6/2018  7:36 AM  
Infiltration Assessment 0 9/6/2018  7:36 AM  
Dressing Status Clean, dry, & intact 9/6/2018  7:36 AM  
Dressing Type Transparent 9/6/2018  7:36 AM  
Hub Color/Line Status Pink;Patent 9/6/2018  4:15 AM  
Alcohol Cap Used No 9/3/2018  3:00 PM  
  
 
Opportunity for questions and clarification was provided. Patient transported with: 
 [email or web address]

## 2018-09-06 NOTE — PROGRESS NOTES
Orthopedic Joint Progress Note 2018 Admit Date: 2018 Admit Diagnosis: MELANI (acute kidney injury) ([facility] Nausea & vomiting Hypotension Sepsis ([facility] * No surgery found * Subjective:  
 
Janette Wagoner awake and alert/salmon out Review of Systems: Pertinent items are noted in HPI. Objective:  
 
PT/OT:  
 
PATIENT MOBILITY Bed Mobility Supine to Sit: Moderate assistance, Maximum assistance Sit to Supine: Maximum assistance Scooting: Moderate assistance, Maximum assistance Transfers Sit to Stand: Moderate assistance, Assist x2 (SPT with rw, back to EOB, poor standing balance ) Stand to Sit: Moderate assistance, Assist x2 Bed to Chair: Moderate assistance, Maximum assistance, Assist x2 Gait Speed/Kenzie: Shuffled, Slow Step Length: Right shortened Stance: Left decreased Gait Abnormalities: Decreased step clearance, Step to gait Ambulation - Level of Assistance: Minimal assistance Distance (ft): 20 Feet (ft) Assistive Device: Walker, rolling Interventions: Verbal cues Weight Bearing Status Left Side Weight Bearing: As tolerated Vital Signs:   
Blood pressure 164/77, pulse 97, temperature 97.2 °F (36.2 °C), resp. rate 22, height 6' (1.829 m), weight 90.7 kg (199 lb 14.4 oz), SpO2 93 %. Temp (24hrs), Av.5 °F (36.4 °C), Min:96.8 °F (36 °C), Max:98.4 °F (36.9 °C) Pain Control:  
Pain Assessment Pain Scale 1: Numeric (0 - 10) Pain Intensity 1: 0 Pain Onset 1: at rest 
Pain Location 1: Knee Pain Orientation 1: Left Pain Description 1: Aching Pain Intervention(s) 1: Medication (see MAR) Meds: 
Current Facility-Administered Medications Medication Dose Route Frequency  insulin regular (NOVOLIN R, HUMULIN R) injection   SubCUTAneous AC&HS  tamsulosin (FLOMAX) capsule 0.4 mg  0.4 mg Oral QHS  mirtazapine (REMERON) tablet 15 mg  15 mg Oral QHS  
 HYDROcodone-acetaminophen (NORCO) 7.5-325 mg per tablet 2 Tab  2 Tab Oral Q6H PRN  
  aspirin delayed-release tablet 81 mg  81 mg Oral BID  buPROPion SR [facility] SR) tablet 150 mg  150 mg Oral BID  divalproex ER (DEPAKOTE ER) 24 hour tablet 1,000 mg  1,000 mg Oral BID  latanoprost (XALATAN) 0.005 % ophthalmic solution 1 Drop  1 Drop Both Eyes QHS  simvastatin (ZOCOR) tablet 40 mg  40 mg Oral QHS  heparin (porcine) injection 5,000 Units  5,000 Units SubCUTAneous Q12H  
 acetaminophen (TYLENOL) tablet 650 mg  650 mg Oral Q6H PRN  pantoprazole (PROTONIX) 40 mg in sodium chloride 0.9% 10 mL injection  40 mg IntraVENous DAILY  ondansetron (ZOFRAN) injection 4 mg  4 mg IntraVENous Q8H PRN  
 naloxone (NARCAN) injection 0.4 mg  0.4 mg IntraVENous PRN  
 HYDROmorphone (PF) (DILAUDID) injection 0.5 mg  0.5 mg IntraVENous Q6H PRN  
 dextrose (D50W) injection syrg 25 g  25 g IntraVENous PRN  
  
 
LAB:   
Lab Results Component Value Date/Time INR 1.6 08/15/2018 01:42 PM  
 INR 1.8 08/05/2018 04:30 AM  
 INR 1.2 08/04/2018 04:08 AM  
 
Lab Results Component Value Date/Time HGB 10.3 (L) 09/02/2018 04:57 AM  
 HGB 10.0 (L) 09/01/2018 05:43 AM  
 HGB 9.8 (L) 08/31/2018 05:41 AM  
 
 
Wound Knee Left (Active) Number of days:35 Physical Exam: No significant changes Assessment:  
  
Principal Problem: 
  MELANI (acute kidney injury) ([facility] (8/30/2018) Active Problems: 
  CKD (chronic kidney disease) stage 3, GFR 30-59 ml/min (5/8/2018) S/P total knee arthroplasty, left (8/3/2018) Diabetes mellitus type 2, controlled ([facility] (8/15/2018) Sepsis ([facility] (8/15/2018) Bipolar disorder ([facility] (8/15/2018) Hypotension (8/30/2018) Nausea & vomiting (8/30/2018) Plan:  
 
Continue PT/OT/Rehab Consult: Rehab team including PT, OT, recreational therapy, and  To [facility] today Moving very slowly with PT Will ask PT to eval for CPM 
 
Patient Expects to be Discharged to[de-identified] Rehabilitation facility Signed By: Logan Baeza MD

## 2018-09-06 NOTE — PROGRESS NOTES
09/06/18 0196 Incentive Spirometry Treatment Level of Service Needs encouragement Actual Volume (ml) 1000 ml Pulmonary Toilet Pulmonary Toilet Incentive Spirometry

## 2018-09-06 NOTE — PROGRESS NOTES
Pt ready for transportation to 9th floor DT [facility].  RN given # to call report, Frankie Huggins set up for  @ 10am

## 2018-09-06 NOTE — PROGRESS NOTES
MD aware of no BM since 9/2 and that patient has not yet voided and current bladder scan amount. No further orders received.

## 2018-09-06 NOTE — H&P
HISTORY AND PHYSICAL IRC Admit Date: 9/6/2018 Surgeon: Luzmaria Franco MD  
Primary Care Physician: Brady Saldivar MD 
Specialty Group: orthopedics, urology Chief Complaint : Gait dysfunction secondary to below. Admit Diagnosis: Unilateral primary osteoarthritis, left knee [M17.12] MELANI (acute kidney injury) ([address] (8/30/2018) CKD (chronic kidney disease) stage 3, GFR 30-59 ml/min (5/8/2018) S/P total knee arthroplasty, left (8/3/2018) Diabetes mellitus type 2, controlled ([address] (8/15/2018) Sepsis ([address] (8/15/2018) Bipolar disorder ([address] (8/15/2018) Hypotension (8/30/2018) Nausea & vomiting (8/30/2018) Pain DVT risk Post op acute blood loss anemia Hypertension Diabetes ([address] Spondylolisthesis of lumbar region Neurogenic bladder  -has catheterized once in the past month; urinating more frequently own his own at baseline Acute Rehab Dx: 
Gait impairment Debility   
Mobility and ambulation deficits Self Care/ADL deficits Medical Dx: 
Past Medical History:  
Diagnosis Date  Arthritis  BMI 30.0-30.9,adult  CAD (coronary artery disease)   
 mild to moderate apical reversible ischemia per cardio note dated 07/12/10  Chronic kidney disease   
 no nephrologist   
 Chronic pain   
 neck, lower back  Depression  Diabetes ([address] type 2; oral and insulin meds; 1-2 times per day; average 104; A1C=8.1 on 06/13/18  Enlarged prostate with urinary obstruction  Former smoker  GERD (gastroesophageal reflux disease)  High cholesterol  Hypertension   
 on med for control  Nausea & vomiting 8/30/2018  Neurogenic bladder  Other ill-defined conditions(799.89)  Overflow incontinence  Sciatica   
 recently took medrol dose pack around 05/04/18  Self-catheterizes urinary bladder   
 has catheterized once in the past month; urinating more frequently own his own now  (07/26/18)  Skin cancer  Sleep apnea bi-pap; instructed to bring [facility]  Spondylolisthesis of lumbar region  Thromboembolus [facility]) 2013 DVT left leg; caused from a falling accident that damaged left leg  Urine retention  Varicose veins of both lower extremities with complications Date of Evaluation:  September 7, 2018 HPI: Vickey Hinojosa a 71 y. o. male patient at [address] was admitted on 8/2/2018  by [address] below mentioned medical history, is being seen for Physical Medicine and Rehabilitation. Martina Cutler intially presented with severe left knee pain due to end stage DJD and underwent a left total knee arthroplasty per Dr. Umer Cassidy, [place] [id number] 8/2/2018. Patient was discharged to a SNF for sub acute rehab program subsequently. He was readmitted to [facility] on 8/15 from SNF with a fever of 102+F, malaise, chills, confusion. He was found to have a pseudomonas UTI, sepsis and acute cystitis, treated and again discharged to SNF on vantin. He is re-admitted to [facility] on 8/30 from SNF due to chills, hypotension, 79/45. 98/61, 90/52 mmHg, AMS, hallucinations and vomiting. Patient was admitted with diagnosis of possible sepsis, MELANI, with Cr 3.99. Cultures were sent. Patient is started on empiric iv antibiotics, ivf. Diuretics and ACEI has been held due to MELANI. Vickey Hinojosa seen and examined today. Medical Records reviewed. PT assessment noted. He requires ax asit for mobility and transfers today. He is limited by pain with weight bearing on the surgical limb. His active and passive left knee flexion and extensions are unfortunately very limited. Brian Pollock is seen and examined today. Medical Records reviewed. Patient has been independent with ambulation, prior to admission, but limited by left knee pain.  
 
Our service was consulted for rehab needs and we recommended inpatient hospital rehabilitation is reasonable and necessary due to ongoing acute medical issues which have not changed since initial pre-admission evaluation. and rehab needs still likely best managed in IRU setting. The patient was evaluated by [facility] admissions coordinators. I reviewed the preadmission screening and have approved for admission to the [facility]. The patient was cleared for transfer to rehab today. Patient continues to have ongoing  medical issues outlined above requiring physician medical supervision and functional deficits which would benefit from continued intensive therapies. Current Level of Function: (evaluated by acute therapy staff, with bed mobility, transfers, balance personally observed post-admission in the IRF setting minutes prior to submission of document) bathing - mod A, lower body dressing - max A, toileting - max A, bed mobility - mod A, transfers- Moderate assistance;Assist x2, poor balance , ambulation- short distances, 20'  with min/ mod assist using a RW Prior Level of Function/Home Situation:  
patient lives alone. Pt was functioning with a rolling walker & limited assist in rehab prior to this set back & re-admit. Past Medical History:  
Diagnosis Date  Arthritis  BMI 30.0-30.9,adult  CAD (coronary artery disease)   
 mild to moderate apical reversible ischemia per cardio note dated 07/12/10  Chronic kidney disease   
 no nephrologist   
 Chronic pain   
 neck, lower back  Depression  Diabetes [address] type 2; oral and insulin meds; 1-2 times per day; average 104; A1C=8.1 on 06/13/18  Enlarged prostate with urinary obstruction  Former smoker  GERD (gastroesophageal reflux disease)  High cholesterol  Hypertension   
 on med for control  Nausea & vomiting 8/30/2018  Neurogenic bladder  Other ill-defined conditions(799.89)  Overflow incontinence  Sciatica   
 recently took medrol dose pack around 05/04/18  Self-catheterizes urinary bladder has catheterized once in the past month; urinating more frequently own his own now  (07/26/18)  Skin cancer  Sleep apnea   
 bi-pap; instructed to bring [facility]  Spondylolisthesis of lumbar region  Thromboembolus [facility]) 2013 DVT left leg; caused from a falling accident that damaged left leg  Urine retention  Varicose veins of both lower extremities with complications Past Surgical History:  
Procedure Laterality Date [address]  HX SHOULDER ARTHROSCOPY Left 1971  HX UROLOGICAL  06/2017 CYSTOSCOPY, TRANSURETHRAL RESECTION OF PROSTATE Allergies Allergen Reactions  Oxycodone Itching  Tramadol Itching Family History Problem Relation Age of Onset  No Known Problems Mother  Heart Attack Father  Cancer Sister  Parkinson's Disease Brother Social History Substance Use Topics  Smoking status: Former Smoker Packs/day: 1.00 Years: 7.00 Quit date: 5/8/1971  Smokeless tobacco: Never Used  Alcohol use Yes Comment: occasionally Current Facility-Administered Medications Medication Dose Route Frequency  acetaminophen (TYLENOL) tablet 650 mg  650 mg Oral Q6H PRN  
 aspirin delayed-release tablet 81 mg  81 mg Oral BID  buPROPion [facility]) tablet 150 mg  150 mg Oral BID  dextrose (D50W) injection syrg 25 g  25 g IntraVENous PRN  
 divalproex ER (DEPAKOTE ER) 24 hour tablet 1,000 mg  1,000 mg Oral BID  heparin (porcine) injection 5,000 Units  5,000 Units SubCUTAneous Q12H  
 HYDROcodone-acetaminophen (NORCO) 7.5-325 mg per tablet 2 Tab  2 Tab Oral Q6H PRN  
 insulin regular (NOVOLIN R, HUMULIN R) injection   SubCUTAneous AC&HS  latanoprost (XALATAN) 0.005 % ophthalmic solution 1 Drop  1 Drop Both Eyes QHS  mirtazapine (REMERON) tablet 15 mg  15 mg Oral QHS  naloxone (NARCAN) injection 0.4 mg  0.4 mg IntraVENous PRN  
  ondansetron (ZOFRAN) injection 4 mg  4 mg IntraVENous Q8H PRN  pantoprazole (PROTONIX) 40 mg in sodium chloride 0.9% 10 mL injection  40 mg IntraVENous DAILY  simvastatin (ZOCOR) tablet 40 mg  40 mg Oral QHS  tamsulosin (FLOMAX) capsule 0.4 mg  0.4 mg Oral QHS  influenza vaccine 2018-19 (6 mos+)(PF) (FLUARIX QUAD/FLULAVAL QUAD) injection 0.5 mL  0.5 mL IntraMUSCular PRIOR TO DISCHARGE  pneumococcal 23-valent (PNEUMOVAX 23) injection 0.5 mL  0.5 mL IntraMUSCular PRIOR TO DISCHARGE Review of Systems: 
Denies: fevers, chills, sweats, fatigue, malaise, anorexia, weight loss Denies: blurry vision, loss of vision, eye pain, photophobia Denies: hearing loss, ringing in the ears, earache, epistaxis Denies: chest pain, palpitations, syncope, orthopnea, paroxysmal nocturnal dyspnea, claudication Denies: dysphagia, odynophagia, nausea, vomiting, diarrhea, constipation, abdominal pain, jaundice, melena Denies: frequency, dysuria, nocturia, urinary incontinence, stones, hematuria Denies: polydipsia/polyuria, skin changes, temperature intolerance, unexpected weight gain Denies: back pain, joint pain, joint swelling, muscle pain Denies: bleeding problems, blood transfusions, bruising, pallor, swollen lymph nodes Denies: headache, dysarthria, blurred vision, diplopia,seizure, focal deficits. Objective:  
 
Visit Vitals  /72  Pulse 88  Temp 98.2 °F (36.8 °C)  Resp 18  SpO2 96% Intake and Output: 
09/05 1901 - 09/07 0700 In: 240 [P.O.:240] Out: 500 [Urine:500] Physical Exam: Psych: Patient was oriented x 2. Without hallucinations. Patient is not suicidal.  
General:   Alert, appears stated age, No acute distress. HEENT:  Normocephalic, EOMI, facial symmetry  Intact. Oral mucosa pink and moist. No nasal discharge. Lungs:  CTA Bilaterally,Respiration even and unlabored No apparent use of accessory muscles for respiration. No nasal alar flaring. Heart:  Regular rate and rhythm, S1, S2, No obvious murmur, click, rub or gallop. Genitourinary: defered Abdomen:  Soft, non-tender to palpation in all four quadrants. Bowel sounds present. No obvious masses palpated. Neuromuscular:  PERRL, EOMI Follows simple commands consistently. Able to identify, recall repeat. Mood appropriate. Insight, judgement poor 
+ generalized weakness. Sensory - intact No cerebellar signs. Plantars - down going No atrophy, no fasciculations, no tremors. Skin:  No rashes, no erythema. Calf non tender BLE. boggy L knee. LLE PROM 
L Knee Flexion: 80 (~) 
L Knee Extension: -15(~) Incision:  healing well, clean, dry, and intact Lab Review:   
Recent Results (from the past 72 hour(s)) GLUCOSE, POC Collection Time: 09/04/18 12:50 PM  
Result Value Ref Range Glucose (POC) 94 65 - 100 mg/dL GLUCOSE, POC Collection Time: 09/04/18  4:08 PM  
Result Value Ref Range Glucose (POC) 116 (H) 65 - 100 mg/dL GLUCOSE, POC Collection Time: 09/04/18  9:57 PM  
Result Value Ref Range Glucose (POC) 150 (H) 65 - 100 mg/dL GLUCOSE, POC Collection Time: 09/05/18  8:13 AM  
Result Value Ref Range Glucose (POC) 111 (H) 65 - 100 mg/dL GLUCOSE, POC Collection Time: 09/05/18 11:59 AM  
Result Value Ref Range Glucose (POC) 293 (H) 65 - 100 mg/dL GLUCOSE, POC Collection Time: 09/05/18  4:38 PM  
Result Value Ref Range Glucose (POC) 344 (H) 65 - 100 mg/dL GLUCOSE, POC Collection Time: 09/05/18  9:21 PM  
Result Value Ref Range Glucose (POC) 178 (H) 65 - 100 mg/dL URINALYSIS W/ RFLX MICROSCOPIC Collection Time: 09/06/18  5:14 AM  
Result Value Ref Range Color YELLOW Appearance CLEAR Specific gravity 1.030 (H) 1.001 - 1.023    
 pH (UA) 5.0 5.0 - 9.0 Protein NEGATIVE  NEG mg/dL Glucose >1000 mg/dL Ketone 40 (A) NEG mg/dL Bilirubin NEGATIVE  NEG Blood NEGATIVE  NEG Urobilinogen 0.2 0.2 - 1.0 EU/dL Nitrites NEGATIVE  NEG Leukocyte Esterase NEGATIVE  NEG    
GLUCOSE, POC Collection Time: 09/06/18  7:22 AM  
Result Value Ref Range Glucose (POC) 140 (H) 65 - 100 mg/dL GLUCOSE, POC Collection Time: 09/06/18 11:09 AM  
Result Value Ref Range Glucose (POC) 223 (H) 65 - 100 mg/dL GLUCOSE, POC Collection Time: 09/06/18  4:28 PM  
Result Value Ref Range Glucose (POC) 243 (H) 65 - 100 mg/dL GLUCOSE, POC Collection Time: 09/06/18  8:37 PM  
Result Value Ref Range Glucose (POC) 229 (H) 65 - 100 mg/dL CBC WITH AUTOMATED DIFF Collection Time: 09/07/18  6:31 AM  
Result Value Ref Range WBC 6.1 4.3 - 11.1 K/uL  
 RBC 3.93 (L) 4.23 - 5.6 M/uL  
 HGB 11.4 (L) 13.6 - 17.2 g/dL HCT 37.0 (L) 41.1 - 50.3 % MCV 94.1 79.6 - 97.8 FL  
 MCH 29.0 26.1 - 32.9 PG  
 MCHC 30.8 (L) 31.4 - 35.0 g/dL  
 RDW 14.5 % PLATELET 499 037 - 324 K/uL MPV 9.6 9.4 - 12.3 FL ABSOLUTE NRBC 0.00 0.0 - 0.2 K/uL  
 DF AUTOMATED NEUTROPHILS 54 43 - 78 % LYMPHOCYTES 29 13 - 44 % MONOCYTES 13 (H) 4.0 - 12.0 % EOSINOPHILS 3 0.5 - 7.8 % BASOPHILS 1 0.0 - 2.0 % IMMATURE GRANULOCYTES 1 0.0 - 5.0 %  
 ABS. NEUTROPHILS 3.2 1.7 - 8.2 K/UL  
 ABS. LYMPHOCYTES 1.8 0.5 - 4.6 K/UL  
 ABS. MONOCYTES 0.8 0.1 - 1.3 K/UL  
 ABS. EOSINOPHILS 0.2 0.0 - 0.8 K/UL  
 ABS. BASOPHILS 0.0 0.0 - 0.2 K/UL  
 ABS. IMM. GRANS. 0.0 0.0 - 0.5 K/UL MAGNESIUM Collection Time: 09/07/18  6:31 AM  
Result Value Ref Range Magnesium 1.8 1.8 - 2.4 mg/dL METABOLIC PANEL, BASIC Collection Time: 09/07/18  6:31 AM  
Result Value Ref Range Sodium 141 136 - 145 mmol/L Potassium 4.4 3.5 - 5.1 mmol/L Chloride 104 98 - 107 mmol/L  
 CO2 30 21 - 32 mmol/L Anion gap 7 7 - 16 mmol/L Glucose 141 (H) 65 - 100 mg/dL BUN 22 8 - 23 MG/DL Creatinine 1.01 0.8 - 1.5 MG/DL  
 GFR est AA >60 >60 ml/min/1.73m2 GFR est non-AA >60 >60 ml/min/1.73m2  Calcium 10.4 8.3 - 10.4 MG/DL  
GLUCOSE, POC  
 Collection Time: 09/07/18  7:00 AM  
Result Value Ref Range Glucose (POC) 181 (H) 65 - 100 mg/dL Functional Assessment: 
   
   
Balance Sitting - Static: Fair (occasional) (09/06/18 1600) Sitting - Dynamic: Poor (constant support) (09/06/18 1600) Standing - Static: Constant support;Poor (with RW) (09/06/18 1600) Speech Assessment: 
    
 
Ambulation: 
Gait Distance (ft): 0 Feet (ft) (09/06/18 1600) Assistive Device: Walker, rolling;Gait belt (09/06/18 1600) Condition on Admission: Good Assessment: The Post Assessment Physician Evaluation (BURAK) found the current functional status to be comparable with the Pre-admission Screening. The Patient is a good candidate for acute inpatient rehabilitation. Nothing since the Pre-admission screen has changed that determination. Rehabilitation Plan The patient has shown the ability to tolerate and benefit from 3 hours of therapy daily and is being admitted to a comprehensive acute inpatient rehabilitation program consisting of at least 3 hours of combined physical and occupational therapies. Begin intensive Physical Therapy for a minimum of 1.5 hours a day, at least 5 out of 7 days per week to address bed mobility, transfers, ambulation, strengthening, balance, and endurance. Begin intensive Occupational Therapy for a minimum of 1.5 hours a day, at least 5 out of 7 days per week to address ADL ( bathing, LE dressing, toileting) and adaptive equipment as needed. - barriers will be pain, poor L knee ROM, deconditioned state. Continue ST for: cognitive deficits, may be baseline, or acutely deteriorating due to recent sepsis, on-going medical issues. Continue 24-hour skilled rehabilitation nursing for bowel and bladder management, skin care for decubitus ulcer prevention , pain management and ongoing medication administration The patient may benefit from a psychology consult for depression, anxiety and adjustment disorder. Patient with underlying bi-polar disorder on medications. Continue daily physician medical management: 
 
MELANI (acute kidney injury)  )/ CKD (chronic kidney disease) stage 3, GFR 30-59 ml/min (5/8/2018) 
- monitor renal funcion closely. - encourage po hydration. ivf as needed. hold Cozaar, lasix. S/P left total knee arthroplasty (8/3/2018) - monitor wound for late infection. Closed. - focus on strength, ROM. PT evaluate for CPM.  
 
Sepsis - monitor. Treated. Bipolar disorder (- continue PTA medications. Depakote, wellbutrin. Post op acute blood loss anemia - monitor Spondylolisthesis of lumbar region Pneumonia prophylaxis- Insentive spirometer every hour while awake DVT risk / DVT Prophylaxis- Will require daily physician exam to assess for signs and symptoms as patient is at increased risk for of thromboembolism. Mobilization as tolerated. Intermittent pneumatic compression devices when in bed Thigh-high or knee-high thromboembolic deterrent hose when out of bed.  
- resume heparin q12h. Will wean aspirin from ortho. Pain Control: stable, mild-to-moderate joint symptoms intermittently, reasonably well controlled by PRN meds. Will require regular pain assessment and comprenhensive pain management, Wound Care: Monitor wound status daily per staff and physician. At risk for failure. Will require 24/7 rehab nursing. Keep wound clean and dry Hypertension/ hypotension - BP uncontrolled, fluctuating, managed medically. - cozaar, lasix held due to MELANI. - Will resume antihypertesives, diuretics as appropriate. Diabetes mellitus type 2- Uncontrolled. poor glycemic control. Will require daily, close FSG monitoring and medication adjustment to optimize glycemic control in setting of acute illness and hospitalization.  
 
Urinary retention/ neurogenic bladder -- patient has history of \" neurogenic bladder. Has catheterized once in the past month; urinating more frequently own his own at baseline 
 - will observe pattern. schedule voids q6-8 hrs. Check post-void residual every shift; In and Out catheterize if post-void residual is more than 400 cc. 
- on flomax 
 
bowel program -  
 
GERD - resume PPI. At times may need additional antacids, Maalox prn. The patient's prognosis for significant practical improvement within a reasonable period of time appears good and the estimated length of stay is 14 days and patient is expected to return home with spouse and continued rehabilitation with home health therapy. Given the patient's complex neurologic/medical condition and the risk of further medical complications including: DVT, PE, skin breakdown, andpneumonia due to decreased mobility, infection at surgical site , worsening renal function, sepsis. CVA, MI, cardiac arrhythmias due to HTN, increased risk of thromboembolism secondary to decreased blood volume and increased energy expenditure in a patient with known acute blood loss could potentially impact the IRF. For these ongoing medical issues, rehabilitation services could not be safely provided at a lower level of care such as a skilled nursing facility or nursing home. Signed By: Sherri Sneed MD   
 September 7, 2018

## 2018-09-06 NOTE — PROGRESS NOTES
End Of Shift Functional Summary, Nursing TOILETING:   
Does patient need assist with adjusting pants up or down and/or pericare? yes If yes, please indicate what the patient needs help with:  Pulling clothing up and down. Pt uses bed pan. Does the patient require extra time? yes Does the patient require more than one staff member for assistance? yes BLADDER:   
Pt does take medication. If so, please indicate which medication: Flomax PT has not voided or had BM 
 
EATING Pt requires setup. Pt wears dentures. Documentation reviewed and plan of care discussed/reviewed with  
oncoming nurse and patient assistant during the shift.

## 2018-09-06 NOTE — PROGRESS NOTES
Primary Nurse Maciel Mason RN and Pedro Mcgrath RN performed a dual skin assessment on this patient. No impairment noted except shearing on buttocks and sacrum, areas on sacrum are blanchable and non-blanachable, and red. Kenny score is 15.

## 2018-09-06 NOTE — PROGRESS NOTES
Patient is straight cathed at this time using coude type cath. 785 ml of greenish urine obtained. Urine sample sent to lab.

## 2018-09-06 NOTE — PROGRESS NOTES
Shift assessment completed via doc flow sheet. Patient is alert and oriented x 3. No acute distress noted. . Lung sounds CTA bilaterally. Heart sounds S1, S2 auscultated and regular. Abdomen  soft and non tender. active to all 4 quadrants. Patient denies pain and other needs at this time. Bed is locked and in low position. Bed rails x 3. Patient is encouraged to call for assistance. Call light within reach.

## 2018-09-07 NOTE — PROGRESS NOTES
09/07/18 1202 Time Spent With Patient Time In 1117 Time Out 1154 Mental Status Neurologic State Alert;Confused Orientation Level Oriented to person;Oriented to place; Disoriented to time;Oriented to situation Cognition Decreased attention/concentration; Impaired decision making;Poor safety awareness Perception Appears intact Perseveration No perseveration noted Safety/Judgement Fall prevention;Decreased awareness of need for safety;Decreased insight into deficits Psychosocial  
Patient Behaviors Calm; Cooperative Purposeful Interaction Yes Pt Identified Daily Priority Clinical issues (comment); Communication issues (comment) Caritas Process Establish trust;Attend basic human needs Caring Interventions Reassure Reassure Therapeutic listening Therapeutic Modalities Humor 09/07/18 1203 Verbal Expression Primary Mode of Expression Verbal  
Initiation No impairment Automatic Speech Task No impairment Naming No impairment with confrontational naming; 
Decreased generative naming and mild increased time for word finding in conversational speech Sentence Formulation No impairment FIM Score (Verbal Expression) 6 (occasional increased time for word finding with lower frequency words) Neuro-Linguistics/Cognitive Function Reasoning  [place] reasoning;Executive function;Mental flexibility Organizational Sequencing; Thought;Simple functional tasks Problem Solving Simple FIM Score (Problem Solving) 3 Mathematics Word problems, simple Memory  Short-term;Immediate FIM Score (Memory) 3 Attention  Sustained attention Overall Impairment Severity Moderate Pragmatics Pragmatics Impairment Severity Mild FIM Score (Pragmatics/Social Interaction) 5 Patient seen for cognitive evaluation. He reports that he lives alone and is independent at home and still driving. Sepsis with AMS s/p left knee arthroplasty. He is oriented to person, place, and situation. He knew the year but stated initially that is was November. Confusion noted upon arrival with the patient looking up at his whiteboard for answers to questions related to his past medical hx. Asked if SLP was Annie. 60% accuracy with immediate recall tasks. 60% accuracy with basic verbal reasoning activities related to home and hospital ADLs. He was aware of need to call for assistance. 50% accuracy with functional math/time reasoning tasks. Patient recalled 2/3 items discussed earlier in the session and was stimulable to name the current month when reminded that we had discussed that a holiday was earlier this week. Recommend continued speech therapy for address noted cognitive deficits.  
 
 
Daniel Sandra MS, CCC-SLP

## 2018-09-07 NOTE — PROGRESS NOTES
Problem: Falls - Risk of 
Goal: *Absence of Falls Document Clotilde Jackson Fall Risk and appropriate interventions in the flowsheet. Outcome: Progressing Towards Goal 
Fall Risk Interventions: 
Mobility Interventions: Patient to call before getting OOB, Utilize walker, cane, or other assistive device Mentation Interventions: Door open when patient unattended, More frequent rounding, Toileting rounds Medication Interventions: Patient to call before getting OOB Elimination Interventions: Call light in reach, Patient to call for help with toileting needs, Toileting schedule/hourly rounds, Urinal in reach

## 2018-09-07 NOTE — PROGRESS NOTES
[facility] OCCUPATIONAL THERAPY INITIAL EVALUATION Time In: 8342 Time Out: 1425 *Also includes 8:06-9:44* Precautions: Falls, Poor Safety Awareness, Confused and Cathing Pain: Pt had no c/o pain at rest. 
 
History of Presenting Illness (per previous reports): Lux Bates presented with severe left knee pain due to end stage DJD and underwent a left total knee arthroplasty per Dr. Andrzej Yost [facility] 93 8/2/2018. Patient was discharged to a SNF for sub acute rehab program subsequently. He was readmitted to [address] [place] [address] on 8/15 from SNF with a fever of 102+F, malaise, chills, confusion. He was found to have a pseudomonas UTI, sepsis and acute cystitis, treated and again discharged to SNF on vantin. He is re-ad[facility] to [facility] [place] [facility] 8/30 from [facility] due to chills, hypotension, 79/45. 98/61, 90/52 mmHg, AMS, hallucinations and vomiting. Patient was admitted with diagnosis of possible sepsis, MELANI, with Cr 3.99. Cultures were sent. Patient is started on empiric iv antibiotics, ivf. Diuretics and ACEI has been held due to MELANI. Romualdo Boast seen and examined today. Medical Records reviewed. PT assessment noted. He requires ax asit for mobility and transfers today. He is limited by pain with weight bearing on the surgical limb. His active and passive left knee flexion and extensions are unfortunately very limited.    
 
Past Medical History/ Co-morbidities:  
Past Medical History:  
Diagnosis Date  Arthritis  BMI 30.0-30.9,adult  CAD (coronary artery disease)   
 mild to moderate apical reversible ischemia per cardio note dated 07/12/10  Chronic kidney disease   
 no nephrologist   
 Chronic pain   
 neck, lower back  Depression  Diabetes ([address] type 2; oral and insulin meds; 1-2 times per day; average 104; A1C=8.1 on 06/13/18  Enlarged prostate with urinary obstruction  Former smoker  GERD (gastroesophageal reflux disease)  High cholesterol  Hypertension   
 on med for control  Nausea & vomiting 8/30/2018  Neurogenic bladder  Other ill-defined conditions(799.89)  Overflow incontinence  Sciatica   
 recently took medrol dose pack around 05/04/18  Self-catheterizes urinary bladder   
 has catheterized once in the past month; urinating more frequently own his own now  (07/26/18)  Skin cancer  Sleep apnea   
 bi-pap; instructed to bring [facility]  Spondylolisthesis of lumbar region  Thromboembolus [facility]) 2013 DVT left leg; caused from a falling accident that damaged left leg  Urine retention  Varicose veins of both lower extremities with complications Patient's Goal: to take himself to the bathroom Previous Level of Function: Pt was using a quad cane and was completing ADL prior to sx. Pt pays someone to clean the house and doesn't cook. Pt managed medications and was driving. Home Situation Environment House Situation Private residence Lives Alone Yes Support Systems Child(giovanni), Family member(s) Shower Situation Shower Current DME Cane, quad Stairs to Enter 0 Upper Extremity Function LUE RUE Shoulder Flexion 3- 3- Shoulder Abduction 3- 3-  
Elbow Flexion 4- 4-  
Elbow Extension  4- 4-  
0/5 No palpable muscle contraction 1/5 Palpable muscle contraction, no joint movement 2-/5 Less than full range of motion in gravity eliminated position 2/5 Able to complete full range of motion in gravity eliminated position 2+/5 Able to initiate movement against gravity 3-/5 More than half but not full range of motion against gravity 3/5 Able to complete full range of motion against gravity 3+/5 Completes full range of motion against gravity with minimal resistance 4-/5 Completes full range of motion against gravity with minimal-moderate resistance 4/5 Completes full range of motion against gravity with moderate resistance 4+/5 Completes full range of motion against gravity with moderate-maximum resistance 5/5 Completes full range of motion against gravity with maximum resistance Functional Mobility Performance Sitting: Static Fair (occasional) Sitting: Dynamic Fair (occasional) Standing: Static Poor Standing: Dynamic Poor (constant support) Supine to Sit 2 (Maximal assistance) Sit to Stand Assistance Total assistance Transfer Assist Score 1 Transfer Type SPT with walker Cognition Performance Comments Orientation Level Oriented to person, Oriented to place, Oriented to situation, Oriented to time Comprehension Level 5 (90% of basic needs) Mode: Auditory Hearing Aid:None Glasses:Glasses Expression (Native Language) 5 Mode: Verbal 
Confused Social Interaction/Pragmatics 3 Sexually inappropriate at times Problem Solving 2 Solves basic problems 25-49% of the time, needs direction more than 50% of the time to initiate, plan, or complete simple activities and may need a restraint for safety. Memory 2 Recalled 1/3 words Activities of Daily Living Score Comments Grooming 4 Tasks completed by patient: Shaved/applied makeup (optional) A wiping shaving cream  
Bathing 4 Body Parts Bathe: Abdomen, Arm, left, Arm, right, Lower leg and foot, left, Lower leg and foot, right, Donna area, Thigh, left, Thigh, right CGA Tub/Shower Transfer [place] 1 Type of Shower: Shower Adaptive  Equipment:Tub transfer bench, Grab bars, Walker and Wheelchair Comments: Ax2 Upper Body Dressing/Undressing 2 Items Applied:Pullover (4 steps) A for overhead and pulling down Lower Body Dressing/Undressing 1 Items Applied:Shoe, left (1 step), Shoe, right (1 step), Sock, left (1 step), Sock, right (1 step), Underpants (3 steps), Elastic waist pants (3 steps) Ax2; A for all parts Toileting 1 In and out cath Vision/Perception: No changes from pre-morbid function where patient wore glasses. Leyda Carter Session: Pt was in bed and wanted to bathe. Pt's performance with ADL is reflected in above chart. Pt walked to shower, but when he got within 3' of the tub bench he started to collapse and sat on therapist's knee. Pt got resituated on the tub bench and bathed. Pt was transferred 2 with Ax2. Pt has limited foot movement and therapist has to swing his hips. Pt stood 2x once for 2 minutes and once for 1 minute. Pt was completing bent over for the second stand. Attempted 2 pc abstract puzzle, but pt could not break down task at all. When handed a piece at a time pt would participate for one or two pieces, but then would get distracted. Interdisciplinary Communication: PT Risa Bush on performance Patient/Family Education: Patient was/were educated On the role of OT, On POC and On IRC expectations. Problem List: Activity Tolerance, Decreased ROM L knee, Safety Awareness, Strength, Pain, Sitting Balance, Standing Balance and Cognition Functional Limitations: ADL, IADL, Functional Transfers and Functional Mobility Goals: Please see Care Plan OT order received and chart reviewed. OT orders have been acknowledged. Patient will benefit from skilled OT services to address ADL, functional transfers, UE strength, UE coordination, balance, cognition and activity tolerance to maximize functional performance with daily self-care tasks and functional mobility. Treatment is likely to include ADL, Balance, Strength, Activity tolerance, DME, AE, Family  and Safety awareness training to increase independence with self-care. Patient will be seen for 1.5-2 hours of skilled OT services 5-6 days a week as appropriate. Initate POC. Oh PATRICIA/NIKA 
9/7/2018

## 2018-09-07 NOTE — PROGRESS NOTES
PHYSICAL THERAPY DAILY NOTE Time In: 1030 Time Out: 1115 Subjective: \"Do we really have to try and stand again today\" Objective:  Vital Signs:   
Patient Vitals for the past 12 hrs: 
 Temp Pulse Resp BP SpO2  
09/07/18 0758 98.2 °F (36.8 °C) 88 18 152/72 96 % 09/07/18 0146 97.5 °F (36.4 °C) 89 16 174/79 95 % Pain level:  Patient complains of pain in his LLE knee with any movement Patient education:  Encouraged patient to move more and not anticipate his pain with movement since he tends to stiffen up and resist 
 
Interdisciplinary Communication:  Discussed his rigidity with mobility and dependent level with other PT and OT Other (comment) (fall precautions  WBAT LLE) TRANSFERS Daily Assessment Sit to  the parallel bars requires mod. Assist x 2. Patient remains very stiff and rigid with his mobility and appears slgihtly distressed when attempting to stand Sit to Stand Assistance: Total assistance Car Transfers: Not tested BALANCE Daily Assessment Patient can only tolerate standing with minimal assist in parallel bars for ~ 30-45 seconds. Can weight shift with verbal/manual cues but fatigues quickly and sits quickly Sitting - Static: Fair (occasional) Sitting - Dynamic: Poor (constant support) Standing - Static: Constant support; Fair (parallel bars) Standing - Dynamic : Impaired WHEELCHAIR MOBILITY Daily Assessment Patient needs consistent encouragement and verbal cues to continue to push with his BUEs for w/c mobility. Very slow with use of his BUEs. Able to Propel (ft): 60 feet (BUEs) Functional Level: 2 Curbs/Ramps Assist Required (FIM Score): 2 (Maximal assistance) Wheelchair Setup Assist Required : 2 (Maximal assistance) LOWER EXTREMITY EXERCISES Daily Assessment Extremity: Both Exercise Type #1: Seated lower extremity strengthening Sets Performed: 2 Reps Performed: 10 Level of Assist: Minimal assistance Exercise Type #2: Standing lower extremity strengthening (Sit to stand 3 times parallel bars) Sets Performed: 1 Reps Performed:  (3) Level of Assist: Moderate assistance (2nd person needed to assist) Assessment: Patient presents very stiff and rigid with all mobility. He reports pain in his LLE knee with flexion and extension and he remains limited in both directions. He appears to have decreased initiation and continues to have a flat affect. He is soft spoken and gets information confused at times. Patient was taken back to his room via his w/c where he remained and was set up for lunch with the call light within reach. Plan of Care: Continue to treat and progress as indicated. Becki Aldana [place] 
9/7/2018

## 2018-09-07 NOTE — PROGRESS NOTES
Subjective \"I want to get better. \" Activity Evaluation, basketball toss with 1 lb wrist weigths Strength/Endurance Patient with extreme decreased activity tolerance. He c/o fatigue and c/o tiredness during the session. Patient had difficulty being comfortable in whichever place he was in. Balance Sit<->stand:  Max (A) X2 with RW  
Social Interaction Patient was soft spoken and with flat affect, making it hard to understand him. Cognitive A&O X3, disoriented to time Comments Patient was frustrated at the beginning of the session with this therapist because he thought this therapist was 50 minutes late. Upon arrival, he commented that \"you are late and I am hurting because I have been sitting up waiting for your RT session. \"  This therapist was 5 minutes late, not 50 but it didn't allow for the initial contact to be a pleasant one. Patient was handed off to Christopher Ahumada at the end of the session. Patient's Recreational Therapy evaluation completed under the [facility] navigation tool on 9/7/18. Please see for specifics regarding plan of care and goals. Patient prefers to be called \"Daev. \" Thank you for the referral. 
Gilma Tai, ERICHS

## 2018-09-07 NOTE — PROGRESS NOTES
End Of Shift Functional Summary, Nursing BLADDER:   
 Pt does take medication. If so, please indicate which medication: Flomax Pt requires catherization. Pt requires staff to empty device. BOWEL: no bm on my shift. BED/CHAIR TRANSFER Pt requires maximum assistance. Pt uses slideboard Does the patient require extra time? yes Does the patient require contact guard assistance? no 
Does the patient require more than one staff member for assistance? yes EATING Pt requires setup. Pt wears dentures. Documentation reviewed and plan of care discussed/reviewed with  
oncoming nurse and patient assistant during the shift.

## 2018-09-07 NOTE — PROGRESS NOTES
End Of Shift Functional Summary, Nursing TOILETING:   
Does patient need assist with adjusting pants up or down and/or pericare? yes If yes, please indicate what the patient needs help with:  all  (i.e. pants up, pants down, pericare) Pt uses wheelchair. Does the patient require extra time? yes Does the patient require standby assistance? yes Does the patient require contact guard assistance? yes Does the patient require more than one staff member for assistance? yes TOILET TRANSFER:   
Pt requires maximum assistance. Pt uses wheelchair. Does the patient require extra time? yes Does the patient require contact guard assistance? yes Does the patient require more than one staff member for assistance? yes BLADDER:   
Pt does not have a salmon catheter that staff manages. Pt does take medication. If so, please indicate which medication: flomax Pt has not been voiding. . Pt requires q 6hr in and out cath. Ellen De La Torre BOWEL:  Pt does take medication. Pt is continent of bowel and uses toilet. Pt requires staff for ambulation. Pt has had 0 bowel movement during this shift. Constipated. .  (An accident is when the episode is not contained in a brief AND/OR the clothing/linen requires changing/cleaning up.) BED/CHAIR TRANSFER Pt requires maximum assistance. Pt uses walker Does the patient require extra time? yes Does the patient require contact guard assistance? yes Does the patient require more than one staff member for assistance? yes Documentation reviewed and plan of care discussed/reviewed with  
patient, oncoming nurse and patient assistant during the shift.

## 2018-09-07 NOTE — PROGRESS NOTES
SFD PROGRESS NOTE Ardie Scheuermann 
Admit Date: 9/6/2018 Chief Complaint : Gait dysfunction secondary to below. Admit Diagnosis: Unilateral primary osteoarthritis, left knee [M17.12] MELANI (acute kidney injury) ([facility] [address] (8/30/2018) CKD (chronic kidney disease) stage 3, GFR 30-59 ml/min (5/8/2018) S/P total knee arthroplasty, left (8/3/2018) Diabetes mellitus type 2, controlled ([facility] [address] (8/15/2018) Sepsis ([address] (8/15/2018) Bipolar disorder ([address] (8/15/2018) Hypotension (8/30/2018) Nausea & vomiting (8/30/2018) Pain DVT risk Post op acute blood loss anemia Hypertension Diabetes ([facility] [address] Spondylolisthesis of lumbar region Neurogenic bladder  -has catheterized once in the past month; urinating more frequently own his own at baseline Acute Rehab Dx: 
Gait impairment Debility   
Mobility and ambulation deficits Self Care/ADL deficits 
  
Subjective Patient seen and examined. Vss. C/o knee pain. PT, OT well tolerated. PT reports significant UE, LE tome stiffness. He anticipates increased pain and tenses up before any movement occurs. Objective:  
 
Current Facility-Administered Medications Medication Dose Route Frequency  [START ON 9/8/2018] aspirin delayed-release tablet 81 mg  81 mg Oral DAILY  mirtazapine (REMERON) tablet 7.5 mg  7.5 mg Oral QHS PRN  
 acetaminophen (TYLENOL) tablet 650 mg  650 mg Oral Q6H PRN  
 buPROPion SR (WELLBUTRIN SR) tablet 150 mg  150 mg Oral BID  dextrose (D50W) injection syrg 25 g  25 g IntraVENous PRN  
 divalproex ER (DEPAKOTE ER) 24 hour tablet 1,000 mg  1,000 mg Oral BID  heparin (porcine) injection 5,000 Units  5,000 Units SubCUTAneous Q12H  
 HYDROcodone-acetaminophen (NORCO) 7.5-325 mg per tablet 2 Tab  2 Tab Oral Q6H PRN  
 insulin regular (NOVOLIN R, HUMULIN R) injection   SubCUTAneous AC&HS  latanoprost (XALATAN) 0.005 % ophthalmic solution 1 Drop  1 Drop Both Eyes QHS  naloxone (NARCAN) injection 0.4 mg  0.4 mg IntraVENous PRN  
 ondansetron (ZOFRAN) injection 4 mg  4 mg IntraVENous Q8H PRN  pantoprazole (PROTONIX) 40 mg in sodium chloride 0.9% 10 mL injection  40 mg IntraVENous DAILY  simvastatin (ZOCOR) tablet 40 mg  40 mg Oral QHS  tamsulosin (FLOMAX) capsule 0.4 mg  0.4 mg Oral QHS  influenza vaccine 2018-19 (6 mos+)(PF) (FLUARIX QUAD/FLULAVAL QUAD) injection 0.5 mL  0.5 mL IntraMUSCular PRIOR TO DISCHARGE  pneumococcal 23-valent (PNEUMOVAX 23) injection 0.5 mL  0.5 mL IntraMUSCular PRIOR TO DISCHARGE Review of Systems: Denies chest pain, shortness of breath, cough, headache, visual problems, abdominal pain, dysurea, calf pain. Pertinent positives are as noted in the medical records and unremarkable otherwise. Visit Vitals  BP 95/61 (BP 1 Location: Right arm, BP Patient Position: At rest)  Pulse 85  Temp 98 °F (36.7 °C)  Resp 16  SpO2 93%  
  
  
Physical Exam: Psych: Patient was oriented x 2. Without hallucinations. Patient is not suicidal.  
General:   Alert, appears stated age, No acute distress. HEENT:  Normocephalic, EOMI, facial symmetry  Intact. Oral mucosa pink and moist. No nasal discharge. Lungs:  CTA Bilaterally,Respiration even and unlabored No apparent use of accessory muscles for respiration. No nasal alar flaring. Heart:  Regular rate and rhythm, S1, S2, No obvious murmur, click, rub or gallop. Genitourinary: defered Abdomen:  Soft, non-tender to palpation in all four quadrants. Bowel sounds present. No obvious masses palpated. Neuromuscular:  PERRL, EOMI Follows simple commands consistently. Able to identify, recall repeat. Mood appropriate. Insight, judgement poor 
+ generalized weakness.   
Sensory - intact No cerebellar signs. Plantars - down going No atrophy, no fasciculations, no tremors. + increased flexor tone BUE, BLE. Skin:  No rashes, no erythema. Calf non tender BLE. boggy L knee. LLE PROM 
L Knee Flexion: 80 (~) 
L Knee Extension: -15(~) Incision:  healing well, clean, dry, and intact  
   
 
                                                                        
Functional Assessment: 
   
   
Balance Sitting - Static: Fair (occasional) (09/07/18 1518) Sitting - Dynamic: Fair (occasional) (09/07/18 1518) Standing - Static: Poor (09/07/18 1500) Standing - Dynamic : Impaired (09/07/18 1200) [facility] Fall Risk Assessment: 
Darryn Sargent Risk Mobility: Ambulates or transfers with assist devices or assistance (09/07/18 0703) Mobility Interventions: Patient to call before getting OOB;Utilize walker, cane, or other assistive device (09/07/18 0703) Mentation: Periodic confusion (09/07/18 0703) Mentation Interventions: Door open when patient unattended;More frequent rounding; Toileting rounds (09/07/18 0703) Medication: Patient receiving anticonvulsants, sedatives(tranquilizers), psychotropics or hypnotics, hypoglycemics, narcotics, sleep aids, antihypertensives, laxatives, or diuretics (09/07/18 0703) Medication Interventions: Patient to call before getting OOB (09/07/18 0703) Elimination: Needs assistance with toileting (09/07/18 0703) Elimination Interventions: Call light in reach; Patient to call for help with toileting needs; Toileting schedule/hourly rounds;Urinal in reach (09/07/18 0703) Prior Fall History: Unknown (09/07/18 0703) Total Score: 4 (09/07/18 0703) High Fall Risk: Yes (09/07/18 0703) Speech Assessment: 
    
 
Ambulation: 
Gait Distance (ft): 0 Feet (ft) (09/06/18 1600) Assistive Device: Walker, rolling;Gait belt (09/06/18 1600) Labs/Studies: 
Recent Results (from the past 72 hour(s)) GLUCOSE, POC Collection Time: 09/04/18  9:57 PM  
Result Value Ref Range Glucose (POC) 150 (H) 65 - 100 mg/dL GLUCOSE, POC  Collection Time: 09/05/18  8:13 AM  
 Result Value Ref Range Glucose (POC) 111 (H) 65 - 100 mg/dL GLUCOSE, POC Collection Time: 09/05/18 11:59 AM  
Result Value Ref Range Glucose (POC) 293 (H) 65 - 100 mg/dL GLUCOSE, POC Collection Time: 09/05/18  4:38 PM  
Result Value Ref Range Glucose (POC) 344 (H) 65 - 100 mg/dL GLUCOSE, POC Collection Time: 09/05/18  9:21 PM  
Result Value Ref Range Glucose (POC) 178 (H) 65 - 100 mg/dL URINALYSIS W/ RFLX MICROSCOPIC Collection Time: 09/06/18  5:14 AM  
Result Value Ref Range Color YELLOW Appearance CLEAR Specific gravity 1.030 (H) 1.001 - 1.023    
 pH (UA) 5.0 5.0 - 9.0 Protein NEGATIVE  NEG mg/dL Glucose >1000 mg/dL Ketone 40 (A) NEG mg/dL Bilirubin NEGATIVE  NEG Blood NEGATIVE  NEG Urobilinogen 0.2 0.2 - 1.0 EU/dL Nitrites NEGATIVE  NEG Leukocyte Esterase NEGATIVE  NEG    
GLUCOSE, POC Collection Time: 09/06/18  7:22 AM  
Result Value Ref Range Glucose (POC) 140 (H) 65 - 100 mg/dL GLUCOSE, POC Collection Time: 09/06/18 11:09 AM  
Result Value Ref Range Glucose (POC) 223 (H) 65 - 100 mg/dL GLUCOSE, POC Collection Time: 09/06/18  4:28 PM  
Result Value Ref Range Glucose (POC) 243 (H) 65 - 100 mg/dL GLUCOSE, POC Collection Time: 09/06/18  8:37 PM  
Result Value Ref Range Glucose (POC) 229 (H) 65 - 100 mg/dL CBC WITH AUTOMATED DIFF Collection Time: 09/07/18  6:31 AM  
Result Value Ref Range WBC 6.1 4.3 - 11.1 K/uL  
 RBC 3.93 (L) 4.23 - 5.6 M/uL  
 HGB 11.4 (L) 13.6 - 17.2 g/dL HCT 37.0 (L) 41.1 - 50.3 % MCV 94.1 79.6 - 97.8 FL  
 MCH 29.0 26.1 - 32.9 PG  
 MCHC 30.8 (L) 31.4 - 35.0 g/dL  
 RDW 14.5 % PLATELET 888 902 - 869 K/uL MPV 9.6 9.4 - 12.3 FL ABSOLUTE NRBC 0.00 0.0 - 0.2 K/uL  
 DF AUTOMATED NEUTROPHILS 54 43 - 78 % LYMPHOCYTES 29 13 - 44 % MONOCYTES 13 (H) 4.0 - 12.0 % EOSINOPHILS 3 0.5 - 7.8 % BASOPHILS 1 0.0 - 2.0 % IMMATURE GRANULOCYTES 1 0.0 - 5.0 % ABS. NEUTROPHILS 3.2 1.7 - 8.2 K/UL  
 ABS. LYMPHOCYTES 1.8 0.5 - 4.6 K/UL  
 ABS. MONOCYTES 0.8 0.1 - 1.3 K/UL  
 ABS. EOSINOPHILS 0.2 0.0 - 0.8 K/UL  
 ABS. BASOPHILS 0.0 0.0 - 0.2 K/UL  
 ABS. IMM. GRANS. 0.0 0.0 - 0.5 K/UL MAGNESIUM Collection Time: 09/07/18  6:31 AM  
Result Value Ref Range Magnesium 1.8 1.8 - 2.4 mg/dL METABOLIC PANEL, BASIC Collection Time: 09/07/18  6:31 AM  
Result Value Ref Range Sodium 141 136 - 145 mmol/L Potassium 4.4 3.5 - 5.1 mmol/L Chloride 104 98 - 107 mmol/L  
 CO2 30 21 - 32 mmol/L Anion gap 7 7 - 16 mmol/L Glucose 141 (H) 65 - 100 mg/dL BUN 22 8 - 23 MG/DL Creatinine 1.01 0.8 - 1.5 MG/DL  
 GFR est AA >60 >60 ml/min/1.73m2 GFR est non-AA >60 >60 ml/min/1.73m2 Calcium 10.4 8.3 - 10.4 MG/DL  
GLUCOSE, POC Collection Time: 09/07/18  7:00 AM  
Result Value Ref Range Glucose (POC) 181 (H) 65 - 100 mg/dL GLUCOSE, POC Collection Time: 09/07/18 11:18 AM  
Result Value Ref Range Glucose (POC) 279 (H) 65 - 100 mg/dL GLUCOSE, POC Collection Time: 09/07/18  4:25 PM  
Result Value Ref Range Glucose (POC) 188 (H) 65 - 100 mg/dL Assessment:  
 
Problem List as of 9/7/2018  Date Reviewed: 8/30/2018 Codes Class Noted - Resolved Debility ICD-10-CM: R53.81 ICD-9-CM: 799.3  9/6/2018 - Present Total knee replacement status, left ICD-10-CM: K13.371 ICD-9-CM: V43.65  9/6/2018 - Present Hypotension ICD-10-CM: I95.9 ICD-9-CM: 458.9  8/30/2018 - Present Nausea & vomiting ICD-10-CM: R11.2 ICD-9-CM: 787.01  8/30/2018 - Present MELANI (acute kidney injury) ([facility] ICD-10-CM: N17.9 ICD-9-CM: 584.9  8/30/2018 - Present Fever ICD-10-CM: R50.9 ICD-9-CM: 780.60  8/15/2018 - Present Volume depletion ICD-10-CM: E86.9 ICD-9-CM: 276.50  8/15/2018 - Present Diabetes mellitus type 2, controlled ([facility] ICD-10-CM: E11.9 ICD-9-CM: 250.00  8/15/2018 - Present Hyperlipidemia ICD-10-CM: E78.5 ICD-9-CM: 272.4  8/15/2018 - Present Essential hypertension ICD-10-CM: I10 
ICD-9-CM: 401.9  8/15/2018 - Present Sepsis ([facility] ICD-10-CM: A41.9 ICD-9-CM: 038.9, 995.91  8/15/2018 - Present Acute cystitis without hematuria ICD-10-CM: N30.00 ICD-9-CM: 595.0  8/15/2018 - Present Bipolar disorder ([facility] ICD-10-CM: F31.9 ICD-9-CM: 296.80  8/15/2018 - Present S/P total knee arthroplasty, left ICD-10-CM: N06.487 ICD-9-CM: V43.65  8/3/2018 - Present Osteoarthritis ICD-10-CM: M19.90 ICD-9-CM: 715.90  8/2/2018 - Present Abnormal urinalysis ICD-10-CM: R82.90 ICD-9-CM: 791.9  7/26/2018 - Present CKD (chronic kidney disease) stage 3, GFR 30-59 ml/min ICD-10-CM: N18.3 ICD-9-CM: 585.3  5/8/2018 - Present Elevated white blood cell count ICD-10-CM: F31.374 ICD-9-CM: 288.60  5/8/2018 - Present Plan:  
 
Assessment: 
  
The Post Assessment Physician Evaluation (BURAK) found the current functional status to be comparable with the Pre-admission Screening. The Patient is a good candidate for acute inpatient rehabilitation. Nothing since the Pre-admission screen has changed that determination.  
  
Rehabilitation Plan The patient has shown the ability to tolerate and benefit from 3 hours of therapy daily and is being admitted to a comprehensive acute inpatient rehabilitation program consisting of at least 3 hours of combined physical and occupational therapies. - intensive Physical Therapy for a minimum of 1.5 hours a day, at least 5 out of 7 days per week to address bed mobility, transfers, ambulation, strengthening, balance, and endurance. - intensive Occupational Therapy for a minimum of 1.5 hours a day, at least 5 out of 7 days per week to address ADL ( bathing, LE dressing, toileting) and adaptive equipment as needed.  
- barriers will be pain, poor L knee ROM, deconditioned state.  
  
  
 Continue ST for: cognitive deficits, may be baseline, or acutely deteriorating due to recent sepsis, on-going medical issues.  
  
Continue 24-hour skilled rehabilitation nursing for bowel and bladder management, skin care for decubitus ulcer prevention , pain management and ongoing medication administration  
  
The patient may benefit from a psychology consult for depression, anxiety and adjustment disorder. Patient with underlying bi-polar disorder on medications. 
  
Continue daily physician medical management: 
  
MELANI (acute kidney injury)  )/ CKD (chronic kidney disease) stage 3, GFR 30-59 ml/min (5/8/2018) 
- monitor renal funcion closely. - encourage po hydration. ivf as needed. hold Cozaar, lasix. - Cr improved. Start norvasc.  
  
S/P left total knee arthroplasty (8/3/2018) - monitor wound for late infection. Closed. - focus on strength, ROM. PT evaluate for CPM.  
- jerald hold on CPM until patient's tone better evaluated,.  
  
Sepsis - monitor. Treated.  
  
Bipolar disorder  - continue PTA medications. Depakote, wellbutrin.  
   
Post op acute blood loss anemia - monitor - 9/7 - hgb 11. 4.  
   
Spondylolisthesis of lumbar region 
  
  
Pneumonia prophylaxis- Insentive spirometer every hour while awake 
  
DVT risk / DVT Prophylaxis- Will require daily physician exam to assess for signs and symptoms as patient is at increased risk for of thromboembolism. Mobilization as tolerated. Intermittent pneumatic compression devices when in bed Thigh-high or knee-high thromboembolic deterrent hose when out of bed.  
- resume heparin q12h. Will wean aspirin from ortho.  
  
Pain Control: stable, mild-to-moderate joint symptoms intermittently, reasonably well controlled by PRN meds. Will require regular pain assessment and comprenhensive pain management,  
  
Wound Care: Monitor wound status daily per staff and physician. At risk for failure. Will require 24/7 rehab nursing.  Keep wound clean and dry 
  
 Hypertension/ hypotension - BP uncontrolled, fluctuating, managed medically. - cozaar, lasix held due to MELANI.  
  
- Will resume antihypertesives, diuretics as appropriate.  
  
Diabetes mellitus type 2- Uncontrolled. poor glycemic control. Will require daily, close FSG monitoring and medication adjustment to optimize glycemic control in setting of acute illness and hospitalization. - will resume glucotrol. Start 5mg dose. Pt was on glucotrol SR 10mg. - continue SSI.  
  
Urinary retention/ neurogenic bladder -- patient has history of \" neurogenic bladder. Has catheterized once in the past month; urinating more frequently own his own at baseline 
 - will observe pattern. schedule voids q6-8 hrs. Check post-void residual every shift; In and Out catheterize if post-void residual is more than 400 cc. 
- on flomax 
  
bowel program - start abbie colace 
  
GERD - resume PPI. At times may need additional antacids, Maalox prn. 
  
 
Time spent was 25 minutes with over 1/2 in direct patient care/examination, consultation and coordination of care. Signed By: Brant Ryder MD   
 September 7, 2018

## 2018-09-07 NOTE — PROGRESS NOTES
Problem: Falls - Risk of 
Goal: *Absence of Falls Document Mike Moncada Fall Risk and appropriate interventions in the flowsheet. Outcome: Progressing Towards Goal 
Fall Risk Interventions: 
Mobility Interventions: Bed/chair exit alarm, Utilize walker, cane, or other assistive device Mentation Interventions: Increase mobility Medication Interventions: Patient to call before getting OOB Elimination Interventions: Call light in reach

## 2018-09-07 NOTE — PROGRESS NOTES
PHYSICAL THERAPY DAILY NOTE Time In: 1430 Time Out: 1515 Patient Seen For: AM;Therapeutic exercise;Transfer training; Wheelchair mobility Subjective: \"Can we just go back to my room, I am too tired\" Objective:  Vital Signs:   
Patient Vitals for the past 12 hrs: 
 Temp Pulse Resp BP SpO2  
09/07/18 0758 98.2 °F (36.8 °C) 88 18 152/72 96 % Pain level:  Patient complains of LLE pain with any type of movement. He anticipates increased pain and tenses up before any movement occurs. Patient education:  Encouraged the patient to move his legs more on his own and not stay so stiff and rigid Other (comment) (fall precautions  WBAT LLE) BED/MAT MOBILITY Daily Assessment Rolling Right : 1 (Total assistance) Rolling Left : 1 (Total assistance) Supine to Sit : 2 (Maximal assistance) Sit to Supine : 2 (Maximal assistance) TRANSFERS Daily Assessment Transitioned to a SBT secondary to increased difficulty with SPT. Patient still required mod assist x 2 people but he could help more using the board. Needs verbal cues for forward bending during transfer Transfer Type: Other (SBT) Transfer Assistance : 2 (Moderate assistance) (2nd person for safety) Sit to Stand Assistance: Total assistance Car Transfers: Not tested BALANCE Daily Assessment Sitting - Static: Fair (occasional) Sitting - Dynamic: Fair (occasional) Standing - Static: Poor Standing - Dynamic : Impaired WHEELCHAIR MOBILITY Daily Assessment Needs verbal cues with use of BUEs for continued propulsion and for reaching back with his BUEs to get a better push. He is very slow with mobility. Able to Propel (ft): 75 feet (BUEs with verbal cues) Functional Level: 2 Curbs/Ramps Assist Required (FIM Score): 2 (Maximal assistance) Wheelchair Setup Assist Required : 2 (Maximal assistance) LOWER EXTREMITY EXERCISES Daily Assessment Patient tolerated the motomed at a very slow speed for 7 minutes for LLE flexion and extension active movements. Extremity: Both Exercise Type #1: Seated lower extremity strengthening (Motomed x 7 minutes at speed level 5 no resistance) Sets Performed: 2 Reps Performed: 10 Level of Assist: Supervision Exercise Type #2: Seated lower extremity strengthening (LLE focus on flexion/extension and hip flexion) Sets Performed: 2 Reps Performed: 10 Level of Assist: Minimal assistance Assessment: Overall patient is max assist with low endurance and tolerance for therapy. He did participate but needs much encouragement and fatigues quickly with all tasks. He is sensitive to LLE knee movements and anticipates more pain than actually occurs. He is very soft spoken and hard to hear at times. He has 2 prevalone boots for BLE heel protection. Patient was taken back to his room via w/c and then was assisted in a SBT w/c to bed with mod. Assist x 2. He was positioned for comfort and worked on LLE ankle pumps and knee extension before ending the treatment with the call light within reach. Plan of Care: Continue to treat and progress as indicated and tolerated. Kaden Vanderbilt, [place] 
9/7/2018

## 2018-09-08 NOTE — PROGRESS NOTES
BLADDER:   
 Pt does take medication. If so, please indicate which medication: Flomax Pt requires catherization. Pt requires staff to empty device.  
  
BOWEL: no bm on my shift.

## 2018-09-08 NOTE — PROGRESS NOTES
09/08/18 1218 Time Spent With Patient Time In 7222 Time Out 1059 Patient Seen For: AM;Other (see progress notes) Patient seen for UE strengthening, cognition and standing balance. Patient completed 5 minutes forward and 5 minutes back on UE restorator bicycle. Slow pace at min resistance. Completed 9 shape puzzle with 10 verbal cues for completion. Standing tolerance at table x 2 minutes with mod A while completing puzzle. Batted balloon with 3# dowel 3 sets x 15 reps. Patient requested to stay in wc in room with all essentials within reach. Continue POC. Juanis Gerber 9/8/2018

## 2018-09-08 NOTE — PROGRESS NOTES
Tamera Harley MD, Medical Director Kavon Sena [address], [address] [facility] Tel: [phone] D PROGRESS NOTE Lorri Richey 
Admit Date: 9/6/2018 Admit Diagnosis: Rehab; Total knee replacement status, left; Sepsis ([address] Marlen* Subjective Sleepy but awakens to voice. Per RN , requiring CIC; 900ml at 6am cathed. Had to use a coude cath but pt still yells out \" you are killing me! \"  He is frustrated. Very weak Objective:  
 
Current Facility-Administered Medications Medication Dose Route Frequency  aspirin delayed-release tablet 81 mg  81 mg Oral DAILY  mirtazapine (REMERON) tablet 7.5 mg  7.5 mg Oral QHS PRN  
 amLODIPine (NORVASC) tablet 5 mg  5 mg Oral DAILY  glipiZIDE (GLUCOTROL) tablet 5 mg  5 mg Oral ACB  senna-docusate (PERICOLACE) 8.6-50 mg per tablet 1 Tab  1 Tab Oral DAILY  acetaminophen (TYLENOL) tablet 650 mg  650 mg Oral Q6H PRN  
 buPROPion SR (WELLBUTRIN SR) tablet 150 mg  150 mg Oral BID  dextrose (D50W) injection syrg 25 g  25 g IntraVENous PRN  
 divalproex ER (DEPAKOTE ER) 24 hour tablet 1,000 mg  1,000 mg Oral BID  heparin (porcine) injection 5,000 Units  5,000 Units SubCUTAneous Q12H  
 HYDROcodone-acetaminophen (NORCO) 7.5-325 mg per tablet 2 Tab  2 Tab Oral Q6H PRN  
 insulin regular (NOVOLIN R, HUMULIN R) injection   SubCUTAneous AC&HS  latanoprost (XALATAN) 0.005 % ophthalmic solution 1 Drop  1 Drop Both Eyes QHS  naloxone (NARCAN) injection 0.4 mg  0.4 mg IntraVENous PRN  
 ondansetron (ZOFRAN) injection 4 mg  4 mg IntraVENous Q8H PRN  pantoprazole (PROTONIX) 40 mg in sodium chloride 0.9% 10 mL injection  40 mg IntraVENous DAILY  simvastatin (ZOCOR) tablet 40 mg  40 mg Oral QHS  tamsulosin (FLOMAX) capsule 0.4 mg  0.4 mg Oral QHS  influenza vaccine 2018-19 (6 mos+)(PF) (FLUARIX QUAD/FLULAVAL QUAD) injection 0.5 mL  0.5 mL IntraMUSCular PRIOR TO DISCHARGE  
  pneumococcal 23-valent (PNEUMOVAX 23) injection 0.5 mL  0.5 mL IntraMUSCular PRIOR TO DISCHARGE Review of Systems:Denies chest pain, shortness of breath, cough, headache, visual problems, abdominal pain, dysurea, calf pain. Pertinent positives are as noted in the medical records and unremarkable otherwise. Visit Vitals  /73 (BP 1 Location: Left arm)  Pulse 99  Temp 98.2 °F (36.8 °C)  Resp 16  SpO2 96% Physical Exam:  
General: Alert and age appropriately oriented x to time No acute cardio respiratory distress. HEENT: Normocephalic,no scleral icterus Oral mucosa moist without cyanosis Lungs: Clear to auscultation  bilaterally. Respiration even and unlabored Heart: Regular rate and rhythm, S1, S2 No  murmurs, clicks, rub or gallops Abdomen: Soft, non-tender, nondistended. Bowel sounds present. No organomegaly. Can palpate a full bladder, causes discomfort; given urinal , unable to void Genitourinary: defer Neuromuscular:  
 
 Diffuse weakness; resists any movement to LLE, tone 2+ Affect flat Sensation intact Skin/extremity: No rashes, no erythema. No calf tenderness BLE Left knee incision is well healed Functional Assessment: 
   
   
Balance Sitting - Static: Fair (occasional) (09/07/18 1518) Sitting - Dynamic: Fair (occasional) (09/07/18 1518) Standing - Static: Poor (09/07/18 1500) Standing - Dynamic : Impaired (09/07/18 1200) Omero Hunger Fall Risk Assessment: 
Belvin Dies Risk Mobility: Ambulates or transfers with assist devices or assistance (09/08/18 0728) Mobility Interventions: Patient to call before getting OOB (09/07/18 1912) Mentation: Periodic confusion (09/07/18 1912) Mentation Interventions: Door open when patient unattended (09/07/18 1912) Medication: Patient receiving anticonvulsants, sedatives(tranquilizers), psychotropics or hypnotics, hypoglycemics, narcotics, sleep aids, antihypertensives, laxatives, or diuretics (09/07/18 1912) Medication Interventions: Patient to call before getting OOB (09/07/18 1912) Elimination: Needs assistance with toileting (09/07/18 1912) Elimination Interventions: Call light in reach; Patient to call for help with toileting needs; Toilet paper/wipes in reach; Toileting schedule/hourly rounds;Urinal in reach (09/07/18 1912) Prior Fall History: Unknown (09/07/18 1912) Total Score: 4 (09/07/18 1912) High Fall Risk: Yes (09/07/18 1912) Speech Assessment: 
    
 
Ambulation: 
Gait Distance (ft): 0 Feet (ft) (09/06/18 1600) Assistive Device: Walker, rolling;Gait belt (09/06/18 1600) Labs/Studies: 
Recent Results (from the past 72 hour(s)) GLUCOSE, POC Collection Time: 09/05/18 11:59 AM  
Result Value Ref Range Glucose (POC) 293 (H) 65 - 100 mg/dL GLUCOSE, POC Collection Time: 09/05/18  4:38 PM  
Result Value Ref Range Glucose (POC) 344 (H) 65 - 100 mg/dL GLUCOSE, POC Collection Time: 09/05/18  9:21 PM  
Result Value Ref Range Glucose (POC) 178 (H) 65 - 100 mg/dL URINALYSIS W/ RFLX MICROSCOPIC Collection Time: 09/06/18  5:14 AM  
Result Value Ref Range Color YELLOW Appearance CLEAR Specific gravity 1.030 (H) 1.001 - 1.023    
 pH (UA) 5.0 5.0 - 9.0 Protein NEGATIVE  NEG mg/dL Glucose >1000 mg/dL Ketone 40 (A) NEG mg/dL Bilirubin NEGATIVE  NEG Blood NEGATIVE  NEG Urobilinogen 0.2 0.2 - 1.0 EU/dL Nitrites NEGATIVE  NEG Leukocyte Esterase NEGATIVE  NEG    
GLUCOSE, POC Collection Time: 09/06/18  7:22 AM  
Result Value Ref Range Glucose (POC) 140 (H) 65 - 100 mg/dL GLUCOSE, POC Collection Time: 09/06/18 11:09 AM  
Result Value Ref Range Glucose (POC) 223 (H) 65 - 100 mg/dL GLUCOSE, POC Collection Time: 09/06/18  4:28 PM  
Result Value Ref Range Glucose (POC) 243 (H) 65 - 100 mg/dL GLUCOSE, POC Collection Time: 09/06/18  8:37 PM  
Result Value Ref Range Glucose (POC) 229 (H) 65 - 100 mg/dL CBC WITH AUTOMATED DIFF Collection Time: 09/07/18  6:31 AM  
Result Value Ref Range WBC 6.1 4.3 - 11.1 K/uL  
 RBC 3.93 (L) 4.23 - 5.6 M/uL  
 HGB 11.4 (L) 13.6 - 17.2 g/dL HCT 37.0 (L) 41.1 - 50.3 % MCV 94.1 79.6 - 97.8 FL  
 MCH 29.0 26.1 - 32.9 PG  
 MCHC 30.8 (L) 31.4 - 35.0 g/dL  
 RDW 14.5 % PLATELET 293 590 - 648 K/uL MPV 9.6 9.4 - 12.3 FL ABSOLUTE NRBC 0.00 0.0 - 0.2 K/uL  
 DF AUTOMATED NEUTROPHILS 54 43 - 78 % LYMPHOCYTES 29 13 - 44 % MONOCYTES 13 (H) 4.0 - 12.0 % EOSINOPHILS 3 0.5 - 7.8 % BASOPHILS 1 0.0 - 2.0 % IMMATURE GRANULOCYTES 1 0.0 - 5.0 %  
 ABS. NEUTROPHILS 3.2 1.7 - 8.2 K/UL  
 ABS. LYMPHOCYTES 1.8 0.5 - 4.6 K/UL  
 ABS. MONOCYTES 0.8 0.1 - 1.3 K/UL  
 ABS. EOSINOPHILS 0.2 0.0 - 0.8 K/UL  
 ABS. BASOPHILS 0.0 0.0 - 0.2 K/UL  
 ABS. IMM. GRANS. 0.0 0.0 - 0.5 K/UL MAGNESIUM Collection Time: 09/07/18  6:31 AM  
Result Value Ref Range Magnesium 1.8 1.8 - 2.4 mg/dL METABOLIC PANEL, BASIC Collection Time: 09/07/18  6:31 AM  
Result Value Ref Range Sodium 141 136 - 145 mmol/L Potassium 4.4 3.5 - 5.1 mmol/L Chloride 104 98 - 107 mmol/L  
 CO2 30 21 - 32 mmol/L Anion gap 7 7 - 16 mmol/L Glucose 141 (H) 65 - 100 mg/dL BUN 22 8 - 23 MG/DL Creatinine 1.01 0.8 - 1.5 MG/DL  
 GFR est AA >60 >60 ml/min/1.73m2 GFR est non-AA >60 >60 ml/min/1.73m2 Calcium 10.4 8.3 - 10.4 MG/DL  
GLUCOSE, POC Collection Time: 09/07/18  7:00 AM  
Result Value Ref Range Glucose (POC) 181 (H) 65 - 100 mg/dL GLUCOSE, POC Collection Time: 09/07/18 11:18 AM  
Result Value Ref Range Glucose (POC) 279 (H) 65 - 100 mg/dL GLUCOSE, POC Collection Time: 09/07/18  4:25 PM  
Result Value Ref Range Glucose (POC) 188 (H) 65 - 100 mg/dL GLUCOSE, POC Collection Time: 09/07/18  9:38 PM  
Result Value Ref Range Glucose (POC) 173 (H) 65 - 100 mg/dL GLUCOSE, POC Collection Time: 09/08/18  7:21 AM  
Result Value Ref Range Glucose (POC) 133 (H) 65 - 100 mg/dL Assessment:  
 
Problem List as of 9/8/2018  Date Reviewed: 8/30/2018 Codes Class Noted - Resolved Debility ICD-10-CM: R53.81 ICD-9-CM: 799.3  9/6/2018 - Present Total knee replacement status, left ICD-10-CM: C23.374 ICD-9-CM: V43.65  9/6/2018 - Present Hypotension ICD-10-CM: I95.9 ICD-9-CM: 458.9  8/30/2018 - Present Nausea & vomiting ICD-10-CM: R11.2 ICD-9-CM: 787.01  8/30/2018 - Present MELANI (acute kidney injury) ([facility] ICD-10-CM: N17.9 ICD-9-CM: 584.9  8/30/2018 - Present Fever ICD-10-CM: R50.9 ICD-9-CM: 780.60  8/15/2018 - Present Volume depletion ICD-10-CM: E86.9 ICD-9-CM: 276.50  8/15/2018 - Present Diabetes mellitus type 2, controlled ([facility] ICD-10-CM: E11.9 ICD-9-CM: 250.00  8/15/2018 - Present Hyperlipidemia ICD-10-CM: E78.5 ICD-9-CM: 272.4  8/15/2018 - Present Essential hypertension ICD-10-CM: I10 
ICD-9-CM: 401.9  8/15/2018 - Present Sepsis ([facility] ICD-10-CM: A41.9 ICD-9-CM: 038.9, 995.91  8/15/2018 - Present Acute cystitis without hematuria ICD-10-CM: N30.00 ICD-9-CM: 595.0  8/15/2018 - Present Bipolar disorder ([facility] ICD-10-CM: F31.9 ICD-9-CM: 296.80  8/15/2018 - Present S/P total knee arthroplasty, left ICD-10-CM: X75.376 ICD-9-CM: V43.65  8/3/2018 - Present Osteoarthritis ICD-10-CM: M19.90 ICD-9-CM: 715.90  8/2/2018 - Present Abnormal urinalysis ICD-10-CM: R82.90 ICD-9-CM: 791.9  7/26/2018 - Present CKD (chronic kidney disease) stage 3, GFR 30-59 ml/min ICD-10-CM: N18.3 ICD-9-CM: 585.3  5/8/2018 - Present Elevated white blood cell count ICD-10-CM: Z43.053 ICD-9-CM: 288.60  5/8/2018 - Present S/p L TKA with sepsis and profound debilitation 
 
   
Continue daily physician medical management:    
MELANI (acute kidney injury)  )/ CKD (chronic kidney disease) stage 3, GFR 30-59 ml/min (5/8/2018) 
- monitor renal funcion closely. - encourage po hydration. ivf as needed. 
 hold Cozaar, lasix. - Cr improved. Start norvasc.  
   
S/P left total knee arthroplasty (8/3/2018) - monitor wound for late infection. Closed. - focus on strength, ROM. PT evaluate for CPM.  
- jerald hold on CPM until patient's tone better evaluated,. ; 9/8 a lot of pts tone is voluntary in anticipation of movement; doesn't tolerated much PROM, doubt he'd tolerate CPM as well; not sure if anti spasticity meds would help and would like to avoid muscle relaxants that would further alter mental status 
   
Sepsis - monitor. Treated. -afebrile, WBC nl 
   
Bipolar disorder  - continue PTA medications. Depakote, wellbutrin. Cont remeron at [employer] op acute blood loss anemia - monitor - 9/7 - hgb 11. 4.  
   
Pneumonia prophylaxis- Incentive spirometer every hour while awake 
-lungs clear 
   
DVT risk / DVT Prophylaxis- Will require daily physician exam to assess for signs and symptoms as patient is at increased risk for of thromboembolism. Mobilization as tolerated. Intermittent pneumatic compression devices when in bed Thigh-high or knee-high thromboembolic deterrent hose when out of bed.  
- resume heparin q12h. Will wean aspirin from ortho.  
-hi risk due to prolonged immobility 
   
Pain Control: stable, mild-to-moderate joint symptoms intermittently, reasonably well controlled by PRN meds. Will require regular pain assessment and comprenhensive pain management,  
-prn [place] 
   
Wound Care: Monitor wound status daily per staff and physician. At risk for failure. Will require 24/7 rehab nursing.  Keep wound clean and dry 
-well healed left knee incision  
   
Hypertension/ hypotension - BP uncontrolled, fluctuating, managed medically.   
- cozaar, lasix held due to MELANI. ; 9/8 pt on Norvasc alone, -176 past 24hrs; monitor 
   
 Diabetes mellitus type 2- Uncontrolled. poor glycemic control. Will require daily, close FSG monitoring and medication adjustment to optimize glycemic control in setting of acute illness and hospitalization. - will resume glucotrol. Start 5mg dose. Pt was on glucotrol SR 10mg. - continue SSI. -9/8 bs 133, 173,188; fair control 
   
Urinary retention/ neurogenic bladder -- patient has history of \" neurogenic bladder. Has catheterized once in the past month; urinating more frequently own his own at baseline 
 - will observe pattern.  schedule voids q6-8 hrs. Check post-void residual every shift; In and Out catheterize if post-void residual is more than 400 cc. 
- on flomax 
-9/8 not voiding, requiring CIC with volumes > 600; doubt he could self cath given extent of weakness; best thing would to replace salmon due to the pain he complains of even with coude cath; will try lidocaine gel and add urecholine to see if we can get bladder to contract better. 
   
bowel program - start abbie colace 
   
GERD -on Protonix IV; ? Change to po? Junius El At times may need additional antacids, Maalox prn. 
   
 
Time spent was 25 minutes with over 1/2 in direct patient care/examination, consultation and coordination of care. Signed By: Doron Figueroa MD   
 September 8, 2018

## 2018-09-08 NOTE — PROGRESS NOTES
Problem: Falls - Risk of 
Goal: *Absence of Falls Document Haley Mack Fall Risk and appropriate interventions in the flowsheet. Outcome: Progressing Towards Goal 
Fall Risk Interventions: 
Mobility Interventions: Bed/chair exit alarm, Patient to call before getting OOB, Utilize walker, cane, or other assistive device Mentation Interventions: Adequate sleep, hydration, pain control, Door open when patient unattended Medication Interventions: Evaluate medications/consider consulting pharmacy, Patient to call before getting OOB Elimination Interventions: Call light in reach, Patient to call for help with toileting needs

## 2018-09-08 NOTE — PROGRESS NOTES
Problem: Falls - Risk of 
Goal: *Absence of Falls Document [facility] Fall Risk and appropriate interventions in the flowsheet. Outcome: Progressing Towards Goal 
Fall Risk Interventions: 
Mobility Interventions: Patient to call before getting OOB Mentation Interventions: Door open when patient unattended Medication Interventions: Patient to call before getting OOB Elimination Interventions: Call light in reach, Patient to call for help with toileting needs, Toilet paper/wipes in reach, Toileting schedule/hourly rounds, Urinal in reach

## 2018-09-08 NOTE — PROGRESS NOTES
PHYSICAL THERAPY DAILY NOTE Time In: 3109 Time Out: 4601 Patient Seen For: AM;Patient education; Therapeutic exercise;Transfer training Subjective: \"Standing is hard. \" Patient agreeable to therapy. Objective: Vital Signs:  
Patient Vitals for the past 8 hrs: 
 Temp Pulse Resp BP SpO2  
09/08/18 0726 98.2 °F (36.8 °C) 99 16 126/73 96 % Pain level: No pain reported. Pain location: n/a Pain interventions: n/a Patient education: Educated patient on safety with slide board transfer. Interdisciplinary Communication: Communicated with Raisa Domínguez regarding patient's POC. Other (comment) (fall precautions  WBAT LLE) GROSS ASSESSMENT Daily Assessment BED/MAT MOBILITY Daily Assessment Assist x 2 for rolling and donning brief and shorts. Patient rolled to L and R x2 each with MOD A for stability. Rolling Right : 2 (Maximal assistance) Rolling Left : 2 (Maximal assistance) Supine to Sit : 1 (Total assistance) TRANSFERS Daily Assessment Patient required assist x2 for slide board transfer to patient's L from bed to w/c level surface. Patient demonstrated decreased anterior weight shift for scooting. Transfer Type: Lateral with transfer board Transfer Assistance : 1 (Total assistance) Sit to Stand Assistance: Total assistance Car Transfers: Not tested Patient stood at elevated desk with assist x2 for sit>stand and balance in standing. Patient lacked full L knee extension in standing with moderate forward head posture. Patient stood 15 seconds with assist x2 for lowering safely into w/c. BALANCE Daily Assessment Sitting - Static: Fair (occasional) Sitting - Dynamic: Fair (occasional) Standing - Static: Poor LOWER EXTREMITY EXERCISES Daily Assessment Extremity: Both Exercise Type #1: Seated lower extremity strengthening (L LE focus on L knee flexion/extension, hip flexion) Patient handed off to Raisa Domínguez in therapy gym. Assessment: Patient continues to present with decreased independence with functional mobility. Plan of Care: Continue with POC and progress as tolerated. Abiola Wilson 9/8/2018

## 2018-09-09 NOTE — PROGRESS NOTES
PHYSICAL THERAPY DAILY NOTE Time In: 9777 Time Out: 1141 Patient Seen For: AM;Patient education;Transfer training; Therapeutic exercise Subjective: \"What is the plan for me? \" Patient agreeable to therapy. Objective: Vital Signs:  
Patient Vitals for the past 8 hrs: 
 Temp Pulse Resp BP SpO2  
09/09/18 0727 97.8 °F (36.6 °C) 64 16 120/73 97 % Pain level: 5/10 Pain location: L knee Pain interventions: Positioned for comfort, provided rest breaks, notified Carly Bae RN Patient education: Educated patient on body mechanics with bed mobility and lateral transfer with transfer board. Interdisciplinary Communication: Communicated with Carly Bae RN regarding patient's POC. Other (comment) (fall precautions  WBAT LLE) GROSS ASSESSMENT Daily Assessment BED/MAT MOBILITY Daily Assessment Required for safety. Patient rolled x1 each way for donning shorts with MAX A. Patient rolled x1 each way for doffing shorts and placing bed pan under backside. Rolling Right : 2 (Maximal assistance) Rolling Left : 2 (Maximal assistance) Supine to Sit : 1 (Total assistance) (assist x 2) Sit to Supine : 1 (Total assistance) (assist x2) TRANSFERS Daily Assessment Patient requires MOD A x2 with lateral transfer with transfer board with verbal cues for hand placement for safety. Patient has tendency to lean posteriorly with unsupported sitting. Transfer Type: Lateral with transfer board Transfer Assistance : 1 (Total assistance) Sit to Stand Assistance: Unable at this time Car Transfers: Not tested STEPS or STAIRS Daily Assessment BALANCE Daily Assessment Sitting - Static: Fair (occasional) Sitting - Dynamic: Fair (occasional) LOWER EXTREMITY EXERCISES Daily Assessment Extremity: Both Exercise Type #1: Seated lower extremity strengthening (L LE flexion/extension of knee, flexion of hip AROM) Exercise Type #2: Other (comment) (B LE motomed x10 minutes, no resistance) Patient returned to room lying supine in bed with all needs in reach. Assessment: Patient making slow progress with functional mobility. Plan of Care: Continue with POC and progress as tolerated. Brian Monge 9/9/2018

## 2018-09-09 NOTE — PROGRESS NOTES
Brown Menchaca MD, Medical Director Kavon Sena [address], [address] [facility] Tel: [phone] [facility] PROGRESS NOTE Audrey Parsons 
Admit Date: 9/6/2018 Admit Diagnosis: Rehab; Total knee replacement status, left; Sepsis ([address] Marlen* Subjective No bm several days per nursing. No n/v. No abd pain. + flatus. Sleepy . SAMAYOA replaced Objective:  
 
Current Facility-Administered Medications Medication Dose Route Frequency  polyethylene glycol (MIRALAX) packet 17 g  17 g Oral DAILY  bisacodyl (DULCOLAX) tablet 10 mg  10 mg Oral DAILY PRN  
 sodium phosphate (FLEET'S) enema 1 Enema  1 Enema Rectal NOW  
 bisacodyl (DULCOLAX) suppository 10 mg  10 mg Rectal DAILY PRN  
 white petrolatum-mineral oil (EUCERIN) cream   Topical PRN  
 aspirin delayed-release tablet 81 mg  81 mg Oral DAILY  mirtazapine (REMERON) tablet 7.5 mg  7.5 mg Oral QHS PRN  
 amLODIPine (NORVASC) tablet 5 mg  5 mg Oral DAILY  glipiZIDE (GLUCOTROL) tablet 5 mg  5 mg Oral ACB  senna-docusate (PERICOLACE) 8.6-50 mg per tablet 1 Tab  1 Tab Oral DAILY  acetaminophen (TYLENOL) tablet 650 mg  650 mg Oral Q6H PRN  
 buPROPion SR (WELLBUTRIN SR) tablet 150 mg  150 mg Oral BID  dextrose (D50W) injection syrg 25 g  25 g IntraVENous PRN  
 divalproex ER (DEPAKOTE ER) 24 hour tablet 1,000 mg  1,000 mg Oral BID  heparin (porcine) injection 5,000 Units  5,000 Units SubCUTAneous Q12H  
 HYDROcodone-acetaminophen (NORCO) 7.5-325 mg per tablet 2 Tab  2 Tab Oral Q6H PRN  
 insulin regular (NOVOLIN R, HUMULIN R) injection   SubCUTAneous AC&HS  latanoprost (XALATAN) 0.005 % ophthalmic solution 1 Drop  1 Drop Both Eyes QHS  naloxone (NARCAN) injection 0.4 mg  0.4 mg IntraVENous PRN  
 ondansetron (ZOFRAN) injection 4 mg  4 mg IntraVENous Q8H PRN  pantoprazole (PROTONIX) 40 mg in sodium chloride 0.9% 10 mL injection  40 mg IntraVENous DAILY  simvastatin (ZOCOR) tablet 40 mg  40 mg Oral QHS  tamsulosin (FLOMAX) capsule 0.4 mg  0.4 mg Oral QHS  influenza vaccine 2018-19 (6 mos+)(PF) (FLUARIX QUAD/FLULAVAL QUAD) injection 0.5 mL  0.5 mL IntraMUSCular PRIOR TO DISCHARGE  pneumococcal 23-valent (PNEUMOVAX 23) injection 0.5 mL  0.5 mL IntraMUSCular PRIOR TO DISCHARGE Review of Systems:Denies chest pain, shortness of breath, cough, headache, visual problems, abdominal pain, dysurea, calf pain. Pertinent positives are as noted in the medical records and unremarkable otherwise. Visit Vitals  /73 (BP 1 Location: Left arm)  Pulse 64  Temp 97.8 °F (36.6 °C)  Resp 16  SpO2 97% Physical Exam:  
General: Alert and  Oriented x 2 No acute cardio respiratory distress. HEENT: Normocephalic,no scleral icterus Oral mucosa moist without cyanosis Lungs: Clear to auscultation  bilaterally. Respiration even and unlabored Heart: Regular rate and rhythm, S1, S2 No  murmurs, clicks, rub or gallops Abdomen: Soft, non-tender, nondistended. Bowel sounds present. No organomegaly. Genitourinary: salmon . Neuromuscular:  
 
 Uncooperative with strength testing. Increased tone, voluntary to some point; very resistant to allow ROM left knee No sensory deficits distally. Exam limited by pain. Skin/extremity: No rashes, no erythema. No calf tenderness BLE Wound healing well Functional Assessment: 
   
   
Balance Sitting - Static: Fair (occasional) (09/08/18 1200) Sitting - Dynamic: Fair (occasional) (09/08/18 1200) Standing - Static: Poor (09/08/18 1200) Standing - Dynamic : Impaired (09/07/18 1200) Winston Jenkins Fall Risk Assessment: 
Good Danger Risk Mobility: Ambulates or transfers with assist devices or assistance (09/09/18 0700) Mobility Interventions: Bed/chair exit alarm (09/09/18 1955) Mentation: Alert, oriented x 3 (09/09/18 0434) Mentation Interventions: Adequate sleep, hydration, pain control (09/09/18 0312) Medication: Patient receiving anticonvulsants, sedatives(tranquilizers), psychotropics or hypnotics, hypoglycemics, narcotics, sleep aids, antihypertensives, laxatives, or diuretics (09/09/18 0312) Medication Interventions: Evaluate medications/consider consulting pharmacy (09/09/18 0009) Elimination: Needs assistance with toileting (09/09/18 2271) Elimination Interventions: Call light in reach (09/09/18 1615) Prior Fall History: Unknown (09/09/18 0312) Total Score: 3 (09/09/18 0623) High Fall Risk: Yes (09/09/18 6253) Speech Assessment: 
    
 
Ambulation: 
Gait Distance (ft): 0 Feet (ft) (09/06/18 1600) Assistive Device: Walker, rolling;Gait belt (09/06/18 1600) Labs/Studies: 
Recent Results (from the past 72 hour(s)) GLUCOSE, POC Collection Time: 09/06/18 11:09 AM  
Result Value Ref Range Glucose (POC) 223 (H) 65 - 100 mg/dL GLUCOSE, POC Collection Time: 09/06/18  4:28 PM  
Result Value Ref Range Glucose (POC) 243 (H) 65 - 100 mg/dL GLUCOSE, POC Collection Time: 09/06/18  8:37 PM  
Result Value Ref Range Glucose (POC) 229 (H) 65 - 100 mg/dL CBC WITH AUTOMATED DIFF Collection Time: 09/07/18  6:31 AM  
Result Value Ref Range WBC 6.1 4.3 - 11.1 K/uL  
 RBC 3.93 (L) 4.23 - 5.6 M/uL  
 HGB 11.4 (L) 13.6 - 17.2 g/dL HCT 37.0 (L) 41.1 - 50.3 % MCV 94.1 79.6 - 97.8 FL  
 MCH 29.0 26.1 - 32.9 PG  
 MCHC 30.8 (L) 31.4 - 35.0 g/dL  
 RDW 14.5 % PLATELET 233 350 - 901 K/uL MPV 9.6 9.4 - 12.3 FL ABSOLUTE NRBC 0.00 0.0 - 0.2 K/uL  
 DF AUTOMATED NEUTROPHILS 54 43 - 78 % LYMPHOCYTES 29 13 - 44 % MONOCYTES 13 (H) 4.0 - 12.0 % EOSINOPHILS 3 0.5 - 7.8 % BASOPHILS 1 0.0 - 2.0 % IMMATURE GRANULOCYTES 1 0.0 - 5.0 %  
 ABS. NEUTROPHILS 3.2 1.7 - 8.2 K/UL  
 ABS. LYMPHOCYTES 1.8 0.5 - 4.6 K/UL ABS. MONOCYTES 0.8 0.1 - 1.3 K/UL  
 ABS. EOSINOPHILS 0.2 0.0 - 0.8 K/UL  
 ABS. BASOPHILS 0.0 0.0 - 0.2 K/UL  
 ABS. IMM. GRANS. 0.0 0.0 - 0.5 K/UL MAGNESIUM Collection Time: 09/07/18  6:31 AM  
Result Value Ref Range Magnesium 1.8 1.8 - 2.4 mg/dL METABOLIC PANEL, BASIC Collection Time: 09/07/18  6:31 AM  
Result Value Ref Range Sodium 141 136 - 145 mmol/L Potassium 4.4 3.5 - 5.1 mmol/L Chloride 104 98 - 107 mmol/L  
 CO2 30 21 - 32 mmol/L Anion gap 7 7 - 16 mmol/L Glucose 141 (H) 65 - 100 mg/dL BUN 22 8 - 23 MG/DL Creatinine 1.01 0.8 - 1.5 MG/DL  
 GFR est AA >60 >60 ml/min/1.73m2 GFR est non-AA >60 >60 ml/min/1.73m2 Calcium 10.4 8.3 - 10.4 MG/DL  
GLUCOSE, POC Collection Time: 09/07/18  7:00 AM  
Result Value Ref Range Glucose (POC) 181 (H) 65 - 100 mg/dL GLUCOSE, POC Collection Time: 09/07/18 11:18 AM  
Result Value Ref Range Glucose (POC) 279 (H) 65 - 100 mg/dL GLUCOSE, POC Collection Time: 09/07/18  4:25 PM  
Result Value Ref Range Glucose (POC) 188 (H) 65 - 100 mg/dL GLUCOSE, POC Collection Time: 09/07/18  9:38 PM  
Result Value Ref Range Glucose (POC) 173 (H) 65 - 100 mg/dL GLUCOSE, POC Collection Time: 09/08/18  7:21 AM  
Result Value Ref Range Glucose (POC) 133 (H) 65 - 100 mg/dL GLUCOSE, POC Collection Time: 09/08/18 11:26 AM  
Result Value Ref Range Glucose (POC) 202 (H) 65 - 100 mg/dL GLUCOSE, POC Collection Time: 09/08/18  4:19 PM  
Result Value Ref Range Glucose (POC) 145 (H) 65 - 100 mg/dL GLUCOSE, POC Collection Time: 09/08/18  8:30 PM  
Result Value Ref Range Glucose (POC) 153 (H) 65 - 100 mg/dL GLUCOSE, POC Collection Time: 09/09/18  7:08 AM  
Result Value Ref Range Glucose (POC) 135 (H) 65 - 100 mg/dL Assessment:  
 
Problem List as of 9/9/2018  Date Reviewed: 8/30/2018 Codes Class Noted - Resolved Debility ICD-10-CM: R53.81 ICD-9-CM: 799.3  9/6/2018 - Present Total knee replacement status, left ICD-10-CM: S62.429 ICD-9-CM: V43.65  9/6/2018 - Present Hypotension ICD-10-CM: I95.9 ICD-9-CM: 458.9  8/30/2018 - Present Nausea & vomiting ICD-10-CM: R11.2 ICD-9-CM: 787.01  8/30/2018 - Present MELANI (acute kidney injury) ([facility] ICD-10-CM: N17.9 ICD-9-CM: 584.9  8/30/2018 - Present Fever ICD-10-CM: R50.9 ICD-9-CM: 780.60  8/15/2018 - Present Volume depletion ICD-10-CM: E86.9 ICD-9-CM: 276.50  8/15/2018 - Present Diabetes mellitus type 2, controlled ([facility] ICD-10-CM: E11.9 ICD-9-CM: 250.00  8/15/2018 - Present Hyperlipidemia ICD-10-CM: E78.5 ICD-9-CM: 272.4  8/15/2018 - Present Essential hypertension ICD-10-CM: I10 
ICD-9-CM: 401.9  8/15/2018 - Present Sepsis ([facility] ICD-10-CM: A41.9 ICD-9-CM: 038.9, 995.91  8/15/2018 - Present Acute cystitis without hematuria ICD-10-CM: N30.00 ICD-9-CM: 595.0  8/15/2018 - Present Bipolar disorder ([facility] ICD-10-CM: F31.9 ICD-9-CM: 296.80  8/15/2018 - Present S/P total knee arthroplasty, left ICD-10-CM: W67.169 ICD-9-CM: V43.65  8/3/2018 - Present Osteoarthritis ICD-10-CM: M19.90 ICD-9-CM: 715.90  8/2/2018 - Present Abnormal urinalysis ICD-10-CM: R82.90 ICD-9-CM: 791.9  7/26/2018 - Present CKD (chronic kidney disease) stage 3, GFR 30-59 ml/min ICD-10-CM: N18.3 ICD-9-CM: 585.3  5/8/2018 - Present Elevated white blood cell count ICD-10-CM: K78.238 ICD-9-CM: 288.60  5/8/2018 - Present  
   
  
 
/p L TKA with sepsis and profound debilitation 
  
   
Continue daily physician medical management: 
   
MELANI (acute kidney injury)  )/ CKD (chronic kidney disease) stage 3, GFR 30-59 ml/min (5/8/2018) 
- monitor renal funcion closely. - encourage po hydration. ivf as needed. 
 hold Cozaar, lasix. - Cr improved. Start norvasc.  
   
S/P left total knee arthroplasty (8/3/2018) - monitor wound for late infection. Closed. - focus on strength, ROM. PT evaluate for CPM.  
- jerald hold on CPM until patient's tone better evaluated,. ; 9/8 a lot of pts tone is voluntary in anticipation of movement; doesn't tolerated much PROM, doubt he'd tolerate CPM as well; not sure if anti spasticity meds would help and would like to avoid muscle relaxants that would further alter mental status 
   
Sepsis - monitor. Treated. -afebrile, WBC nl 
   
Bipolar disorder  - continue PTA medications. Depakote, wellbutrin. Cont remeron at [employer] op acute blood loss anemia - monitor - 9/7 - hgb 11. 4.  
   
Pneumonia prophylaxis- Incentive spirometer every hour while awake 
-lungs clear 
   
DVT risk / DVT Prophylaxis- Will require daily physician exam to assess for signs and symptoms as patient is at increased risk for of thromboembolism. Mobilization as tolerated. Intermittent pneumatic compression devices when in bed Thigh-high or knee-high thromboembolic deterrent hose when out of bed.  
- resume heparin q12h. Will wean aspirin from ortho.  
-hi risk due to prolonged immobility 
   
Pain Control: stable, mild-to-moderate joint symptoms intermittently, reasonably well controlled by PRN meds. Will require regular pain assessment and comprenhensive pain management,  
-prn Coamo 
   
Wound Care: Monitor wound status daily per staff and physician. At risk for failure. Will require 24/7 rehab nursing. Keep wound clean and dry 
-well healed left knee incision  
   
Hypertension/ hypotension - BP uncontrolled, fluctuating, managed medically.   
- cozaar, lasix held due to MELANI. ; 9/8 pt on Norvasc alone, -176 past 24hrs; monitor 
-9/9 bp 120-158 stable off lasix and cozaar; MELANI resolved.  
   
  
Diabetes mellitus type 2- Uncontrolled. poor glycemic control. Will require daily, close FSG monitoring and medication adjustment to optimize glycemic control in setting of acute illness and hospitalization. - will resume glucotrol. Start 5mg dose. Pt was on glucotrol SR 10mg. - continue SSI. -9/8 bs 133, 173,188; fair control 
   
Urinary retention/ neurogenic bladder -- patient has history of \" neurogenic bladder. Has catheterized once in the past month; urinating more frequently own his own at baseline 
 - will observe pattern.  schedule voids q6-8 hrs. Check post-void residual every shift; In and Out catheterize if post-void residual is more than 400 cc. 
- on flomax 
-9/8 not voiding, requiring CIC with volumes > 600; doubt he could self cath given extent of weakness; best thing would to replace salmon due to the pain he complains of even with coude cath; will try lidocaine gel and add urecholine to see if we can get bladder to contract better. 
-9/9 salmon replaced. \"exruciating pain with cathing. \" ; had to use coude' 
   
bowel program - start abbie colace; 9/9 no bm several days; will add miralax and prn dulcolax and fleets. No abd pain, + flatus 
   
GERD -on Protonix IV; ? Change to po? Fritz Quiet At times may need additional antacids, Maalox prn. 
-9/9 change to po protonix 
   
 
Time spent was 25 minutes with over 1/2 in direct patient care/examination, consultation and coordination of care. Signed By: Karl Murphy MD   
 September 9, 2018

## 2018-09-10 NOTE — PROGRESS NOTES
OT Daily Note Time In 1300 Time Out 1345 Precautions: Ax2 for transfers, confused Home: Lives alone, QC Pain: Patient had no complaint of pain. Functional Mobility Pt transferred on and off the toilet with Ax2. Pt was unable to move feet or stand straight. Pt appeared to have used all his energy earlier. Self-Care Pt toileted with dependence. Strengthening Pt completed 5 minutes on the ergometer frontwards with moderate resistance to increase UB strength and activity tolerance for integration into functional mobility. Education Various way to toilet Interdisciplinary Communication: LAN Leong on toileting Plan: Continue with POC. Pt was left in w/c with all needs within reach. Gm Chilel OTR/L 
9/10/2018

## 2018-09-10 NOTE — PROGRESS NOTES
PHYSICAL THERAPY DAILY NOTE Time In: 5 Time Out: 1144 Patient Seen For: AM;Gait training; Therapeutic exercise;Transfer training; Other (see progress notes) Subjective: Patient complaints of being cold. Objective: No pain noted all during therapy. Attempted bathroom for bowels twice but unsuccessful. Nurse Dang notified. Other (comment) (fall precautions  WBAT LLE) GROSS ASSESSMENT Daily Assessment BED/MAT MOBILITY Daily Assessment Supine to Sit : 0 (Not tested) Sit to Supine : 0 (Not tested) TRANSFERS Daily Assessment Transfer Type: SPT with walker Transfer Assistance : 2 (Maximal assistance) Sit to Stand Assistance: Maximum assistance GAIT Daily Assessment Amount of Assistance: 2 (Maximal assistance) Distance (ft): 5 Feet (ft) Assistive Device: Gait belt; Other (comment) (II bars) STEPS or STAIRS Daily Assessment Level of Assist : 0 (Not tested) BALANCE Daily Assessment WHEELCHAIR MOBILITY Daily Assessment LOWER EXTREMITY EXERCISES Daily Assessment Extremity: Both Exercise Type #1: Other (comment) (motomed x 10 minutes) Sets Performed: 1 Reps Performed: 10 Level of Assist: Supervision Exercise Type #2: Other (comment) (bathroom transfers x  4 using hand rails to stand and perform SPT) Sets Performed: 4 Reps Performed: 0 Level of Assist: Maximum assistance Assessment: Patient moving well with transfers. Standing in II bars to take steps and needs encouragement to keep working. Plan of Care: Continue with plan of care to reach PT goals. Returned to room with call howell at [facility]. Jessica Monzon, PTA 
9/10/2018

## 2018-09-10 NOTE — PROGRESS NOTES
09/10/18 1233 Time Spent With Patient Time In 1200 Time Out 1231 Patient Seen For: AM  
Mental Status Neurologic State Alert Orientation Level Oriented X4 Cognition Decreased attention/concentration Perception Appears intact Perseveration No perseveration noted Safety/Judgement Fall prevention 09/10/18 1235 Reading Comprehension Visual Impairment Glasses/contacts Functional reading comprehension 80% accuracy Pre-Morbid Reading Status Literate Exercise/Activities Tolerance Exercise Tolerance/Response Tolerated well Required some encouragement reported he was tired 09/10/18 1237 Mathematical Exercises Word Problems Accuracy (%) 70 % Neuro-Linguistic Exercises Verbal Organization Impaired Mathematical Impaired; 70% sorting/calculating monetary amounts Memory Impaired; decreased recall of tasks completed previous session Patient was seen for cognitive treatment. Son at bedside reports that his father was independent prior to admission and handling his own meds/finances. Patient completed money management tasks with 70% accuracy. Decreased attention to detail with repetitions required. 80% accuracy with functional reading comprehension. Patient reports he was tired and required some encouragement at the conclusion of the session. Requested to attempt to have a BM; has had several unsuccessful attempts earlier today. RN notified.  
 
Percy Knutson MS, CCC-SLP

## 2018-09-10 NOTE — PROGRESS NOTES
Problem: Falls - Risk of 
Goal: *Absence of Falls Document Morena Carter Fall Risk and appropriate interventions in the flowsheet. Outcome: Progressing Towards Goal 
Fall Risk Interventions: 
Mobility Interventions: Bed/chair exit alarm, Patient to call before getting OOB, Utilize walker, cane, or other assistive device Mentation Interventions: Adequate sleep, hydration, pain control, Door open when patient unattended Medication Interventions: Evaluate medications/consider consulting pharmacy, Patient to call before getting OOB Elimination Interventions: Bed/chair exit alarm, Call light in reach, Patient to call for help with toileting needs

## 2018-09-10 NOTE — PROGRESS NOTES
Time In 3686 Time Out 5471 Activities of Daily Living Score Comments Grooming 4 Tasks completed by patient: Brushed teeth, Shaved/applied makeup (optional), Washed face Comments: Cut himself while shaving Bathing 4 Body Parts Bathe: Abdomen, Arm, left, Arm, right, Buttocks, Chest, Lower leg and foot, left, Lower leg and foot, right, Thigh, left, Thigh, right Comments: A for buttocks Lower Body Dressing/ 
Undressing 1 Items Applied:Shoe, right (1 step), Shoe, left (1 step), Elastic waist pants (3 steps), Underpants (3 steps) Adaptive Equipment: Reacher, RW 
Comments: Ax2; trained on reacher Toileting 1 Ramey Toilet Transfer 1 Ax2 Education  Reacher use Precautions: Ax2 for transfers, confused Home: Lives alone, QC Pt was in bed and agreeable to tx. Pt's performance with ADL is reflected in above chart. Pt perseverated on ginger ale. Pt required redirection on multiple occasions. Pt would close eyes when getting ready and need redirection. Pt was left shaving at sink with LAN Leong present. Continue with POC. Davina Shahid OTR/NIKA 
9/10/2018

## 2018-09-10 NOTE — PROGRESS NOTES
SFD PROGRESS NOTE Ban Reaves 
Admit Date: 9/6/2018 Chief Complaint : Gait dysfunction secondary to below. Admit Diagnosis: Unilateral primary osteoarthritis, left knee [M17.12] MELANI (acute kidney injury) ([facility] [address] (8/30/2018) CKD (chronic kidney disease) stage 3, GFR 30-59 ml/min (5/8/2018) S/P total knee arthroplasty, left (8/3/2018) Diabetes mellitus type 2, controlled ([facility] [address] (8/15/2018) Sepsis ([facility] [address] (8/15/2018) Bipolar disorder ([address] (8/15/2018) Hypotension (8/30/2018) Nausea & vomiting (8/30/2018) Pain DVT risk Post op acute blood loss anemia Hypertension Diabetes ([facility] [address] Spondylolisthesis of lumbar region Neurogenic bladder  -has catheterized once in the past month; urinating more frequently own his own at baseline Acute Rehab Dx: 
Gait impairment Debility   
Mobility and ambulation deficits Self Care/ADL deficits 
  
Subjective Patient seen and examined. Vss. Afebrile. Patient limited by weakness, increased LE tone. Tone likely chronic issue. He requires max assist for transfers and ambulation at this time. Objective:  
 
Current Facility-Administered Medications Medication Dose Route Frequency  polyethylene glycol (MIRALAX) packet 17 g  17 g Oral DAILY  bisacodyl (DULCOLAX) tablet 10 mg  10 mg Oral DAILY PRN  
 bisacodyl (DULCOLAX) suppository 10 mg  10 mg Rectal DAILY PRN  pantoprazole (PROTONIX) tablet 40 mg  40 mg Oral ACB  white petrolatum-mineral oil (EUCERIN) cream   Topical PRN  
 aspirin delayed-release tablet 81 mg  81 mg Oral DAILY  mirtazapine (REMERON) tablet 7.5 mg  7.5 mg Oral QHS PRN  
 amLODIPine (NORVASC) tablet 5 mg  5 mg Oral DAILY  glipiZIDE (GLUCOTROL) tablet 5 mg  5 mg Oral ACB  senna-docusate (PERICOLACE) 8.6-50 mg per tablet 1 Tab  1 Tab Oral DAILY  acetaminophen (TYLENOL) tablet 650 mg  650 mg Oral Q6H PRN  
 buPROPion SR (WELLBUTRIN SR) tablet 150 mg  150 mg Oral BID  
  dextrose (D50W) injection syrg 25 g  25 g IntraVENous PRN  
 divalproex ER (DEPAKOTE ER) 24 hour tablet 1,000 mg  1,000 mg Oral BID  heparin (porcine) injection 5,000 Units  5,000 Units SubCUTAneous Q12H  
 HYDROcodone-acetaminophen (NORCO) 7.5-325 mg per tablet 2 Tab  2 Tab Oral Q6H PRN  
 insulin regular (NOVOLIN R, HUMULIN R) injection   SubCUTAneous AC&HS  latanoprost (XALATAN) 0.005 % ophthalmic solution 1 Drop  1 Drop Both Eyes QHS  naloxone (NARCAN) injection 0.4 mg  0.4 mg IntraVENous PRN  
 ondansetron (ZOFRAN) injection 4 mg  4 mg IntraVENous Q8H PRN  
 simvastatin (ZOCOR) tablet 40 mg  40 mg Oral QHS  tamsulosin (FLOMAX) capsule 0.4 mg  0.4 mg Oral QHS  influenza vaccine 2018-19 (6 mos+)(PF) (FLUARIX QUAD/FLULAVAL QUAD) injection 0.5 mL  0.5 mL IntraMUSCular PRIOR TO DISCHARGE  pneumococcal 23-valent (PNEUMOVAX 23) injection 0.5 mL  0.5 mL IntraMUSCular PRIOR TO DISCHARGE Review of Systems: Denies chest pain, shortness of breath, cough, headache, visual problems, abdominal pain, dysurea, calf pain. Pertinent positives are as noted in the medical records and unremarkable otherwise. Visit Vitals  /77 (BP 1 Location: Left arm)  Pulse 94  Temp 98.5 °F (36.9 °C)  Resp 18  SpO2 95%  
  
  
Physical Exam: Psych: Patient was oriented x 2. Without hallucinations. Patient is not suicidal.  
General:   Alert, appears stated age, No acute distress. HEENT:  Normocephalic, EOMI, facial symmetry  Intact. Oral mucosa pink and moist. No nasal discharge. Lungs:  CTA Bilaterally,Respiration even and unlabored No apparent use of accessory muscles for respiration. No nasal alar flaring. Heart:  Regular rate and rhythm, S1, S2, No obvious murmur, click, rub or gallop. Genitourinary: defered Abdomen:  Soft, non-tender to palpation in all four quadrants. Bowel sounds present. No obvious masses palpated. Neuromuscular:  PERRL, EOMI Follows simple commands consistently. Speech is fluent.  
+ generalized weakness.  more LE weakness. Sensory - intact grossly Plantars - down going No atrophy, no fasciculations, no tremors. + increased flexor tone BUE, BLE. Skin:  No rashes, no erythema. Calf non tender BLE. boggy L knee. LLE PROM, trace BLE pedal edema. L Knee Flexion: 80 (~) 
L Knee Extension: -15(~) Incision:  healing well, clean, dry, and intact  
   
 
                                                                        
Functional Assessment: 
   
   
Balance Sitting - Static: Fair (occasional) (09/09/18 1200) Sitting - Dynamic: Fair (occasional) (09/09/18 1200) Standing - Static: Poor (09/08/18 1200) Standing - Dynamic : Impaired (09/07/18 1200) Chitra Williamson Fall Risk Assessment: 
Darryn Crane Mobility: Ambulates or transfers with assist devices or assistance (09/10/18 0715) Mobility Interventions: Bed/chair exit alarm; Patient to call before getting OOB;Utilize walker, cane, or other assistive device (09/10/18 0715) Mentation: Alert, oriented x 3 (09/10/18 0715) Mentation Interventions: Adequate sleep, hydration, pain control;Door open when patient unattended (09/10/18 0715) Medication: Patient receiving anticonvulsants, sedatives(tranquilizers), psychotropics or hypnotics, hypoglycemics, narcotics, sleep aids, antihypertensives, laxatives, or diuretics (09/10/18 0715) Medication Interventions: Evaluate medications/consider consulting pharmacy; Patient to call before getting OOB (09/10/18 0715) Elimination: Needs assistance with toileting (09/10/18 0715) Elimination Interventions: Bed/chair exit alarm;Call light in reach; Patient to call for help with toileting needs (09/10/18 0715) Prior Fall History: Unknown (09/10/18 0715) Total Score: 3 (09/10/18 0715) High Fall Risk: Yes (09/10/18 0715) Speech Assessment: 
    
 
Ambulation: 
Gait Distance (ft): 0 Feet (ft) (09/06/18 1600) Assistive Device: Walker, rolling;Gait belt (09/06/18 1600) Labs/Studies: 
Recent Results (from the past 72 hour(s)) GLUCOSE, POC Collection Time: 09/07/18 11:18 AM  
Result Value Ref Range Glucose (POC) 279 (H) 65 - 100 mg/dL GLUCOSE, POC Collection Time: 09/07/18  4:25 PM  
Result Value Ref Range Glucose (POC) 188 (H) 65 - 100 mg/dL GLUCOSE, POC Collection Time: 09/07/18  9:38 PM  
Result Value Ref Range Glucose (POC) 173 (H) 65 - 100 mg/dL GLUCOSE, POC Collection Time: 09/08/18  7:21 AM  
Result Value Ref Range Glucose (POC) 133 (H) 65 - 100 mg/dL GLUCOSE, POC Collection Time: 09/08/18 11:26 AM  
Result Value Ref Range Glucose (POC) 202 (H) 65 - 100 mg/dL GLUCOSE, POC Collection Time: 09/08/18  4:19 PM  
Result Value Ref Range Glucose (POC) 145 (H) 65 - 100 mg/dL GLUCOSE, POC Collection Time: 09/08/18  8:30 PM  
Result Value Ref Range Glucose (POC) 153 (H) 65 - 100 mg/dL GLUCOSE, POC Collection Time: 09/09/18  7:08 AM  
Result Value Ref Range Glucose (POC) 135 (H) 65 - 100 mg/dL GLUCOSE, POC Collection Time: 09/09/18 11:03 AM  
Result Value Ref Range Glucose (POC) 154 (H) 65 - 100 mg/dL GLUCOSE, POC Collection Time: 09/09/18  4:46 PM  
Result Value Ref Range Glucose (POC) 147 (H) 65 - 100 mg/dL GLUCOSE, POC Collection Time: 09/09/18  8:58 PM  
Result Value Ref Range Glucose (POC) 183 (H) 65 - 100 mg/dL GLUCOSE, POC Collection Time: 09/10/18  7:14 AM  
Result Value Ref Range Glucose (POC) 137 (H) 65 - 100 mg/dL Assessment:  
 
Problem List as of 9/10/2018  Date Reviewed: 8/30/2018 Codes Class Noted - Resolved Debility ICD-10-CM: R53.81 ICD-9-CM: 799.3  9/6/2018 - Present Total knee replacement status, left ICD-10-CM: U95.654 ICD-9-CM: V43.65  9/6/2018 - Present Hypotension ICD-10-CM: I95.9 ICD-9-CM: 458.9  8/30/2018 - Present Nausea & vomiting ICD-10-CM: R11.2 ICD-9-CM: 787.01  8/30/2018 - Present MELANI (acute kidney injury) ([facility] ICD-10-CM: N17.9 ICD-9-CM: 584.9  8/30/2018 - Present Fever ICD-10-CM: R50.9 ICD-9-CM: 780.60  8/15/2018 - Present Volume depletion ICD-10-CM: E86.9 ICD-9-CM: 276.50  8/15/2018 - Present Diabetes mellitus type 2, controlled ([facility] ICD-10-CM: E11.9 ICD-9-CM: 250.00  8/15/2018 - Present Hyperlipidemia ICD-10-CM: E78.5 ICD-9-CM: 272.4  8/15/2018 - Present Essential hypertension ICD-10-CM: I10 
ICD-9-CM: 401.9  8/15/2018 - Present Sepsis ([facility] ICD-10-CM: A41.9 ICD-9-CM: 038.9, 995.91  8/15/2018 - Present Acute cystitis without hematuria ICD-10-CM: N30.00 ICD-9-CM: 595.0  8/15/2018 - Present Bipolar disorder ([facility] ICD-10-CM: F31.9 ICD-9-CM: 296.80  8/15/2018 - Present S/P total knee arthroplasty, left ICD-10-CM: K33.029 ICD-9-CM: V43.65  8/3/2018 - Present Osteoarthritis ICD-10-CM: M19.90 ICD-9-CM: 715.90  8/2/2018 - Present Abnormal urinalysis ICD-10-CM: R82.90 ICD-9-CM: 791.9  7/26/2018 - Present CKD (chronic kidney disease) stage 3, GFR 30-59 ml/min ICD-10-CM: N18.3 ICD-9-CM: 585.3  5/8/2018 - Present Elevated white blood cell count ICD-10-CM: Q80.197 ICD-9-CM: 288.60  5/8/2018 - Present Plan:  
 
Assessment: 
  
The Post Assessment Physician Evaluation (BURAK) found the current functional status to be comparable with the Pre-admission Screening. The Patient is a good candidate for acute inpatient rehabilitation. Nothing since the Pre-admission screen has changed that determination.  
  
Rehabilitation Plan The patient has shown the ability to tolerate and benefit from 3 hours of therapy daily and is being admitted to a comprehensive acute inpatient rehabilitation program consisting of at least 3 hours of combined physical and occupational therapies.  
- intensive Physical Therapy for a minimum of 1.5 hours a day, at least 5 out of 7 days per week to address bed mobility, transfers, ambulation, strengthening, balance, and endurance. - intensive Occupational Therapy for a minimum of 1.5 hours a day, at least 5 out of 7 days per week to address ADL ( bathing, LE dressing, toileting) and adaptive equipment as needed. - barriers will be pain, poor L knee ROM, deconditioned state.  
  
  
Continue ST for: cognitive deficits, may be baseline, or acutely deteriorating due to recent sepsis, on-going medical issues.  
  
Continue 24-hour skilled rehabilitation nursing for bowel and bladder management, skin care for decubitus ulcer prevention , pain management and ongoing medication administration  
  
The patient may benefit from a psychology consult for depression, anxiety and adjustment disorder. Patient with underlying bi-polar disorder on medications. 
  
Continue daily physician medical management: 
  
MELANI (acute kidney injury)  )/ CKD (chronic kidney disease) stage 3, GFR 30-59 ml/min (5/8/2018) 
- monitor renal funcion closely. - encourage po hydration. ivf as needed. - Cr improved. 22/1.02  
  
S/P left total knee arthroplasty (8/3/2018) - monitor wound for late infection. Closed. - focus on strength, ROM. PT evaluate for CPM.  
- jerald hold on CPM until patient's tone better evaluated. - stable knee exam. Still with some pain. Continue to focus on strengthening / ROM exercises. Pt encouraged to do so aloe as well.  
  
Sepsis - monitor. Treated. Hypertension - monitor. norvasc started.  
  
Bipolar disorder  - continue PTA medications. Depakote, wellbutrin.  
   
Post op acute blood loss anemia - monitor - 9/7 - hgb 11. 4.  
   
Spondylolisthesis of lumbar region 
  
  
Pneumonia prophylaxis- Insentive spirometer every hour while awake 
  
DVT risk / DVT Prophylaxis-   
- resume heparin q12h. Will wean aspirin from ortho.   
- 9/10 - no s/s of DVT.  
  
Pain Control: stable, mild-to-moderate joint symptoms intermittently, reasonably well controlled by PRN meds. Will require regular pain assessment and comprenhensive pain management,  
  
Wound Care: Monitor wound status daily per staff and physician. At risk for failure. Will require 24/7 rehab nursing. Keep wound clean and dry. 9/10- Wound healing well.  
  
Hypertension/ hypotension - BP uncontrolled, fluctuating, managed medically. - cozaar, lasix held due to MELANI.  
- BP monitored close. Acceptable.  
  
- Will resume antihypertesives, diuretics as appropriate.  
  
Diabetes mellitus type 2- Uncontrolled. poor glycemic control. Will require daily, close FSG monitoring and medication adjustment to optimize glycemic control in setting of acute illness and hospitalization. - will resume glucotrol. Start 5mg dose. Pt was on glucotrol SR 10mg. - continue SSI.  
  
Urinary retention/ neurogenic bladder -- patient has history of \" neurogenic bladder. Has catheterized once in the past month; urinating more frequently own his own at baseline 
 - will observe pattern. schedule voids q6-8 hrs. Check post-void residual every shift; In and Out catheterize if post-void residual is more than 400 cc. 
- on flomax 
  
bowel program - start abbie colace 
  
GERD - resume PPI. At times may need additional antacids, Maalox prn. 
  
 
Time spent was 25 minutes with over 1/2 in direct patient care/examination, consultation and coordination of care. Signed By: Bernarda Fierro MD   
 September 10, 2018

## 2018-09-10 NOTE — PROGRESS NOTES
Problem: Falls - Risk of 
Goal: *Absence of Falls Document Tami Deal Fall Risk and appropriate interventions in the flowsheet. Outcome: Progressing Towards Goal 
Fall Risk Interventions: 
Mobility Interventions: Bed/chair exit alarm Mentation Interventions: Adequate sleep, hydration, pain control Medication Interventions: Patient to call before getting OOB Elimination Interventions: Bed/chair exit alarm Problem: Pressure Injury - Risk of 
Goal: *Prevention of pressure injury Document Kenny Scale and appropriate interventions in the flowsheet. Outcome: Progressing Towards Goal 
Pressure Injury Interventions: 
Sensory Interventions: Assess changes in LOC Moisture Interventions: Absorbent underpads Activity Interventions: Increase time out of bed Mobility Interventions: Pressure redistribution bed/mattress (bed type) Nutrition Interventions: Document food/fluid/supplement intake Friction and Shear Interventions: Apply protective barrier, creams and emollients

## 2018-09-11 NOTE — PROGRESS NOTES
PHYSICAL THERAPY DAILY NOTE Time In: 5812 Time Out: 9492 Patient Seen For: PM;Patient education;Transfer training Subjective: \"I think I'll be able to go home and do everything I need to. \" Patient agreeable to therapy. Objective: Vital Signs:  
Patient Vitals for the past 8 hrs: 
 Temp Pulse Resp BP SpO2  
09/11/18 1533 98.3 °F (36.8 °C) 95 18 131/81 100 % Pain level: No pain reported. Pain location: n/a Pain interventions: n/a Patient education: Educated patient on body mechanics with sit<>stand transfer. Interdisciplinary Communication: Communicated with Sridevi Hurst RN regarding patient's transfer status. Other (comment) (fall precautions  WBAT LLE) GROSS ASSESSMENT Daily Assessment TRANSFERS Daily Assessment Required for safety. Patient transferred from w/c>raised toilet seat with grab bars. Patient performed sit>stand x3 for clothes management with MAX A. Patient performed sit>stand x3 requiring assist x2 for balance in standing and hygiene. Patient transferred from elevated toilet seat to w/c with grab bars with MAX A x2 secondary to fatigue. Transfer Type: Other Other: SPT with grab bars from w/c to toilet Transfer Assistance : 2 (Maximal assistance) Sit to Stand Assistance: Maximum assistance Car Transfers: Not tested BALANCE Daily Assessment Sitting - Static: Good (unsupported) Sitting - Dynamic: Fair (occasional) Standing - Static: Poor Standing - Dynamic : Impaired Patient returned to room sitting in w/c with all needs in reach. Assessment: Patient making slow progress with functional mobility. Patient demonstrating decreased functional strength. Plan of Care: Continue with POC and progress as tolerated. Tasneem Walker 9/11/2018

## 2018-09-11 NOTE — PROGRESS NOTES
PHYSICAL THERAPY DAILY NOTE Time In: 1 Time Out: 1047 Patient Seen For: AM;Patient education; Therapeutic exercise;Transfer training; Wheelchair mobility Subjective: \"Are you on my side? \" Patient agreeable to therapy. Objective: Vital Signs:  
Patient Vitals for the past 8 hrs: 
 Temp Pulse Resp BP SpO2  
09/11/18 0751 98 °F (36.7 °C) 86 14 141/74 100 % Pain level: No pain level given. Pain location: L knee Pain interventions: Positioned for comfort, PROM, provided rest breaks, pain medication per RN Patient education: Educated patient on body mechanics with sit>stand transfer. Interdisciplinary Communication: Communicated with Alvin Kellogg OT regarding patient's POC. Other (comment) (fall precautions  WBAT LLE) GROSS ASSESSMENT Daily Assessment TRANSFERS Daily Assessment Required for safety. Transfer Type: Lateral with transfer board Transfer Assistance : 1 (Total assistance) Sit to Stand Assistance: Maximum assistance GAIT Daily Assessment Amount of Assistance: 0 (Not tested) BALANCE Daily Assessment Sitting - Static: Good (unsupported) Sitting - Dynamic: Fair (occasional) Standing - Static: Poor Standing - Dynamic : Impaired LOWER EXTREMITY EXERCISES Daily Assessment Extremity: Both Exercise Type #1: Other (comment) (B LE motomed x5 minutes, Level 0) Exercise Type #2: Other (comment) (L LE knee flex/ext, L hip flex) Patient handed off to Loyda Pham in therapy gym. Assessment: Patient making slow progress towards goals. Patient demonstrating poor functional strength with transfers. Plan of Care: Continue with POC and progress as tolerated. John Adair 9/11/2018

## 2018-09-11 NOTE — PROGRESS NOTES
End Of Shift Functional Summary, Nursing TOILETING:   
Does patient need assist with adjusting pants up or down and/or pericare? yes If yes, please indicate what the patient needs help with:  Clothing and pericare. Pt uses wheelchair and grab bars. Does the patient require extra time? yes Does the patient require standby assistance? yes Does the patient require contact guard assistance? no 
Does the patient require more than one staff member for assistance? yes TOILET TRANSFER:   
Pt requires maximum assistance. Pt uses wheelchair. Does the patient require extra time? yes Does the patient require contact guard assistance? no 
Does the patient require more than one staff member for assistance? yes BLADDER:   
Pt does have a salmon catheter that staff manages. Pt does take medication. If so, please indicate which medication: Flomax Pt requires staff to empty device Pt has had 0 bladder accidents during this shift. BOWEL:  Pt does take medication. Pt is continent of bowel and uses toilet. Pt requires staff to wipe. Pt has had 0 bowel accidents during this shift requiring maximum assistance from staff to clean up. (An accident is when the episode is not contained in a brief AND/OR the clothing/linen requires changing/cleaning up.) BED/CHAIR TRANSFER Pt requires maximum assistance. Pt uses wheelchair. Does the patient require extra time? yes Does the patient require contact guard assistance? no 
Does the patient require more than one staff member for assistance? yes EATING Pt requires supervision. Pt does wear dentures. Documentation reviewed and plan of care discussed/reviewed with  
patient assistant and family/spouse during the shift.

## 2018-09-11 NOTE — PROGRESS NOTES
End Of Shift Functional Summary, Nursing TOILETING:   
Does patient need assist with adjusting pants up or down and/or pericare? yes If yes, please indicate what the patient needs help with:  Pants up and down(i.e. pants up, pants down, pericare) Pt uses wheelchair. Does the patient require extra time? yes Does the patient require standby assistance? yes Does the patient require contact guard assistance? no 
Does the patient require more than one staff member for assistance? yes TOILET TRANSFER:   
Pt requires maximum assistance. Pt uses wheelchair. Does the patient require extra time? yes Does the patient require contact guard assistance? no 
Does the patient require more than one staff member for assistance? yes BLADDER:   
Pt does have a salmon catheter that staff manages. Pt does take medication. If so, please indicate which medication: flomax BOWEL:  No B.M. This shift BED/CHAIR TRANSFER Pt requires maximum assistance. Pt uses side board Does the patient require extra time? yes Does the patient require contact guard assistance? no 
Does the patient require more than one staff member for assistance? yes Documentation reviewed and plan of care discussed/reviewed with  
patient during the shift.

## 2018-09-11 NOTE — PROGRESS NOTES
Problem: Falls - Risk of 
Goal: *Absence of Falls Document Veronica Luu Fall Risk and appropriate interventions in the flowsheet. Outcome: Progressing Towards Goal 
Fall Risk Interventions: 
Mobility Interventions: Bed/chair exit alarm, Patient to call before getting OOB, Utilize walker, cane, or other assistive device Mentation Interventions: Door open when patient unattended, Increase mobility, Toileting rounds Medication Interventions: Patient to call before getting OOB Elimination Interventions: Call light in reach, Patient to call for help with toileting needs, Toileting schedule/hourly rounds

## 2018-09-11 NOTE — PROGRESS NOTES
Problem: Falls - Risk of 
Goal: *Absence of Falls Document Noam Navarrete Fall Risk and appropriate interventions in the flowsheet. Outcome: Progressing Towards Goal 
Fall Risk Interventions: 
Mobility Interventions: Bed/chair exit alarm Mentation Interventions: Adequate sleep, hydration, pain control Medication Interventions: Evaluate medications/consider consulting pharmacy Elimination Interventions: Bed/chair exit alarm

## 2018-09-11 NOTE — PROGRESS NOTES
09/11/18 1138 Time Spent With Patient Time In 1050 Time Out 1130 Patient Seen For: AM;Neuro-linguistics Mental Status Neurologic State Alert;Confused Orientation Level Oriented to person;Oriented to place;Oriented to situation Cognition Decreased attention/concentration Perception Appears intact Perseveration No perseveration noted Safety/Judgement Fall prevention 09/11/18 1143 Neuro-Linguistic Exercises Verbal Reasoning Tasks Impaired; 80% accuracy with writing checks and balancing checkbooks due to attention to detail 100% reading medication labels and sorting at the basic level Verbal Organization Impaired; mod cues for higher level tasks Patient completed cognitive tasks related to writing checks/balancing checkbook and medication management. 80% accuracy with check writing activities. Patient used his phone to complete the calculations stating that this is how he completes it at home. No issues with basic calculations but when copying the amount from the check to the checkbook he had one numerical order and he initially placed 9/11/2013 at the top of the check. Corrected the year appropriately when cued. 100% accuracy with basic medication management task to read the labels and sort for the day. Patient reports that his son who was visiting yesterday had to leave for [facility]. While talking about his young grandson he became emotional stating he wished his wife could have met him. He stated his son brought him crossword puzzles and sudokus to work on and that his wife used to do Sudoku puzzles. He asked ST to show him how to do one. Discussed the general premise and he was able to fill in the blanks for a basic puzzle with Mod A. He was pleasant and appreciative. Will continue to follow for cognitive treatment.  
 
Fazal Gayle MS, CCC-SLP

## 2018-09-11 NOTE — PROGRESS NOTES
SFD PROGRESS NOTE Belkis Ortiz 
Admit Date: 9/6/2018 Chief Complaint : Gait dysfunction secondary to below. Admit Diagnosis: Unilateral primary osteoarthritis, left knee [M17.12] MELANI (acute kidney injury) ([facility] (8/30/2018) CKD (chronic kidney disease) stage 3, GFR 30-59 ml/min (5/8/2018) S/P total knee arthroplasty, left (8/3/2018) Diabetes mellitus type 2, controlled ([address] (8/15/2018) Sepsis ([address] (8/15/2018) Bipolar disorder ([address] (8/15/2018) Hypotension (8/30/2018) Nausea & vomiting (8/30/2018) Pain DVT risk Post op acute blood loss anemia Hypertension Diabetes ([facility] Spondylolisthesis of lumbar region Neurogenic bladder  -has catheterized once in the past month; urinating more frequently own his own at baseline Acute Rehab Dx: 
Gait impairment Debility   
Mobility and ambulation deficits Self Care/ADL deficits 
  
Subjective Patient seen and examined. Vss. C/o knee pain. PT, OT limited by poor ROM, pain, weakness. Case discussed in conference. Progress slow. Unusually high flexor tone, rigidity that limits mobility, limb movement. PT reports significant UE, LE tone stiffness. He anticipates increased pain and tenses up before any movement occurs. Objective:  
 
Current Facility-Administered Medications Medication Dose Route Frequency  polyethylene glycol (MIRALAX) packet 17 g  17 g Oral DAILY  bisacodyl (DULCOLAX) tablet 10 mg  10 mg Oral DAILY PRN  
 bisacodyl (DULCOLAX) suppository 10 mg  10 mg Rectal DAILY PRN  pantoprazole (PROTONIX) tablet 40 mg  40 mg Oral ACB  white petrolatum-mineral oil (EUCERIN) cream   Topical PRN  
 aspirin delayed-release tablet 81 mg  81 mg Oral DAILY  mirtazapine (REMERON) tablet 7.5 mg  7.5 mg Oral QHS PRN  
 amLODIPine (NORVASC) tablet 5 mg  5 mg Oral DAILY  glipiZIDE (GLUCOTROL) tablet 5 mg  5 mg Oral ACB  senna-docusate (PERICOLACE) 8.6-50 mg per tablet 1 Tab  1 Tab Oral DAILY  acetaminophen (TYLENOL) tablet 650 mg  650 mg Oral Q6H PRN  
 buPROPion SR (WELLBUTRIN SR) tablet 150 mg  150 mg Oral BID  dextrose (D50W) injection syrg 25 g  25 g IntraVENous PRN  
 divalproex ER (DEPAKOTE ER) 24 hour tablet 1,000 mg  1,000 mg Oral BID  heparin (porcine) injection 5,000 Units  5,000 Units SubCUTAneous Q12H  
 HYDROcodone-acetaminophen (NORCO) 7.5-325 mg per tablet 2 Tab  2 Tab Oral Q6H PRN  
 insulin regular (NOVOLIN R, HUMULIN R) injection   SubCUTAneous AC&HS  latanoprost (XALATAN) 0.005 % ophthalmic solution 1 Drop  1 Drop Both Eyes QHS  naloxone (NARCAN) injection 0.4 mg  0.4 mg IntraVENous PRN  
 ondansetron (ZOFRAN) injection 4 mg  4 mg IntraVENous Q8H PRN  
 simvastatin (ZOCOR) tablet 40 mg  40 mg Oral QHS  tamsulosin (FLOMAX) capsule 0.4 mg  0.4 mg Oral QHS  influenza vaccine 2018-19 (6 mos+)(PF) (FLUARIX QUAD/FLULAVAL QUAD) injection 0.5 mL  0.5 mL IntraMUSCular PRIOR TO DISCHARGE  pneumococcal 23-valent (PNEUMOVAX 23) injection 0.5 mL  0.5 mL IntraMUSCular PRIOR TO DISCHARGE Review of Systems: Denies chest pain, shortness of breath, cough, headache, visual problems, abdominal pain, dysurea, calf pain. Pertinent positives are as noted in the medical records and unremarkable otherwise. Visit Vitals  /74  Pulse 86  Temp 98 °F (36.7 °C)  Resp 14  SpO2 100%  
  
  
Physical Exam: Psych: Patient was oriented x 2. Without hallucinations. Patient is not suicidal.  
General:   Alert, appears stated age, No acute distress. HEENT:  Normocephalic, EOMI, facial symmetry  Intact. Oral mucosa pink and moist. No nasal discharge. Lungs:  CTA Bilaterally,Respiration even and unlabored No apparent use of accessory muscles for respiration. No nasal alar flaring. Heart:  Regular rate and rhythm, S1, S2, No obvious murmur, click, rub or gallop. Genitourinary: defered Abdomen:  Soft, non-tender to palpation in all four quadrants.  Bowel sounds present. No obvious masses palpated. Neuromuscular:  PERRL, EOMI Follows simple commands consistently. Able to identify, recall repeat. Mood appropriate. Insight, judgement poor 
+ generalized weakness.   
Sensory - intact No cerebellar signs. Plantars - down going No atrophy, no fasciculations, no tremors. + increased flexor tone BUE, BLE. Skin:  No rashes, no erythema. Calf non tender BLE. boggy L knee. LLE PROM 
L Knee Flexion: 80 (~) 
L Knee Extension: -15(~) Incision:  healing well, clean, dry, and intact  
   
 
                                                                        
Functional Assessment: 
   
   
Balance Sitting - Static: Fair (occasional) (09/09/18 1200) Sitting - Dynamic: Fair (occasional) (09/09/18 1200) Standing - Static: Poor (09/08/18 1200) Standing - Dynamic : Impaired (09/07/18 1200) [facility] Fall Risk Assessment: 
[facility] Mobility: Ambulates or transfers with assist devices or assistance (09/11/18 0746) Mobility Interventions: Bed/chair exit alarm; Patient to call before getting OOB;Utilize walker, cane, or other assistive device (09/11/18 0746) Mentation: Periodic confusion (09/11/18 0746) Mentation Interventions: Door open when patient unattended; Increase mobility; Toileting rounds (09/11/18 0746) Medication: Patient receiving anticonvulsants, sedatives(tranquilizers), psychotropics or hypnotics, hypoglycemics, narcotics, sleep aids, antihypertensives, laxatives, or diuretics (09/11/18 0746) Medication Interventions: Patient to call before getting OOB (09/11/18 0746) Elimination: Independent in elimination (09/11/18 0746) Elimination Interventions: Call light in reach; Patient to call for help with toileting needs; Toileting schedule/hourly rounds (09/11/18 0746) Prior Fall History: Unknown (09/11/18 0746) Total Score: 3 (09/11/18 0746) High Fall Risk: Yes (09/11/18 0746) Speech Assessment: 
    
 
Ambulation: Gait 
Distance (ft): 5 Feet (ft) (09/10/18 1253) Assistive Device: Gait belt; Other (comment) (II bars) (09/10/18 1253) Labs/Studies: 
Recent Results (from the past 72 hour(s)) GLUCOSE, POC Collection Time: 09/08/18 11:26 AM  
Result Value Ref Range Glucose (POC) 202 (H) 65 - 100 mg/dL GLUCOSE, POC Collection Time: 09/08/18  4:19 PM  
Result Value Ref Range Glucose (POC) 145 (H) 65 - 100 mg/dL GLUCOSE, POC Collection Time: 09/08/18  8:30 PM  
Result Value Ref Range Glucose (POC) 153 (H) 65 - 100 mg/dL GLUCOSE, POC Collection Time: 09/09/18  7:08 AM  
Result Value Ref Range Glucose (POC) 135 (H) 65 - 100 mg/dL GLUCOSE, POC Collection Time: 09/09/18 11:03 AM  
Result Value Ref Range Glucose (POC) 154 (H) 65 - 100 mg/dL GLUCOSE, POC Collection Time: 09/09/18  4:46 PM  
Result Value Ref Range Glucose (POC) 147 (H) 65 - 100 mg/dL GLUCOSE, POC Collection Time: 09/09/18  8:58 PM  
Result Value Ref Range Glucose (POC) 183 (H) 65 - 100 mg/dL GLUCOSE, POC Collection Time: 09/10/18  7:14 AM  
Result Value Ref Range Glucose (POC) 137 (H) 65 - 100 mg/dL GLUCOSE, POC Collection Time: 09/10/18 11:53 AM  
Result Value Ref Range Glucose (POC) 259 (H) 65 - 100 mg/dL GLUCOSE, POC Collection Time: 09/10/18  4:35 PM  
Result Value Ref Range Glucose (POC) 171 (H) 65 - 100 mg/dL GLUCOSE, POC Collection Time: 09/10/18  8:44 PM  
Result Value Ref Range Glucose (POC) 160 (H) 65 - 100 mg/dL GLUCOSE, POC Collection Time: 09/11/18  7:05 AM  
Result Value Ref Range Glucose (POC) 130 (H) 65 - 100 mg/dL Assessment:  
 
Problem List as of 9/11/2018  Date Reviewed: 8/30/2018 Codes Class Noted - Resolved Debility ICD-10-CM: R53.81 ICD-9-CM: 799.3  9/6/2018 - Present Total knee replacement status, left ICD-10-CM: O99.626 ICD-9-CM: V43.65  9/6/2018 - Present Hypotension ICD-10-CM: I95.9 ICD-9-CM: 458.9  8/30/2018 - Present Nausea & vomiting ICD-10-CM: R11.2 ICD-9-CM: 787.01  8/30/2018 - Present MELANI (acute kidney injury) ([facility] ICD-10-CM: N17.9 ICD-9-CM: 584.9  8/30/2018 - Present Fever ICD-10-CM: R50.9 ICD-9-CM: 780.60  8/15/2018 - Present Volume depletion ICD-10-CM: E86.9 ICD-9-CM: 276.50  8/15/2018 - Present Diabetes mellitus type 2, controlled ([facility] ICD-10-CM: E11.9 ICD-9-CM: 250.00  8/15/2018 - Present Hyperlipidemia ICD-10-CM: E78.5 ICD-9-CM: 272.4  8/15/2018 - Present Essential hypertension ICD-10-CM: I10 
ICD-9-CM: 401.9  8/15/2018 - Present Sepsis ([facility] ICD-10-CM: A41.9 ICD-9-CM: 038.9, 995.91  8/15/2018 - Present Acute cystitis without hematuria ICD-10-CM: N30.00 ICD-9-CM: 595.0  8/15/2018 - Present Bipolar disorder ([facility] ICD-10-CM: F31.9 ICD-9-CM: 296.80  8/15/2018 - Present S/P total knee arthroplasty, left ICD-10-CM: V25.671 ICD-9-CM: V43.65  8/3/2018 - Present Osteoarthritis ICD-10-CM: M19.90 ICD-9-CM: 715.90  8/2/2018 - Present Abnormal urinalysis ICD-10-CM: R82.90 ICD-9-CM: 791.9  7/26/2018 - Present CKD (chronic kidney disease) stage 3, GFR 30-59 ml/min ICD-10-CM: N18.3 ICD-9-CM: 585.3  5/8/2018 - Present Elevated white blood cell count ICD-10-CM: Y07.558 ICD-9-CM: 288.60  5/8/2018 - Present Plan:  
 
Assessment: 
  
The Post Assessment Physician Evaluation (BURAK) found the current functional status to be comparable with the Pre-admission Screening. The Patient is a good candidate for acute inpatient rehabilitation. Nothing since the Pre-admission screen has changed that determination.  
  
Rehabilitation Plan The patient has shown the ability to tolerate and benefit from 3 hours of therapy daily and is being admitted to a comprehensive acute inpatient rehabilitation program consisting of at least 3 hours of combined physical and occupational therapies. - intensive Physical Therapy for a minimum of 1.5 hours a day, at least 5 out of 7 days per week to address bed mobility, transfers, ambulation, strengthening, balance, and endurance. - intensive Occupational Therapy for a minimum of 1.5 hours a day, at least 5 out of 7 days per week to address ADL ( bathing, LE dressing, toileting) and adaptive equipment as needed. - barriers will be pain, poor L knee ROM, deconditioned state.  
  
  
Continue ST for: cognitive deficits, may be baseline, or acutely deteriorating due to recent sepsis, on-going medical issues.  
  
Continue 24-hour skilled rehabilitation nursing for bowel and bladder management, skin care for decubitus ulcer prevention , pain management and ongoing medication administration  
  
The patient may benefit from a psychology consult for depression, anxiety and adjustment disorder. Patient with underlying bi-polar disorder on medications. 
  
Continue daily physician medical management: 
  
MELANI (acute kidney injury)  )/ CKD (chronic kidney disease) stage 3, GFR 30-59 ml/min (5/8/2018) 
- monitor renal funcion closely. - encourage po hydration. ivf as needed. hold Cozaar, lasix. - Cr improved. Start norvasc.  
  
S/P left total knee arthroplasty (8/3/2018) - monitor wound for late infection. Closed. - focus on strength, ROM. PT evaluate for CPM.  
- jerald hold on CPM until patient's tone better evaluated,.  
  
 
Dystonia/ spasticity - abnormal tone , which appears velocity dependent. Patient unabel to explain clearly but has been seen by neurologist at [facility]. Recent MRI 1/2018 due to vertigo. Reports Cerebral and cerebellar volume loss with ex vacuo dilatation of the ventricular system.  Several foci of signal abnormality are present within the periventricular and subcortical white matter, likely sequela of chronic microvascular ischemia.  No mass effect, mass lesion, acute hemorrhage, or hydrocephalus.     
 - will follow with daughter. Sepsis - monitor. Treated.  
  
Bipolar disorder  - continue PTA medications. Depakote, wellbutrin.  
   
Post op acute blood loss anemia - monitor - 9/7 - hgb 11. 4.  
   
Spondylolisthesis of lumbar region - no new radicular symptoms.  
  
  
Pneumonia prophylaxis- Insentive spirometer every hour while awake 
  
DVT risk / DVT Prophylaxis- Will require daily physician exam to assess for signs and symptoms as patient is at increased risk for of thromboembolism. Mobilization as tolerated. Intermittent pneumatic compression devices when in bed Thigh-high or knee-high thromboembolic deterrent hose when out of bed.  
- resume heparin q12h. Will wean aspirin from ortho.  
  
Pain Control: stable, mild-to-moderate joint symptoms intermittently, reasonably well controlled by PRN meds. Will require regular pain assessment and comprenhensive pain management,  
  
Wound Care: Monitor wound status daily per staff and physician. At risk for failure. Will require 24/7 rehab nursing. Keep wound clean and dry - L TKA wound closed. No s/s of infection.  
  
Hypertension/ hypotension - BP uncontrolled, fluctuating, managed medically. - cozaar, lasix held due to MELANI. - 9/11 - SBP in fair range, 130-150s. - Will resume antihypertesives, diuretics as appropriate.  
  
Diabetes mellitus type 2- Uncontrolled. poor glycemic control. Will require daily, close FSG monitoring and medication adjustment to optimize glycemic control in setting of acute illness and hospitalization. - will resume glucotrol. Start 5mg dose. Pt was on glucotrol SR 10mg. - continue SSI.  
  
Urinary retention/ neurogenic bladder -- patient has history of \" neurogenic bladder. Has catheterized once in the past month; urinating more frequently own his own at baseline 
 - will observe pattern. schedule voids q6-8 hrs. Check post-void residual every shift; In and Out catheterize if post-void residual is more than 400 cc. - on flomax salmon for now. Patient reports he did have neurogenic bladder , was dependent on salmon, self cath for long period, but eventually was able to void. - Salmon replaced due to poor tolerance to CIC, pain. continue  
  
bowel program - start abbie colace 
  
GERD - resume PPI. At times may need additional antacids, Maalox prn. 
  
 
Time spent was 25 minutes with over 1/2 in direct patient care/examination, consultation and coordination of care. Signed By: Zana Campos MD   
 September 11, 2018

## 2018-09-11 NOTE — PROGRESS NOTES
09/11/18 0831 Grooming Functional Level 5 Tasks Completed by Patient Washed face Comments Setup Bathing Functional Level 4 Body Parts Patient Bathed Donna area;Arm, right;Arm, left Comments Patient refuses to participate in full bath, assist to bathe bottom in supine after attempting to use bed pan Upper Body Dressing/Undressing Functional Level 5 Items Applied/Steps Completed Pullover (4 steps) Comments Setup seated EOB Lower Body Dressing/Undressing Functional Level 1 Items Applied/Steps Completed Underpants (3 steps); Sock, right (1 step); Sock, left (1 step); Elastic waist pants (3 steps) Comments Assist for all components at bed level Toileting Functional Level 1 Comments Ramey, attempted bed pan x 2 d/t DABSC not available however patient does not complete bowel movement Bed/Chair/Wheelchair Transfers Rolling Right  Max A Rolling Left  Max A Supine to Sit  Dep Transfer Assist Score 1 Transfer Type Lateral with transfer board Comments Assist x 2, cues for body mechanics/hand placement S: \"I know this is not what you want to hear. .. But I think I'm pooping. \" [note patient reports need for bowel movement x 2 this session, DABSC unavailable, attempted transfer to [facility] however appears unsafe d/t patient's inability to assist with sliding board transfer/complete forward flexion despite +2 assistance. Defer to bedpan x 2 and ultimately no bowel movement on either attempt] Agreeable to therapy. Focus of session was on ADLs and functional mobility. Patient was able to roll to R and L in bed with maximal assistance and use of bed rails, transferred supine <> EOB with maximal-total assist (+2 at times). Patient transferred EOB to wheelchair end of session with +2 assist via sliding board. Pain not rated, therapy to tolerance. Collaborated with PT, Ankit Reardon and confirmed patient is on track to reach goals as documented in the care plan. Patient tolerated session well, but activity tolerance, balance, functional mobility, strength, cognition are still below baseline and require skilled facilitation to successfully and safely complete ADL's and transfers. Patient ended session in wheelchair with PT, Alexandro Mcgill.   
 
Pauly Rose, OTR/L

## 2018-09-12 NOTE — PROGRESS NOTES
OT Daily Note Time In 0915 Time Out 1000 Precautions: Ax2 for transfers, confused Home: Lives alone, QC Pain: Patient had no complaint of pain. Functional Mobility Pt completed 2 stands with Ax2 for sit to stand. Pt stood for 1 and 2 mins. Pt required min A to maintain balance. Pt was unable to get either knee straight; therefore was in a constant squat. When pt would use a hand his anxiety would increase and would demand to sit down. Pt was unable to be redirected. Strengthening Pt kicked a ball 10x with each leg. Pt did fair with RLE, but was unable to kick with LUE. When facilitated on posterior of leg pt would fight against movement, but when facilitated anteriorly on LUE pt demonstrated improved movement. Pt required max cueing to kick with LUE. Coordination Engaged in anterior and downward reach with pt. Pt was able to pick over 10 items up from the floor. Pt needed max cueing for initiation and demonstrated poor awareness to environment. Pt exhibited mood swings during activity. Education Benefits of forward reach Interdisciplinary Communication: PT Sonya Braun on performance Plan: Continue with POC. Pt was left in w/c with all needs within reach. Janett Linton OTR/L 
9/12/2018

## 2018-09-12 NOTE — PROGRESS NOTES
PHYSICAL THERAPY DAILY NOTE Time In: 1301 Time Out: 4253 Patient Seen For: PM;Patient education; Therapeutic exercise;Transfer training; Wheelchair mobility Subjective: \"I want to lay down when we're done. \" Patient agreeable to therapy. Objective: Vital Signs:  
Patient Vitals for the past 8 hrs: 
 Temp Pulse Resp BP SpO2  
09/12/18 1539 98 °F (36.7 °C) 89 16 134/79 99 % Pain level: No pain reported. Pain location: n/a Pain interventions: n/a Patient education: Educated patient on safety with lateral transfer with transfer board. Interdisciplinary Communication: Communicated with Sheyla Cornejo regarding patient's POC. Other (comment) (fall precautions  WBAT LLE) GROSS ASSESSMENT Daily Assessment BED/MAT MOBILITY Daily Assessment Required for safety. Assist x 2 secondary to decreased functional strength and ROM. Supine to Sit : 1 (Total assistance) TRANSFERS Daily Assessment Patient attempted standing in parallel bars x2 with MAX assist x2. Patient unable to stand fully upright with bilateral knee flexion and moderate forward head posture. Patient unable to attain upright position with assist x 2 prior to increased fatigue and requesting to sit back into w/c. Transfer Type: Lateral with transfer board Transfer Assistance : 1 (Total assistance) (assist x 2) Sit to Stand Assistance: Total assistance (x2 in parallel bars) Car Transfers: Not tested GAIT Daily Assessment NT secondary to decreased strength and endurance with sit<>stand transfers in parallel bars. STEPS or STAIRS Daily Assessment BALANCE Daily Assessment Sitting - Static: Good (unsupported) Sitting - Dynamic: Fair (occasional) Standing - Static: Poor Standing - Dynamic : Impaired WHEELCHAIR MOBILITY Daily Assessment Patient propelled w/c with B UE 50 ft with stand by assistance over level surfaces. LOWER EXTREMITY EXERCISES Daily Assessment Extremity: Left Exercise Type #1: Other (comment) (L knee extension prolonged stretch onto elevated platform) Patient returned to room lying supine in bed with all needs in reach, prevalon boots applied to B LE. Assessment: Patient demonstrated increased fatigue with functional mobility this PM. Patient unable to achieve upright posture with B UE support and assist x 2. Patient continues to require assist x2 for safety with bed mobility and transfers. Plan of Care: Continue with POC and progress as tolerated. Bocanegra Rack 9/12/2018

## 2018-09-12 NOTE — PROGRESS NOTES
OT Daily Note Time In 1125 Time Out 1200 Precautions: Ax2 for transfers, confused Home: Lives alone, QC Pain: Patient had no complaint of pain. Functional Mobility Pt was mod A with managing brakes. Activity Tolerance Pt completed 5 minutes on the ergometer frontwards and backwards with moderate resistance to increase UB strength and activity tolerance for integration into functional mobility. Pt appeared to self-limit when going backwards by going at a slow speed. Pt did not respond to verbal cues to speed up. Cognition Pt engaged with the [address] to improve problem solving to utilize during ADL. Pt moved at a fair speed for approximately 10 minutes and then just stopped and was unable to go on. Pt demonstrates decreased attention and motivation. Education Spasticity Interdisciplinary Communication: Dr. Rell Desai on medications Plan: Continue with POC. Pt was left in w/c with all needs within reach. Sandra Agee OTR/L 
9/12/2018

## 2018-09-12 NOTE — PROGRESS NOTES
Problem: Falls - Risk of 
Goal: *Absence of Falls Document Douglas Tomlinson Fall Risk and appropriate interventions in the flowsheet. Outcome: Progressing Towards Goal 
Fall Risk Interventions: 
Mobility Interventions: Communicate number of staff needed for ambulation/transfer, Bed/chair exit alarm, PT Consult for assist device competence, PT Consult for mobility concerns, Patient to call before getting OOB, Utilize walker, cane, or other assistive device Mentation Interventions: Door open when patient unattended, Evaluate medications/consider consulting pharmacy, Eyeglasses and hearing aids Medication Interventions: Evaluate medications/consider consulting pharmacy, Patient to call before getting OOB, Bed/chair exit alarm Elimination Interventions: Call light in reach, Patient to call for help with toileting needs

## 2018-09-12 NOTE — PROGRESS NOTES
BLADDER:   
Pt does have a salmon catheter that staff manages. Pt does take medication. If so, please indicate which medication: Flomax Pt requires staff to empty device Pt has had 0 bladder accidents during this shift.  
  
BOWEL:  Pt does take medication. Pt is continent of bowel and uses toilet. Pt requires staff to wipe. Pt has had 0 bowel accidents during this shift requiring maximum assistance from staff to clean up.

## 2018-09-12 NOTE — PROGRESS NOTES
09/12/18 1150 Time Spent With Patient Time In 1049 Time Out 1122 Patient Seen For: AM;Neuro-linguistics Mental Status Neurologic State Alert;Confused Orientation Level Oriented to person;Oriented to place; Disoriented to time;Oriented to situation Cognition Memory loss Perception Appears intact Perseveration No perseveration noted Safety/Judgement Fall prevention 09/12/18 1151 Neuro-Linguistic Exercises Verbal Reasoning Tasks Impaired; following 2 step conditional written directions required mod A Verbal Organization Impaired Memory Impaired 
(recalled 50% of items from a grocery list with use of compensatory strategies of categoriizing, writing them down, and repetition) Attention  Impaired Patient was seen for cognitive treatment. Oriented except to date. Slower processing and response formation today. Poor recall of the date later in the session despite conversation regarding yesterday being 9/11. Increased time for word finding noted when discussing his psychiatric history. Required moderate cues to follow 2-step written conditional instructions; completed with 50% accuracy. 50% accuracy recalling items from a grocery list with repetition, writing them down, and categorizing them. Improved to 70% with verbal cues. Patient was living alone and independent prior to admission and is functioning significantly below baseline at this time.  
 
Jasiel Rose MS, CCC-SLP

## 2018-09-12 NOTE — PROGRESS NOTES
Patient resting up in bed. Alert and oriented. Following commands. Lung sounds diminished in bases. Encouraged to cough and deep breathe. S1S2, bowel sounds active. Ramey draining clear yellow urine. Complaint of discomfort to left knee when moving leg. See MAR. Repositioned in bed for breakfast tray. No other verbalized needs made known at present time. Assessment completed. See doc flow sheet for further assessments.

## 2018-09-12 NOTE — PROGRESS NOTES
[facility] PROGRESS NOTE Gee Begin 
Admit Date: 9/6/2018 Chief Complaint : Gait dysfunction secondary to below. Admit Diagnosis: Unilateral primary osteoarthritis, left knee [M17.12] Subjective Patient seen and examined. Vss. Afebrile. Poor knee ROM, pain, weakness major barriers. Patient requires encouragement and motivation. .  
 
Objective:  
 
Current Facility-Administered Medications Medication Dose Route Frequency  polyethylene glycol (MIRALAX) packet 17 g  17 g Oral DAILY  bisacodyl (DULCOLAX) tablet 10 mg  10 mg Oral DAILY PRN  
 bisacodyl (DULCOLAX) suppository 10 mg  10 mg Rectal DAILY PRN  pantoprazole (PROTONIX) tablet 40 mg  40 mg Oral ACB  white petrolatum-mineral oil (EUCERIN) cream   Topical PRN  
 aspirin delayed-release tablet 81 mg  81 mg Oral DAILY  mirtazapine (REMERON) tablet 7.5 mg  7.5 mg Oral QHS PRN  
 amLODIPine (NORVASC) tablet 5 mg  5 mg Oral DAILY  glipiZIDE (GLUCOTROL) tablet 5 mg  5 mg Oral ACB  senna-docusate (PERICOLACE) 8.6-50 mg per tablet 1 Tab  1 Tab Oral DAILY  acetaminophen (TYLENOL) tablet 650 mg  650 mg Oral Q6H PRN  
 buPROPion SR (WELLBUTRIN SR) tablet 150 mg  150 mg Oral BID  dextrose (D50W) injection syrg 25 g  25 g IntraVENous PRN  
 divalproex ER (DEPAKOTE ER) 24 hour tablet 1,000 mg  1,000 mg Oral BID  heparin (porcine) injection 5,000 Units  5,000 Units SubCUTAneous Q12H  
 HYDROcodone-acetaminophen (NORCO) 7.5-325 mg per tablet 2 Tab  2 Tab Oral Q6H PRN  
 insulin regular (NOVOLIN R, HUMULIN R) injection   SubCUTAneous AC&HS  latanoprost (XALATAN) 0.005 % ophthalmic solution 1 Drop  1 Drop Both Eyes QHS  naloxone (NARCAN) injection 0.4 mg  0.4 mg IntraVENous PRN  
 ondansetron (ZOFRAN) injection 4 mg  4 mg IntraVENous Q8H PRN  
 simvastatin (ZOCOR) tablet 40 mg  40 mg Oral QHS  tamsulosin (FLOMAX) capsule 0.4 mg  0.4 mg Oral QHS  influenza vaccine 2018-19 (6 mos+)(PF) (FLUARIX QUAD/FLULAVAL QUAD) injection 0.5 mL  0.5 mL IntraMUSCular PRIOR TO DISCHARGE  pneumococcal 23-valent (PNEUMOVAX 23) injection 0.5 mL  0.5 mL IntraMUSCular PRIOR TO DISCHARGE Review of Systems: Denies chest pain, shortness of breath, cough, headache, visual problems, abdominal pain, dysurea, calf pain. Pertinent positives are as noted in the medical records and unremarkable otherwise. Visit Vitals  /77  Pulse 79  Temp 98 °F (36.7 °C)  Resp 14  
 Ht 6' (1.829 m)  Wt 199 lb 14.4 oz (90.7 kg)  SpO2 95%  BMI 27.11 kg/m2  
  
  
Physical Exam: Psych: Patient was oriented x 2. General:   Alert, appears stated age, No acute distress. No JVD HEENT:  Normocephalic, EOMI, facial symmetry  Intact. Oral mucosa pink and moist. No nasal discharge. Lungs:  CTA Bilaterally,Respiration even and unlabored Heart:  Regular rate and rhythm, S1, S2, No obvious murmur, click, rub or gallop. Genitourinary: defered Abdomen:  Soft, non-tender to palpation in all four quadrants. Bowel sounds present. No obvious masses palpated. Neuromuscular:  PERRL, EOMI Follows simple commands consistently.  
+ generalized weakness.   
Sensory - intact 
+ increased flexor tone BUE, BLE, spasticity, say 1-2. BLE, BUE. Skin:  No rashes, no erythema. Calf non tender BLE. boggy L knee. LLE PROM decreased. Incision:  healing well, clean, dry, and intact  
   
 
                                                                        
Functional Assessment: 
   
   
Balance Sitting - Static: Good (unsupported) (09/12/18 1000) Sitting - Dynamic: Fair (occasional) (09/12/18 1000) Standing - Static: Poor (09/12/18 1000) Standing - Dynamic : Impaired (09/12/18 1000) Virgil Morgan Fall Risk Assessment: 
Ebony Crane Mobility: Ambulates or transfers with assist devices or assistance (09/12/18 0705) Mobility Interventions: Communicate number of staff needed for ambulation/transfer;Bed/chair exit alarm;PT Consult for assist device competence;PT Consult for mobility concerns; Patient to call before getting OOB;Utilize walker, cane, or other assistive device (09/12/18 0705) Mentation: Periodic confusion (09/12/18 0705) Mentation Interventions: Door open when patient unattended;Evaluate medications/consider consulting pharmacy; Eyeglasses and hearing aids (09/12/18 6073) Medication: Patient receiving anticonvulsants, sedatives(tranquilizers), psychotropics or hypnotics, hypoglycemics, narcotics, sleep aids, antihypertensives, laxatives, or diuretics (09/12/18 0705) Medication Interventions: Evaluate medications/consider consulting pharmacy; Patient to call before getting OOB; Bed/chair exit alarm (09/12/18 0705) Elimination: Needs assistance with toileting (09/12/18 0705) Elimination Interventions: Call light in reach; Patient to call for help with toileting needs (09/12/18 0705) Prior Fall History: No (09/12/18 0705) Total Score: 4 (09/12/18 0705) Standard Fall Precautions: Yes (09/12/18 0705) High Fall Risk: Yes (09/11/18 1941) Speech Assessment: 
    
 
Ambulation: 
Gait Distance (ft): 5 Feet (ft) (x1, 2' x2) (09/12/18 1000) Assistive Device: Other (comment) (parallel bars) (09/12/18 1000) Labs/Studies: 
Recent Results (from the past 72 hour(s)) GLUCOSE, POC Collection Time: 09/09/18 11:03 AM  
Result Value Ref Range Glucose (POC) 154 (H) 65 - 100 mg/dL GLUCOSE, POC Collection Time: 09/09/18  4:46 PM  
Result Value Ref Range Glucose (POC) 147 (H) 65 - 100 mg/dL GLUCOSE, POC Collection Time: 09/09/18  8:58 PM  
Result Value Ref Range Glucose (POC) 183 (H) 65 - 100 mg/dL GLUCOSE, POC Collection Time: 09/10/18  7:14 AM  
Result Value Ref Range Glucose (POC) 137 (H) 65 - 100 mg/dL GLUCOSE, POC Collection Time: 09/10/18 11:53 AM  
Result Value Ref Range Glucose (POC) 259 (H) 65 - 100 mg/dL GLUCOSE, POC Collection Time: 09/10/18  4:35 PM  
Result Value Ref Range Glucose (POC) 171 (H) 65 - 100 mg/dL GLUCOSE, POC Collection Time: 09/10/18  8:44 PM  
Result Value Ref Range Glucose (POC) 160 (H) 65 - 100 mg/dL GLUCOSE, POC Collection Time: 09/11/18  7:05 AM  
Result Value Ref Range Glucose (POC) 130 (H) 65 - 100 mg/dL GLUCOSE, POC Collection Time: 09/11/18 11:47 AM  
Result Value Ref Range Glucose (POC) 180 (H) 65 - 100 mg/dL GLUCOSE, POC Collection Time: 09/11/18  4:41 PM  
Result Value Ref Range Glucose (POC) 198 (H) 65 - 100 mg/dL GLUCOSE, POC Collection Time: 09/11/18  8:44 PM  
Result Value Ref Range Glucose (POC) 176 (H) 65 - 100 mg/dL GLUCOSE, POC Collection Time: 09/12/18  7:13 AM  
Result Value Ref Range Glucose (POC) 176 (H) 65 - 100 mg/dL Assessment:  
 
Problem List as of 9/12/2018  Date Reviewed: 8/30/2018 Codes Class Noted - Resolved Debility ICD-10-CM: R53.81 ICD-9-CM: 799.3  9/6/2018 - Present Total knee replacement status, left ICD-10-CM: S30.133 ICD-9-CM: V43.65  9/6/2018 - Present Hypotension ICD-10-CM: I95.9 ICD-9-CM: 458.9  8/30/2018 - Present Nausea & vomiting ICD-10-CM: R11.2 ICD-9-CM: 787.01  8/30/2018 - Present MELANI (acute kidney injury) ([facility] ICD-10-CM: N17.9 ICD-9-CM: 584.9  8/30/2018 - Present Fever ICD-10-CM: R50.9 ICD-9-CM: 780.60  8/15/2018 - Present Volume depletion ICD-10-CM: E86.9 ICD-9-CM: 276.50  8/15/2018 - Present Diabetes mellitus type 2, controlled ([facility] ICD-10-CM: E11.9 ICD-9-CM: 250.00  8/15/2018 - Present Hyperlipidemia ICD-10-CM: E78.5 ICD-9-CM: 272.4  8/15/2018 - Present Essential hypertension ICD-10-CM: I10 
ICD-9-CM: 401.9  8/15/2018 - Present Sepsis ([facility] ICD-10-CM: A41.9 ICD-9-CM: 038.9, 995.91  8/15/2018 - Present Acute cystitis without hematuria ICD-10-CM: N30.00 ICD-9-CM: 595.0  8/15/2018 - Present Bipolar disorder ([facility] [address] ICD-10-CM: F31.9 ICD-9-CM: 296.80  8/15/2018 - Present S/P total knee arthroplasty, left ICD-10-CM: R82.025 ICD-9-CM: V43.65  8/3/2018 - Present Osteoarthritis ICD-10-CM: M19.90 ICD-9-CM: 715.90  8/2/2018 - Present Abnormal urinalysis ICD-10-CM: R82.90 ICD-9-CM: 791.9  7/26/2018 - Present CKD (chronic kidney disease) stage 3, GFR 30-59 ml/min ICD-10-CM: N18.3 ICD-9-CM: 585.3  5/8/2018 - Present Elevated white blood cell count ICD-10-CM: L58.298 ICD-9-CM: 288.60  5/8/2018 - Present Plan:  
 
Assessment: 
  
The Post Assessment Physician Evaluation (BURAK) found the current functional status to be comparable with the Pre-admission Screening. The Patient is a good candidate for acute inpatient rehabilitation. Nothing since the Pre-admission screen has changed that determination.  
  
Rehabilitation Plan The patient has shown the ability to tolerate and benefit from 3 hours of therapy daily and is being admitted to a comprehensive acute inpatient rehabilitation program consisting of at least 3 hours of combined physical and occupational therapies. - intensive Physical Therapy for a minimum of 1.5 hours a day, at least 5 out of 7 days per week to address bed mobility, transfers, ambulation, strengthening, balance, and endurance. - intensive Occupational Therapy for a minimum of 1.5 hours a day, at least 5 out of 7 days per week to address ADL ( bathing, LE dressing, toileting) and adaptive equipment as needed. - barriers will be pain, poor L knee ROM, deconditioned state. Spasticity/ increased tone not likely new. Poor progress noted.  continue to encourage,  
  
  
Continue ST for: cognitive deficits, may be baseline, or acutely deteriorating due to recent sepsis, on-going medical issues.  
  
 Continue 24-hour skilled rehabilitation nursing for bowel and bladder management, skin care for decubitus ulcer prevention , pain management and ongoing medication administration  
  
The patient may benefit from a psychology consult for depression, anxiety and adjustment disorder. Patient with underlying bi-polar disorder on medications. 
  
Continue daily physician medical management: 
  
MELANI (acute kidney injury)  )/ CKD (chronic kidney disease) stage 3, GFR 30-59 ml/min (5/8/2018) 
- monitor renal funcion closely. - encourage po hydration. ivf as needed. hold Cozaar, lasix.  
  
S/P left total knee arthroplasty (8/3/2018) - monitor wound for late infection. Closed. - focus on strength, ROM. - No CPM. Continue ROM exercises. PROM improved.  
  
 
Dystonia/ spasticity - abnormal tone , which appears velocity dependent. Patient unabel to explain clearly but has been seen by neurologist at [facility]. Recent MRI 1/2018 due to vertigo. Reports Cerebral and cerebellar volume loss with ex vacuo dilatation of the ventricular system.  Several foci of signal abnormality are present within the periventricular and subcortical white matter, likely sequela of chronic microvascular ischemia.  No mass effect, mass lesion, acute hemorrhage, or hydrocephalus.    
-  jerald consider treatment with baclofen. Sepsis - monitor. Treated.  
  
Bipolar disorder  - continue PTA medications. Depakote, wellbutrin.  
   
Post op acute blood loss anemia - monitor - 9/7 - hgb 11. 4.  
   
Spondylolisthesis of lumbar region - no new radicular symptoms.  
  
  
Pneumonia prophylaxis- Insentive spirometer every hour while awake 
  
DVT risk / DVT Prophylaxis-  No s/s of DVT. - on heparin q12h.   
  
Pain Control: stable, mild-to-moderate joint symptoms intermittently, reasonably well controlled by PRN meds. Will require regular pain assessment and comprenhensive pain management.  
- Norco prn.  
  
 Wound Care: Monitor wound status daily per staff and physician. At risk for failure. Will require 24/7 rehab nursing. Keep wound clean and dry - L TKA wound closed. No s/s of infection.  
  
Hypertension/ hypotension - BP uncontrolled, fluctuating, managed medically. - cozaar, lasix held due to MELANI. - 9/11 - SBP in fair range, 130-150s. - Will resume antihypertesives, diuretics as appropriate.  
  
Diabetes mellitus type 2-  Pt was on glucotrol SR 10mg. - continue SSI.  
  
Urinary retention/ neurogenic bladder -- patient has history of \" neurogenic bladder. Has catheterized once in the past month; urinating more frequently own his own at baseline 
- on flomax salmon for now. Patient reports he did have neurogenic bladder , was dependent on salmon, self cath for long period, but eventually was able to void per patient. appears this is also related to prior N syptoms. - failed voiding trial . Salmon replaced due to poor tolerance to CIC, pain.   
  
bowel program - start abbie colace 
  
GERD - resume PPI. At times may need additional antacids, Maalox prn. 
  
 
Time spent was 25 minutes with over 1/2 in direct patient care/examination, consultation and coordination of care. Signed By: Abida Jackson MD   
 September 12, 2018

## 2018-09-12 NOTE — PROGRESS NOTES
Problem: Falls - Risk of 
Goal: *Absence of Falls Document Tiffanie Eldridge Fall Risk and appropriate interventions in the flowsheet. Outcome: Progressing Towards Goal 
Fall Risk Interventions: 
Mobility Interventions: Bed/chair exit alarm Mentation Interventions: Door open when patient unattended Medication Interventions: Patient to call before getting OOB, Teach patient to arise slowly Elimination Interventions: Call light in reach

## 2018-09-12 NOTE — PROGRESS NOTES
PHYSICAL THERAPY DAILY NOTE Time In: 0828 Time Out: 3747 Patient Seen For: AM;Gait training;Patient education; Therapeutic exercise;Transfer training Subjective: \"How long do I have to be out here? \" Patient agreeable to therapy. Objective: Vital Signs:  
Patient Vitals for the past 8 hrs: 
 Temp Pulse Resp BP SpO2  
09/12/18 0801 98 °F (36.7 °C) 79 14 156/77 95 % Pain level: 7/10 Pain location: L Knee Pain interventions: Positioned for comfort, provided rest breaks, pain medication per nursing Patient education: Educated patient on upright posture in standing, body mechanics with sit>stand transfer. Interdisciplinary Communication: Communicated with [employer] regarding patient's POC. Other (comment) (fall precautions  WBAT LLE) GROSS ASSESSMENT Daily Assessment BED/MAT MOBILITY Daily Assessment TRANSFERS Daily Assessment Required for safety. Increased time and effort. Patient performed sit<>stand transfer x4 throughout ambulation training in parallel bars. Transfer Assistance : 1 (Total assistance) (assist x2) Sit to Stand Assistance: Total assistance (assist x2 in parallel bars) Car Transfers: Not tested GAIT Daily Assessment Patient ambulated with slow step-to gait pattern leading with R LE with decreased step clearance of B LE with moderate forward head posture with increased knee flexion throughout gait cycle of B LE. Verbal and tactile cues for upright posture unsuccessful. Amount of Assistance: 1 (Dependent/total assistance) Distance (ft): 5 Feet (ft) (x1, 2' x2) Assistive Device: Other (comment) (parallel bars) BALANCE Daily Assessment Sitting - Static: Good (unsupported) Sitting - Dynamic: Fair (occasional) Standing - Static: Poor Standing - Dynamic : Impaired LOWER EXTREMITY EXERCISES Daily Assessment Extremity: Both Exercise Type #1: Other (comment) (B LE motomed x 10 minutes, Level 0) Patient handed off to Clark Bernal in therapy gym. Assessment: Patient making slow progress with functional mobility. Patient requires constant verbal encouragement to participate in therapy. Plan of Care: Continue with POC and progress as tolerated. Beatriz Soliz 9/12/2018

## 2018-09-12 NOTE — PROGRESS NOTES
End Of Shift Functional Summary, Nursing TOILETING:   
Does patient need assist with adjusting pants up or down and/or pericare? yes If yes, please indicate what the patient needs help with: All clothing Pt uses wheelchair. Does the patient require extra time? yes Does the patient require standby assistance? yes Does the patient require contact guard assistance? yes Does the patient require more than one staff member for assistance? yes TOILET TRANSFER:   
Pt requires maximum assistance. Pt uses wheelchair. Does the patient require extra time? yes Does the patient require contact guard assistance? yes Does the patient require more than one staff member for assistance? yes BLADDER:   
Pt does have a salmon catheter that staff manages. Pt is continent. of bladder and voids in  Salmon catheter bag    Pt requires staff to empty device Pt has had 0 bladder accidents during this shift requiring maximum assistance to clean up. (An accident is when the episode is not contained in a brief AND/OR the clothing/linen requires changing/cleaning up.) BOWEL:  Pt does take medication. Pt is continent of bowel and uses bedside commode. Pt requires staff to empty device    Pt has had 0 bowel accidents during this shift requiring maximum assistance from staff to clean up. (An accident is when the episode is not contained in a brief AND/OR the clothing/linen requires changing/cleaning up.) BED/CHAIR TRANSFER Pt requires maximum assistance. Pt uses wheelchair. Does the patient require extra time? yes Does the patient require contact guard assistance? yes Does the patient require more than one staff member for assistance? yes EATING Pt requires setup. Documentation reviewed and plan of care discussed/reviewed with  
patient, physician, therapists, oncoming nurse, patient assistant and family/spouse during the shift.

## 2018-09-13 NOTE — PROGRESS NOTES
SFD PROGRESS NOTE Jyl Screws 
Admit Date: 9/6/2018 Chief Complaint : Gait dysfunction secondary to below. Admit Diagnosis: Unilateral primary osteoarthritis, left knee [M17.12] MELANI (acute kidney injury) ([facility] [address] (8/30/2018) CKD (chronic kidney disease) stage 3, GFR 30-59 ml/min (5/8/2018) S/P total knee arthroplasty, left (8/3/2018) Diabetes mellitus type 2, controlled ([address] (8/15/2018) Sepsis ([address] (8/15/2018) Bipolar disorder ([address] (8/15/2018) Hypotension (8/30/2018) Nausea & vomiting (8/30/2018) Pain DVT risk Post op acute blood loss anemia Hypertension Diabetes ([facility] [address] Spondylolisthesis of lumbar region Neurogenic bladder  -has catheterized once in the past month; urinating more frequently own his own at baseline Acute Rehab Dx: 
Gait impairment Debility   
Mobility and ambulation deficits Self Care/ADL deficits 
  
Subjective Patient seen and examined. Vss. Afebrile. More sleepy today. Did not sleep well last night. however also baclofen started. Will monitor response and side effects. Attempted to contact daughter. Left message to discuss his neurologic hx. Pateitn dose say he had been followed at [facility] by neurology. It appears he hs had neurosurgical issues for his back at [facility]. Patient PT, OT limited by poor ROM, spasticity/ increased flexor tone. pain, weakness. Progress slow. Unusually high flexor tone, rigidity that limits mobility, limb movement. Objective:  
 
Current Facility-Administered Medications Medication Dose Route Frequency  baclofen (LIORESAL) tablet 5 mg  5 mg Oral TID  polyethylene glycol (MIRALAX) packet 17 g  17 g Oral DAILY  bisacodyl (DULCOLAX) tablet 10 mg  10 mg Oral DAILY PRN  
 bisacodyl (DULCOLAX) suppository 10 mg  10 mg Rectal DAILY PRN  pantoprazole (PROTONIX) tablet 40 mg  40 mg Oral ACB  white petrolatum-mineral oil (EUCERIN) cream   Topical PRN  
 aspirin delayed-release tablet 81 mg  81 mg Oral DAILY  mirtazapine (REMERON) tablet 7.5 mg  7.5 mg Oral QHS PRN  
 amLODIPine (NORVASC) tablet 5 mg  5 mg Oral DAILY  glipiZIDE (GLUCOTROL) tablet 5 mg  5 mg Oral ACB  senna-docusate (PERICOLACE) 8.6-50 mg per tablet 1 Tab  1 Tab Oral DAILY  acetaminophen (TYLENOL) tablet 650 mg  650 mg Oral Q6H PRN  
 buPROPion SR (WELLBUTRIN SR) tablet 150 mg  150 mg Oral BID  dextrose (D50W) injection syrg 25 g  25 g IntraVENous PRN  
 divalproex ER (DEPAKOTE ER) 24 hour tablet 1,000 mg  1,000 mg Oral BID  heparin (porcine) injection 5,000 Units  5,000 Units SubCUTAneous Q12H  
 HYDROcodone-acetaminophen (NORCO) 7.5-325 mg per tablet 2 Tab  2 Tab Oral Q6H PRN  
 insulin regular (NOVOLIN R, HUMULIN R) injection   SubCUTAneous AC&HS  latanoprost (XALATAN) 0.005 % ophthalmic solution 1 Drop  1 Drop Both Eyes QHS  naloxone (NARCAN) injection 0.4 mg  0.4 mg IntraVENous PRN  
 ondansetron (ZOFRAN) injection 4 mg  4 mg IntraVENous Q8H PRN  
 simvastatin (ZOCOR) tablet 40 mg  40 mg Oral QHS  tamsulosin (FLOMAX) capsule 0.4 mg  0.4 mg Oral QHS  influenza vaccine 2018-19 (6 mos+)(PF) (FLUARIX QUAD/FLULAVAL QUAD) injection 0.5 mL  0.5 mL IntraMUSCular PRIOR TO DISCHARGE  pneumococcal 23-valent (PNEUMOVAX 23) injection 0.5 mL  0.5 mL IntraMUSCular PRIOR TO DISCHARGE Review of Systems: Denies chest pain, shortness of breath, cough, headache, visual problems, abdominal pain, dysurea, calf pain. Pertinent positives are as noted in the medical records and unremarkable otherwise. Visit Vitals  /56  Pulse (!) 49  Temp 98 °F (36.7 °C)  Resp 16  
 Ht 6' (1.829 m)  Wt 199 lb 14.4 oz (90.7 kg)  SpO2 98%  BMI 27.11 kg/m2  
  
  
Physical Exam: Psych: Patient was oriented x 2. Without hallucinations. Patient is not suicidal.  
General:   Alert, appears stated age, No acute distress. HEENT:  Normocephalic, EOMI, facial symmetry  Intact.  Oral mucosa pink and moist. No nasal discharge. Lungs:  CTA Bilaterally,Respiration even and unlabored No apparent use of accessory muscles for respiration. No nasal alar flaring. Heart:  Regular rate and rhythm, S1, S2, No obvious murmur, click, rub or gallop. Genitourinary: defered Abdomen:  Soft, non-tender to palpation in all four quadrants. Bowel sounds present. No obvious masses palpated. Neuromuscular:  PERRL, EOMI Follows simple commands consistently. Able to identify, recall repeat. Mood appropriate. Insight, judgement poor 
+ generalized weakness.   
Sensory - intact No cerebellar signs. Plantars - down going No atrophy, no fasciculations, no tremors. + increased flexor tone/ spasticity BUE, BLE. Skin:  No rashes, no erythema. Calf non tender BLE. boggy L knee. LLE PROM 
L Knee Flexion: 80 (~) 
L Knee Extension: -15(~) Incision:  healing well, clean, dry, and intact  
   
 
                                                                        
Functional Assessment: 
   
   
Balance Sitting - Static: Good (unsupported) (09/13/18 1200) Sitting - Dynamic: Fair (occasional) (09/13/18 1200) Standing - Static: Poor (09/13/18 1200) Standing - Dynamic : Impaired (09/13/18 1200) [facility] Fall Risk Assessment: 
Doraine Ang Risk Mobility: Ambulates or transfers with assist devices or assistance (09/13/18 0710) Mobility Interventions: Utilize walker, cane, or other assistive device;Communicate number of staff needed for ambulation/transfer (09/13/18 0710) Mentation: Periodic confusion (09/13/18 0710) Mentation Interventions: Door open when patient unattended (09/13/18 0710) Medication: Patient receiving anticonvulsants, sedatives(tranquilizers), psychotropics or hypnotics, hypoglycemics, narcotics, sleep aids, antihypertensives, laxatives, or diuretics (09/13/18 0710) Medication Interventions: Evaluate medications/consider consulting pharmacy; Patient to call before getting OOB (09/13/18 0710) Elimination: Needs assistance with toileting (09/13/18 0710) Elimination Interventions: Call light in reach (09/13/18 0710) Prior Fall History: No (09/13/18 0710) Total Score: 4 (09/13/18 0710) Standard Fall Precautions: Yes (09/13/18 0710) High Fall Risk: Yes (09/12/18 2038) Speech Assessment: 
    
 
Ambulation: 
Gait Distance (ft): 3 Feet (ft) (09/13/18 1200) Assistive Device: Gait belt; Other (comment) (Parallel bars) (09/13/18 1200) Labs/Studies: 
Recent Results (from the past 72 hour(s)) GLUCOSE, POC Collection Time: 09/10/18  4:35 PM  
Result Value Ref Range Glucose (POC) 171 (H) 65 - 100 mg/dL GLUCOSE, POC Collection Time: 09/10/18  8:44 PM  
Result Value Ref Range Glucose (POC) 160 (H) 65 - 100 mg/dL GLUCOSE, POC Collection Time: 09/11/18  7:05 AM  
Result Value Ref Range Glucose (POC) 130 (H) 65 - 100 mg/dL GLUCOSE, POC Collection Time: 09/11/18 11:47 AM  
Result Value Ref Range Glucose (POC) 180 (H) 65 - 100 mg/dL GLUCOSE, POC Collection Time: 09/11/18  4:41 PM  
Result Value Ref Range Glucose (POC) 198 (H) 65 - 100 mg/dL GLUCOSE, POC Collection Time: 09/11/18  8:44 PM  
Result Value Ref Range Glucose (POC) 176 (H) 65 - 100 mg/dL GLUCOSE, POC Collection Time: 09/12/18  7:13 AM  
Result Value Ref Range Glucose (POC) 176 (H) 65 - 100 mg/dL GLUCOSE, POC Collection Time: 09/12/18 11:23 AM  
Result Value Ref Range Glucose (POC) 263 (H) 65 - 100 mg/dL GLUCOSE, POC Collection Time: 09/12/18  4:01 PM  
Result Value Ref Range Glucose (POC) 261 (H) 65 - 100 mg/dL GLUCOSE, POC Collection Time: 09/12/18  8:50 PM  
Result Value Ref Range Glucose (POC) 179 (H) 65 - 100 mg/dL CBC WITH AUTOMATED DIFF Collection Time: 09/13/18  5:57 AM  
Result Value Ref Range WBC 9.8 4.3 - 11.1 K/uL  
 RBC 3.64 (L) 4.23 - 5.6 M/uL  
 HGB 10.7 (L) 13.6 - 17.2 g/dL HCT 33.0 (L) 41.1 - 50.3 % MCV 90.7 79.6 - 97.8 FL  
 MCH 29.4 26.1 - 32.9 PG  
 MCHC 32.4 31.4 - 35.0 g/dL  
 RDW 14.7 % PLATELET 978 656 - 912 K/uL MPV 9.7 9.4 - 12.3 FL ABSOLUTE NRBC 0.00 0.0 - 0.2 K/uL  
 DF AUTOMATED NEUTROPHILS 57 43 - 78 % LYMPHOCYTES 28 13 - 44 % MONOCYTES 13 (H) 4.0 - 12.0 % EOSINOPHILS 2 0.5 - 7.8 % BASOPHILS 0 0.0 - 2.0 % IMMATURE GRANULOCYTES 1 0.0 - 5.0 %  
 ABS. NEUTROPHILS 5.5 1.7 - 8.2 K/UL  
 ABS. LYMPHOCYTES 2.8 0.5 - 4.6 K/UL  
 ABS. MONOCYTES 1.3 0.1 - 1.3 K/UL  
 ABS. EOSINOPHILS 0.2 0.0 - 0.8 K/UL  
 ABS. BASOPHILS 0.0 0.0 - 0.2 K/UL  
 ABS. IMM. GRANS. 0.1 0.0 - 0.5 K/UL METABOLIC PANEL, BASIC Collection Time: 09/13/18  5:57 AM  
Result Value Ref Range Sodium 136 136 - 145 mmol/L Potassium 4.1 3.5 - 5.1 mmol/L Chloride 98 98 - 107 mmol/L  
 CO2 27 21 - 32 mmol/L Anion gap 11 7 - 16 mmol/L Glucose 160 (H) 65 - 100 mg/dL BUN 23 8 - 23 MG/DL Creatinine 1.02 0.8 - 1.5 MG/DL  
 GFR est AA >60 >60 ml/min/1.73m2 GFR est non-AA >60 >60 ml/min/1.73m2 Calcium 9.9 8.3 - 10.4 MG/DL  
VALPROIC ACID Collection Time: 09/13/18  5:57 AM  
Result Value Ref Range Valproic acid 54 50 - 100 ug/ml GLUCOSE, POC Collection Time: 09/13/18  7:06 AM  
Result Value Ref Range Glucose (POC) 165 (H) 65 - 100 mg/dL GLUCOSE, POC Collection Time: 09/13/18 11:20 AM  
Result Value Ref Range Glucose (POC) 202 (H) 65 - 100 mg/dL Assessment:  
 
Problem List as of 9/13/2018  Date Reviewed: 8/30/2018 Codes Class Noted - Resolved Debility ICD-10-CM: R53.81 ICD-9-CM: 799.3  9/6/2018 - Present Total knee replacement status, left ICD-10-CM: J06.937 ICD-9-CM: V43.65  9/6/2018 - Present Hypotension ICD-10-CM: I95.9 ICD-9-CM: 458.9  8/30/2018 - Present Nausea & vomiting ICD-10-CM: R11.2 ICD-9-CM: 787.01  8/30/2018 - Present MELANI (acute [facility]) ([facility] ICD-10-CM: N17.9 ICD-9-CM: 584.9  8/30/2018 - Present Fever ICD-10-CM: R50.9 ICD-9-CM: 780.60  8/15/2018 - Present Volume depletion ICD-10-CM: E86.9 ICD-9-CM: 276.50  8/15/2018 - Present Diabetes mellitus type 2, controlled ([facility] ICD-10-CM: E11.9 ICD-9-CM: 250.00  8/15/2018 - Present Hyperlipidemia ICD-10-CM: E78.5 ICD-9-CM: 272.4  8/15/2018 - Present Essential hypertension ICD-10-CM: I10 
ICD-9-CM: 401.9  8/15/2018 - Present Sepsis ([facility] ICD-10-CM: A41.9 ICD-9-CM: 038.9, 995.91  8/15/2018 - Present Acute cystitis without hematuria ICD-10-CM: N30.00 ICD-9-CM: 595.0  8/15/2018 - Present Bipolar disorder ([facility] ICD-10-CM: F31.9 ICD-9-CM: 296.80  8/15/2018 - Present S/P total knee arthroplasty, left ICD-10-CM: S61.019 ICD-9-CM: V43.65  8/3/2018 - Present Osteoarthritis ICD-10-CM: M19.90 ICD-9-CM: 715.90  8/2/2018 - Present Abnormal urinalysis ICD-10-CM: R82.90 ICD-9-CM: 791.9  7/26/2018 - Present CKD (chronic kidney disease) stage 3, GFR 30-59 ml/min ICD-10-CM: N18.3 ICD-9-CM: 585.3  5/8/2018 - Present Elevated white blood cell count ICD-10-CM: O41.879 ICD-9-CM: 288.60  5/8/2018 - Present Plan:  
 
Assessment: 
  
The Post Assessment Physician Evaluation (BURAK) found the current functional status to be comparable with the Pre-admission Screening. The Patient is a good candidate for acute inpatient rehabilitation. Nothing since the Pre-admission screen has changed that determination.  
  
Rehabilitation Plan The patient has shown the ability to tolerate and benefit from 3 hours of therapy daily and is being admitted to a comprehensive acute inpatient rehabilitation program consisting of at least 3 hours of combined physical and occupational therapies.  
- intensive Physical Therapy for a minimum of 1.5 hours a day, at least 5 out of 7 days per week to address bed mobility, transfers, ambulation, strengthening, balance, and endurance. - intensive Occupational Therapy for a minimum of 1.5 hours a day, at least 5 out of 7 days per week to address ADL ( bathing, LE dressing, toileting) and adaptive equipment as needed. - barriers will be pain, poor L knee ROM, deconditioned state.  
  
  
Continue ST for: cognitive deficits, may be baseline, or acutely deteriorating due to recent sepsis, on-going medical issues.  
  
Continue 24-hour skilled rehabilitation nursing for bowel and bladder management, skin care for decubitus ulcer prevention , pain management and ongoing medication administration  
  
The patient may benefit from a psychology consult for depression, anxiety and adjustment disorder. Patient with underlying bi-polar disorder on medications. 
  
Continue daily physician medical management: 
  
MELANI (acute kidney injury)  )/ CKD (chronic kidney disease) stage 3, GFR 30-59 ml/min (5/8/2018) 
- monitor renal funcion closely. - encourage po hydration. ivf as needed. hold Cozaar, lasix. - 9/13  continue norvasc. Renal function wnl 9/13.  
  
  
S/P left total knee arthroplasty (8/3/2018) - monitor wound for late infection. Closed. - focus on strength, ROM. PT evaluate for CPM.  
- jerald hold on CPM until patient's tone better evaluated,.  
- decreased L TKA ROM, extension not all due to poor knee rom. Appears spasticity now evident affecting PROM.   
 
Dystonia/ spasticity - abnormal tone , which appears velocity dependent. Patient unabel to explain clearly but has been seen by neurologist at [address] - Recent MRI 1/2018 due to vertigo. Reports Cerebral and cerebellar volume loss with ex vacuo dilatation of the ventricular system.  Several foci of signal abnormality are present within the periventricular and subcortical white matter, likely sequela of chronic microvascular ischemia.  No mass effect, mass lesion, acute hemorrhage, or hydrocephalus.    
- will follow with daughter. Left message. - patient agreeable to start meds for spasticity. Start baclofen 5mg tid. Increase as tolerated. Sepsis - monitor. Treated.  
  
Bipolar disorder  - continue PTA medications. Depakote, wellbutrin. - depakote level 9/13 today 54, wnl.  
   
Post op acute blood loss anemia - monitor - 9/7 - hgb 11. -> 10.7 (9/13) 
   
Spondylolisthesis of lumbar region - no new radicular symptoms.  
- has had back issues in past, follwed by neurosurgery at [facility].   
  
Pneumonia prophylaxis- Insentive spirometer every hour while awake 
  
DVT risk / DVT Prophylaxis- Will require daily physician exam to assess for signs and symptoms as patient is at increased risk for of thromboembolism. Mobilization as tolerated. Intermittent pneumatic compression devices when in bed Thigh-high or knee-high thromboembolic deterrent hose when out of bed.  
- resume heparin q12h.   
  
Pain Control: stable, mild-to-moderate joint symptoms intermittently, reasonably well controlled by PRN meds. Will require regular pain assessment and comprenhensive pain management,  
  
Wound Care: Monitor wound status daily per staff and physician. At risk for failure. Will require 24/7 rehab nursing. Keep wound clean and dry - L TKA wound closed. No s/s of infection.  
  
Hypertension/ hypotension -  
- cozaar, lasix held due to MELANI. - 9/13 -  BP in good control. No new changes. renal function WNL today. continue norvasc.  
  
Diabetes mellitus type 2- Uncontrolled. poor glycemic control. Will require daily, close FSG monitoring and medication adjustment to optimize glycemic control in setting of acute illness and hospitalization. - will resume glucotrol. Start 5mg dose. Pt was on glucotrol SR 10mg. - continue SSI.  
  
Urinary retention/ neurogenic bladder -- patient has history of \" neurogenic bladder. Has catheterized once in the past month; urinating more frequently own his own at baseline - will observe pattern. schedule voids q6-8 hrs. Check post-void residual every shift; In and Out catheterize if post-void residual is more than 400 cc. 
- on flomax salmon for now. Patient reports he did have neurogenic bladder , was dependent on salmon, self cath for long period, but eventually was able to void. - Salmon replaced due to poor tolerance to CIC, pain. continue  
- baclofen started 9/13. May held with possible sphincter tone.  
  
bowel program - start abbie colace 
  
GERD - resume PPI. At times may need additional antacids, Maalox prn. 
  
 
Time spent was 25 minutes with over 1/2 in direct patient care/examination, consultation and coordination of care. Signed By: Yas Booker MD   
 September 13, 2018

## 2018-09-13 NOTE — PROGRESS NOTES
PT WEEKLY PROGRESS NOTE Time In: 1115 Time Out: 1158 Subjective: \"How much am I going to have to do of this? \" Patient agreeable to therapy. Objective: Other (comment) (fall precautions  WBAT LLE) Outcome Measures: Vital Signs:  
Patient Vitals for the past 8 hrs: 
 Temp Pulse Resp BP SpO2  
09/13/18 0827 98 °F (36.7 °C) (!) 49 16 110/56 98 % Pain level: 6/10 Pain location: L knee Pain interventions: Positioned for comfort, pain medication per nursing, provided rest breaks. Patient education: Educated patient on body mechanics with sit<>stand transfer. Interdisciplinary Communication: Communicated with Daryl Rockwell regarding patient's POC. AROM: Generally decreased B LE 
 
FIM SCORES Initial Assessment Weekly Progress Assessment 9/13/2018 Bed/Chair/Wheelchair Transfers 1 1 Wheelchair Mobility 0 1 Walking [place] 0 1 Steps/Stairs 0 NT- Secondary to poor functional strength Please see IRC Interdisciplinary Eval: Coordination/Balance Section for details regarding FIM score description. BED/CHAIR/WHEELCHAIR TRANSFERS Initial Assessment Weekly Progress Assessment 9/13/2018 Rolling Right 1 (Total assistance) Rolling Left 1 (Total assistance) Supine to Sit 1 (Total assistance) Sit to Stand Total assistance (mod assist x 2) Total assistance (x2 assist with parallel bars) Sit to Supine 1 (Total assistance) (max assist x 2) Transfer Type SPT with walker Comments Increased time and effort to complete with altered body mechanics. Significant limitations in UE and LE AROM with rigidity noted in all 4 extremities, LEs>UEs. Motor planning deficits noted with significant delay in initiating functional movement. Tremor noted in UEs and LEs during functional mobility  Patient requires assist x 2 with sit>stand transfer secondary to decreased AROM and strength of B LE. Patient unable to stand upright with MOD/MAX A.  Patient demonstrates forward flexed posture with increased knee flexion of B LE in standing. Car Transfer Not tested Not tested Car Type GROSS ASSESSMENT Weekly Progress Assessment 9/13/2018 AROM Strength Coordination Tone Sensation PROM    
 
POSTURE Weekly Progress Assessment 9/13/2018 Posture (WDL) Posture Assessment [facility] MOBILITY/MANAGEMENT Initial Assessment Weekly Progress Assessment 9/13/2018 Able to Propel 0 feet  35 ft Curbs/ramps assistance required 0 (Not tested)  NT Wheelchair set up assistance required 0 (Not tested)  DEP Wheelchair management    NT Patient performed sit<>stand transfer x3 prior to initiating step with gait training in parallel bars with dependent assist (assist x2). Patient unable to attempt ambulation x3 following sit>stand due to reports of increased pain and fatigue. WALKING INDEPENDENCE Initial Assessment Weekly Progress Assessment 9/13/2018 Assistive device Walker, rolling, Gait belt Gait belt; Other (comment) (Parallel bars) Ambulation assistance - level surface 1 (Dependent/total assistance)  1 (Dependent/total assitance) Distance 0 Feet (ft) 3 Feet (ft) Comments Attempted to initiate gait training with RW. Patient able to stand and take 1 step forward. patient began to tremor and demonstrate flexed posture. requesting to sit back down on bed. patient took 1 step backwards and returned to sitting. Unable to tolerate gait training this PM. Patient reluctant to weight bear on LLE and demonstrated 1 to 2 slow shuffling steps Patient took small steps demonstrating step-to gait pattern leading with L LE. Patient demonstrated decreased step length and clearance with increased time and effort. PT assist with weight shift to L for right foot to step-to. Patient requested to sit back down following ambulation attempt. Patient continues to be reluctant to weight bear on L LE with reports of increased pain with standing. STEPS/STAIRS Initial Assessment Weekly Progress Assessment 9/13/2018 Steps/Stairs ambulated 0 Rail Use Comments Unable to ambulate up/down steps at this time   Unable to ambulate up/down steps at this time. Curbs/Ramps 0 (Not tested) Patient returned to room sitting in w/c with all needs in reach. Assessment: Patient making minimal progress towards goals at this time. Patient remains Dependent for all bed mobility and transfers at this time. Patient unable to consistently perform sit<>stand transfer with dependent assist. Patient requires constant motivation and encouragement as he attempts to divert PT sessions by talking of other topics during therapy sessions. Patient self-limiting due to reports of pain. Patient demonstrating decreased functional strength with bed mobility, transfers, and gait training. Patient has met zero short term goals at this point. Plan of Care: Please see Care Plan for updated LTGs. Family Training: Lalita Wade 9/13/2018

## 2018-09-13 NOTE — PROGRESS NOTES
Patient resting up in be asleep. Drowsy upon awakening and assessment. Lung sounds with light coarseness to upper lobes, diminished in bases. Older incision to left knee healed and open to air. Ramey draining clear dark yellow urine. Admits to feeling sleepy. Not interested in eating much breakfast at present time. Repositioned up in bed. Elevated left leg/states feels better. No other verbalized needs made known at present time. Assessment completed. See doc flow sheet for further assessments. Door left open for closer observation.

## 2018-09-13 NOTE — PROGRESS NOTES
09/13/18 1127 Time Spent With Patient Time In 1056 Time Out 1119 Patient Seen For: AM;Neuro-linguistics Mental Status Neurologic State Drowsy; Confused Orientation Level Oriented to person;Oriented to place;Oriented to situation Cognition Follows commands Perception Appears intact Perseveration No perseveration noted Safety/Judgement Fall prevention 09/13/18 1128 Reasoning Task Exercises-Categorical Exclusion/Analogy Categorical Exclusion Level of Impairment Intermediate Categorical Exclusion Accuracy (%) 40 % Categorical Exclusion Cues Maximal  
Memory Exercises Functional Tasks recalled therapist and RN's names; recalled 50% of  items discussed during previous session Neuro-Linguistic Exercises Verbal Reasoning Tasks Impaired Verbal Problem Solving Impaired Memory Impaired Patient was seen for cognitive treatment. Oriented to month and year this session; however, patient was drowsy with increased cues required to remain on task and participate. 40% accuracy with abstract reasoning task with several repetitions of the instructions required. Recalled items we had discussed during short-term memory task the previous session with 50% accuracy. Decreased auditory processing with delayed responses compared with previous session. Reported \"I'm just tired\" several times during the session.  
 
 
Nabor Bone MS, CCC-SLP

## 2018-09-13 NOTE — PROGRESS NOTES
OT WEEKLY PROGRESS NOTE Time In: 5871 Time Out: 1045 COMPREHENSION MODE Initial Assessment Weekly Progress Assessment 9/13/2018 Score 5 (90% of basic needs) 3 EXPRESSION Initial Assessment Weekly Progress Assessment 9/13/2018 Primary Mode of Expression Verbal Verbal  
Score 5 4 Comments Confused Expresses only basic needs 75-89%, repeats words, OR expresses self with assist from staff to occlude trach. SOCIAL INTERACTION/ PRAGMATICS Initial Assessment Weekly Progress Assessment 9/13/2018 Score 3 3 Comments Sexually inappropriate at times Sexually inappropriate PROBLEM SOLVING Initial Assessment Weekly Progress Assessment 9/13/2018 Score 2 2 Comments Solves basic problems 25-49% of the time, needs direction more than 50% of the time to initiate, plan, or complete simple activities and may need a restraint for safety. Solves basic problems 25-49% of the time, needs direction more than 50% of the time to initiate, plan, or complete simple activities and may need a restraint for safety. MEMORY Initial Assessment Weekly Progress Assessment 9/13/2018 Score 2 2 Comments Recalled 1/3 words Recognizes, recalls, or executes 25-49% of the time 1 step of 2 step request.  
 
GROOMING Initial Assessment Weekly Progress Assessment 9/13/2018 Functional Level 4 5 Tasks completed by patient Shaved/applied makeup (optional) Washed face Comments A wiping shaving cream S/U  
 
BATHING Initial Assessment Weekly Progress Assessment 9/13/2018 Functional Level 4 1 Body parts patient bathed Abdomen, Arm, left, Arm, right, Lower leg and foot, left, Lower leg and foot, right, Donna area, Thigh, left, Thigh, right Abdomen;Arm, left;Arm, right;Buttocks; Chest;Lower leg and foot, left; Lower leg and foot, right;Donna area; Thigh, left; Thigh, right Comments CGA Ax2 for buttocks UPPER BODY DRESSING/UNDRESSING Initial Assessment Weekly Progress Assessment 9/13/2018 Functional Level 2 2 Items applied/Steps completed Pullover (4 steps) Pullover (4 steps) Comments A for overhead and pulling down A to pull down in front in back LOWER BODY DRESSING/UNDRESSING Initial Assessment Weekly Progress Assessment 9/13/2018 Functional Level 1 1 Items applied/Steps completed Shoe, left (1 step), Shoe, right (1 step), Sock, left (1 step), Sock, right (1 step), Underpants (3 steps), Elastic waist pants (3 steps) Underpants (3 steps); Sock, left (1 step); Sock, right (1 step); Shoe, right (1 step); Shoe, left (1 step); Elastic waist pants (3 steps) Comments Ax2; A for all parts Ax2 TOILETING Initial Assessment Weekly Progress Assessment 9/13/2018 Functional Level 1 1 Comments In and out cath Karoline Plan of Care: Please see Care Plan for updated LTGs. Family Training:  Not indicated secondary to pt not going home Summary of Progress: Pt has gone backwards secondary to poor memory, carryover of techniques, tone, initiation, and motivation. Pt will freeze with activities and be unable to produce movement he has completed before. Pt walked 20' the first day, but now rarely can take a few steps. Pt appears to have some neuro involvement that has not been classified at this point. Pt will perseverate on a few tasks, demonstrates poor alertness, and is lethargic at this point. Summary of Session: Pt was in bed and agreeable to adl. Pt's performance with ADL is reflected in above chart. Pt was unable to complete simple movements of lifting or kicking legs. Pt was unable to use AE, which he demonstrated previous ability to engage with. Pt engaged with medium soft putty to find 20 items which took over 30 minutes with mod verbal cues for alertness. Pt was in and out of alertness throughout session. Alertness reported to Dr. Lexis Yoon. Continue with BARBARA. Candy Calamity OTR/L 
9/13/2018

## 2018-09-13 NOTE — PROGRESS NOTES
Problem: Falls - Risk of 
Goal: *Absence of Falls Document Panchito Pandya Fall Risk and appropriate interventions in the flowsheet. Outcome: Progressing Towards Goal 
Fall Risk Interventions: 
Mobility Interventions: Utilize walker, cane, or other assistive device, Communicate number of staff needed for ambulation/transfer Mentation Interventions: Door open when patient unattended Medication Interventions: Evaluate medications/consider consulting pharmacy, Patient to call before getting OOB Elimination Interventions: Call light in reach

## 2018-09-13 NOTE — PROGRESS NOTES
End Of Shift Functional Summary, Nursing 
  
  
TOILETING:   
Does patient need assist with adjusting pants up or down and/or pericare? yes If yes, please indicate what the patient needs help with:  all  (i.e. pants up, pants down, pericare) Pt uses wheelchair/slide board. Anna Jimenez Does the patient require extra time? yes Does the patient require standby assistance? yes Does the patient require contact guard assistance? yes Does the patient require more than one staff member for assistance? yes 
  
TOILET TRANSFER:   
Pt requires maximum assistance. Pt uses wheelchair/slide board. Does the patient require extra time? yes Does the patient require contact guard assistance? yes Does the patient require more than one staff member for assistance? yes 
  
BLADDER:   
Pt does have a salmon catheter that staff manages. Pt does take medication. If so, please indicate which medication: flomax BOWEL:  Pt does take medication. Pt is continent of bowel and uses toilet. Pt requires staff for ambulation. Pt has had 0 bowel movement during this shift. Constipated. .  (An accident is when the episode is not contained in a brief AND/OR the clothing/linen requires changing/cleaning up.) 
  
BED/CHAIR TRANSFER Pt requires maximum assistance. Pt uses walker Does the patient require extra time? yes Does the patient require contact guard assistance? yes Does the patient require more than one staff member for assistance? yes 
  
Documentation reviewed and plan of care discussed/reviewed with  
patient, oncoming nurse and patient assistant during the shift.

## 2018-09-13 NOTE — PROGRESS NOTES
TERRELL spoke with Bola Donaldson at [phone] who wanted a verbal update on pt. SW provided this and his anticipated discharge. She also wanted clinicals faxed to her on 9/13. TERRELL will send tomorrow. CM following.

## 2018-09-13 NOTE — PROGRESS NOTES
End Of Shift Functional Summary, Nursing TOILETING:   
Does patient need assist with adjusting pants up or down and/or pericare? yes If yes, please indicate what the patient needs help with: All clothing. Pt uses wheelchair. Does the patient require extra time? yes Does the patient require standby assistance? yes Does the patient require contact guard assistance? yes Does the patient require more than one staff member for assistance? yes TOILET TRANSFER:   
Pt requires maximum assistance. Pt uses wheelchair. Does the patient require extra time? yes Does the patient require contact guard assistance? yes Does the patient require more than one staff member for assistance? yes BLADDER:   
Pt does have a salmon catheter that staff manages. Pt does not take medication. Pt is continent. of bladder and voids in salmon bag   Pt requires staff to empty device Pt has had 0 bladder accidents during this shift requiring maximum assistance to clean up. (An accident is when the episode is not contained in a brief AND/OR the clothing/linen requires changing/cleaning up.) BOWEL:  Pt does take medication. Pt is continent of bowel and uses bedside commode. Pt requires staff to empty device    Pt has had 0 bowel accidents during this shift requiring maximum assistance from staff to clean up. (An accident is when the episode is not contained in a brief AND/OR the clothing/linen requires changing/cleaning up.) BED/CHAIR TRANSFER Pt requires maximum assistance. Pt uses wheelchair. Does the patient require extra time? yes Does the patient require contact guard assistance? yes Does the patient require more than one staff member for assistance? yes EATING Pt requires setup. Pt does not wear dentures. Letha López Documentation reviewed and plan of care discussed/reviewed with  
patient, physician, therapists, oncoming nurse, patient assistant and family/spouse during the shift.

## 2018-09-13 NOTE — PROGRESS NOTES
PHYSICAL THERAPY DAILY NOTE Time In: 1300 Time Out: 4461 Subjective: \"I need to lay down. \" Patient agreeable to therapy. Objective: Vital Signs:  
Patient Vitals for the past 8 hrs: 
 Temp Pulse Resp BP SpO2  
09/13/18 1543 98.1 °F (36.7 °C) 95 16 120/79 97 % Pain level: 7/10 Pain location: L knee Pain interventions: Positioned for comfort, PROM to L Knee, provided rest breaks, pain medication per nursing Patient education: Educated patient regarding ROM of L knee for functional ambulation. Interdisciplinary Communication: Communicated with Tigist Hardy RN regarding patient's pain level. Other (comment) (fall precautions  WBAT LLE) GROSS ASSESSMENT Daily Assessment BED/MAT MOBILITY Daily Assessment Required for safety, assist x2. Sit to Supine : 1 (Total assistance) TRANSFERS Daily Assessment Required for safety. Assist x2 with sliding board. Transfer Type: Lateral with transfer board Transfer Assistance : 1 (Total assistance) Sit to Stand Assistance: Unable at this time Car Transfers: Not tested GAIT Daily Assessment NT secondary to patient fatigue and increased reported pain in L knee. STEPS or STAIRS Daily Assessment BALANCE Daily Assessment Sitting - Static: Good (unsupported) Sitting - Dynamic: Fair (occasional) Standing - Static: Not tested Standing - Dynamic : Impaired LOWER EXTREMITY EXERCISES Daily Assessment PROM to L Knee for increased knee flex/ext to allow for improved standing posture and functional ambulation. Extremity: Both Exercise Type #1: Seated lower extremity strengthening Sets Performed: 1 Reps Performed: 10 Level of Assist: Moderate assistance Exercise Type #2: Other (comment) (B LE motomed x10 minutes, 0 resistance) Patient returned to room lying supine in bed with prevalon boots on and all needs in reach. Assessment: Patient making minimal progress towards goals at this time. Plan of Care: Continue with POC and progress as tolerated. Rodrigo Burgos 9/13/2018

## 2018-09-13 NOTE — PROGRESS NOTES
Change of shift bedside report received from [facility], outgoing shift RN.

## 2018-09-13 NOTE — PROGRESS NOTES
SW assessed pt for discharge planning. He lives in a one level home without any steps at entrance. 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 Prior to four weeks ago, he didn't require the use of any assistive devices for ambulation and doesn't have any DMEs at home. He confirmed his PCP and insurance as listed and doesn't have any trouble getting insurance. After his last two admissions (8/2-8/5 and 8/15-8/18), he was discharged to [facility]. He was admitted to the hospital from [facility] with an Acute Kidney Injury and came here to the [facility] at discharge. He says he wants to go home at discharge and believes his daughter and grandchildren (ages 25, 16, 12, and 8) and his son Jewell Gonzalez who lives in [place] ([phone]) and works as an  out of his home can move in with him to take care of him. If not, he says he is willing to pay for [facility] services. TERRELL spoke with daughter Sarah Tamez [phone] who said she is going out of the county for ten days on 9/21 and she isn't comfortable with her children staying with him without adult supervision. She wants him to return to [facility] or go to a different SNF. TERRELL gave her the addresses for other local SNFs. She said she will go check them out on Monday, weather permitting, and call SW back prior to Tuesday's IDR at noon. TERRELL also called and left a voicemail for Jewell Gonzalez. CM following. Care Management Interventions PCP Verified by CM: Yes Mode of Transport at Discharge: Other (see comment) (Family) Transition of Care Consult (CM Consult): Discharge Planning Physical Therapy Consult: Yes Occupational Therapy Consult: Yes Speech Therapy Consult: Yes Current Support Network: Own Home, Family Lives [place] Confirm Follow Up Transport: Family Plan discussed with Pt/Family/Caregiver: Yes Freedom of Choice Offered: Yes Discharge Location Discharge Placement: Unable to determine at this time

## 2018-09-13 NOTE — PROGRESS NOTES
Problem: Falls - Risk of 
Goal: *Absence of Falls Document Omero Uriarte Fall Risk and appropriate interventions in the flowsheet. Outcome: Progressing Towards Goal 
Fall Risk Interventions: 
Mobility Interventions: Utilize walker, cane, or other assistive device Mentation Interventions: Door open when patient unattended Medication Interventions: Patient to call before getting OOB Elimination Interventions: Call light in reach

## 2018-09-14 NOTE — PROGRESS NOTES
Problem: Falls - Risk of 
Goal: *Absence of Falls Document Sudhakar Sutherland Fall Risk and appropriate interventions in the flowsheet. Outcome: Progressing Towards Goal 
Fall Risk Interventions: 
Mobility Interventions: Patient to call before getting OOB, Utilize walker, cane, or other assistive device Mentation Interventions: Door open when patient unattended, Toileting rounds Medication Interventions: Patient to call before getting OOB Elimination Interventions: Call light in reach, Patient to call for help with toileting needs, Toileting schedule/hourly rounds

## 2018-09-14 NOTE — PROGRESS NOTES
OT Daily Note Time In 1345 Time Out 1427 Precautions: Ax2 for transfers, confused Home: Lives alone, QC Pain: Patient had no complaint of pain. Functional Mobility Pt locked brakes with cueing. Self-Care Pt reported he still wanted to shave. Pt shaved with min A, but required mod verbal cueing for initiation and attention. Pt took entire time to shave. Pt reported it seemed to only take 15 minutes. When told he had been shaving for a full 30 minutes he was unphased. Education Cognitive deficits Interdisciplinary Communication: PT Montserrat Triplett on performance Plan: Continue with POC. Pt was left in w/c with family. Clare Romano OTR/L 
9/14/2018

## 2018-09-14 NOTE — PROGRESS NOTES
PHYSICAL THERAPY DAILY NOTE Time In: 1115 Time Out: 1158 Patient Seen For: AM;Patient education; Therapeutic exercise;Transfer training Subjective: \"This will be a good end to my day. \" Patient agreeable to therapy. Objective: Vital Signs:  
Patient Vitals for the past 8 hrs: 
 Temp Pulse Resp BP SpO2  
09/14/18 1550 98 °F (36.7 °C) 86 16 154/80 97 % 09/14/18 0804 97.5 °F (36.4 °C) 79 18 116/72 98 % Pain level: No pain reported. Pain location: n/a Pain interventions: n/a Patient education: Educated patient on safety with SPT with RW. Interdisciplinary Communication: Communicated with Leroy Lilly regarding patient's POC. Other (comment) (fall precautions  WBAT LLE) GROSS ASSESSMENT Daily Assessment TRANSFERS Daily Assessment Required for safety. Patient requires assist x2 for sit>stand with decreased anterior weight shift, and assist x2 for weight shift for step to patient's R. Transfer Type: SPT with walker Transfer Assistance : 1 (Total assistance) Sit to Stand Assistance: Total assistance Car Transfers: Not tested GAIT Daily Assessment Amount of Assistance: 0 (Not tested) BALANCE Daily Assessment Sitting - Static: Good (unsupported) Sitting - Dynamic: Fair (occasional) Standing - Static: Poor Standing - Dynamic : Impaired LOWER EXTREMITY EXERCISES Daily Assessment Extremity: Both Exercise Type #1: Other (comment) (B LE motomed x 10 minutes, Level 0) Patient returned to room sitting in bedside chair with all needs in reach. Assessment: Patient making minimal progress towards functional mobility goals at this time. Plan of Care: Continue with POC and progress as tolerated. Juancarlos Chandler 9/14/2018

## 2018-09-14 NOTE — PROGRESS NOTES
End Of Shift Functional Summary, Nursing TOILETING:   
Does patient need assist with adjusting pants up or down and/or pericare? yes If yes, please indicate what the patient needs help with:  Pulling clothing up and down. Pt uses wheelchair, gait belt, and grab bars. Does the patient require extra time? yes Does the patient require standby assistance? yes Does the patient require contact guard assistance? no 
Does the patient require more than one staff member for assistance? yes TOILET TRANSFER:   
Pt requires maximum assistance. Pt uses wheelchair and grab bars. Does the patient require extra time? yes Does the patient require contact guard assistance? no 
Does the patient require more than one staff member for assistance? yes BLADDER:   
Pt does have a salmon catheter that staff manages. Pt does take medication. If so, please indicate which medication: Flomax. Pt is continent of bladder and voids in salmon bag  Pt requires staff to empty device Pt has had 0 bladder accidents during this shift. BOWEL:  Pt does take medication. No BM on day shift. BED/CHAIR TRANSFER Pt requires maximum assistance. Pt uses walker Does the patient require extra time? yes Does the patient require contact guard assistance? no 
Does the patient require more than one staff member for assistance? yes EATING Pt requires setup. Pt wears dentures. Documentation reviewed and plan of care discussed/reviewed with  
oncoming nurse and patient assistant during the shift.

## 2018-09-14 NOTE — PROGRESS NOTES
Pt was confused earlier in shift stated daughter was getting  , insisted I call her. Spoke with daughter and calmed down and went to sleep. Hourly rounds completed throughout this shift. Pt denies needs at this time. Bed in locked and low position, bedside table within reach. Will continue to monitor and give bedside shift report to oncoming day shift nurse.

## 2018-09-14 NOTE — PROGRESS NOTES
PHYSICAL THERAPY DAILY NOTE Time In: 1301 Time Out: 3458 Patient Seen For: PM;Gait training;Patient education; Therapeutic exercise;Transfer training Subjective: \"I can't walk anymore today. \" Patient agreeable to therapy. Objective: Vital Signs:  
Patient Vitals for the past 8 hrs: 
 Temp Pulse Resp BP SpO2  
09/14/18 1550 98 °F (36.7 °C) 86 16 154/80 97 % Pain level: No pain level given. Pain location:  L knee Pain interventions: Positioned for comfort, provided rest breaks, pain medication per nursing Patient education: Educated patient on body mechanics with sit<>stand transfer in parallel bars and weight shift with ambulation training. Interdisciplinary Communication: Communicated with Nancy Caldera regarding patient's POC. Other (comment) (fall precautions  WBAT LLE) GROSS ASSESSMENT Daily Assessment TRANSFERS Daily Assessment Required for safety. Transfer Type: SPT with walker Transfer Assistance : 1 (Total assistance) Sit to Stand Assistance: Total assistance (with parallel bars) Car Transfers: Not tested GAIT Daily Assessment Patient ambulated with slow shuffled step with increased time and effort. Increased flexed posture with increased knee flexion of B LE in standing. Amount of Assistance: 1 (Dependent/total assistance) Distance (ft): 3 Feet (ft) (x2) Assistive Device: Gait belt; Other (comment) (parallel bars) STEPS or STAIRS Daily Assessment Level of Assist : 0 (Not tested) BALANCE Daily Assessment Sitting - Static: Good (unsupported) Sitting - Dynamic: Fair (occasional) Standing - Static: Poor Standing - Dynamic : Impaired LOWER EXTREMITY EXERCISES Daily Assessment Extremity: Left Exercise Type #1: Other (comment) (PROM to L knee flexion/extension) Patient handed off to Nancy Caldera in therapy gym. Assessment: Patient able to ambulate short distance this PM in parallel bars with MAX A and w/c follow. Plan of Care: Continue with POC and progress as tolerated. Daljit Gupta 9/14/2018

## 2018-09-14 NOTE — PROGRESS NOTES
Time In [id number] Time Out 1003 Mobility Score Comments Supine to Sit 2 (Maximal assistance) Transfer Assist 1 Transfer Type: SPT with walker Comments: Ax2 Activities of Daily Living  Score Comments Grooming 5 Tasks completed by patient: Washed face Comments: S/U Bathing 1 Body Parts Bathe: Abdomen, Arm, left, Arm, right, Buttocks, Lower leg and foot, left, Chest, Lower leg and foot, right, Donna area, Thigh, left, Thigh, right Comments: Ax2 for buttocks Upper Body Dressing/ 
Undressing 5 Items Applied:Pullover (4 steps) Comments: S/U Lower Body Dressing/ 
Undressing 1 Items Applied:Underpants (3 steps), Shoe, right (1 step), Shoe, left (1 step), Elastic waist pants (3 steps) Adaptive Equipment: RW 
Comments: Ax2; A for all parts Toileting 1 R[place] Education  Improvements made Precautions: Ax2 for transfers, confused Home: Lives alone, QC Pt was in bed and agreeable to tx. Pt's performance with ADL is reflected in above chart. Pt was able to do his shirt for the first time, but froze when working on LB. Pt continues to have delayed initiation and poor response to cueing. Pt was left in w/c with all needs within reach. Continue with SAMUEL Montes OTR/NIKA 
9/14/2018

## 2018-09-14 NOTE — PROGRESS NOTES
SFD PROGRESS NOTE Loyda Astudillo 
Admit Date: 9/6/2018 Chief Complaint : Gait dysfunction secondary to below. Admit Diagnosis: Unilateral primary osteoarthritis, left knee [M17.12] MELANI (acute kidney injury) ([facility] (8/30/2018) CKD (chronic kidney disease) stage 3, GFR 30-59 ml/min (5/8/2018) S/P total knee arthroplasty, left (8/3/2018) Diabetes mellitus type 2, controlled ([facility] (8/15/2018) Sepsis ([facility] (8/15/2018) Bipolar disorder ([facility] (8/15/2018) 
  
  
Subjective Patient seen and examined. Vss. Afebrile. More cognitively impaired yesterday and today with increased cues required to complete basic tasks and recall  Information per ST. Required total assist with transfers. Objective:  
 
Current Facility-Administered Medications Medication Dose Route Frequency  baclofen (LIORESAL) tablet 5 mg  5 mg Oral TID  polyethylene glycol (MIRALAX) packet 17 g  17 g Oral DAILY  bisacodyl (DULCOLAX) tablet 10 mg  10 mg Oral DAILY PRN  
 bisacodyl (DULCOLAX) suppository 10 mg  10 mg Rectal DAILY PRN  pantoprazole (PROTONIX) tablet 40 mg  40 mg Oral ACB  white petrolatum-mineral oil (EUCERIN) cream   Topical PRN  
 aspirin delayed-release tablet 81 mg  81 mg Oral DAILY  mirtazapine (REMERON) tablet 7.5 mg  7.5 mg Oral QHS PRN  
 amLODIPine (NORVASC) tablet 5 mg  5 mg Oral DAILY  glipiZIDE (GLUCOTROL) tablet 5 mg  5 mg Oral ACB  senna-docusate (PERICOLACE) 8.6-50 mg per tablet 1 Tab  1 Tab Oral DAILY  acetaminophen (TYLENOL) tablet 650 mg  650 mg Oral Q6H PRN  
 buPROPion SR (WELLBUTRIN SR) tablet 150 mg  150 mg Oral BID  dextrose (D50W) injection syrg 25 g  25 g IntraVENous PRN  
 divalproex ER (DEPAKOTE ER) 24 hour tablet 1,000 mg  1,000 mg Oral BID  heparin (porcine) injection 5,000 Units  5,000 Units SubCUTAneous Q12H  
 HYDROcodone-acetaminophen (NORCO) 7.5-325 mg per tablet 2 Tab  2 Tab Oral Q6H PRN  
  insulin regular (NOVOLIN R, HUMULIN R) injection   SubCUTAneous AC&HS  latanoprost (XALATAN) 0.005 % ophthalmic solution 1 Drop  1 Drop Both Eyes QHS  naloxone (NARCAN) injection 0.4 mg  0.4 mg IntraVENous PRN  
 ondansetron (ZOFRAN) injection 4 mg  4 mg IntraVENous Q8H PRN  
 simvastatin (ZOCOR) tablet 40 mg  40 mg Oral QHS  tamsulosin (FLOMAX) capsule 0.4 mg  0.4 mg Oral QHS  influenza vaccine 2018-19 (6 mos+)(PF) (FLUARIX QUAD/FLULAVAL QUAD) injection 0.5 mL  0.5 mL IntraMUSCular PRIOR TO DISCHARGE  pneumococcal 23-valent (PNEUMOVAX 23) injection 0.5 mL  0.5 mL IntraMUSCular PRIOR TO DISCHARGE Review of Systems: Denies chest pain, shortness of breath, cough, headache, visual problems, abdominal pain, dysurea, calf pain. Pertinent positives are as noted in the medical records and unremarkable otherwise. Visit Vitals  /72  Pulse 79  Temp 97.5 °F (36.4 °C)  Resp 18  Ht 6' (1.829 m)  Wt 199 lb 14.4 oz (90.7 kg)  SpO2 98%  BMI 27.11 kg/m2  
  
  
Physical Exam: Psych: Patient somnolent. Less attentive at periods. General:   Alert, appears stated age, No acute distress. HEENT:  Normocephalic, EOMI, facial symmetry  Intact. Lungs:  CTA Bilaterally,Respiration even and unlabored No apparent use of accessory muscles for respiration. No nasal alar flaring. Heart:  Regular rate and rhythm, S1, S2, No obvious murmur, click, rub or gallop. Genitourinary: + salmon. Abdomen:  Soft, non-tender to palpation in all four quadrants. Bowel sounds present. No obvious masses palpated. Neuromuscular:  PERRL, EOMI Follows simple commands consistently. Able to identify, recall repeat. Mood appropriate.   
+ generalized weakness.   
Sensory - intact No cerebellar signs. Plantars - down going No atrophy, no fasciculations, no tremors. + increased flexor tone/ spasticity BUE, BLE. Skin:  No rashes, no erythema.  Calf non tender BLE.   
 LLE PROM decreased. Incision:  healing well, clean, dry, and intact  
   
 
                                                                        
Functional Assessment: 
   
   
Balance Sitting - Static: Good (unsupported) (09/13/18 1600) Sitting - Dynamic: Fair (occasional) (09/13/18 1600) Standing - Static: Not tested (09/13/18 1600) Standing - Dynamic : Impaired (09/13/18 1200) Virgil Morgan Fall Risk Assessment: 
Ebony Crane Mobility: Ambulates or transfers with assist devices or assistance (09/14/18 0737) Mobility Interventions: Patient to call before getting OOB;Utilize walker, cane, or other assistive device (09/14/18 0737) Mentation: Periodic confusion (09/14/18 0737) Mentation Interventions: Door open when patient unattended; Toileting rounds (09/14/18 0737) Medication: Patient receiving anticonvulsants, sedatives(tranquilizers), psychotropics or hypnotics, hypoglycemics, narcotics, sleep aids, antihypertensives, laxatives, or diuretics (09/14/18 0737) Medication Interventions: Patient to call before getting OOB (09/14/18 0737) Elimination: Needs assistance with toileting (09/14/18 0737) Elimination Interventions: Call light in reach; Patient to call for help with toileting needs; Toileting schedule/hourly rounds (09/14/18 0737) Prior Fall History: No (09/14/18 0737) Total Score: 4 (09/14/18 0737) Standard Fall Precautions: Yes (09/13/18 0710) High Fall Risk: Yes (09/14/18 0737) Speech Assessment: 
    
 
Ambulation: 
Gait Distance (ft): 3 Feet (ft) (09/13/18 1200) Assistive Device: Gait belt; Other (comment) (Parallel bars) (09/13/18 1200) Labs/Studies: 
Recent Results (from the past 72 hour(s)) GLUCOSE, POC Collection Time: 09/11/18 11:47 AM  
Result Value Ref Range Glucose (POC) 180 (H) 65 - 100 mg/dL GLUCOSE, POC Collection Time: 09/11/18  4:41 PM  
Result Value Ref Range Glucose (POC) 198 (H) 65 - 100 mg/dL GLUCOSE, POC  
 Collection Time: 09/11/18  8:44 PM  
Result Value Ref Range Glucose (POC) 176 (H) 65 - 100 mg/dL GLUCOSE, POC Collection Time: 09/12/18  7:13 AM  
Result Value Ref Range Glucose (POC) 176 (H) 65 - 100 mg/dL GLUCOSE, POC Collection Time: 09/12/18 11:23 AM  
Result Value Ref Range Glucose (POC) 263 (H) 65 - 100 mg/dL GLUCOSE, POC Collection Time: 09/12/18  4:01 PM  
Result Value Ref Range Glucose (POC) 261 (H) 65 - 100 mg/dL GLUCOSE, POC Collection Time: 09/12/18  8:50 PM  
Result Value Ref Range Glucose (POC) 179 (H) 65 - 100 mg/dL CBC WITH AUTOMATED DIFF Collection Time: 09/13/18  5:57 AM  
Result Value Ref Range WBC 9.8 4.3 - 11.1 K/uL  
 RBC 3.64 (L) 4.23 - 5.6 M/uL  
 HGB 10.7 (L) 13.6 - 17.2 g/dL HCT 33.0 (L) 41.1 - 50.3 % MCV 90.7 79.6 - 97.8 FL  
 MCH 29.4 26.1 - 32.9 PG  
 MCHC 32.4 31.4 - 35.0 g/dL  
 RDW 14.7 % PLATELET 027 378 - 299 K/uL MPV 9.7 9.4 - 12.3 FL ABSOLUTE NRBC 0.00 0.0 - 0.2 K/uL  
 DF AUTOMATED NEUTROPHILS 57 43 - 78 % LYMPHOCYTES 28 13 - 44 % MONOCYTES 13 (H) 4.0 - 12.0 % EOSINOPHILS 2 0.5 - 7.8 % BASOPHILS 0 0.0 - 2.0 % IMMATURE GRANULOCYTES 1 0.0 - 5.0 %  
 ABS. NEUTROPHILS 5.5 1.7 - 8.2 K/UL  
 ABS. LYMPHOCYTES 2.8 0.5 - 4.6 K/UL  
 ABS. MONOCYTES 1.3 0.1 - 1.3 K/UL  
 ABS. EOSINOPHILS 0.2 0.0 - 0.8 K/UL  
 ABS. BASOPHILS 0.0 0.0 - 0.2 K/UL  
 ABS. IMM. GRANS. 0.1 0.0 - 0.5 K/UL METABOLIC PANEL, BASIC Collection Time: 09/13/18  5:57 AM  
Result Value Ref Range Sodium 136 136 - 145 mmol/L Potassium 4.1 3.5 - 5.1 mmol/L Chloride 98 98 - 107 mmol/L  
 CO2 27 21 - 32 mmol/L Anion gap 11 7 - 16 mmol/L Glucose 160 (H) 65 - 100 mg/dL BUN 23 8 - 23 MG/DL Creatinine 1.02 0.8 - 1.5 MG/DL  
 GFR est AA >60 >60 ml/min/1.73m2 GFR est non-AA >60 >60 ml/min/1.73m2 Calcium 9.9 8.3 - 10.4 MG/DL  
VALPROIC ACID Collection Time: 09/13/18  5:57 AM  
Result Value Ref Range Valproic acid 54 50 - 100 ug/ml GLUCOSE, POC Collection Time: 09/13/18  7:06 AM  
Result Value Ref Range Glucose (POC) 165 (H) 65 - 100 mg/dL GLUCOSE, POC Collection Time: 09/13/18 11:20 AM  
Result Value Ref Range Glucose (POC) 202 (H) 65 - 100 mg/dL GLUCOSE, POC Collection Time: 09/13/18  3:45 PM  
Result Value Ref Range Glucose (POC) 175 (H) 65 - 100 mg/dL GLUCOSE, POC Collection Time: 09/13/18  7:37 PM  
Result Value Ref Range Glucose (POC) 137 (H) 65 - 100 mg/dL GLUCOSE, POC Collection Time: 09/14/18  6:57 AM  
Result Value Ref Range Glucose (POC) 160 (H) 65 - 100 mg/dL Assessment:  
 
Problem List as of 9/14/2018  Date Reviewed: 8/30/2018 Codes Class Noted - Resolved Debility ICD-10-CM: R53.81 ICD-9-CM: 799.3  9/6/2018 - Present Total knee replacement status, left ICD-10-CM: Q33.330 ICD-9-CM: V43.65  9/6/2018 - Present Hypotension ICD-10-CM: I95.9 ICD-9-CM: 458.9  8/30/2018 - Present Nausea & vomiting ICD-10-CM: R11.2 ICD-9-CM: 787.01  8/30/2018 - Present MELANI (acute kidney injury) ([facility] ICD-10-CM: N17.9 ICD-9-CM: 584.9  8/30/2018 - Present Fever ICD-10-CM: R50.9 ICD-9-CM: 780.60  8/15/2018 - Present Volume depletion ICD-10-CM: E86.9 ICD-9-CM: 276.50  8/15/2018 - Present Diabetes mellitus type 2, controlled ([facility] ICD-10-CM: E11.9 ICD-9-CM: 250.00  8/15/2018 - Present Hyperlipidemia ICD-10-CM: E78.5 ICD-9-CM: 272.4  8/15/2018 - Present Essential hypertension ICD-10-CM: I10 
ICD-9-CM: 401.9  8/15/2018 - Present Sepsis ([facility] ICD-10-CM: A41.9 ICD-9-CM: 038.9, 995.91  8/15/2018 - Present Acute cystitis without hematuria ICD-10-CM: N30.00 ICD-9-CM: 595.0  8/15/2018 - Present Bipolar disorder ([facility] ICD-10-CM: F31.9 ICD-9-CM: 296.80  8/15/2018 - Present S/P total knee arthroplasty, left ICD-10-CM: D36.208 ICD-9-CM: V43.65  8/3/2018 - Present Osteoarthritis ICD-10-CM: M19.90 ICD-9-CM: 715.90  8/2/2018 - Present Abnormal urinalysis ICD-10-CM: R82.90 ICD-9-CM: 791.9  7/26/2018 - Present CKD (chronic kidney disease) stage 3, GFR 30-59 ml/min ICD-10-CM: N18.3 ICD-9-CM: 585.3  5/8/2018 - Present Elevated white blood cell count ICD-10-CM: W76.862 ICD-9-CM: 288.60  5/8/2018 - Present Plan:  
 
Assessment: 
  
The Post Assessment Physician Evaluation (BURAK) found the current functional status to be comparable with the Pre-admission Screening. The Patient is a good candidate for acute inpatient rehabilitation. Nothing since the Pre-admission screen has changed that determination.  
  
Rehabilitation Plan The patient has shown the ability to tolerate and benefit from 3 hours of therapy daily and is being admitted to a comprehensive acute inpatient rehabilitation program consisting of at least 3 hours of combined physical and occupational therapies. - intensive Physical Therapy for a minimum of 1.5 hours a day, at least 5 out of 7 days per week to address bed mobility, transfers, ambulation, strengthening, balance, and endurance. - intensive Occupational Therapy for a minimum of 1.5 hours a day, at least 5 out of 7 days per week to address ADL ( bathing, LE dressing, toileting) and adaptive equipment as needed. - barriers will be pain, poor L knee ROM, deconditioned state. - Fatigue, poor attention also limiting at times.  
  
  
Continue ST for: cognitive deficits, may be baseline, or acutely deteriorating due to recent sepsis, on-going medical issues. - 9/14 - less attentive.  
  
Continue 24-hour skilled rehabilitation nursing for bowel and bladder management, skin care for decubitus ulcer prevention , pain management and ongoing medication administration  
  
The patient may benefit from a psychology consult for depression, anxiety and adjustment disorder. Patient with underlying bi-polar disorder on medications.   
Continue daily physician medical management: 
  
MELANI (acute kidney injury)  )/ CKD (chronic kidney disease) stage 3, GFR 30-59 ml/min (5/8/2018) 
- monitor renal funcion closely. - encourage po hydration. ivf as needed. hold Cozaar, lasix. - 9/13  continue norvasc. Renal function wnl 9/13.  
  
  
S/P left total knee arthroplasty (8/3/2018) - monitor wound for late infection. Closed. - focus on strength, ROM. PT evaluate for CPM.  
- jerald hold on CPM until patient's tone better evaluated,.  
- decreased L TKA ROM, extension not all due to poor knee rom. Appears spasticity now evident affecting PROM.   
 
Dystonia/ spasticity - abnormal tone , which appears velocity dependent. Patient unabel to explain clearly but has been seen by neurologist at [facility]. - Recent MRI 1/2018 due to vertigo. Reports Cerebral and cerebellar volume loss with ex vacuo dilatation of the ventricular system.  Several foci of signal abnormality are present within the periventricular and subcortical white matter, likely sequela of chronic microvascular ischemia.  No mass effect, mass lesion, acute hemorrhage, or hydrocephalus.    
- will follow with daughter. Left message.  
- patient agreeable to start meds for spasticity. Start baclofen 5mg tid. Increase as tolerated. - 9/14 - poor attention, possibly due to baclofen. Will observe, for tolerance. Tone/dystonia - discussed my findings and daughter's knowledge of his condition. Appears family has no certain diagnosis for spasticity/ tone. Recommended follow up with neurologist. Will help to refer at d/c. Sepsis - monitor. Treated.  
  
Bipolar disorder  - continue PTA medications. Depakote, wellbutrin. - depakote level 9/13 today 54, wnl.  
- pt has been on this for many years. Will need to continue.  
Kishan Bender op acute blood loss anemia - monitor 
- hgb 11. -> 10.7 (9/13), stable 
   
Spondylolisthesis of lumbar region - no new radicular symptoms. - has had back issues in past, follwed by neurosurgery at [facility].   
DVT risk / DVT Prophylaxis-   
- resume heparin q12h. No s/s of DVT.  
  
Pain Control: stable, mild-to-moderate joint symptoms intermittently, reasonably well controlled by PRN meds. Will require regular pain assessment and comprenhensive pain management,  
  
Wound Care: Monitor wound status daily per staff and physician. At risk for failure. Will require 24/7 rehab nursing. Keep wound clean and dry - L TKA wound closed. No s/s of infection.  
  
Hypertension/ hypotension -  
- cozaar, lasix held due to MELANI. - 9/13 -  BP in good control. No new changes. renal function WNL today. continue norvasc.  
  
Diabetes mellitus type 2-   
- resumed glucotrol. 5mg dose. Pt was on glucotrol SR 10mg. - continue SSI.  
  
Urinary retention/ neurogenic bladder -- patient has history of \" neurogenic bladder. Has catheterized once in the past month; urinating more frequently own his own at baseline 
 - will observe pattern. schedule voids q6-8 hrs. Check post-void residual every shift; In and Out catheterize if post-void residual is more than 400 cc. 
- on flomax salmon for now. Patient reports he did have neurogenic bladder , was dependent on salmon, self cath for long period, but eventually was able to void. - Salmon replaced due to poor tolerance to CIC, pain. continue  
- baclofen started 9/13. May held with possible sphincter tone.  
  
bowel program - start abbie colace 
  
GERD - resume PPI. At times may need additional antacids, Maalox prn. 
  
 
Time spent was 25 minutes with over 1/2 in direct patient care/examination, consultation and coordination of care. Signed By: Tal Estrella MD   
 September 14, 2018

## 2018-09-14 NOTE — PROGRESS NOTES
09/14/18 1113 Time Spent With Patient Time In 1035 Time Out 1110 Patient Seen For: AM;Auditory/read/write exercises; Patient education; Neuro-linguistics Team Conference Team Conference Date 09/11/18 Family/Caregiver Training Family Training Needs to be scheduled Mental Status Neurologic State Alert;Confused Orientation Level Oriented to person;Oriented to place; Disoriented to situation;Disoriented to time Cognition Follows commands;Memory loss Perception Appears intact Perseveration No perseveration noted Safety/Judgement Fall prevention Comprehension (Native Language) Primary Mode of Comprehension Auditory Score 4 Expression (Native Language) Primary Mode of Expression Verbal  
Score 5 Social Interaction/Pragmatics Score 4 Problem Solving Score 2 Memory Score 2  
 
 
 09/14/18 1116 Verbal Problem Solving Exercises Solution Level of Impairment Intermediate; verbal reasoning related to ADLs Solution Accuracy (%) 30 % Memory Exercises Functional Tasks required mod-max cues to recall information from schedule with 15 minute delay Neuro-Linguistic Exercises Verbal Reasoning Tasks Impaired Verbal Problem Solving Impaired Verbal Organization Impaired Thought Organization difficulty copying his daily schedule (perseverations) 
with max cues to interpret Patient participated with cognitive treatment. More cognitively impaired yesterday and today with increased cues required to complete basic tasks and recall  Information. Patient asked about his schedule for the day upon SLP arrival.  Attempted to have him copy the starting times for each discipline. He required maximum cues to correctly copy. Perseverated on writing the same information three times. Patient required mod-max cues to recall information written later in the session.  
30% accuracy with intermediate level problem solving tasks related to ADLs.  Patient seemed despondent stating \"I don't know sweetheart\" several times.  
 
 
Bradford Banks MS, CCC-SLP

## 2018-09-15 NOTE — PROGRESS NOTES
PHYSICAL THERAPY DAILY NOTE Time In: 6741 Time Out: 1225 Patient Seen For: AM;Balance activities;Gait training;Patient education; Therapeutic exercise;Transfer training; Other (see progress notes) Subjective: patient reporting his left knee is stiff and sore. Reports it hurts to put weight down on his LLE. Reports he gets very tired when trying to walk Objective:Vital Signs: 
Patient Vitals for the past 12 hrs: 
 Temp Pulse Resp BP SpO2  
09/15/18 0720 97.7 °F (36.5 °C) 82 18 99/65 98 % Pain level:patient did not rate on pain scale but expressing 3 to 7 out of 10 Pain location:left knee Pain interventions:Pain medication per RN, rest, positioning,ROM Patient education:Balance training,transfer training, gait training, fall precautions, activity pacing, body mechanics,Patient verbalizing understanding and demonstrating partial understanding of patient education. Recommend follow up education. Interdisciplinary Communication:NA Other (comment) (fall precautions  WBAT LLE) GROSS ASSESSMENT Daily Assessment Tendency to have significant increase in RR when first initiating gait. Cues to control RR during functional mobility  NA  
 
 
BED/MAT MOBILITY Daily Assessment Rolling Right : 0 (Not tested) Rolling Left : 0 (Not tested) Supine to Sit : 0 (Not tested) Sit to Supine : 0 (Not tested) TRANSFERS Daily Assessment Increased time and effort to complete with cues for body mechanics Transfer Type: SPT with walker Transfer Assistance : 1 (Total assistance) (max assist x 2) Sit to Stand Assistance: Total assistance (max assist x 2) Car Transfers: Not tested GAIT Daily Assessment 5 to 7 min rest break between attempts Amount of Assistance: 1 (Dependent/total assistance) (max assist x 1 with second assist to follow with w/c) Distance (ft): 10 Feet (ft) (10ft x 2  4 ft x 1) Assistive Device: Gait belt;Walker, rolling Gait training with RW demonstrating slow start/stop step to antalgic gait pattern with decreased stance phase  L LE, decreased step length, decreased step clearance and flexed posture. Verbal and visual cues to correct gait deviations. flexed shuffling type gait. STEPS or STAIRS Daily Assessment Steps/Stairs Ambulated (#): 0 Level of Assist : 0 (Not tested) BALANCE Daily Assessment Static standing balance with RW facilitating upright midline posture. Tendency to stand with flexed posture and COG shift posteriorly Sitting - Static: Good (unsupported) Sitting - Dynamic: Fair (occasional) Standing - Static: Poor;Constant support Standing - Dynamic : Impaired WHEELCHAIR MOBILITY Daily Assessment Curbs/Ramps Assist Required (FIM Score): 0 (Not tested) Wheelchair Setup Assist Required : 0 (Not tested) LOWER EXTREMITY EXERCISES Daily Assessment Extremity: Both Exercise Type #1: Other (comment) (LE motomed x 10 mins at level 0) Level of Assist: Minimal assistance Assessment: Progress towards goals remains slow and limited. Able to advance to gait with RW but not functional at this time. Cont to demonstrate bradykinesia during functional mobility with difficulty initiating functional movement. Patient return to room at end of treatment and remained up in wheelchair with needs in reach. Plan of Care: Continue with POC and progress as tolerated. Georgiana Davis, PT 
9/15/2018

## 2018-09-15 NOTE — PROGRESS NOTES
Problem: Falls - Risk of 
Goal: *Absence of Falls Document [facility] Fall Risk and appropriate interventions in the flowsheet. Outcome: Progressing Towards Goal 
Fall Risk Interventions: 
Mobility Interventions: Patient to call before getting OOB Mentation Interventions: Reorient patient Medication Interventions: Patient to call before getting OOB Elimination Interventions: Call light in reach Problem: Pressure Injury - Risk of 
Goal: *Prevention of pressure injury Document Kenny Scale and appropriate interventions in the flowsheet. Outcome: Progressing Towards Goal 
Pressure Injury Interventions: 
Sensory Interventions: Assess changes in LOC Moisture Interventions: Absorbent underpads Activity Interventions: Increase time out of bed, Pressure redistribution bed/mattress(bed type), PT/OT evaluation Mobility Interventions: HOB 30 degrees or less, PT/OT evaluation, Pressure redistribution bed/mattress (bed type) Nutrition Interventions: Document food/fluid/supplement intake Friction and Shear Interventions: Apply protective barrier, creams and emollients

## 2018-09-16 NOTE — PROGRESS NOTES
Received call from pt's daughter, Carlie Campos, stating that she left her purse in pt's room and requested staff put it someplace safe for the night as she will come and pick it up in the morning. Pt's daughters purse placed in bag and taken to hospital safe by security. Contents of purse were not inventoried--purse was NEVER opened by staff.

## 2018-09-16 NOTE — PROGRESS NOTES
PHYSICAL THERAPY DAILY NOTE Time In: 9659 Time Out: 1008 Patient Seen For: AM;Gait training; Therapeutic exercise;Transfer training; Other (see progress notes) Subjective: \" I cant get up . \" Objective: Patient unable to rate his pain . \"It just hurts. \" When asked if he wants pain meds patient declines pain meds. Nurses assisted in transfers. Other (comment) (fall precautions  WBAT LLE) GROSS ASSESSMENT Daily Assessment BED/MAT MOBILITY Daily Assessment Supine to Sit : 2 (Maximal assistance) Sit to Supine : 2 (Maximal assistance) TRANSFERS Daily Assessment Transfer Type: SPT with walker Transfer Assistance : 1 (Total assistance) Sit to Stand Assistance: Total assistance GAIT Daily Assessment Attempted standing in II bars . Patient pushes against assistance to stand. \" I just cant do it. \" Amount of Assistance:  (attempted in II bars) STEPS or STAIRS Daily Assessment Level of Assist : 0 (Not tested) BALANCE Daily Assessment WHEELCHAIR MOBILITY Daily Assessment LOWER EXTREMITY EXERCISES Daily Assessment Extremity: Both Exercise Type #1: Other (comment) (motomed x 20 minutes) Sets Performed: 1 Reps Performed: 20 Level of Assist: Stand-by assistance Assessment: Patient requires assistance for initiating all task with mobility. Plan of Care: Continue with plan of care to reach PT goals. Returned to room with call bell at [facility]. Bria Biggs, GUILLE 
9/16/2018

## 2018-09-16 NOTE — PROGRESS NOTES
SFD PROGRESS NOTE Ardie Scheuermann 
Admit Date: 9/6/2018 Chief Complaint : Gait dysfunction secondary to below. Admit Diagnosis: Unilateral primary osteoarthritis, left knee [M17.12] MELANI (acute kidney injury) ([facility] [address] (8/30/2018) CKD (chronic kidney disease) stage 3, GFR 30-59 ml/min (5/8/2018) S/P total knee arthroplasty, left (8/3/2018) Diabetes mellitus type 2, controlled ([address] (8/15/2018) Sepsis ([address] (8/15/2018) Bipolar disorder ([address] (8/15/2018) Hypotension (8/30/2018) Nausea & vomiting (8/30/2018) Pain DVT risk Post op acute blood loss anemia Hypertension Diabetes ([facility] [address] Spondylolisthesis of lumbar region Neurogenic bladder  -has catheterized once in the past month; urinating more frequently own his own at baseline Acute Rehab Dx: 
Gait impairment Debility   
Mobility and ambulation deficits Self Care/ADL deficits 
  
Subjective Patient seen and examined. Vss. Afebrile. Costa Olmedo Discussed with daughter at length rehab progress, barriers and discharge plan. Agree thursday should be his discharge day to SNF as she is going out of town on Friday. Will investigate his spasticity, will check MRI tomorrow. PT, OT limited by poor ROM, spasticity/ increased flexor tone. pain, weakness. Progress slow. Unusually high flexor tone, rigidity that limits mobility, limb movement. Objective:  
 
Current Facility-Administered Medications Medication Dose Route Frequency  baclofen (LIORESAL) tablet 5 mg  5 mg Oral TID  polyethylene glycol (MIRALAX) packet 17 g  17 g Oral DAILY  bisacodyl (DULCOLAX) tablet 10 mg  10 mg Oral DAILY PRN  
 bisacodyl (DULCOLAX) suppository 10 mg  10 mg Rectal DAILY PRN  pantoprazole (PROTONIX) tablet 40 mg  40 mg Oral ACB  white petrolatum-mineral oil (EUCERIN) cream   Topical PRN  
 aspirin delayed-release tablet 81 mg  81 mg Oral DAILY  mirtazapine (REMERON) tablet 7.5 mg  7.5 mg Oral QHS PRN  
 amLODIPine (NORVASC) tablet 5 mg  5 mg Oral DAILY  glipiZIDE (GLUCOTROL) tablet 5 mg  5 mg Oral ACB  senna-docusate (PERICOLACE) 8.6-50 mg per tablet 1 Tab  1 Tab Oral DAILY  acetaminophen (TYLENOL) tablet 650 mg  650 mg Oral Q6H PRN  
 buPROPion SR (WELLBUTRIN SR) tablet 150 mg  150 mg Oral BID  dextrose (D50W) injection syrg 25 g  25 g IntraVENous PRN  
 divalproex ER (DEPAKOTE ER) 24 hour tablet 1,000 mg  1,000 mg Oral BID  heparin (porcine) injection 5,000 Units  5,000 Units SubCUTAneous Q12H  
 HYDROcodone-acetaminophen (NORCO) 7.5-325 mg per tablet 2 Tab  2 Tab Oral Q6H PRN  
 insulin regular (NOVOLIN R, HUMULIN R) injection   SubCUTAneous AC&HS  latanoprost (XALATAN) 0.005 % ophthalmic solution 1 Drop  1 Drop Both Eyes QHS  naloxone (NARCAN) injection 0.4 mg  0.4 mg IntraVENous PRN  
 ondansetron (ZOFRAN) injection 4 mg  4 mg IntraVENous Q8H PRN  
 simvastatin (ZOCOR) tablet 40 mg  40 mg Oral QHS  tamsulosin (FLOMAX) capsule 0.4 mg  0.4 mg Oral QHS  influenza vaccine 2018-19 (6 mos+)(PF) (FLUARIX QUAD/FLULAVAL QUAD) injection 0.5 mL  0.5 mL IntraMUSCular PRIOR TO DISCHARGE  pneumococcal 23-valent (PNEUMOVAX 23) injection 0.5 mL  0.5 mL IntraMUSCular PRIOR TO DISCHARGE Review of Systems: Denies chest pain, shortness of breath, cough, headache, visual problems, abdominal pain, dysurea, calf pain. Pertinent positives are as noted in the medical records and unremarkable otherwise. Visit Vitals  /78 (BP 1 Location: Left arm)  Pulse 99  Temp 98.2 °F (36.8 °C)  Resp 20  
 Ht 6' (1.829 m)  Wt 199 lb 14.4 oz (90.7 kg)  SpO2 99%  BMI 27.11 kg/m2  
  
  
Physical Exam: Psych: Patient was oriented x 2. Without hallucinations. Patient is not suicidal.  
General:   Alert, appears stated age, No acute distress. HEENT:  Normocephalic, EOMI, facial symmetry  Intact. Oral mucosa pink and moist. No nasal discharge. Lungs:  CTA Bilaterally,Respiration even and unlabored No apparent use of accessory muscles for respiration. No nasal alar flaring. Heart:  Regular rate and rhythm, S1, S2, No obvious murmur, click, rub or gallop. Genitourinary: defered Abdomen:  Soft, non-tender to palpation in all four quadrants. Bowel sounds present. No obvious masses palpated. Neuromuscular:  PERRL, EOMI Follows simple commands consistently. Able to identify, recall repeat. Mood appropriate. Insight, judgement poor 
+ generalized weakness.   
Sensory - intact No cerebellar signs. Plantars - down going No atrophy, no fasciculations, no tremors. + increased flexor tone/ spasticity BUE, BLE. Skin:  No rashes, no erythema. Calf non tender BLE. boggy L knee. LLE PROM 
L Knee Flexion: 80 (~) 
L Knee Extension: -15(~) Incision:  healing well, clean, dry, and intact  
   
 
                                                                        
Functional Assessment: 
   
   
Balance Sitting - Static: Good (unsupported) (09/15/18 1400) Sitting - Dynamic: Fair (occasional) (09/15/18 1400) Standing - Static: Poor;Constant support (09/15/18 1400) Standing - Dynamic : Impaired (09/15/18 1400) Genevive Camera Fall Risk Assessment: 
Karma Ripple Risk Mobility: Ambulates or transfers with assist devices or assistance (09/15/18 1904) Mobility Interventions: Bed/chair exit alarm (09/15/18 1904) Mentation: Periodic confusion (09/15/18 1904) Mentation Interventions: Door open when patient unattended (09/15/18 1904) Medication: Patient receiving anticonvulsants, sedatives(tranquilizers), psychotropics or hypnotics, hypoglycemics, narcotics, sleep aids, antihypertensives, laxatives, or diuretics (09/15/18 1904) Medication Interventions: Patient to call before getting OOB (09/15/18 1904) Elimination: Needs assistance with toileting (09/15/18 1904) Elimination Interventions: Call light in reach (09/15/18 1904) Prior Fall History: No (09/15/18 1904) Total Score: 4 (09/15/18 1904) Standard Fall Precautions: Yes (09/13/18 2610) High Fall Risk: Yes (09/15/18 1904) Speech Assessment: 
    
 
Ambulation: 
Gait Distance (ft): 10 Feet (ft) (10ft x 2  4 ft x 1) (09/15/18 1400) Assistive Device: Gait belt;Walker, rolling (09/15/18 1400) Labs/Studies: 
Recent Results (from the past 72 hour(s)) GLUCOSE, POC Collection Time: 09/13/18  3:45 PM  
Result Value Ref Range Glucose (POC) 175 (H) 65 - 100 mg/dL GLUCOSE, POC Collection Time: 09/13/18  7:37 PM  
Result Value Ref Range Glucose (POC) 137 (H) 65 - 100 mg/dL GLUCOSE, POC Collection Time: 09/14/18  6:57 AM  
Result Value Ref Range Glucose (POC) 160 (H) 65 - 100 mg/dL GLUCOSE, POC Collection Time: 09/14/18 11:31 AM  
Result Value Ref Range Glucose (POC) 195 (H) 65 - 100 mg/dL GLUCOSE, POC Collection Time: 09/14/18  4:24 PM  
Result Value Ref Range Glucose (POC) 249 (H) 65 - 100 mg/dL GLUCOSE, POC Collection Time: 09/14/18  8:34 PM  
Result Value Ref Range Glucose (POC) 152 (H) 65 - 100 mg/dL GLUCOSE, POC Collection Time: 09/15/18  7:25 AM  
Result Value Ref Range Glucose (POC) 173 (H) 65 - 100 mg/dL GLUCOSE, POC Collection Time: 09/15/18 10:52 AM  
Result Value Ref Range Glucose (POC) 212 (H) 65 - 100 mg/dL GLUCOSE, POC Collection Time: 09/15/18  4:39 PM  
Result Value Ref Range Glucose (POC) 251 (H) 65 - 100 mg/dL GLUCOSE, POC Collection Time: 09/15/18 10:17 PM  
Result Value Ref Range Glucose (POC) 205 (H) 65 - 100 mg/dL GLUCOSE, POC Collection Time: 09/16/18  7:14 AM  
Result Value Ref Range Glucose (POC) 165 (H) 65 - 100 mg/dL GLUCOSE, POC Collection Time: 09/16/18 11:19 AM  
Result Value Ref Range Glucose (POC) 219 (H) 65 - 100 mg/dL Assessment:  
 
Problem List as of 9/16/2018  Date Reviewed: 8/30/2018 Codes Class Noted - Resolved  Debility ICD-10-CM: R53.81 
 ICD-9-CM: 799.3  9/6/2018 - Present Total knee replacement status, left ICD-10-CM: D07.535 ICD-9-CM: V43.65  9/6/2018 - Present Hypotension ICD-10-CM: I95.9 ICD-9-CM: 458.9  8/30/2018 - Present Nausea & vomiting ICD-10-CM: R11.2 ICD-9-CM: 787.01  8/30/2018 - Present MELANI (acute kidney injury) ([facility] ICD-10-CM: N17.9 ICD-9-CM: 584.9  8/30/2018 - Present Fever ICD-10-CM: R50.9 ICD-9-CM: 780.60  8/15/2018 - Present Volume depletion ICD-10-CM: E86.9 ICD-9-CM: 276.50  8/15/2018 - Present Diabetes mellitus type 2, controlled ([facility] ICD-10-CM: E11.9 ICD-9-CM: 250.00  8/15/2018 - Present Hyperlipidemia ICD-10-CM: E78.5 ICD-9-CM: 272.4  8/15/2018 - Present Essential hypertension ICD-10-CM: I10 
ICD-9-CM: 401.9  8/15/2018 - Present Sepsis ([facility] ICD-10-CM: A41.9 ICD-9-CM: 038.9, 995.91  8/15/2018 - Present Acute cystitis without hematuria ICD-10-CM: N30.00 ICD-9-CM: 595.0  8/15/2018 - Present Bipolar disorder ([facility] ICD-10-CM: F31.9 ICD-9-CM: 296.80  8/15/2018 - Present S/P total knee arthroplasty, left ICD-10-CM: Y77.647 ICD-9-CM: V43.65  8/3/2018 - Present Osteoarthritis ICD-10-CM: M19.90 ICD-9-CM: 715.90  8/2/2018 - Present Abnormal urinalysis ICD-10-CM: R82.90 ICD-9-CM: 791.9  7/26/2018 - Present CKD (chronic kidney disease) stage 3, GFR 30-59 ml/min ICD-10-CM: N18.3 ICD-9-CM: 585.3  5/8/2018 - Present Elevated white blood cell count ICD-10-CM: D32.774 ICD-9-CM: 288.60  5/8/2018 - Present Plan:  
 
Assessment: 
  
The Post Assessment Physician Evaluation (BURAK) found the current functional status to be comparable with the Pre-admission Screening. The Patient is a good candidate for acute inpatient rehabilitation. Nothing since the Pre-admission screen has changed that determination.  
  
Rehabilitation Plan The patient has shown the ability to tolerate and benefit from 3 hours of therapy daily and is being admitted to a comprehensive acute inpatient rehabilitation program consisting of at least 3 hours of combined physical and occupational therapies. - intensive Physical Therapy for a minimum of 1.5 hours a day, at least 5 out of 7 days per week to address bed mobility, transfers, ambulation, strengthening, balance, and endurance. - intensive Occupational Therapy for a minimum of 1.5 hours a day, at least 5 out of 7 days per week to address ADL ( bathing, LE dressing, toileting) and adaptive equipment as needed. - barriers will be pain, poor L knee ROM, deconditioned state.  
  
  
Continue ST for: cognitive deficits, may be baseline, or acutely deteriorating due to recent sepsis, on-going medical issues.  
  
Continue 24-hour skilled rehabilitation nursing for bowel and bladder management, skin care for decubitus ulcer prevention , pain management and ongoing medication administration  
  
The patient may benefit from a psychology consult for depression, anxiety and adjustment disorder. Patient with underlying bi-polar disorder on medications. 
  
Continue daily physician medical management: 
  
MELANI (acute kidney injury)  )/ CKD (chronic kidney disease) stage 3, GFR 30-59 ml/min (5/8/2018) 
- monitor renal funcion closely. - encourage po hydration. ivf as needed. hold Cozaar, lasix. - 9/16  continue norvasc. Renal function wnl 9/13.  
  
  
S/P left total knee arthroplasty (8/3/2018) - monitor wound for late infection. Closed. - focus on strength, ROM. PT evaluate for CPM.  
- jerald hold on CPM until patient's tone better evaluated,.  
- decreased L TKA ROM, extension not all due to poor knee rom. Appears spasticity now evident affecting PROM. - continue to work on extension. Both knees difficult to passively extend due to flexor tone. CPM not safe due to tone, possible muscle injury.   
 
Dystonia/ spasticity - abnormal tone , which appears velocity dependent. Patient unabel to explain clearly but has been seen by neurologist at [facility]. - Recent MRI 1/2018 due to vertigo. Reports Cerebral and cerebellar volume loss with ex vacuo dilatation of the ventricular system.  Several foci of signal abnormality are present within the periventricular and subcortical white matter, likely sequela of chronic microvascular ischemia.  No mass effect, mass lesion, acute hemorrhage, or hydrocephalus.    
- will follow with daughter. Left message.  
- patient agreeable to start meds for spasticity. Start baclofen 5mg tid. Increase as tolerated. 9/16 family suspects this is not new. Will check MRI brain tomorrow. Sepsis - monitor. Treated.  
  
Bipolar disorder  - continue PTA medications. Depakote, wellbutrin. - depakote level 9/13 today 54, wnl.  
   
Post op acute blood loss anemia - monitor - 9/7 - hgb 11. -> 10.7 (9/13) 
   
Spondylolisthesis of lumbar region - no new radicular symptoms.  
- has had back issues in past, follwed by neurosurgery at [facility].   
  
Pneumonia prophylaxis- Insentive spirometer every hour while awake 
  
DVT risk / DVT Prophylaxis-  Mobilization as tolerated. Intermittent pneumatic compression devices when in bed Thigh-high or knee-high thromboembolic deterrent hose when out of bed.  
- resume heparin q12h.   
  
Pain Control: stable, mild-to-moderate joint symptoms intermittently, reasonably well controlled by PRN meds. Will require regular pain assessment and comprenhensive pain management. - Takes 1 norco prn daily for knee pain.  
  
Wound Care: Monitor wound status daily per staff and physician. At risk for failure. Will require 24/7 rehab nursing. Keep wound clean and dry - L TKA wound closed. No s/s of infection.  
  
Hypertension/ hypotension -  
- cozaar, lasix held due to MELANI. - 9/13 -  BP in good control. No new changes. renal function WNL today. continue norvasc.  
  
Diabetes mellitus type 2- Uncontrolled. poor glycemic control.  Will require daily, close FSG monitoring and medication adjustment to optimize glycemic control in setting of acute illness and hospitalization. - will resume glucotrol. Start 5mg dose. Pt was on glucotrol SR 10mg. - continue SSI.  
  
Urinary retention/ neurogenic bladder -- patient has history of \" neurogenic bladder. Has catheterized once in the past month; urinating more frequently own his own at baseline 
 - will observe pattern. schedule voids q6-8 hrs. Check post-void residual every shift; In and Out catheterize if post-void residual is more than 400 cc. 
- on flomax salmon for now. Patient reports he did have neurogenic bladder , was dependent on salmon, self cath for long period, but eventually was able to void. - Salmon replaced due to poor tolerance to CIC, pain. continue  
- baclofen started 9/13. May held with possible sphincter tone. - will try voiding trial 1 more time prior to d/c  
  
bowel program - start abbie colace 
  
GERD - resume PPI. At times may need additional antacids, Maalox prn. 
  
 
Time spent was 25 minutes with over 1/2 in direct patient care/examination, consultation and coordination of care. Signed By: Mario Faria MD   
 September 16, 2018

## 2018-09-17 NOTE — PROGRESS NOTES
Patient refused therapy today, both AM and PM sessions. Stating he was \"physically and emotionally exhausted. \" Will attempt tomorrow to continue therapy efforts.  
 
Breanna Temple, PT,DPT

## 2018-09-17 NOTE — PROGRESS NOTES
Pt repositioned in bed. Pt inappropriate requesting kiss from nurse. Ordered blistex for dry lips. Pt resting

## 2018-09-17 NOTE — PROGRESS NOTES
Pt refused H.S. Medications, daughter Wilbert Hyman called, stated that pt had done this at [facility] also. States would  Only eat puddding and jellos. Here pt states he does not eat anything sweet. Pt resting with sheet only on.stating he was cold. Pt refused to let nurse place, blanket on him. Stating he,s always hot. Will cont to monitor.

## 2018-09-17 NOTE — PROGRESS NOTES
Problem: Falls - Risk of 
Goal: *Absence of Falls Document [facility] Fall Risk and appropriate interventions in the flowsheet. Outcome: Progressing Towards Goal 
Fall Risk Interventions: 
Mobility Interventions: Bed/chair exit alarm, Patient to call before getting OOB, Utilize walker, cane, or other assistive device Mentation Interventions: Bed/chair exit alarm, Door open when patient unattended, Evaluate medications/consider consulting pharmacy, Eyeglasses and hearing aids Medication Interventions: Bed/chair exit alarm, Evaluate medications/consider consulting pharmacy, Patient to call before getting OOB Elimination Interventions: Bed/chair exit alarm, Call light in reach, Patient to call for help with toileting needs

## 2018-09-17 NOTE — PROGRESS NOTES
Patient resting up in bed. Patient lethargic. Repositioned up in bed. Doesn't want to sit up to eat breakfast. Patient willing to take medications. Lung sounds diminished in bases. S1S2, bowel sounds active. Ramey draining dark yellow urine with sediment. Old incision to left knee open to air. Discussed with patient on need to eat breakfast. Patient agreeable to attempt to eat some breakfast. Breakfast tray set up. No other verbalized needs made known at present time. Assessment completed. See doc flow sheet for further assessments.

## 2018-09-17 NOTE — PROGRESS NOTES
SPEECH PATHOLOGY NOTE: 
 
Pt was sleeping soundly but did eventually rouse. Declined therapy despite encouragement. Will re-attempt as pt is available.  
 
Giuliana Romero MS, CCC-SLP

## 2018-09-17 NOTE — PROGRESS NOTES
Pt reported he did not want to do therapy. Will attempt tomorrow as appropriate. Continue with POC. Anamaria Griggs OTR/L 
9/17/2018

## 2018-09-17 NOTE — PROGRESS NOTES
SFD PROGRESS NOTE Audrey Parsons 
Admit Date: 9/6/2018 Chief Complaint : Gait dysfunction secondary to below. Admit Diagnosis: Unilateral primary osteoarthritis, left knee [M17.12] MELANI (acute kidney injury) ([facility] (8/30/2018) CKD (chronic kidney disease) stage 3, GFR 30-59 ml/min (5/8/2018) S/P total knee arthroplasty, left (8/3/2018) Diabetes mellitus type 2, controlled ([facility] (8/15/2018) Sepsis ([facility] [address] (8/15/2018) Bipolar disorder ([facility] (8/15/2018) Hypotension (8/30/2018) Nausea & vomiting (8/30/2018) Pain DVT risk Post op acute blood loss anemia Hypertension Diabetes ([facility] [address] [facility] Spondylolisthesis of lumbar region Neurogenic bladder  -has catheterized once in the past month; urinating more frequently own his own at baseline Acute Rehab Dx: 
Gait impairment Debility   
Mobility and ambulation deficits Self Care/ADL deficits 
  
Subjective  
 
Vss. Afebrile. Appears somnolent, refused therapy. No new weakness focally, but appears not motivated, excuses to skip therapy. Awaited for MRI to be done. Over all poor progress made as new spasticity, tone in UE, LE flexors major barriers. according to daughter this has been there before. Thought was may be TKA may improve his mobility. Objective:  
 
Current Facility-Administered Medications Medication Dose Route Frequency  lip protectant (BLISTEX) ointment   Topical PRN  
 baclofen (LIORESAL) tablet 5 mg  5 mg Oral TID  polyethylene glycol (MIRALAX) packet 17 g  17 g Oral DAILY  bisacodyl (DULCOLAX) tablet 10 mg  10 mg Oral DAILY PRN  
 bisacodyl (DULCOLAX) suppository 10 mg  10 mg Rectal DAILY PRN  pantoprazole (PROTONIX) tablet 40 mg  40 mg Oral ACB  white petrolatum-mineral oil (EUCERIN) cream   Topical PRN  
 aspirin delayed-release tablet 81 mg  81 mg Oral DAILY  mirtazapine (REMERON) tablet 7.5 mg  7.5 mg Oral QHS PRN  
 amLODIPine (NORVASC) tablet 5 mg  5 mg Oral DAILY  glipiZIDE (GLUCOTROL) tablet 5 mg  5 mg Oral ACB  senna-docusate (PERICOLACE) 8.6-50 mg per tablet 1 Tab  1 Tab Oral DAILY  acetaminophen (TYLENOL) tablet 650 mg  650 mg Oral Q6H PRN  
 buPROPion SR (WELLBUTRIN SR) tablet 150 mg  150 mg Oral BID  dextrose (D50W) injection syrg 25 g  25 g IntraVENous PRN  
 divalproex ER (DEPAKOTE ER) 24 hour tablet 1,000 mg  1,000 mg Oral BID  heparin (porcine) injection 5,000 Units  5,000 Units SubCUTAneous Q12H  
 HYDROcodone-acetaminophen (NORCO) 7.5-325 mg per tablet 2 Tab  2 Tab Oral Q6H PRN  
 insulin regular (NOVOLIN R, HUMULIN R) injection   SubCUTAneous AC&HS  latanoprost (XALATAN) 0.005 % ophthalmic solution 1 Drop  1 Drop Both Eyes QHS  naloxone (NARCAN) injection 0.4 mg  0.4 mg IntraVENous PRN  
 ondansetron (ZOFRAN) injection 4 mg  4 mg IntraVENous Q8H PRN  
 simvastatin (ZOCOR) tablet 40 mg  40 mg Oral QHS  tamsulosin (FLOMAX) capsule 0.4 mg  0.4 mg Oral QHS  influenza vaccine 2018-19 (6 mos+)(PF) (FLUARIX QUAD/FLULAVAL QUAD) injection 0.5 mL  0.5 mL IntraMUSCular PRIOR TO DISCHARGE  pneumococcal 23-valent (PNEUMOVAX 23) injection 0.5 mL  0.5 mL IntraMUSCular PRIOR TO DISCHARGE Review of Systems: Denies chest pain, shortness of breath, cough, headache, visual problems, abdominal pain, dysurea, calf pain. Pertinent positives are as noted in the medical records and unremarkable otherwise. Visit Vitals  /81 (BP 1 Location: Left arm)  Pulse 94  Temp 98 °F (36.7 °C)  Resp 17  Ht 6' (1.829 m)  Wt 199 lb 14.4 oz (90.7 kg)  SpO2 99%  BMI 27.11 kg/m2  
  
  
Physical Exam: Psych: Patient was oriented x 2. Without hallucinations. Patient is not suicidal.  
General:   Alert, appears stated age, No acute distress. HEENT:  Normocephalic, EOMI, facial symmetry  Intact. Oral mucosa pink and moist. No nasal discharge. Lungs:  CTA Bilaterally,Respiration even and unlabored No apparent use of accessory muscles for respiration. No nasal alar flaring. Heart:  Regular rate and rhythm, S1, S2, No obvious murmur, click, rub or gallop. Genitourinary: defered Abdomen:  Soft, non-tender to palpation in all four quadrants. Bowel sounds present. No obvious masses palpated. Neuromuscular:  PERRL, EOMI Follows simple commands consistently. Able to identify, recall repeat. Mood appropriate. Insight, judgement poor 
+ generalized weakness.   
Sensory - intact No cerebellar signs. Plantars - down going No atrophy, no fasciculations, no tremors. + increased flexor tone/ spasticity BUE, BLE. Skin:  No rashes, no erythema. Calf non tender BLE. boggy L knee. LLE PROM 
L Knee Flexion: 80 (~) 
L Knee Extension: -15(~) Incision:  healing well, clean, dry, and intact  
   
 
                                                                        
Functional Assessment: 
   
   
Balance Sitting - Static: Good (unsupported) (09/15/18 1400) Sitting - Dynamic: Fair (occasional) (09/15/18 1400) Standing - Static: Poor;Constant support (09/15/18 1400) Standing - Dynamic : Impaired (09/15/18 1400) Clotiled Jackson Fall Risk Assessment: 
Dionicio Labrum Risk Mobility: Ambulates or transfers with assist devices or assistance (09/16/18 1903) Mobility Interventions: Bed/chair exit alarm (09/16/18 1903) Mentation: Periodic confusion (09/16/18 1903) Mentation Interventions: Bed/chair exit alarm (09/16/18 1903) Medication: Patient receiving anticonvulsants, sedatives(tranquilizers), psychotropics or hypnotics, hypoglycemics, narcotics, sleep aids, antihypertensives, laxatives, or diuretics (09/16/18 1903) Medication Interventions: Evaluate medications/consider consulting pharmacy (09/16/18 1903) Elimination: Needs assistance with toileting (09/16/18 1903) Elimination Interventions: Bed/chair exit alarm (09/16/18 1903) Prior Fall History: No (09/16/18 1903) Total Score: 4 (09/16/18 1903) Standard Fall Precautions: Yes (09/13/18 3410) High Fall Risk: Yes (09/16/18 1903) Speech Assessment: 
    
 
Ambulation: 
Gait Distance (ft): 10 Feet (ft) (10ft x 2  4 ft x 1) (09/15/18 1400) Assistive Device: Gait belt;Walker, rolling (09/15/18 1400) Labs/Studies: 
Recent Results (from the past 72 hour(s)) GLUCOSE, POC Collection Time: 09/14/18 11:31 AM  
Result Value Ref Range Glucose (POC) 195 (H) 65 - 100 mg/dL GLUCOSE, POC Collection Time: 09/14/18  4:24 PM  
Result Value Ref Range Glucose (POC) 249 (H) 65 - 100 mg/dL GLUCOSE, POC Collection Time: 09/14/18  8:34 PM  
Result Value Ref Range Glucose (POC) 152 (H) 65 - 100 mg/dL GLUCOSE, POC Collection Time: 09/15/18  7:25 AM  
Result Value Ref Range Glucose (POC) 173 (H) 65 - 100 mg/dL GLUCOSE, POC Collection Time: 09/15/18 10:52 AM  
Result Value Ref Range Glucose (POC) 212 (H) 65 - 100 mg/dL GLUCOSE, POC Collection Time: 09/15/18  4:39 PM  
Result Value Ref Range Glucose (POC) 251 (H) 65 - 100 mg/dL GLUCOSE, POC Collection Time: 09/15/18 10:17 PM  
Result Value Ref Range Glucose (POC) 205 (H) 65 - 100 mg/dL GLUCOSE, POC Collection Time: 09/16/18  7:14 AM  
Result Value Ref Range Glucose (POC) 165 (H) 65 - 100 mg/dL GLUCOSE, POC Collection Time: 09/16/18 11:19 AM  
Result Value Ref Range Glucose (POC) 219 (H) 65 - 100 mg/dL GLUCOSE, POC Collection Time: 09/16/18  4:30 PM  
Result Value Ref Range Glucose (POC) 150 (H) 65 - 100 mg/dL GLUCOSE, POC Collection Time: 09/16/18  9:09 PM  
Result Value Ref Range Glucose (POC) 176 (H) 65 - 100 mg/dL CBC WITH AUTOMATED DIFF Collection Time: 09/17/18  5:26 AM  
Result Value Ref Range WBC 8.6 4.3 - 11.1 K/uL  
 RBC 4.08 (L) 4.23 - 5.6 M/uL  
 HGB 11.6 (L) 13.6 - 17.2 g/dL HCT 37.2 (L) 41.1 - 50.3 %  MCV 91.2 79.6 - 97.8 FL  
 MCH 28.4 26.1 - 32.9 PG  
 MCHC 31.2 (L) 31.4 - 35.0 g/dL  
 RDW 14.9 % PLATELET 037 506 - 159 K/uL MPV 9.8 9.4 - 12.3 FL ABSOLUTE NRBC 0.00 0.0 - 0.2 K/uL  
 DF AUTOMATED NEUTROPHILS 59 43 - 78 % LYMPHOCYTES 26 13 - 44 % MONOCYTES 13 (H) 4.0 - 12.0 % EOSINOPHILS 1 0.5 - 7.8 % BASOPHILS 1 0.0 - 2.0 % IMMATURE GRANULOCYTES 1 0.0 - 5.0 %  
 ABS. NEUTROPHILS 5.1 1.7 - 8.2 K/UL  
 ABS. LYMPHOCYTES 2.2 0.5 - 4.6 K/UL  
 ABS. MONOCYTES 1.1 0.1 - 1.3 K/UL  
 ABS. EOSINOPHILS 0.1 0.0 - 0.8 K/UL  
 ABS. BASOPHILS 0.0 0.0 - 0.2 K/UL  
 ABS. IMM. GRANS. 0.0 0.0 - 0.5 K/UL METABOLIC PANEL, BASIC Collection Time: 09/17/18  5:26 AM  
Result Value Ref Range Sodium 137 136 - 145 mmol/L Potassium 3.9 3.5 - 5.1 mmol/L Chloride 97 (L) 98 - 107 mmol/L  
 CO2 26 21 - 32 mmol/L Anion gap 14 7 - 16 mmol/L Glucose 178 (H) 65 - 100 mg/dL BUN 24 (H) 8 - 23 MG/DL Creatinine 0.92 0.8 - 1.5 MG/DL  
 GFR est AA >60 >60 ml/min/1.73m2 GFR est non-AA >60 >60 ml/min/1.73m2 Calcium 9.9 8.3 - 10.4 MG/DL  
GLUCOSE, POC Collection Time: 09/17/18  7:24 AM  
Result Value Ref Range Glucose (POC) 206 (H) 65 - 100 mg/dL Assessment:  
 
Problem List as of 9/17/2018  Date Reviewed: 8/30/2018 Codes Class Noted - Resolved Debility ICD-10-CM: R53.81 ICD-9-CM: 799.3  9/6/2018 - Present Total knee replacement status, left ICD-10-CM: G45.223 ICD-9-CM: V43.65  9/6/2018 - Present Hypotension ICD-10-CM: I95.9 ICD-9-CM: 458.9  8/30/2018 - Present Nausea & vomiting ICD-10-CM: R11.2 ICD-9-CM: 787.01  8/30/2018 - Present MELANI (acute kidney injury) ([facility] ICD-10-CM: N17.9 ICD-9-CM: 584.9  8/30/2018 - Present Fever ICD-10-CM: R50.9 ICD-9-CM: 780.60  8/15/2018 - Present Volume depletion ICD-10-CM: E86.9 ICD-9-CM: 276.50  8/15/2018 - Present Diabetes mellitus type 2, controlled ([facility] ICD-10-CM: E11.9 ICD-9-CM: 250.00  8/15/2018 - Present Hyperlipidemia ICD-10-CM: E78.5 ICD-9-CM: 272.4  8/15/2018 - Present Essential hypertension ICD-10-CM: I10 
ICD-9-CM: 401.9  8/15/2018 - Present Sepsis ([facility] ICD-10-CM: A41.9 ICD-9-CM: 038.9, 995.91  8/15/2018 - Present Acute cystitis without hematuria ICD-10-CM: N30.00 ICD-9-CM: 595.0  8/15/2018 - Present Bipolar disorder ([facility] ICD-10-CM: F31.9 ICD-9-CM: 296.80  8/15/2018 - Present S/P total knee arthroplasty, left ICD-10-CM: E18.574 ICD-9-CM: V43.65  8/3/2018 - Present Osteoarthritis ICD-10-CM: M19.90 ICD-9-CM: 715.90  8/2/2018 - Present Abnormal urinalysis ICD-10-CM: R82.90 ICD-9-CM: 791.9  7/26/2018 - Present CKD (chronic kidney disease) stage 3, GFR 30-59 ml/min ICD-10-CM: N18.3 ICD-9-CM: 585.3  5/8/2018 - Present Elevated white blood cell count ICD-10-CM: N92.187 ICD-9-CM: 288.60  5/8/2018 - Present Plan:  
 
Assessment: 
  
The Post Assessment Physician Evaluation (BURAK) found the current functional status to be comparable with the Pre-admission Screening. The Patient is a good candidate for acute inpatient rehabilitation. Nothing since the Pre-admission screen has changed that determination.  
  
Rehabilitation Plan The patient has shown the ability to tolerate and benefit from 3 hours of therapy daily and is being admitted to a comprehensive acute inpatient rehabilitation program consisting of at least 3 hours of combined physical and occupational therapies. - intensive Physical Therapy for a minimum of 1.5 hours a day, at least 5 out of 7 days per week to address bed mobility, transfers, ambulation, strengthening, balance, and endurance. - intensive Occupational Therapy for a minimum of 1.5 hours a day, at least 5 out of 7 days per week to address ADL ( bathing, LE dressing, toileting) and adaptive equipment as needed. - barriers be pain, poor L knee ROM, deconditioned state. - patient continued to demonstrate major flexor tone, spasticity that was likley a major factor in his debility prior to recent knee surgery.  
  
Continue ST for: cognitive deficits, may be baseline, or acutely deteriorating due to recent sepsis, on-going medical issues.  
  
Continue 24-hour skilled rehabilitation nursing for bowel and bladder management, skin care for decubitus ulcer prevention , pain management and ongoing medication administration  
  
The patient may benefit from a psychology consult for depression, anxiety and adjustment disorder. Patient with underlying bi-polar disorder on medications. 
  
Continue daily physician medical management: 
  
MELANI (acute kidney injury)  )/ CKD (chronic kidney disease) stage 3, GFR 30-59 ml/min (5/8/2018) 
- monitor renal funcion closely. - encourage po hydration. ivf as needed. hold Cozaar, lasix. - 9/17 Renal function wnl 9/17.  
  
  
S/P left total knee arthroplasty (8/3/2018) - monitor wound for late infection. Closed. - focus on strength, ROM. PT evaluate for CPM.  
- jerald hold on CPM until patient's tone better evaluated,.  
- decreased L TKA ROM, extension not all due to poor knee rom. Appears spasticity now evident affecting PROM. - continue to work on extension. Both knees difficult to passively extend due to flexor tone. CPM not safe due to tone, possible muscle injury.   
 
Dystonia/ spasticity - abnormal tone , which appears velocity dependent. Patient unabel to explain clearly but has been seen by neurologist at [facility]. - Recent MRI 1/2018 due to vertigo. Reports Cerebral and cerebellar volume loss with ex vacuo dilatation of the ventricular system.  Several foci of signal abnormality are present within the periventricular and subcortical white matter, likely sequela of chronic microvascular ischemia.  No mass effect, mass lesion, acute hemorrhage, or hydrocephalus.    
- will follow with daughter. Left message. - patient agreeable to start meds for spasticity. Start baclofen 5mg tid. Increase as tolerated. 9/16 family suspects this is not new. Will check MRI brain tomorrow. 9/17 - MRI today. Continues to be somnolent. appears possibly from new baclofen administration, intolerant to CNS depressants. Will discontinue baclofen as no effect/ benefit  . Sepsis - monitor. Treated.  
  
Bipolar disorder  - continue PTA medications. Depakote, wellbutrin. - depakote level 9/13 today 54, wnl.  
   
Post op acute blood loss anemia - monitor - 9/7 - hgb 11. -> 10.7 (9/13)-> 11.6(9/17) improved. 
   
Spondylolisthesis of lumbar region - no new radicular symptoms.  
- has had back issues in past, follwed by neurosurgery at [facility].   
  
Pneumonia prophylaxis- Insentive spirometer every hour while awake 
  
DVT risk / DVT Prophylaxis-  Mobilization as tolerated. Intermittent pneumatic compression devices when in bed Thigh-high or knee-high thromboembolic deterrent hose when out of bed.  
- resume heparin q12h.   
  
Pain Control: stable, mild-to-moderate joint symptoms intermittently, reasonably well controlled by PRN meds. Will require regular pain assessment and comprenhensive pain management. - Takes 1 norco prn daily for knee pain.  
  
Wound Care: Monitor wound status daily per staff and physician. At risk for failure. Will require 24/7 rehab nursing. Keep wound clean and dry - L TKA wound closed. No s/s of infection. - healed well PROM improved. Spasticity/ tone limits passive motion.  
  
Hypertension/ hypotension -  
- cozaar, lasix held due to MELANI. - 9/17 continue norvasc.  
  
Diabetes mellitus type 2- Uncontrolled. poor glycemic control. Will require daily, close FSG monitoring and medication adjustment to optimize glycemic control in setting of acute illness and hospitalization. - will resume glucotrol. Start 5mg dose. Pt was on glucotrol SR 10mg. - continue SSI. - mildly higher BS. Will resume home dose glucotrol SR 10mg.  
  
Urinary retention/ neurogenic bladder -- patient has history of \" neurogenic bladder. Has catheterized once in the past month; urinating more frequently own his own at baseline 
 - will observe pattern. schedule voids q6-8 hrs. Check post-void residual every shift; In and Out catheterize if post-void residual is more than 400 cc. 
- on flomax salmon for now. Patient reports he did have neurogenic bladder , was dependent on salmon, self cath for long period, but eventually was able to void. - Salmon replaced due to poor tolerance to CIC, pain. continue  
- baclofen started 9/13. May held with possible sphincter tone. - will discuss with patient possibly  voiding trial 1 more time prior to d/c, however this jerald be risky as patient has had 2x urosepsis , readmission without salmon, likely due to retention.  
  
bowel program - start abbie colace 
  
GERD - resume PPI. At times may need additional antacids, Maalox prn. 
  
 
Time spent was 25 minutes with over 1/2 in direct patient care/examination, consultation and coordination of care. Signed By: Sherri Sneed MD   
 September 17, 2018

## 2018-09-18 NOTE — PROGRESS NOTES
PHYSICAL THERAPY DAILY NOTE Time In: 1300 Time Out: 1326 Patient Seen For: PM;Patient education;Transfer training Subjective: \"I can't. \" Patient reported being unable to ambulate while in standing. Patient agreeable to therapy. Objective: Vital Signs:  
Patient Vitals for the past 8 hrs: 
 Temp Pulse Resp BP SpO2  
09/18/18 0737 98.6 °F (37 °C) 60 18 156/78 96 % Pain level: No pain level given. Pain location: L knee Pain interventions: Provided rest breaks, positioned for comfort Patient education: Educated patient on benefits of physical therapy and patient's motivation towards therapy session. Interdisciplinary Communication: Co treatment with Fabrice Mcduffie. Other (comment) (fall precautions  WBAT LLE) GROSS ASSESSMENT Daily Assessment TRANSFERS Daily Assessment Dependent assist, MOD A x2. Patient unable to maintain standing in parallel bars demonstrating decreased anterior weight shift. Transfer Type: SPT with walker Transfer Assistance : 1 (Total assistance) (assist x2) Sit to Stand Assistance: Total assistance Car Transfers: Not tested GAIT Daily Assessment Patient took one small step with L LE while being supported by 2 for safety. Patient reported inability to move R LE forward while in standing. Amount of Assistance: 1 (Dependent/total assistance) (assist x 2 for stability in standing, w/c follow) Distance (ft): 1 Feet (ft) Assistive Device: Gait belt;Walker, rolling STEPS or STAIRS Daily Assessment Level of Assist : 0 (Not tested) BALANCE Daily Assessment Sitting - Static: Good (unsupported) Sitting - Dynamic: Fair (occasional) Standing - Static: Poor;Constant support Standing - Dynamic : Impaired Patient returned to room sitting in w/c with all needs in reach. Assessment: Patient making no progress towards goals at this time. Patient demonstrating decreased motivation to participate in therapy. Plan of Care: Continue with POC and progress as tolerated. Czech New 9/18/2018

## 2018-09-18 NOTE — PROGRESS NOTES
OT Daily Note Time In 1300 Time Out 1326 Precautions: Ax2 for transfers, confused Home: Lives alone, QC Pain: Pt c/o pain with movement. Functional Mobility Pt was Ax2 to  parallel bars. Pt would actively fight against therapist cues to sit back down. Attempted standing at the walker because pt has had previous success, but pt required Ax3 and made no purposeful effort. Pt refused to attempt to put leg on leg rest. Asked pt what he wanted to work on and he just stared into space. Pt was able to report he was not doing well to [place], but was unable to change performance. Session was terminated early secondary to performance and lack of motivation. Education Importance of moving Interdisciplinary Communication: LAN Helm on performance Plan: Continue with POC. Pt was left in w/c with all needs within reach. Monserrat Rosales OTR/L 
9/18/2018

## 2018-09-18 NOTE — PROGRESS NOTES
PHYSICAL THERAPY DAILY NOTE Time In: 1 Time Out: 1045 Patient Seen For: AM;Therapeutic exercise;Patient education;Transfer training Subjective: \"What are we going to do? \" Patient agreeable to therapy. Objective: Vital Signs:  
Patient Vitals for the past 8 hrs: 
 Temp Pulse Resp BP SpO2  
09/18/18 0737 98.6 °F (37 °C) 60 18 156/78 96 % Pain level: No pain reported. Pain location: n/a Pain interventions: n/a Patient education: Educated patient on body mechanics with SPT with RW. Interdisciplinary Communication: Co-treatment with Fabrice Keen. Other (comment) (fall precautions  WBAT LLE) GROSS ASSESSMENT Daily Assessment TRANSFERS Daily Assessment Required for safety. Patient requires verbal cues for hand placement and anterior weight shift. Patient requires MOD A x2. Transfer Type: SPT with walker Transfer Assistance : 1 (Total assistance) (assist x2) Sit to Stand Assistance: Total assistance Car Transfers: Not tested GAIT Daily Assessment STEPS or STAIRS Daily Assessment Level of Assist : 0 (Not tested) BALANCE Daily Assessment Sitting - Static: Good (unsupported) Sitting - Dynamic: Fair (occasional) Standing - Static: Poor;Constant support Standing - Dynamic : Impaired LOWER EXTREMITY EXERCISES Daily Assessment Patient performed B LE exercises to promote L knee flexion/extension. Extremity: Both Exercise Type #1: Seated lower extremity strengthening Sets Performed: 2 Reps Performed: 10 Level of Assist: Minimal assistance Patient returned to room sitting in w/c with all needs in reach. Assessment: Patient continues to require assist x2 for functional mobility. Plan of Care: Continue with POC and progress as tolerated. Beatriz Soliz 9/18/2018

## 2018-09-18 NOTE — PROGRESS NOTES
09/18/18 9353 Time Spent With Patient Time In 0901 Time Out 5324 Patient Seen For: AM;Neuro-linguistics Mental Status Neurologic State Drowsy; Confused Orientation Level Oriented to person;Oriented to place; Disoriented to time;Oriented to situation Cognition Follows commands Perception Appears intact Perseveration No perseveration noted Safety/Judgement Fall prevention 09/18/18 2126 Reading Comprehension Visual Impairment Glasses/contacts Pre-Morbid Reading Status Literate Interfering Components Attention to detail Written Expression Legibility  Decreased legibility Interfering Components Micrographia Exercise/Activities Tolerance Exercise Tolerance/Response Tolerated with cues 09/18/18 9253 Neuro-Linguistic Exercises Verbal Problem Solving Impaired; 75% accuracy understanding written directional concepts Decreased attention to detail Verbal Organization Impaired; required cues to redirect to the appropriate question during reading task x2 Memory Impaired; recalled 2 therapists names Constant cues to re-orient to time Attention  Impaired; drowsy; decreased motivation Patient participated with cognitive treatment. Sitting up in the chair after eating breakfast.  Drowsy with decreased motivation. Easily falls asleep in the middle of tasks. Reports he is uncomfortable at night with difficulty falling asleep. Patient recalled having the MRI yesterday which indicated: Mild diffuse atrophy and chronic change in the white matter. No evidence of acute intracranial abnormality. Poor short term recall persists with re-orientation to time required throughout the session. Followed written spatial directions with 75% accuracy. Decreased attention to detail a barrier. Required verbal cues x2 to redirect to appropriate question with patient skipping ahead at times. Decreased legibility; micrographia with written responses.   Patient required increased encouragement to participate this date.  
 
 
Roxie Morales MS, CCC-SLP

## 2018-09-18 NOTE — PROGRESS NOTES
End Of Shift Functional Summary, Nursing 
   
   
TOILETING:   
Does patient need assist with adjusting pants up or down and/or pericare? yes If yes, please indicate what the patient needs help with:  all  (i.e. pants up, pants down, pericare) Pt uses wheelchair/slide board. Leledecanetta Guadalupe Does the patient require extra time? yes Does the patient require standby assistance? yes Does the patient require contact guard assistance? yes Does the patient require more than one staff member for assistance? yes 
   
TOILET TRANSFER:   
Pt requires maximum assistance.  Pt uses wheelchair/slide board. Does the patient require extra time? yes Does the patient require contact guard assistance? yes Does the patient require more than one staff member for assistance? yes 
   
BLADDER:   
Pt does have a salmon catheter that staff manages.  Pt does take medication.  If so, please indicate which medication: flomax  
  
BOWEL:  Pt does take medication.  Pt is continent of bowel and uses toilet.  Pt requires staff for ambulation.    Pt has had 0 bowel movement during this shift. Constipated. .  (An accident is when the episode is not contained in a brief AND/OR the clothing/linen requires changing/cleaning up.) 
   
BED/CHAIR TRANSFER Pt requires maximum assistance. Pt uses walker Does the patient require extra time? yes Does the patient require contact guard assistance? yes Does the patient require more than one staff member for assistance? yes 
   
Documentation reviewed and plan of care discussed/reviewed with  
patient, oncoming nurse and patient assistant during the shift.

## 2018-09-18 NOTE — PROGRESS NOTES
[facility] PROGRESS NOTE Nerissa Mcgregor 
Admit Date: 9/6/2018 Chief Complaint : Gait dysfunction secondary to below. Admit Diagnosis: Unilateral primary osteoarthritis, left knee [M17.12] MELANI (acute kidney injury) CKD (chronic kidney disease) stage 3, GFR 30-59 ml/min S/P total knee arthroplasty, left (8/3/2018) Diabetes mellitus type 2, controlled ([address] (8/15/2018) Acute Rehab Dx: 
Gait impairment Debility   
Mobility and ambulation deficits Self Care/ADL deficits 
  
Subjective  
 
Vss. Afebrile. Pt/OT fairly participated, but still requires max/total assist for transfers. Unable to take meaningful steps. MRI result discussed with daughter. No acute findings but has chronic changes/ atrophy. Objective:  
 
Current Facility-Administered Medications Medication Dose Route Frequency  lip protectant (BLISTEX) ointment   Topical PRN  
 glipiZIDE (GLUCOTROL) tablet 10 mg  10 mg Oral ACB  polyethylene glycol (MIRALAX) packet 17 g  17 g Oral DAILY  bisacodyl (DULCOLAX) tablet 10 mg  10 mg Oral DAILY PRN  
 bisacodyl (DULCOLAX) suppository 10 mg  10 mg Rectal DAILY PRN  pantoprazole (PROTONIX) tablet 40 mg  40 mg Oral ACB  white petrolatum-mineral oil (EUCERIN) cream   Topical PRN  
 aspirin delayed-release tablet 81 mg  81 mg Oral DAILY  mirtazapine (REMERON) tablet 7.5 mg  7.5 mg Oral QHS PRN  
 amLODIPine (NORVASC) tablet 5 mg  5 mg Oral DAILY  senna-docusate (PERICOLACE) 8.6-50 mg per tablet 1 Tab  1 Tab Oral DAILY  acetaminophen (TYLENOL) tablet 650 mg  650 mg Oral Q6H PRN  
 buPROPion SR (WELLBUTRIN SR) tablet 150 mg  150 mg Oral BID  dextrose (D50W) injection syrg 25 g  25 g IntraVENous PRN  
 divalproex ER (DEPAKOTE ER) 24 hour tablet 1,000 mg  1,000 mg Oral BID  heparin (porcine) injection 5,000 Units  5,000 Units SubCUTAneous Q12H  
 HYDROcodone-acetaminophen (NORCO) 7.5-325 mg per tablet 2 Tab  2 Tab Oral Q6H PRN  
  insulin regular (NOVOLIN R, HUMULIN R) injection   SubCUTAneous AC&HS  latanoprost (XALATAN) 0.005 % ophthalmic solution 1 Drop  1 Drop Both Eyes QHS  naloxone (NARCAN) injection 0.4 mg  0.4 mg IntraVENous PRN  
 ondansetron (ZOFRAN) injection 4 mg  4 mg IntraVENous Q8H PRN  
 simvastatin (ZOCOR) tablet 40 mg  40 mg Oral QHS  tamsulosin (FLOMAX) capsule 0.4 mg  0.4 mg Oral QHS  influenza vaccine 2018-19 (6 mos+)(PF) (FLUARIX QUAD/FLULAVAL QUAD) injection 0.5 mL  0.5 mL IntraMUSCular PRIOR TO DISCHARGE  pneumococcal 23-valent (PNEUMOVAX 23) injection 0.5 mL  0.5 mL IntraMUSCular PRIOR TO DISCHARGE Review of Systems: Denies chest pain, shortness of breath, cough, headache, visual problems, abdominal pain, dysurea, calf pain. Pertinent positives are as noted in the medical records and unremarkable otherwise. Visit Vitals  /78  Pulse 60  Temp 98.6 °F (37 °C)  Resp 18  Ht 6' (1.829 m)  Wt 199 lb 14.4 oz (90.7 kg)  SpO2 96%  BMI 27.11 kg/m2  
  
  
Physical Exam: Psych: Patient was oriented x 2. Without hallucinations. Patient is not suicidal.  
General:   Alert, appears stated age, No acute distress. Sleepy. HEENT:  Normocephalic, EOMI, facial symmetry  Intact. Oral mucosa pink and moist. No nasal discharge. Lungs:  CTA Bilaterally,Respiration even and unlabored No apparent use of accessory muscles for respiration. No nasal alar flaring. Heart:  Regular rate and rhythm, S1, S2, No obvious murmur, click, rub or gallop. Genitourinary: defered Abdomen:  Soft, non-tender to palpation in all four quadrants. Bowel sounds present. No obvious masses palpated. Neuromuscular:  PERRL, EOMI Follows simple commands consistently. Mood appropriate. Insight, judgement poor 
+ generalized weakness.   
Sensory - intact 
no fasciculations, no tremors. + increased flexor tone/ spasticity BUE, BLE. Skin:  No rashes, no erythema. [place] Rust[place] LLE PROM L Knee Flexion: 85 (~) 
L Knee Extension: -10(~)    
   
   
 
                                                                        
Functional Assessment: 
   
   
Balance Sitting - Static: Good (unsupported) (09/15/18 1400) Sitting - Dynamic: Fair (occasional) (09/15/18 1400) Standing - Static: Poor;Constant support (09/15/18 1400) Standing - Dynamic : Impaired (09/15/18 1400) Ray Levels Fall Risk Assessment: 
Renate Labs Risk Mobility: Ambulates or transfers with assist devices or assistance (09/18/18 1025) Mobility Interventions: Utilize walker, cane, or other assistive device (09/18/18 1025) Mentation: Periodic confusion (09/18/18 1025) Mentation Interventions: Door open when patient unattended;Evaluate medications/consider consulting pharmacy (09/18/18 1025) Medication: Patient receiving anticonvulsants, sedatives(tranquilizers), psychotropics or hypnotics, hypoglycemics, narcotics, sleep aids, antihypertensives, laxatives, or diuretics (09/18/18 1025) Medication Interventions: Evaluate medications/consider consulting pharmacy; Patient to call before getting OOB (09/18/18 1025) Elimination: Needs assistance with toileting (09/18/18 1025) Elimination Interventions: Patient to call for help with toileting needs;Call light in reach (09/18/18 1025) Prior Fall History: No (09/18/18 1025) Total Score: 4 (09/18/18 1025) Standard Fall Precautions: Yes (09/18/18 1025) High Fall Risk: Yes (09/17/18 1943) Speech Assessment: 
    
 
Ambulation: 
Gait Distance (ft): 10 Feet (ft) (10ft x 2  4 ft x 1) (09/15/18 1400) Assistive Device: Gait belt;Walker, rolling (09/15/18 1400) Labs/Studies: 
Recent Results (from the past 72 hour(s)) GLUCOSE, POC Collection Time: 09/15/18  4:39 PM  
Result Value Ref Range Glucose (POC) 251 (H) 65 - 100 mg/dL GLUCOSE, POC Collection Time: 09/15/18 10:17 PM  
Result Value Ref Range Glucose (POC) 205 (H) 65 - 100 mg/dL GLUCOSE, POC  
 Collection Time: 09/16/18  7:14 AM  
Result Value Ref Range Glucose (POC) 165 (H) 65 - 100 mg/dL GLUCOSE, POC Collection Time: 09/16/18 11:19 AM  
Result Value Ref Range Glucose (POC) 219 (H) 65 - 100 mg/dL GLUCOSE, POC Collection Time: 09/16/18  4:30 PM  
Result Value Ref Range Glucose (POC) 150 (H) 65 - 100 mg/dL GLUCOSE, POC Collection Time: 09/16/18  9:09 PM  
Result Value Ref Range Glucose (POC) 176 (H) 65 - 100 mg/dL CBC WITH AUTOMATED DIFF Collection Time: 09/17/18  5:26 AM  
Result Value Ref Range WBC 8.6 4.3 - 11.1 K/uL  
 RBC 4.08 (L) 4.23 - 5.6 M/uL  
 HGB 11.6 (L) 13.6 - 17.2 g/dL HCT 37.2 (L) 41.1 - 50.3 % MCV 91.2 79.6 - 97.8 FL  
 MCH 28.4 26.1 - 32.9 PG  
 MCHC 31.2 (L) 31.4 - 35.0 g/dL  
 RDW 14.9 % PLATELET 926 887 - 128 K/uL MPV 9.8 9.4 - 12.3 FL ABSOLUTE NRBC 0.00 0.0 - 0.2 K/uL  
 DF AUTOMATED NEUTROPHILS 59 43 - 78 % LYMPHOCYTES 26 13 - 44 % MONOCYTES 13 (H) 4.0 - 12.0 % EOSINOPHILS 1 0.5 - 7.8 % BASOPHILS 1 0.0 - 2.0 % IMMATURE GRANULOCYTES 1 0.0 - 5.0 %  
 ABS. NEUTROPHILS 5.1 1.7 - 8.2 K/UL  
 ABS. LYMPHOCYTES 2.2 0.5 - 4.6 K/UL  
 ABS. MONOCYTES 1.1 0.1 - 1.3 K/UL  
 ABS. EOSINOPHILS 0.1 0.0 - 0.8 K/UL  
 ABS. BASOPHILS 0.0 0.0 - 0.2 K/UL  
 ABS. IMM. GRANS. 0.0 0.0 - 0.5 K/UL METABOLIC PANEL, BASIC Collection Time: 09/17/18  5:26 AM  
Result Value Ref Range Sodium 137 136 - 145 mmol/L Potassium 3.9 3.5 - 5.1 mmol/L Chloride 97 (L) 98 - 107 mmol/L  
 CO2 26 21 - 32 mmol/L Anion gap 14 7 - 16 mmol/L Glucose 178 (H) 65 - 100 mg/dL BUN 24 (H) 8 - 23 MG/DL Creatinine 0.92 0.8 - 1.5 MG/DL  
 GFR est AA >60 >60 ml/min/1.73m2 GFR est non-AA >60 >60 ml/min/1.73m2 Calcium 9.9 8.3 - 10.4 MG/DL  
GLUCOSE, POC Collection Time: 09/17/18  7:24 AM  
Result Value Ref Range Glucose (POC) 206 (H) 65 - 100 mg/dL GLUCOSE, POC Collection Time: 09/17/18 11:18 AM  
Result Value Ref Range Glucose (POC) 185 (H) 65 - 100 mg/dL GLUCOSE, POC Collection Time: 09/17/18  4:01 PM  
Result Value Ref Range Glucose (POC) 135 (H) 65 - 100 mg/dL GLUCOSE, POC Collection Time: 09/17/18  8:39 PM  
Result Value Ref Range Glucose (POC) 194 (H) 65 - 100 mg/dL GLUCOSE, POC Collection Time: 09/18/18  7:31 AM  
Result Value Ref Range Glucose (POC) 173 (H) 65 - 100 mg/dL Assessment:  
 
Problem List as of 9/18/2018  Date Reviewed: 8/30/2018 Codes Class Noted - Resolved Debility ICD-10-CM: R53.81 ICD-9-CM: 799.3  9/6/2018 - Present Total knee replacement status, left ICD-10-CM: I02.504 ICD-9-CM: V43.65  9/6/2018 - Present Hypotension ICD-10-CM: I95.9 ICD-9-CM: 458.9  8/30/2018 - Present Nausea & vomiting ICD-10-CM: R11.2 ICD-9-CM: 787.01  8/30/2018 - Present MELANI (acute kidney injury) ([facility] ICD-10-CM: N17.9 ICD-9-CM: 584.9  8/30/2018 - Present Fever ICD-10-CM: R50.9 ICD-9-CM: 780.60  8/15/2018 - Present Volume depletion ICD-10-CM: E86.9 ICD-9-CM: 276.50  8/15/2018 - Present Diabetes mellitus type 2, controlled ([facility] ICD-10-CM: E11.9 ICD-9-CM: 250.00  8/15/2018 - Present Hyperlipidemia ICD-10-CM: E78.5 ICD-9-CM: 272.4  8/15/2018 - Present Essential hypertension ICD-10-CM: I10 
ICD-9-CM: 401.9  8/15/2018 - Present Sepsis ([facility] ICD-10-CM: A41.9 ICD-9-CM: 038.9, 995.91  8/15/2018 - Present Acute cystitis without hematuria ICD-10-CM: N30.00 ICD-9-CM: 595.0  8/15/2018 - Present Bipolar disorder ([facility] ICD-10-CM: F31.9 ICD-9-CM: 296.80  8/15/2018 - Present S/P total knee arthroplasty, left ICD-10-CM: F82.449 ICD-9-CM: V43.65  8/3/2018 - Present Osteoarthritis ICD-10-CM: M19.90 ICD-9-CM: 715.90  8/2/2018 - Present Abnormal urinalysis ICD-10-CM: R82.90 ICD-9-CM: 791.9  7/26/2018 - Present CKD (chronic kidney disease) stage 3, GFR 30-59 ml/min ICD-10-CM: N18.3 ICD-9-CM: 585.3  5/8/2018 - Present Elevated white blood cell count ICD-10-CM: K37.509 ICD-9-CM: 288.60  5/8/2018 - Present Assessment/plan:  
 
Rehabilitation Plan The patient has shown the ability to tolerate and benefit from 3 hours of therapy daily and is being admitted to a comprehensive acute inpatient rehabilitation program consisting of at least 3 hours of combined physical and occupational therapies. - intensive Physical Therapy for a minimum of 1.5 hours a day, at least 5 out of 7 days per week to address bed mobility, transfers, ambulation, strengthening, balance, and endurance. - intensive Occupational Therapy for a minimum of 1.5 hours a day, at least 5 out of 7 days per week to address ADL ( bathing, LE dressing, toileting) and adaptive equipment as needed. - barriers be pain, poor L knee ROM, deconditioned state. - patient continued to demonstrate major flexor tone, spasticity that was likley a major factor in his debility prior to recent knee surgery.  
  
Continue ST for: cognitive deficits, may be baseline, or acutely deteriorating due to recent sepsis, on-going medical issues.  
  
Continue 24-hour skilled rehabilitation nursing for bowel and bladder management, skin care for decubitus ulcer prevention , pain management and ongoing medication administration. 
  
The patient may benefit from a psychology consult for depression, anxiety and adjustment disorder. Patient with underlying bi-polar disorder on medications. 
  
Continue daily physician medical management: 
  
MELANI (acute kidney injury)  )/ CKD (chronic kidney disease) stage 3  
- urin output appears adequate. -  Renal function wnl 9/17.  
  
  
S/P left total knee arthroplasty (8/3/2018) - monitor wound for late infection. Closed. - focus on strength, ROM. PT evaluate for CPM.  
- jerald hold on CPM until patient's tone better evaluated,.  
- decreased L TKA ROM, extension not all due to poor knee rom.  Appears spasticity now evident affecting PROM. - overall mildly improved PROM while in [facility]. Still AROM limited due to tone. Dystonia/ spasticity - abnormal tone , which appears velocity dependent. Patient unabel to explain clearly but has been seen by neurologist at [facility]. - Recent MRI 1/2018 due to vertigo. Reports Cerebral and cerebellar volume loss with ex vacuo dilatation of the ventricular system.  Several foci of signal abnormality are present within the periventricular and subcortical white matter, likely sequela of chronic microvascular ischemia.  No mass effect, mass lesion, acute hemorrhage, or hydrocephalus.    
- will follow with daughter. Left message.  
- patient agreeable to start meds for spasticity. Start baclofen 5mg tid. Increase as tolerated. 9/16 family suspects this is not new. Will check MRI brain tomorrow. 9/17 - MRI today. Continues to be somnolent. appears possibly from new baclofen administration, intolerant to CNS depressants. Will discontinue baclofen as no effect/ benefit  . 9/18 - no acute findings noted on MRI. + chronic changes/ atrophy. Discussed result with daughter. Bipolar disorder  - continue PTA medications. Depakote, wellbutrin. - depakote level 9/13 today 54, wnl.  
- continued on PTA dose  
   
Post op acute blood loss anemia - monitor - 9/7 - hgb 11. -> 10.7 (9/13)-> 11.6(9/17) improved. 
   
Spondylolisthesis of lumbar region - no new radicular symptoms.  
- has had back issues in past, follwed by neurosurgery at [facility].   
  
Pneumonia prophylaxis- Insentive spirometer every hour while awake 
  
DVT risk / DVT Prophylaxis-  Mobilization as tolerated. Intermittent pneumatic compression devices when in bed Thigh-high or knee-high thromboembolic deterrent hose when out of bed.  
- resume heparin q12h.   
  
Pain Control: increased knee pain with activity, weight bearing.  
- Takes 1 norco prn daily for knee pain.  
  
Wound Care: - L TKA wound closed. No s/s of infection. - healed well PROM improved. Spasticity/ tone limits passive motion.  
  
Hypertension/ hypotension -  
- cozaar, lasix held due to MELANI. - 9/18  continue norvasc. BP adequately controlled 
  
Diabetes mellitus type 2- Uncontrolled. poor glycemic control. Will require daily, close FSG monitoring and medication adjustment to optimize glycemic control in setting of acute illness and hospitalization. - will resume glucotrol. Start 5mg dose. Pt was on glucotrol SR 10mg. - continue SSI. - mildly higher BS. Will resume home dose glucotrol SR 10mg.  
  
Urinary retention/ neurogenic bladder -- patient has history of \" neurogenic bladder. Has catheterized once in the past month; urinating more frequently own his own at baseline 
 - will observe pattern. schedule voids q6-8 hrs. Check post-void residual every shift; In and Out catheterize if post-void residual is more than 400 cc. 
- on flomax salmon for now. Patient reports he did have neurogenic bladder , was dependent on salmon, self cath for long period, but eventually was able to void. - Salmon replaced due to poor tolerance to CIC, pain. continue  
- baclofen started 9/13. May held with possible sphincter tone. - will discuss with patient possibly  voiding trial 1 more time prior to d/c, however this jerald be risky as patient has had 2x urosepsis , readmission without salmon, likely due to retention.  
  
bowel program - start abbie colace 
  
GERD - resume PPI. At times may need additional antacids, Maalox prn. 
  
 
Time spent was 25 minutes with over 1/2 in direct patient care/examination, consultation and coordination of care. Signed By: Arleen Thakur MD   
 September 18, 2018

## 2018-09-18 NOTE — PROGRESS NOTES
Patient resting up in bed. Alert and oriented, but lethargic and delayed. Repositioned up in bed. Admits to discomfort with left knee. Discussed with patient the need to be more motivated in being more mobile and being more independent with self positioning Ramey draining dark yellow urine with sediment. Lung sounds with light coarseness. S1S2, bowel sounds hypoactive. Assisted Leah Ascencio PT with ADL's. Encouraged patient to stand with walker and ambulate to recliner with assist x 2. Breakfast tray set up. Patient more animated and sitting up in recliner. No complaint of pain or discomfort once in recliner. Call light placed within reach. No other verbalized needs made known at present time. Assessment completed. See doc flow sheet for further assessments.

## 2018-09-18 NOTE — PROGRESS NOTES
SW spoke with daughter Gifty Melendrez who said she still hasn't decided on a SNF yet but is considering [address] SW explained that he is being discharged on 9/20 so a decision has to be made to be able to start the referral process. She will go and look at the [facility] today and make a decision. CM following.

## 2018-09-18 NOTE — PROGRESS NOTES
OT Daily Note Time In 1003 Time Out 1045 Precautions: Ax2 for transfers, confused Home: Lives alone, QC Pain: Pt would moan with movement, but had no c/o pain when resting. Functional Mobility Pt was dependent to transfer from recliner to w/c. Pt managed brakes with mod A. Pt place LLE on foot rest once and was dependent at the end of the session. Strengthening Pt completed about 20 rounds of catch with beachball to improve UB strength for functional mobility. Pt demonstrated decreased movements and needed max encouragement for good quality. Worked on kicking beachball. Pt was able to give quality kicks with RLE, but demonstrated little to no movement with LLE. Pt demonstrated full range when passively moved. Pt would freeze and not answer questions during part of activity. Pt LLE was stretched to promote movement with pt vocalizing he did not enjoy activity. Education Importance of therapy. Interdisciplinary Communication: PT Sonya Common throughout session Plan: Continue with POC. Pt was left in w/c with all needs within reach. Janett Linton OTR/L 
9/18/2018

## 2018-09-18 NOTE — PROGRESS NOTES
Problem: Falls - Risk of 
Goal: *Absence of Falls Document Emory Torres Fall Risk and appropriate interventions in the flowsheet. Outcome: Progressing Towards Goal 
Fall Risk Interventions: 
Mobility Interventions: Utilize walker, cane, or other assistive device Mentation Interventions: Door open when patient unattended, Evaluate medications/consider consulting pharmacy Medication Interventions: Evaluate medications/consider consulting pharmacy, Patient to call before getting OOB Elimination Interventions: Patient to call for help with toileting needs, Call light in reach

## 2018-09-18 NOTE — PROGRESS NOTES
Problem: Falls - Risk of 
Goal: *Absence of Falls Document Omero Uriarte Fall Risk and appropriate interventions in the flowsheet. Outcome: Progressing Towards Goal 
Fall Risk Interventions: 
Mobility Interventions: Utilize walker, cane, or other assistive device Mentation Interventions: Door open when patient unattended Medication Interventions: Patient to call before getting OOB Elimination Interventions: Patient to call for help with toileting needs

## 2018-09-19 NOTE — PROGRESS NOTES
Problem: Mobility Impaired (Adult and Pediatric) Goal: *Therapy Goal (Edit Goal, Insert Text) STG 1. Patient transfer supine<>sit with mod assist in 1 week (9/19/18: Goal not met.) 
LTG 1. Patient transfer supine<>sit with modified independence in 3 weeks (9/19/18: Goal not met.) Outcome: Not Met Patient requires DEP assist for supine<>sit. Goal: *Therapy Goal (Edit Goal, Insert Text) STG 2. Patient transfer sit<>stand and perform stand pivot transfer with RW and mod assist in 1 week (9/19/18: Goal not met.) 
LTG 2. Patient transfer sit<>stand and perform stand pivot transfer with RW and modified independence in 3 weeks (9/19/18: Goal not met.) Outcome: Not Met Patient requires DEP assist for SPT with RW. Goal: *Therapy Goal (Edit Goal, Insert Text) STG 3. Patient ambulate 25 feet with RW and mod assist in 1 week (9/19/18: Goal not met.) 
LTG 3. Patient ambulate 100 feet with RW and modified independence in 3 weeks (9/19/18: Goal not met.) Outcome: Not Met Patient ambulates 5 ft with RW and DEP assist. 
Goal: *Therapy Goal (Edit Goal, Insert Text) STG 4. Patient ambulate up/down 4 steps with hand rails and mod assist in 2 weeks (9/19/18: Goal not met.) 
LTG 4. Patient ambulate up/down 4 steps with hand rails and SBA  in 3 weeks (9/19/18: Goal not met.) Outcome: Not Met Patient demonstrates poor dynamic standing balance and functional strength. Goal: *Therapy Goal (Edit Goal, Insert Text) STG 5. Patient ambulate up/down 1 step with RW and mod assist in 2 weeks (9/19/18: Goal not met.) 
LTG 5. Patient ambulate up/down 1 step with RW and CGA in 3 weeks   (9/19/18: Goal not met.) Outcome: Not Met Patient demonstrates poor dynamic standing balance and poor functional strength.

## 2018-09-19 NOTE — PROGRESS NOTES
End Of Shift Functional Summary, Nursing 
   
   
TOILETING:   
Does patient need assist with adjusting pants up or down and/or pericare? yes If yes, please indicate what the patient needs help with:  all  (i.e. pants up, pants down, pericare) Pt uses wheelchair/slide board. Christine Copper Does the patient require extra time? yes Does the patient require standby assistance? yes Does the patient require contact guard assistance? yes Does the patient require more than one staff member for assistance? yes 
   
TOILET TRANSFER:   
Pt requires maximum assistance.  Pt uses wheelchair/slide board. Does the patient require extra time? yes Does the patient require contact guard assistance? yes Does the patient require more than one staff member for assistance? yes 
   
BLADDER:   
Pt does have a salmon catheter that staff manages.  Pt does take medication.  If so, please indicate which medication: flomax  
   
BOWEL:  Pt does take medication.  Pt is continent of bowel and uses toilet.  Pt requires staff for ambulation.    Pt has had 0 bowel movement during this shift. Constipated. .  (An accident is when the episode is not contained in a brief AND/OR the clothing/linen requires changing/cleaning up.) 
   
BED/CHAIR TRANSFER Pt requires maximum assistance. Pt uses walker Does the patient require extra time? yes Does the patient require contact guard assistance? yes Does the patient require more than one staff member for assistance?  yes 
   
Documentation reviewed and plan of care discussed/reviewed with  
patient, oncoming nurse and patient assistant during the shift.

## 2018-09-19 NOTE — PROGRESS NOTES
1:00 -6:45. .The documentation for this period is being entered following the guidelines as defined in the [facility] downtime policy by Adrian Horner RN.

## 2018-09-19 NOTE — PROGRESS NOTES
[facility] PROGRESS NOTE Joel Rodriguez 
Admit Date: 9/6/2018 Chief Complaint : Gait dysfunction secondary to below. Admit Diagnosis: Unilateral primary osteoarthritis, left knee [M17.12] MELANI (acute kidney injury) ([address] (8/30/2018) CKD (chronic kidney disease) stage 3, GFR 30-59 ml/min (5/8/2018) S/P total knee arthroplasty, left (8/3/2018) Diabetes mellitus type 2, controlled ([address] (8/15/2018) Sepsis ([address] (8/15/2018) Bipolar disorder ([address] (8/15/2018) Hypotension (8/30/2018) Nausea & vomiting (8/30/2018) Pain DVT risk Post op acute blood loss anemia Hypertension Diabetes ([address] Spondylolisthesis of lumbar region Neurogenic bladder  -has catheterized once in the past month; urinating more frequently own his own at baseline Acute Rehab Dx: 
Gait impairment Debility   
Mobility and ambulation deficits Self Care/ADL deficits 
  
Subjective  
 
Vss. Afebrile. Case discussed in team conference yesterday. Patient struggles to make continued functional gains due to pain, weakness, poor activity tolerance, flexor tones in his UEs and LEs. Patient continues to have difficulty straightening his surgical knee, locking and standing on the knee. MRI reports mild diffuse atrophy and chronic change in the white matter. Patient is still dependent for transfers and gait. Patient took one small step with L LE while being supported by 2 for safety Objective:  
 
Current Facility-Administered Medications Medication Dose Route Frequency  lip protectant (BLISTEX) ointment   Topical PRN  
 glipiZIDE (GLUCOTROL) tablet 10 mg  10 mg Oral ACB  polyethylene glycol (MIRALAX) packet 17 g  17 g Oral DAILY  bisacodyl (DULCOLAX) tablet 10 mg  10 mg Oral DAILY PRN  
 bisacodyl (DULCOLAX) suppository 10 mg  10 mg Rectal DAILY PRN  pantoprazole (PROTONIX) tablet 40 mg  40 mg Oral ACB  white petrolatum-mineral oil (EUCERIN) cream   Topical PRN  
 aspirin delayed-release tablet 81 mg  81 mg Oral DAILY  mirtazapine (REMERON) tablet 7.5 mg  7.5 mg Oral QHS PRN  
 amLODIPine (NORVASC) tablet 5 mg  5 mg Oral DAILY  senna-docusate (PERICOLACE) 8.6-50 mg per tablet 1 Tab  1 Tab Oral DAILY  acetaminophen (TYLENOL) tablet 650 mg  650 mg Oral Q6H PRN  
 buPROPion SR (WELLBUTRIN SR) tablet 150 mg  150 mg Oral BID  dextrose (D50W) injection syrg 25 g  25 g IntraVENous PRN  
 divalproex ER (DEPAKOTE ER) 24 hour tablet 1,000 mg  1,000 mg Oral BID  heparin (porcine) injection 5,000 Units  5,000 Units SubCUTAneous Q12H  
 HYDROcodone-acetaminophen (NORCO) 7.5-325 mg per tablet 2 Tab  2 Tab Oral Q6H PRN  
 insulin regular (NOVOLIN R, HUMULIN R) injection   SubCUTAneous AC&HS  latanoprost (XALATAN) 0.005 % ophthalmic solution 1 Drop  1 Drop Both Eyes QHS  naloxone (NARCAN) injection 0.4 mg  0.4 mg IntraVENous PRN  
 ondansetron (ZOFRAN) injection 4 mg  4 mg IntraVENous Q8H PRN  
 simvastatin (ZOCOR) tablet 40 mg  40 mg Oral QHS  tamsulosin (FLOMAX) capsule 0.4 mg  0.4 mg Oral QHS  influenza vaccine 2018-19 (6 mos+)(PF) (FLUARIX QUAD/FLULAVAL QUAD) injection 0.5 mL  0.5 mL IntraMUSCular PRIOR TO DISCHARGE  pneumococcal 23-valent (PNEUMOVAX 23) injection 0.5 mL  0.5 mL IntraMUSCular PRIOR TO DISCHARGE Review of Systems: Denies chest pain, shortness of breath, cough, headache, visual problems, abdominal pain, dysurea, calf pain. Pertinent positives are as noted in the medical records and unremarkable otherwise. Visit Vitals  /78  Pulse 85  Temp 97.9 °F (36.6 °C)  Resp 16  
 Ht 6' (1.829 m)  Wt 199 lb 14.4 oz (90.7 kg)  SpO2 99%  BMI 27.11 kg/m2  
  
  
Physical Exam: Psych: Patient was oriented x 2. Without hallucinations. Patient is not suicidal.  
General:   Alert, appears stated age, No acute distress. HEENT:  Normocephalic, EOMI, facial symmetry  Intact. Lungs:  CTA Bilaterally. Heart:  Regular rate and rhythm, S1, S2, No obvious murmur, click, rub or gallop. Genitourinary: defered Abdomen:  Soft, non-tender to palpation in all four quadrants. Bowel sounds present. Neuromuscular:  PERRL, EOMI Follows simple commands consistently. Able to identify, recall repeat. Mood appropriate. Insight, judgement poor 
+ generalized weakness.   
Sensory - intact No cerebellar signs. Plantars - down going 
no fasciculations, no tremors. + increased flexor tone/ spasticity BUE, BLE. Skin:  No rashes, no erythema. Calf non tender BLE. boggy L knee. LLE PROM 
L Knee Flexion: 85 (~) 
L Knee Extension: - 10(~) Incision:  healing well, clean, dry, and intact  
   
 
                                                                        
Functional Assessment: 
   
   
Balance Sitting - Static: Good (unsupported) (09/18/18 1300) Sitting - Dynamic: Fair (occasional) (09/18/18 1300) Standing - Static: Poor;Constant support (09/18/18 1300) Standing - Dynamic : Impaired (09/18/18 1300) Douglas Tomlinson Fall Risk Assessment: 
Ekaterina Hobson Risk Mobility: Ambulates or transfers with assist devices or assistance (09/18/18 2021) Mobility Interventions: Utilize walker, cane, or other assistive device (09/18/18 2021) Mentation: Periodic confusion (09/18/18 2021) Mentation Interventions: Door open when patient unattended (09/18/18 2021) Medication: Patient receiving anticonvulsants, sedatives(tranquilizers), psychotropics or hypnotics, hypoglycemics, narcotics, sleep aids, antihypertensives, laxatives, or diuretics (09/18/18 2021) Medication Interventions: Evaluate medications/consider consulting pharmacy; Patient to call before getting OOB (09/18/18 2021) Elimination: Needs assistance with toileting (09/18/18 2021) Elimination Interventions: Patient to call for help with toileting needs (09/18/18 2021) Prior Fall History: No (09/18/18 2021) Total Score: 4 (09/18/18 2021) Standard Fall Precautions: Yes (09/18/18 1025) High Fall Risk: Yes (09/18/18 2021) Speech Assessment: 
    
 
Ambulation: 
Gait Distance (ft): 1 Feet (ft) (09/18/18 1300) Assistive Device: Gait belt;Walker, rolling (09/18/18 1300) Labs/Studies: 
Recent Results (from the past 72 hour(s)) GLUCOSE, POC Collection Time: 09/16/18 11:19 AM  
Result Value Ref Range Glucose (POC) 219 (H) 65 - 100 mg/dL GLUCOSE, POC Collection Time: 09/16/18  4:30 PM  
Result Value Ref Range Glucose (POC) 150 (H) 65 - 100 mg/dL GLUCOSE, POC Collection Time: 09/16/18  9:09 PM  
Result Value Ref Range Glucose (POC) 176 (H) 65 - 100 mg/dL CBC WITH AUTOMATED DIFF Collection Time: 09/17/18  5:26 AM  
Result Value Ref Range WBC 8.6 4.3 - 11.1 K/uL  
 RBC 4.08 (L) 4.23 - 5.6 M/uL  
 HGB 11.6 (L) 13.6 - 17.2 g/dL HCT 37.2 (L) 41.1 - 50.3 % MCV 91.2 79.6 - 97.8 FL  
 MCH 28.4 26.1 - 32.9 PG  
 MCHC 31.2 (L) 31.4 - 35.0 g/dL  
 RDW 14.9 % PLATELET 461 920 - 579 K/uL MPV 9.8 9.4 - 12.3 FL ABSOLUTE NRBC 0.00 0.0 - 0.2 K/uL  
 DF AUTOMATED NEUTROPHILS 59 43 - 78 % LYMPHOCYTES 26 13 - 44 % MONOCYTES 13 (H) 4.0 - 12.0 % EOSINOPHILS 1 0.5 - 7.8 % BASOPHILS 1 0.0 - 2.0 % IMMATURE GRANULOCYTES 1 0.0 - 5.0 %  
 ABS. NEUTROPHILS 5.1 1.7 - 8.2 K/UL  
 ABS. LYMPHOCYTES 2.2 0.5 - 4.6 K/UL  
 ABS. MONOCYTES 1.1 0.1 - 1.3 K/UL  
 ABS. EOSINOPHILS 0.1 0.0 - 0.8 K/UL  
 ABS. BASOPHILS 0.0 0.0 - 0.2 K/UL  
 ABS. IMM. GRANS. 0.0 0.0 - 0.5 K/UL METABOLIC PANEL, BASIC Collection Time: 09/17/18  5:26 AM  
Result Value Ref Range Sodium 137 136 - 145 mmol/L Potassium 3.9 3.5 - 5.1 mmol/L Chloride 97 (L) 98 - 107 mmol/L  
 CO2 26 21 - 32 mmol/L Anion gap 14 7 - 16 mmol/L Glucose 178 (H) 65 - 100 mg/dL BUN 24 (H) 8 - 23 MG/DL Creatinine 0.92 0.8 - 1.5 MG/DL  
 GFR est AA >60 >60 ml/min/1.73m2 GFR est non-AA >60 >60 ml/min/1.73m2  Calcium 9.9 8.3 - 10.4 MG/DL  
 GLUCOSE, POC Collection Time: 09/17/18  7:24 AM  
Result Value Ref Range Glucose (POC) 206 (H) 65 - 100 mg/dL GLUCOSE, POC Collection Time: 09/17/18 11:18 AM  
Result Value Ref Range Glucose (POC) 185 (H) 65 - 100 mg/dL GLUCOSE, POC Collection Time: 09/17/18  4:01 PM  
Result Value Ref Range Glucose (POC) 135 (H) 65 - 100 mg/dL GLUCOSE, POC Collection Time: 09/17/18  8:39 PM  
Result Value Ref Range Glucose (POC) 194 (H) 65 - 100 mg/dL GLUCOSE, POC Collection Time: 09/18/18  7:31 AM  
Result Value Ref Range Glucose (POC) 173 (H) 65 - 100 mg/dL GLUCOSE, POC Collection Time: 09/18/18 11:37 AM  
Result Value Ref Range Glucose (POC) 246 (H) 65 - 100 mg/dL GLUCOSE, POC Collection Time: 09/18/18  3:33 PM  
Result Value Ref Range Glucose (POC) 260 (H) 65 - 100 mg/dL GLUCOSE, POC Collection Time: 09/18/18  8:31 PM  
Result Value Ref Range Glucose (POC) 188 (H) 65 - 100 mg/dL GLUCOSE, POC Collection Time: 09/19/18  7:35 AM  
Result Value Ref Range Glucose (POC) 132 (H) 65 - 100 mg/dL Assessment:  
 
Problem List as of 9/19/2018  Date Reviewed: 8/30/2018 Codes Class Noted - Resolved Debility ICD-10-CM: R53.81 ICD-9-CM: 799.3  9/6/2018 - Present Total knee replacement status, left ICD-10-CM: D87.952 ICD-9-CM: V43.65  9/6/2018 - Present Hypotension ICD-10-CM: I95.9 ICD-9-CM: 458.9  8/30/2018 - Present Nausea & vomiting ICD-10-CM: R11.2 ICD-9-CM: 787.01  8/30/2018 - Present MELANI (acute kidney injury) ([facility] ICD-10-CM: N17.9 ICD-9-CM: 584.9  8/30/2018 - Present Fever ICD-10-CM: R50.9 ICD-9-CM: 780.60  8/15/2018 - Present Volume depletion ICD-10-CM: E86.9 ICD-9-CM: 276.50  8/15/2018 - Present Diabetes mellitus type 2, controlled ([facility] ICD-10-CM: E11.9 ICD-9-CM: 250.00  8/15/2018 - Present Hyperlipidemia ICD-10-CM: E78.5 ICD-9-CM: 272.4  8/15/2018 - Present Essential hypertension ICD-10-CM: I10 
ICD-9-CM: 401.9  8/15/2018 - Present Sepsis ([facility] ICD-10-CM: A41.9 ICD-9-CM: 038.9, 995.91  8/15/2018 - Present Acute cystitis without hematuria ICD-10-CM: N30.00 ICD-9-CM: 595.0  8/15/2018 - Present Bipolar disorder ([facility] ICD-10-CM: F31.9 ICD-9-CM: 296.80  8/15/2018 - Present S/P total knee arthroplasty, left ICD-10-CM: B70.643 ICD-9-CM: V43.65  8/3/2018 - Present Osteoarthritis ICD-10-CM: M19.90 ICD-9-CM: 715.90  8/2/2018 - Present Abnormal urinalysis ICD-10-CM: R82.90 ICD-9-CM: 791.9  7/26/2018 - Present CKD (chronic kidney disease) stage 3, GFR 30-59 ml/min ICD-10-CM: N18.3 ICD-9-CM: 585.3  5/8/2018 - Present Elevated white blood cell count ICD-10-CM: C20.134 ICD-9-CM: 288.60  5/8/2018 - Present Plan:  
 
Assessment: 
  
The Post Assessment Physician Evaluation (BURAK) found the current functional status to be comparable with the Pre-admission Screening. The Patient is a good candidate for acute inpatient rehabilitation. Nothing since the Pre-admission screen has changed that determination.  
  
Rehabilitation Plan The patient has shown the ability to tolerate and benefit from 3 hours of therapy daily and is being admitted to a comprehensive acute inpatient rehabilitation program consisting of at least 3 hours of combined physical and occupational therapies. - intensive Physical Therapy for a minimum of 1.5 hours a day, at least 5 out of 7 days per week to address bed mobility, transfers, ambulation, strengthening, balance, and endurance. - intensive Occupational Therapy for a minimum of 1.5 hours a day, at least 5 out of 7 days per week to address ADL ( bathing, LE dressing, toileting) and adaptive equipment as needed. - barriers be pain, poor L knee ROM, deconditioned state.   
- patient discovered to have BUE, BLE flexor tone, spasticity that was likley a major factor in his debility prior to recent knee surgery. Other barriers are proximal LE  Weakness, poor hip extension, pain, poor ROM / extension of the surgical knee as well as the non surgical knee.  
  
Continue ST for: cognitive deficits, may be likely his baseline. Somnolence, possibly due to pain medications, baclofen( now discontinued due to CNS depression) may have affected patient's sensorium.  
  
Continue 24-hour skilled rehabilitation nursing for bowel and bladder management, skin care for decubitus ulcer prevention , pain management and ongoing medication administration  
  
The patient may benefit from a psychology consult for depression, anxiety and adjustment disorder. Patient with underlying bi-polar disorder on medications. 
  
Continue daily physician medical management: 
  
MELANI (acute kidney injury)  )/ CKD (chronic kidney disease) stage 3, GFR 30-59 ml/min (5/8/2018) 
- monitor renal funcion closely. - encourage po hydration. ivf as needed. - 9/19 Renal function wnl 9/17. continue to hold Cozaar, lasix.  
 
  
S/P left total knee arthroplasty (8/3/2018) - monitor wound for late infection. Closed. - focus on strength, ROM. PT evaluate for CPM.  
- jerald hold on CPM until patient's tone better evaluated,.  
- decreased L TKA ROM, extension not all due to poor knee rom. Appears spasticity now evident affecting PROM. - continue to work on extension. Both knees difficult to passively extend due to flexor tone. CPM not safe due to tone, possible muscle injury.   
 
Dystonia/ spasticity - abnormal tone , which appears velocity dependent. Patient unabel to explain clearly but has been seen by neurologist at [address] - Recent MRI 1/2018 due to vertigo.  Reports Cerebral and cerebellar volume loss with ex vacuo dilatation of the ventricular system.  Several foci of signal abnormality are present within the periventricular and subcortical white matter, likely sequela of chronic microvascular ischemia.  No mass effect, mass lesion, acute hemorrhage, or hydrocephalus.    
- will follow with daughter. Left message.  
- patient agreeable to start meds for spasticity. Start baclofen 5mg tid. Increase as tolerated. 9/16 family suspects this is not new. Will check MRI brain tomorrow. 9/17 - MRI today. Continues to be somnolent. appears possibly from new baclofen administration, intolerant to CNS depressants. Will discontinue baclofen as no effect/ benefit  . 9/19 - MRI -\"Mild diffuse atrophy and chronic change in the white matter. No evidence of acute intracranial abnormality. \"  
- chronic global atrophy, likely attributes to spasticity, tone. Sepsis - monitor. Treated. No s/s of active infection.  
  
Bipolar disorder  - continue PTA medications. Depakote, wellbutrin. - depakote level 9/13 today 54, wnl. Has been on for many years. Will continue.  
   
Post op acute blood loss anemia - monitor - 9/7 - hgb 11. -> 10.7 (9/13)-> 11.6(9/17) improved. 
   
Spondylolisthesis of lumbar region - no new radicular symptoms.  
- has had back issues in past, follwed by neurosurgery at [address]   
Pneumonia prophylaxis- Insentive spirometer every hour while awake 
  
DVT risk / DVT Prophylaxis-  Mobilization as tolerated. Intermittent pneumatic compression devices when in bed Thigh-high or knee-high thromboembolic deterrent hose when out of bed.  
- resume heparin q12h.   
  
Pain Control: stable, mild-to-moderate joint symptoms intermittently, reasonably well controlled by PRN meds. Will require regular pain assessment and comprenhensive pain management. - Takes 1 norco prn daily for knee pain.  
  
Wound Care: Monitor wound status daily per staff and physician. At risk for failure. Will require 24/7 rehab nursing. Keep wound clean and dry - L TKA wound closed. No s/s of infection. - healed well PROM improved. Spasticity/ tone limits passive motion.  
  
Hypertension/ hypotension -  
- cozaar, lasix held due to MELANI. - 9/19 continue norvasc. BP in fair control on norvasc.  
  
Diabetes mellitus type 2- Uncontrolled. poor glycemic control. Will require daily, close FSG monitoring and medication adjustment to optimize glycemic control in setting of acute illness and hospitalization. Pt was on glucotrol SR 10mg.  
- resumed home dose [id number]. Monitor response. - continue SSI.  
  
Urinary retention/ neurogenic bladder -- patient has history of \" neurogenic bladder. Has catheterized once in the past month; urinating more frequently own his own at baseline 
 - will observe pattern. schedule voids q6-8 hrs. Check post-void residual every shift; In and Out catheterize if post-void residual is more than 400 cc. 
- on flomax salmon for now. Patient reports he did have neurogenic bladder , was dependent on salmon, self cath for long period, but eventually was able to void. - Salmon replaced due to poor tolerance to CIC, pain. continue  
- will discuss with patient possibly  voiding trial 1 more time prior to d/c, however this jerald be risky as patient has had 2x urosepsis , readmission without salmon, likely due to retention. - patient continued on Salmon due to recent failures of voiding trials.  
  
bowel program - start abbie colace 
  
GERD - resume PPI. At times may need additional antacids, Maalox prn. 
  
 
Time spent was 25 minutes with over 1/2 in direct patient care/examination, consultation and coordination of care. Signed By: Abida Jackson MD   
 September 19, 2018

## 2018-09-19 NOTE — PROGRESS NOTES
Problem: Nutrition Deficit Goal: *Optimize nutritional status Nutrition: 
Assessment based on LOS. Assessment: Anthropometrics: Ht - 6'0\", wgt - 90.7 kg (9th floor unknown source 9/11/18), BMI 27.1 c/w overweight in a person > 72years of age, edema - trace-1+ generalized. Macronutrient Needs: 
Estimated calorie needs - 7222-8959 gabino/day (20-25 gabino/kg/day) Estimated protein needs - 81-97 gm pro/day (1-1.2 gm pro/kgIBW/day) Intake/Comparative Standards: This patient's average intake of regular diet for the past 7 recorded days/16 meals: 15%. This potentially meets 19% of calorie and 18% of protein goals. Diet: 
 Regular. Pertinent Labs: 
 AM glucose (9/19) 178; POC glucose 173,246.260,188 and (9/19) 132,244. Pertinent Medications: 
 Glucotrol, SSI coverage. Food/Nutrition History: 
 The patient presents with no acute nutrition risk factors based on the nursing admission malnutrition screen. He has a h/o bipolar disorder, diabetes and CKD stage 3. He reports that the reason that his oral intake is so poor is because he does not like \"hospital food\". He declined all supplements (\"I can't stand those things\"). He declined reviewing the menu with me to try to find items that he would enjoy. He did agree to drink chocolate milk with his meals and did request unsweetened tea. Diagnosis (Nutrition): Inadequate oral intake related to dislike of institutional food as evidenced by chronically poor oral intake since admission. Intervention: 
Meals and Snacks: Continue regular diet. Patient was encouraged to have family/friends bring him meals. Medical Food Supplement Therapy: Chocolate milk with lunch and dinner. 2% milk with breakfast. 
Nutrition Discharge Plan: Too soon to determine. Yan Kirk. [facility] 
[phone]

## 2018-09-19 NOTE — PROGRESS NOTES
PHYSICAL THERAPY DISCHARGE SUMMARY Time in: 0830 Time out: 1005 Precautions at discharge: Other (comment) (fall risk) Problem List:   
Decreased strength B LE  [x] Decreased strength trunk/core  [x] Decreased AROM   [x] Decreased PROM  [x] Decreased balance sitting  [x] Decreased balance standing  [x] Decreased endurance  [x] Pain  [x] Functional Limitations:  
Decreased independence with bed mobility  [x] Decreased independence with functional transfers  [x] Decreased independence with ambulation  [x] Decreased independence with stair negotiation  [x] Outcome Measures: Vital Signs:  
Patient Vitals for the past 8 hrs: 
 Temp Pulse Resp BP SpO2  
09/19/18 0743 97.9 °F (36.6 °C) 85 16 122/78 99 % Pain level: No pain level given. Pain location: L knee Pain interventions: Positioned for comfort, provided rest breaks, pain medication per nursing Patient education: Educated patient on safety with transfers and ambulation with use of RW. Interdisciplinary Communication: Cotreatment with Caden Bliss 
 
 
 
 
 
 MMT Initial Asssessment Right Lower Extremity Left Lower Extremity Hip Flexion 2+ 2-  
Knee Extension 4 2-  
Knee Flexion 4- 2- Ankle Dorsiflexion 3- 1 MMT Discharge Assessment Right Lower Extremity Left Lower Extremity Hip Flexion 3- 2 Knee Extension 4 2-  
Knee Flexion 4- 2 Ankle Dorsiflexion 3 2-  
0/5 No palpable muscle contraction 1/5 Palpable muscle contraction, no joint movement 2-/5 Less than full range of motion in gravity eliminated position 2/5 Able to complete full range of motion in gravity eliminated position 2+/5 Able to initiate movement against gravity 3-/5 More than half but not full range of motion against gravity 3/5 Able to complete full range of motion against gravity 3+/5 Completes full range of motion against gravity with minimal resistance 4-/5 Completes full range of motion against gravity with minimal-moderate resistance 4/5 Completes full range of motion against gravity with moderate resistance 4+/5 Completes full range of motion against gravity with moderate-maximum resistance 5/5 Completes full range of motion against gravity with maximum resistance AROM: Generally decreased, functional 
 
FIM SCORES Initial Assessment Discharge Assessment Bed/Chair/Wheelchair Transfers 1 1 (Requires two staff members for safety) Wheelchair Mobility 0 4 Walking Placer 0 1 Steps/Stairs 0 0 (Secondary to decreased dynamic standing balance) PRIMARY MODE OF LOCOMOTION: wheelchair Please see IRC Interdisciplinary Eval: Coordination/Balance Section for details regarding FIM score description. BED/CHAIR/WHEELCHAIR TRANSFERS Initial Assessment Discharge Assessment Rolling Right 1 (Total assistance) Rolling Left 1 (Total assistance) Supine to Sit 1 (Total assistance) 2 (Maximal assistance) Sit to Stand Total assistance (mod assist x 2) Total assistance Sit to Supine 1 (Total assistance) (max assist x 2) 0 (Not tested) Transfer Assist Score 1 1 (Requires two staff members for safety) Transfer Type SPT with walker SPT with walker Comments Increased time and effort to complete with altered body mechanics. Significant limitations in UE and LE AROM with rigidity noted in all 4 extremities, LEs>UEs. Motor planning deficits noted with significant delay in initiating functional movement. Tremor noted in UEs and LEs during functional mobility Increased time and effort, with decreased anterior weight shift, and lifting assist  
Car Transfer Not tested Not tested Car Type [facility] MOBILITY/MANAGEMENT Initial Assessment Discharge Assessment Able to Propel 0 feet 150 feet Functional Level 0 4 Curbs/ramps assistance required 0 (Not tested) 0 (Not tested) Wheelchair set up assistance required 0 (Not tested) 2 (Maximal assistance) Wheelchair management Manages left brake, Manages right brake Manages left brake;Manages right brake WALKING INDEPENDENCE Initial Assessment Discharge Assessment Assistive device Walker, rolling, Gait belt Gait belt;Walker, rolling Ambulation assistance - level surface 1 (Dependent/total assistance) 1 (Dependent/total assistance) Distance 0 Feet (ft) 5 Feet (ft) (x2) Functional Level 0 1 Comments Attempted to initiate gait training with RW. Patient able to stand and take 1 step forward. patient began to tremor and demonstrate flexed posture. requesting to sit back down on bed. patient took 1 step backwards and returned to sitting. Unable to tolerate gait training this PM. Patient reluctant to weight bear on LLE and demonstrated 1 to 2 slow shuffling steps Patient ambulated with moderate forward head posture, increased  knee flexion of B LE throughoout gait cycle, with slow step-to gait pattern leading with L LE, decreased step clearance of B LE Ambulation assistance - unlevel surface 0 (Not tested) 0 (Not tested) STEPS/STAIRS Initial Assessment Discharge Assessment Steps/Stairs ambulated 0 0 Rail Use Functional Level 0 0 (Secondary to decreased dynamic standing balance) Comments Unable to ambulate up/down steps at this time Curbs/Ramps 0 (Not tested) QUALITY INDICATOR ASSIST COMMENTS Walk 10 feet DEP Moderate assist x2 with RW and wheelchair follow Walk 50 feet with 2 turns NT Walk 150 feet NT Walk 10 feet on uneven  NT   
1 step/curb NT   
4 steps NT   
12 steps NT  object NT Wheel 50' w/2 turns MIN A Wheel 150' MIN A Patient returned to room sitting in bedside chair with all needs in reach. PHYSICAL THERAPY PLAN OF CARE 
 
LTGs: For updated LTG's please see Care Plan.  
 
Pt would benefit from continued skilled physical therapy in order to improve independent functional mobility within the home with use of least restrictive device. Interventions may include range of motion (AROM, PROM B LE/trunk), motor function (B LE/trunk strengthening/coordination), activity tolerance (vitals, oxygen saturation levels), bed mobility training, balance activities, gait training (progressive ambulation program), and functional transfer training. Therapy Recommendations upon discharge: Other (comment) (Logan Kaur) Equipment needs at discharge:    None at this time. Please see IRC; Interdisciplinary Eval, Care Plan, and Patient Education for further information regarding physical therapy discharge summary and plan of care. Perla Perez 9/19/2018

## 2018-09-19 NOTE — PROGRESS NOTES
OT DISCHARGE REPORT Time In: 0830 Time Out: 1005 COMPREHENSION MODE Initial Assessment Discharge Assessment 9/19/2018 Score 5 (90% of basic needs) 4 EXPRESSION Initial Assessment Discharge Assessment 9/19/2018 Primary Mode of Expression Verbal Verbal  
Score 5 4 Comments Confused Expresses only basic needs 75-89%, repeats words, OR expresses self with assist from staff to occlude trach. SOCIAL INTERACTION/ PRAGMATICS Initial Assessment Discharge Assessment 9/19/2018 Score 3 3 Comments Sexually inappropriate at times Sexually inappropriate; peseverates PROBLEM SOLVING Initial Assessment Discharge Assessment 9/19/2018 Score 2 2 Comments Solves basic problems 25-49% of the time, needs direction more than 50% of the time to initiate, plan, or complete simple activities and may need a restraint for safety. Solves basic problems 25-49% of the time, needs direction more than 50% of the time to initiate, plan, or complete simple activities and may need a restraint for safety. MEMORY Initial Assessment Discharge Assessment 9/19/2018 Score 2 2 Comments Recalled 1/3 words Recognizes, recalls, or executes 25-49% of the time 1 step of 2 step request.  
 
[address] Initial Assessment Discharge Assessment 9/19/2018 Functional Level 4 5 Tasks completed by patient Shaved/applied makeup (optional) Washed face;Brushed hair;Brushed teeth Comments A wiping shaving cream S/U  
 
BATHING Initial Assessment Discharge Assessment 9/19/2018 Functional Level 4 1 Body parts patient bathed Abdomen, Arm, left, Arm, right, Lower leg and foot, left, Lower leg and foot, right, Donna area, Thigh, left, Thigh, right Abdomen;Arm, left;Arm, right;Buttocks; Chest;Lower leg and foot, left; Lower leg and foot, right;Donna area; Thigh, left; Thigh, right Comments CGA Ax2 for buttocks UPPER BODY DRESSING/UNDRESSING Initial Assessment Discharge Assessment 9/19/2018 Functional Level 2 5 Items applied/Steps completed Pullover (4 steps) Pullover (4 steps) Comments A for overhead and pulling down S/U  
 
LOWER BODY DRESSING/UNDRESSING Initial Assessment Discharge Assessment 9/19/2018 Functional Level 1 1 Items applied/Steps completed Shoe, left (1 step), Shoe, right (1 step), Sock, left (1 step), Sock, right (1 step), Underpants (3 steps), Elastic waist pants (3 steps) Underpants (3 steps); Elastic waist pants (3 steps); Shoe, left (1 step); Shoe, right (1 step) Comments Ax2; A for all parts Ax2 TOILETING Initial Assessment Discharge Assessment 9/19/2018 Functional Level 1 1 Comments In and out cath Karoline Plan of Care: Please see Care Plan for progress towards goals during stay Precautions at Discharge: Falls, Poor Safety Awareness, Confused and Anxiety Discharge Location: SNF Family Training: Not indicated secondary to going to SNF Recommendations/Functional Level:    Pt needs max to total A for self-care. Recommended Continuing Therapy:  SNF placement Residual Deficits:   Balance, strength, coordination, cognition, anxiety, and safety awareness Progress over LOS: Pt is doing worse than when he came in secondary to cognitive deficits, lack of motivation, and anxiety being huge barriers to progress. Pt regularly limits himself and refuses to participate. Summary of Session: Pt was in bed and agreeable to tx. Pt's performance with ADL is reflected in above chart. Pt self propelled approximately 48' with increased time. Pt walked 10' with Ax3 with RW and 3 rest breaks. Pt was unable to put leg on leg rest.  
 
Janett Linton OTR/L 
9/19/2018

## 2018-09-19 NOTE — PROGRESS NOTES
Problem: Falls - Risk of 
Goal: *Absence of Falls Document Sudhakar Sutherland Fall Risk and appropriate interventions in the flowsheet. Outcome: Progressing Towards Goal 
Fall Risk Interventions: 
Mobility Interventions: Utilize walker, cane, or other assistive device Mentation Interventions: Door open when patient unattended Medication Interventions: Evaluate medications/consider consulting pharmacy, Patient to call before getting OOB Elimination Interventions: Patient to call for help with toileting needs

## 2018-09-19 NOTE — PROGRESS NOTES
09/19/18 1246 Time Spent With Patient Time In 1120 Time Out 1144 Patient Seen For: AM;Neuro-linguistics Mental Status Neurologic State Alert;Confused Orientation Level Oriented to person;Oriented to place;Oriented to time Cognition Decreased attention/concentration Perception Appears intact Perseveration No perseveration noted Safety/Judgement Fall prevention 09/19/18 1246 Verbal Problem Solving Exercises Solution Level of Impairment Simple Solution Accuracy (%) 50 % Neuro-Linguistic Exercises Verbal Problem Solving Impaired Verbal Organization Impaired Patient was seen for cognitive treatment. Oriented to time this session. 50% accuracy with reasoning tasks related to ADLs. Recalled two memory strategies discussed previous session when verbal cues were provided. Patient requested to be transferred back to the bed stating that his bottom was getting sore.   RN's assisted patient back to the bed and session was concluded at patient's request. 
 
Daniel Sandra MS, CCC-SLP

## 2018-09-19 NOTE — PROGRESS NOTES
Bruce Ornelas at [facility] called back and said she will get the information over to the  to see if they'll pay for SNF. CM following.

## 2018-09-20 NOTE — DISCHARGE SUMMARY
REHABILITATION DISCHARGE SUMMARY Patient: Nakul Rosas MRN: [id number]  SSN: [id number] YOB: 1945  Age: 71 y.o. Sex: male Date: 9/20/2018 Admit Date: 9/6/2018 Discharge Date: 9/20/2018 Admitting Physician: Kellie Veras MD  
Primary Care Physician: Elaine Muse MD  
 
Admission Condition: good 
 
  
Chief Complaint : Gait dysfunction secondary to below. Admit Diagnosis: Unilateral primary osteoarthritis, left knee [M17.12] MELANI (acute kidney injury) ([address] (8/30/2018) CKD (chronic kidney disease) stage 3, GFR 30-59 ml/min (5/8/2018) S/P total knee arthroplasty, left (8/3/2018) Diabetes mellitus type 2, controlled ([address] (8/15/2018) Sepsis ([address] (8/15/2018) Bipolar disorder ([address] (8/15/2018) Hypotension (8/30/2018) Nausea & vomiting (8/30/2018) Pain DVT risk Post op acute blood loss anemia Hypertension Diabetes ([address] Spondylolisthesis of lumbar region Neurogenic bladder  -has catheterized once in the past month; urinating more frequently own his own at baseline Acute Rehab Dx: 
Gait impairment Debility   
Mobility and ambulation deficits Self Care/ADL deficits 
  
HPI: Fernando Rees a 71 y. o. male patient at [facility] was admitted on 8/2/2018  by [address] below mentioned medical history, is being seen for Physical Medicine and Rehabilitation. Cesia Cutler intially presented with severe left knee pain due to end stage DJD and underwent a left total knee arthroplasty per Santos Berger  8/2/2018. Patient was discharged to a SNF for sub acute rehab program subsequently. He was readmitted to [facility] on 8/15 from SNF with a fever of 102+F, malaise, chills, confusion. He was found to have a pseudomonas UTI, sepsis and acute cystitis, treated and again discharged to SNF on vantin.   
He is re-admitted to [facility] on 8/30 from SNF due to chills, hypotension, 79/45. 98/61, 90/52 mmHg, AMS, hallucinations and vomiting. Patient was admitted with diagnosis of possible sepsis, MELANI, with Cr 3.99. Cultures were sent. Patient is started on empiric iv antibiotics, ivf. Diuretics and ACEI has been held due to MELANI. Syed Benson seen and examined today. Medical Records reviewed. PT assessment noted. He requires ax asit for mobility and transfers today. He is limited by pain with weight bearing on the surgical limb. His active and passive left knee flexion and extensions are unfortunately very limited.    
Syed Benson seen and examined today. Medical Records reviewed. Patient has been independent with ambulation, prior to admission, but limited by left knee pain. Our service was consulted for rehab needs and we recommended inpatient hospital rehabilitation is reasonable and necessary due to ongoing acute medical issues which have not changed since initial pre-admission evaluation. and rehab needs still likely best managed in IRU setting. The patient was evaluated by [facility] admissions coordinators. I reviewed the preadmission screening and have approved for admission to the [facility]. The patient was cleared for transfer to rehab today. Patient continues to have ongoing  medical issues outlined above requiring physician medical supervision and functional deficits which would benefit from continued intensive therapies.    
  
Rehabiitation Course:  
Patient was admitted to acute medical rehab unit and started a medical management for acute kidney injury. Patient was monitored and treated for left TKA range of motion exercises, Strengthening, pain. Patient was monitored for recurrent sepsis, urinary tract infection which has not been an issue. Patients cozaar and Lasix was held due to acute kidney injury. Renal function return to normal through out his stay at [facility]. Norvasc was started and blood pressure has been controlled fairly well.  Patient has continued on diabetes mellitus management and the Glucotrol 10 mg with good response. Patient continue to have high volume urinary retention upon voiding trials. Patient had difficulty passing his catheter have bladder outlet there for Ramey was replaced with all in agreement. Patient wishes to continue his Ramey catheter for comfort. Patient is instructed to follow up with urology for this upon discharge. Patient states that he has had issues in the past with urine interview tension requiring chronic Ramey catheter insertion. Patient was also found to have significant spasticity, Flexor tone in his upper extremity and lower extremitys. These were felt not a cute as upon further investigation MRI done in January at [facility] system showed moderate cerebral, cerebellar atrophy. According to the family son and daughter they feel that this has been going on but unable to determine as non-healthcare professionals what the problem was for worsening gait and balance and cognitive issues. Only through repeated hospital admission and improving clinical condition we were able to determine that significant spasticity in town and addition to baseline cognitive deficits contributed to being major barriers for his recovery. Patient continues to have poor left knee range of motion and lower extremity weakness and pain from the surgical knee. PT/OT won his resume to improve his mobility Gait and ADLs. His progress has been slow, often on with a little carryover. He requires maximum assistance to total assist for sit to stand mobility and transfers. He is unable to make meaningful steps and is dependent on a rolling walker with gait belt for ambulation. Patient would benefit from continued skilled physical therapy in order to improve independence functional mobility. Interventions may include range of motion LE, trunk, Core strengthening exercises, Lower extremity Coordination, activity tolerance and balance. Patient is in stable medical condition today to be transferred to skilled subacute rehab facility Home Architecture: Home Situation Home Environment: Private residence (09/07/18 1300) # Steps to Enter: 0 (09/07/18 1300) Rails to Enter: No (09/06/18 1600) Wheelchair Ramp: No (09/06/18 1600) One/Two Story Residence: One story (09/07/18 1300) Living Alone: Yes (09/07/18 1300) Support Systems: Child(giovanni); Family member(s) (09/07/18 1300) Patient Expects to be Discharged to[de-identified]  (Unknown) (09/07/18 1300) Current DME Used/Available at Home: Cane, quad (09/07/18 1300) Tub or Shower Type: Shower (09/07/18 1300) Past Medical History:  
Diagnosis Date  Arthritis  BMI 30.0-30.9,adult  CAD (coronary artery disease)   
 mild to moderate apical reversible ischemia per cardio note dated 07/12/10  Chronic kidney disease   
 no nephrologist   
 Chronic pain   
 neck, lower back  Depression  Diabetes ([address] type 2; oral and insulin meds; 1-2 times per day; average 104; A1C=8.1 on 06/13/18  Enlarged prostate with urinary obstruction  Former smoker  GERD (gastroesophageal reflux disease)  High cholesterol  Hypertension   
 on med for control  Nausea & vomiting 8/30/2018  Neurogenic bladder  Other ill-defined conditions(799.89)  Overflow incontinence  Sciatica   
 recently took medrol dose pack around 05/04/18  Self-catheterizes urinary bladder   
 has catheterized once in the past month; urinating more frequently own his own now  (07/26/18)  Skin cancer  Sleep apnea   
 bi-pap; instructed to bring [facility]  Spondylolisthesis of lumbar region  Thromboembolus [facility]) 2013 DVT left leg; caused from a falling accident that damaged left leg  Urine retention  Varicose veins of both lower extremities with complications Past Surgical History:  
Procedure Laterality Date [address]  HX SHOULDER ARTHROSCOPY Left 1971  HX UROLOGICAL  06/2017 CYSTOSCOPY, TRANSURETHRAL RESECTION OF PROSTATE Family History Problem Relation Age of Onset  No Known Problems Mother  Heart Attack Father  Cancer Sister  Parkinson's Disease Brother Social History Substance Use Topics  Smoking status: Former Smoker Packs/day: 1.00 Years: 7.00 Quit date: 5/8/1971  Smokeless tobacco: Never Used  Alcohol use Yes Comment: occasionally Allergies Allergen Reactions  Oxycodone Itching  Tramadol Itching Prior to Admission medications Medication Sig Start Date End Date Taking? Authorizing Provider  
tamsulosin (FLOMAX) 0.4 mg capsule Take 1 Cap by mouth nightly for 15 days. 9/6/18 9/21/18 Yes Edward Husain MD  
buPROPion SR (WELLBUTRIN SR) 150 mg SR tablet Take 150 mg by mouth two (2) times a day. Yes Historical Provider  
divalproex ER (DEPAKOTE ER) 500 mg ER tablet Take 1,000 mg by mouth two (2) times a day. Yes Historical Provider  
simvastatin (ZOCOR) 40 mg tablet Take 40 mg by mouth nightly. Yes Historical Provider  
pregabalin (LYRICA) 75 mg capsule Take 75 mg by mouth two (2) times a day. Historical Provider  
latanoprost (XALATAN) 0.005 % ophthalmic solution Administer 1 Drop to both eyes nightly. Historical Provider  
glipiZIDE SR (GLUCOTROL XL) 10 mg CR tablet Take 10 mg by mouth daily. Indications: type 2 diabetes mellitus    Historical Provider  
calcium polycarbophil (FIBERCON) 625 mg tablet Take 625 mg by mouth daily. Historical Provider Current Medications: 
Current Facility-Administered Medications Medication Dose Route Frequency Provider Last Rate Last Dose  lip protectant (BLISTEX) ointment   Topical PRN Ene Reina MD      
 glipiZIDE (GLUCOTROL) tablet 10 mg  10 mg Oral ACB Ene Reina MD   10 mg at 09/20/18 3245  polyethylene glycol (MIRALAX) packet 17 g  17 g Oral DAILY Jennifer Abarca Saeid Dudley MD   17 g at 09/20/18 0340  
 bisacodyl (DULCOLAX) tablet 10 mg  10 mg Oral DAILY PRN Jennifer Henry MD   10 mg at 09/20/18 0820  
 bisacodyl (DULCOLAX) suppository 10 mg  10 mg Rectal DAILY PRN Jennifer Rehman MD      
 pantoprazole (PROTONIX) tablet 40 mg  40 mg Oral ACB Jennifer Rehman MD   40 mg at 09/20/18 7253  white petrolatum-mineral oil (EUCERIN) cream   Topical PRN Jennifer Rehman MD      
 aspirin delayed-release tablet 81 mg  81 mg Oral DAILY Zenobia Torrez MD   81 mg at 09/20/18 6625  mirtazapine (REMERON) tablet 7.5 mg  7.5 mg Oral QHS PRN Zenobia Torrez MD   7.5 mg at 09/20/18 0210  
 amLODIPine (NORVASC) tablet 5 mg  5 mg Oral DAILY Zenobia Torrez MD   5 mg at 09/20/18 0821  
 senna-docusate (PERICOLACE) 8.6-50 mg per tablet 1 Tab  1 Tab Oral DAILY Zenobia Torrez MD   1 Tab at 09/20/18 3762  acetaminophen (TYLENOL) tablet 650 mg  650 mg Oral Q6H PRN Zenobia Torrez MD   650 mg at 09/20/18 0208  buPROPion SR (WELLBUTRIN SR) tablet 150 mg  150 mg Oral BID Zenobia Torrez MD   150 mg at 09/20/18 0820  
 dextrose (D50W) injection syrg 25 g  25 g IntraVENous PRN Zenobia Torrez MD      
 divalproex ER (DEPAKOTE ER) 24 hour tablet 1,000 mg  1,000 mg Oral BID Zenobia Torrez MD   1,000 mg at 09/20/18 0821  
 heparin (porcine) injection 5,000 Units  5,000 Units SubCUTAneous Q12H Zenobia Torrez MD   5,000 Units at 09/20/18 0820  
 HYDROcodone-acetaminophen (NORCO) 7.5-325 mg per tablet 2 Tab  2 Tab Oral Q6H PRN Zenobia Torrez MD   2 Tab at 09/19/18 2051  insulin regular (NOVOLIN R, HUMULIN R) injection   SubCUTAneous AC&HS Zenobia Torrez MD   Stopped at 09/19/18 2200  latanoprost (XALATAN) 0.005 % ophthalmic solution 1 Drop  1 Drop Both Eyes QHS Zenobia Torrez MD   1 Drop at 09/19/18 2200  
 naloxone (NARCAN) injection 0.4 mg  0.4 mg IntraVENous PRN Zenobia Torrez MD      
 ondansetron [facility]) injection 4 mg  4 mg IntraVENous Q8H PRN Byung H Cecilia Stoll MD      
 simvastatin (ZOCOR) tablet 40 mg  40 mg Oral QHS Zana Campos MD   40 mg at 09/19/18 2100  tamsulosin (FLOMAX) capsule 0.4 mg  0.4 mg Oral QHS Zana Campos MD   0.4 mg at 09/19/18 2100  
 influenza vaccine 2018-19 (6 mos+)(PF) (FLUARIX QUAD/FLULAVAL QUAD) injection 0.5 mL  0.5 mL IntraMUSCular PRIOR TO DISCHARGE Zana Campos MD      
 pneumococcal 23-valent (PNEUMOVAX 23) injection 0.5 mL  0.5 mL IntraMUSCular PRIOR TO DISCHARGE Zana Campos MD      
  
 
Review of Systems: Denies chest pain, shortness of breath, cough, headache, visual problems, abdominal pain, dysurea, calf pain. Pertinent positives are as noted in the medical records and unremarkable otherwise. Vital Signs: Patient Vitals for the past 8 hrs: 
 BP Temp Pulse Resp SpO2  
09/20/18 0740 153/81 97.7 °F (36.5 °C) 98 16 97 % Physical Exam: Psych: Patient was oriented x 2.  Without hallucinations. Patient is not suicidal.  
General:   Alert, appears stated age, No acute distress. HEENT:  Normocephalic, EOMI, facial symmetry  Intact. Lungs:  CTA Bilaterally. Heart:  Regular rate and rhythm, S1, S2, No obvious murmur, click, rub or gallop. Genitourinary: defered Abdomen:  Soft, non-tender to palpation in all four quadrants. Bowel sounds present. Neuromuscular:  PERRL, EOMI Follows simple commands consistently. Able to identify, recall repeat. Mood appropriate. Insight, judgement poor 
+ generalized weakness.   
Sensory - intact No cerebellar signs. Plantars - down going 
no fasciculations, no tremors. + increased flexor tone/ spasticity BUE, BLE. Skin:  No rashes, no erythema. Calf non tender BLE. boggy L knee. LLE PROM 
L Knee Flexion: 85 (~) 
L Knee Extension: - 10(~)   Incision:  healing well, clean, dry, and intact  
   
Lab Review:  
Recent Results (from the past 72 hour(s)) GLUCOSE, POC Collection Time: 09/17/18  4:01 PM  
Result Value Ref Range Glucose (POC) 135 (H) 65 - 100 mg/dL GLUCOSE, POC Collection Time: 09/17/18  8:39 PM  
Result Value Ref Range Glucose (POC) 194 (H) 65 - 100 mg/dL GLUCOSE, POC Collection Time: 09/18/18  7:31 AM  
Result Value Ref Range Glucose (POC) 173 (H) 65 - 100 mg/dL GLUCOSE, POC Collection Time: 09/18/18 11:37 AM  
Result Value Ref Range Glucose (POC) 246 (H) 65 - 100 mg/dL GLUCOSE, POC Collection Time: 09/18/18  3:33 PM  
Result Value Ref Range Glucose (POC) 260 (H) 65 - 100 mg/dL GLUCOSE, POC Collection Time: 09/18/18  8:31 PM  
Result Value Ref Range Glucose (POC) 188 (H) 65 - 100 mg/dL GLUCOSE, POC Collection Time: 09/19/18  7:35 AM  
Result Value Ref Range Glucose (POC) 132 (H) 65 - 100 mg/dL GLUCOSE, POC Collection Time: 09/19/18 11:17 AM  
Result Value Ref Range Glucose (POC) 244 (H) 65 - 100 mg/dL GLUCOSE, POC Collection Time: 09/19/18  4:26 PM  
Result Value Ref Range Glucose (POC) 151 (H) 65 - 100 mg/dL GLUCOSE, POC Collection Time: 09/19/18  9:03 PM  
Result Value Ref Range Glucose (POC) 79 65 - 100 mg/dL GLUCOSE, POC Collection Time: 09/20/18  7:26 AM  
Result Value Ref Range Glucose (POC) 130 (H) 65 - 100 mg/dL GLUCOSE, POC Collection Time: 09/20/18 11:45 AM  
Result Value Ref Range Glucose (POC) 143 (H) 65 - 100 mg/dL PT Initial  PT Most Recent Amount of Assistance: 2 (Maximal assistance) (09/10/18 1253) 1 (Dependent/total assistance) (assist x 2 for stability in standing, w/c follow) (09/18/18 1300) Distance (ft): 0 Feet (ft) (09/06/18 1600) 5 Feet (ft) (x2) (09/19/18 1000) Assistive Device: Walker, rolling;Gait belt (09/06/18 1600) Gait belt;Walker, rolling (09/19/18 1000) OT Initial OT Most Recent ST Initial ST Most Recent Active Problems: 
  Sepsis ([address] (8/15/2018) Debility (9/6/2018) Total knee replacement status, left (9/6/2018) Condition on discharge :  good Rehabilitation  potential : Good Goals in Rehab : Patient to reach maximal rehabilitation potential in functional mobility,ambulation and ADL/ self care skills ; to improve strength and endurance Expected Length Of Stay : Depending on pace of progress in mobility and self care in PT and OT ,therapies Discharge Instructions: 
MELANI (acute kidney injury)  )/ CKD (chronic kidney disease) stage 3, GFR 30-59 ml/min (5/8/2018) 
- monitor renal funcion. - encourage po hydration. ivf as needed. - Renal function wnl 9/17. Continue to hold Cozaar, lasix as BP controlled and no s/s of CHF. .  
  
   
S/P left total knee arthroplasty (8/3/2018) - monitor wound for late infection. Closed. - focus on strength, ROM. PT evaluate for CPM.  
- jerald hold on CPM until patient's tone better evaluated,.  
- decreased L TKA ROM, extension not all due to poor knee rom. Appears spasticity now evident affecting PROM. - continue to work on ROM, strengthening, extension. Both knees difficult to passively extend due to flexor tone. CPM not safe due to tone, possible muscle injury.   
  
Dystonia/ spasticity - abnormal tone , which appears velocity dependent. Patient unabel to explain clearly but has been seen by neurologist at [address] - Recent MRI 1/2018 due to vertigo. Reports Cerebral and cerebellar volume loss with ex vacuo dilatation of the ventricular system.  Several foci of signal abnormality are present within the periventricular and subcortical white matter, likely sequela of chronic microvascular ischemia.  No mass effect, mass lesion, acute hemorrhage, or hydrocephalus.    
- patient agreeable to start meds for spasticity. family suspects this is not new. Start baclofen 5mg tid. Increase as tolerated. 9/17 - MRI today. Continues to be somnolent.  appears possibly from new baclofen administration, intolerant to CNS depressants. Will discontinue baclofen as no effect/ benefit  . 9/19 - MRI -\"Mild diffuse atrophy and chronic change in the white matter.  No evidence of acute intracranial abnormality. \"  
- chronic global atrophy, likely attributes to spasticity, tone.  
  
  
Sepsis -   Treated. No s/s of active infection.  
   
Bipolar disorder  - continue PTA medications. Depakote, wellbutrin. - depakote level 9/13 ; 54, wnl. Has been on for many years. Will continue.  
   
Post op acute blood loss anemia - monitor - 9/7 - hgb 11. -> 10.7 (9/13)-> 11.6(9/17) improved. 
   
Spondylolisthesis of lumbar region - no new radicular symptoms.  
- has had back issues in past, follwed by neurosurgery at [facility].   
   
Pneumonia prophylaxis- Insentive spirometer every hour while awake 
   
DVT risk / DVT Prophylaxis-  Mobilization as tolerated. Intermittent pneumatic compression devices when in bed Thigh-high or knee-high thromboembolic deterrent hose when out of bed. Patient has been on heparin sq q12h at [facility]. - resume per facility MD.  
   
Pain Control: stable, mild-to-moderate joint symptoms intermittently, reasonably well controlled by PRN meds. Will require regular pain assessment and comprenhensive pain management. - Takes 1 norco prn daily for knee pain.  
   
Wound Care: Monitor wound status daily per staff and physician. At risk for failure. Will require 24/7 rehab nursing. Keep wound clean and dry - L TKA wound closed. No s/s of infection. - healed well PROM improved. Spasticity/ tone limits passive motion.  
   
Hypertension/ hypotension -  
- cozaar, lasix held due to MELANI. Renal function improved. BP good. - started on norvasc 5. continue norvasc.   
   
Diabetes mellitus type 2- Uncontrolled. poor glycemic control. Will require daily, close FSG monitoring and medication adjustment to optimize glycemic control in setting of acute illness and hospitalization.  Pt was on glucotrol SR 10mg.  
- resumed home dose [id number]. Monitor response. - continue SSI.  
   
Urinary retention/ neurogenic bladder -- patient has history of \" neurogenic bladder. Has catheterized once in the past month; urinating more frequently own his own at baseline 
 - will observe pattern.  schedule voids q6-8 hrs. Check post-void residual every shift; In and Out catheterize if post-void residual is more than 400 cc. 
- on flomax salmon for now. Patient reports he did have neurogenic bladder , was dependent on salmon, self cath for long period, but eventually was able to void. - Salmon replaced due to poor tolerance to CIC, pain. continue  
- will discuss with patient possibly  voiding trial 1 more time prior to d/c, however this jerald be risky as patient has had 2x urosepsis , readmission without salmon, likely due to retention. - patient continued on Salmon due to recent failures of voiding trials.  
   
bowel program - start abbie colace 
   
GERD - resume PPI.   
   
Follow up with Danny Marks, Dr. Logan Escobar per instruciton per instructions. [facility] - [phone] Follow up with Urology Dr. Henrietta Barron MD per instruction Follow up with PMD per instruction. Follow up with Dr. Leelee Benitez MD in 4 weeks. Discharge Medications: 
 
 
amLODIPine (NORVASC) tablet 5 mg   Route: Take 1 Tab by mouth daily. - Oral  
  
   
  
acetaminophen (TYLENOL) tablet 650mg Ordered Dose:650mg Route: Oral Frequency: EVERY 8 HOURS  As needed for pain HYDROcodone-acetaminophen (NORCO) 7.5/325 mg tablet 1 Tab  Ordered Dose:7.5/325 mg Route: Oral Frequency: EVERY 6HOURS  as needed for pain 
 
  
aspirin delayed-release tablet 81 mg Ordered Dose: 325 mg Route: Oral Frequency: daily. Take with food or milk or full glass of water. senna-docusate (PERICOLACE) 8.6-50 mg per tablet 2 Tab Ordered Dose: 2 Tab Route: Oral Frequency: DAILY Current Discharge Medication List  
  
 CONTINUE these medications which have NOT CHANGED Details  
tamsulosin (FLOMAX) 0.4 mg capsule Take 1 Cap by mouth nightly buPROPion SR (WELLBUTRIN SR) 150 mg SR tablet Take 150 mg by mouth two (2) times a day. divalproex ER (DEPAKOTE ER) 500 mg ER tablet Take 1,000 mg by mouth two (2) times a day. simvastatin (ZOCOR) 40 mg tablet Take 40 mg by mouth nightly. pregabalin (LYRICA) 75 mg capsule Take 75 mg by mouth two (2) times a day. HOLD  
  
latanoprost (XALATAN) 0.005 % ophthalmic solution Administer 1 Drop to both eyes nightly. glipiZIDE SR (GLUCOTROL XL) 10 mg CR tablet Take 10 mg by mouth daily. Indications: type 2 diabetes mellitus  
  
calcium polycarbophil (FIBERCON) 625 mg tablet Take 625 mg by mouth daily. Discharge time: 35 minutes. Signed By: Mandi Larios MD   
 September 20, 2018

## 2018-09-20 NOTE — PROGRESS NOTES
Pt has difficulty getting comfortable in the bed and needs complete assistance to reposition himself. Pt has had intermittent confusion this shift and was reoriented to time and situation. Pain medication given x 2 for knee and neck discomfort. Ramey catheter draining dark yellow. Pt given Remeron last night to help sleep and pt is resting comfortably at this time. Rounds completed and needs met at this time.

## 2018-09-20 NOTE — PROGRESS NOTES
Received order to discharge patient to [facility]. Verbal report called to [place] at [phone]. Pt has no IV, and is to be discharged with salmon catheter. Pt and personal belongings to be transported by ambulance at 4:30.

## 2018-09-20 NOTE — PROGRESS NOTES
Problem: Rec Therapy Inpatient Rehab Goal: *Therapy Goal (Edit Goal, Insert Text) LTG:  Patient will recognize leisure tasks/activities appropriate for his condition/status within 2 weeks. D/C goal 9/20/18. Recreational therapy did not work with patient during his stay here. Goal: *Therapy Goal (Edit Goal, Insert Text) STG:  Patient will complete leisure tasks to increase awareness of outlets/skills with supervision within 1 week. LTG:  Patient will complete leisure tasks to increase awareness of outlets/skills with modified independence within 2 weeks. D/C goal 9/20/18. Recreational therapy did not work with patient during his stay here. Goal: Patient/Family Education LTG:  Patient will complete education session regarding his diagnosis to increase awareness/knowledge of prevention, warning signs, risk factors, and resources to modified independence within 2 weeks. D/C goal 9/20/18. Recreational therapy did not work with patient during his stay here.

## 2018-09-20 NOTE — PROGRESS NOTES
Pt D/C summary completed on 9/20/18. Recreational therapy did not work with patient during his stay here. Please see for specifics in [facility] [address] Patient expected to be d/c'd to SNF on this date.    
 
Brigid Ruvalcaba, CTRS

## 2018-09-21 NOTE — PROGRESS NOTES
Problem: Falls - Risk of 
Goal: *Absence of Falls Document Candido Lopez Fall Risk and appropriate interventions in the flowsheet. Outcome: Resolved/Met Date Met: 09/21/18 Fall Risk Interventions: 
Mobility Interventions: Utilize walker, cane, or other assistive device Mentation Interventions: Door open when patient unattended Medication Interventions: Patient to call before getting OOB Elimination Interventions: Patient to call for help with toileting needs Problem: Pressure Injury - Risk of 
Goal: *Prevention of pressure injury Document Kenny Scale and appropriate interventions in the flowsheet. Outcome: Resolved/Met Date Met: 09/21/18 Pressure Injury Interventions: 
Sensory Interventions: Assess changes in LOC Moisture Interventions: Check for incontinence Q2 hours and as needed Activity Interventions: Increase time out of bed Mobility Interventions: Pressure redistribution bed/mattress (bed type) Nutrition Interventions: Document food/fluid/supplement intake Friction and Shear Interventions: Apply protective barrier, creams and emollients

## 2018-11-27 NOTE — PROGRESS NOTES
Patient arrived  intubated with a number 7.0 ET Tube. Patient was intubated by EMS. Breath sounds are clear and diminished. Patient is Negative for subcutaneous air and chest excursion is symmetric. Trachea is midline. Patient is also Negative for cyanosis and is Negative for pitting edema. Patient placed on ventilator on documented settings. All alarms are set and audible. Resuscitation bag is at the head of the bed. Ventilator Settings Mode FIO2 Rate Tidal Volume Pressure PEEP I:E Ratio PRVC  50 %(decreased post ABG)   15 500 ml     8 cm H20  1:3.3 Peak airway pressure: 16 cm H2O Minute ventilation: 9.1 l/min ABG: No results for input(s): PH, PCO2, PO2, HCO3 in the last 72 hours.

## 2018-11-27 NOTE — ED PROVIDER NOTES
71-year-old male with history of diabetes, urinary retention with chronic indwelling Ramey and recurrent UTIs, CKD, CAD, bedbound status presents via EMS from nursing home facility status post cardiac arrest.  Patient was really found by staff without a pulse. Patient last seen at baseline mental status at 2:30 PM.  Patient down for unknown period of time. According to EMS staff, patient was found to be in asystole initially. ACLS protocol followed according to EMS. Patient given 5+ rounds of epinephrine. Patient intubated in field. According to EMS patient with ROSC in route. Patient with reported episode of V. Tach w/o pulses and route and was shocked x1 w/ ROSC. EMS stated that family was present at nursing home facility. Patient's medical power of  was present at nursing home facility and had paperwork proving this. Patient's medical power of  stated that she did not walk patient to have any further compressions and that he indicated on several occasions that he wanted to be DNR. Patient's daughter states that even though with paperwork indicating this, EMS continued giving compressions and further intubating the patient. On patient arrival to ED, he is hypotensive w/ BP 70s/40s & a heart rate in the 70s-80s. The history is provided by the EMS personnel. The history is limited by the condition of the patient. No  was used. Cardiac arrest   
This is a new problem. The current episode started less than 1 hour ago (~45 min to 1 hr). Past Medical History:  
Diagnosis Date  Arthritis  BMI 30.0-30.9,adult  CAD (coronary artery disease)   
 mild to moderate apical reversible ischemia per cardio note dated 07/12/10  Chronic kidney disease   
 no nephrologist   
 Chronic pain   
 neck, lower back  Depression  Diabetes ([facility] [address] type 2; oral and insulin meds; 1-2 times per day; average 104; A1C=8.1 on 06/13/18  Enlarged prostate with urinary obstruction  Former smoker  GERD (gastroesophageal reflux disease)  High cholesterol  Hypertension   
 on med for control  Nausea & vomiting 2018  Neurogenic bladder  Other ill-defined conditions(799.89)  Overflow incontinence  Sciatica   
 recently took medrol dose pack around 18  Self-catheterizes urinary bladder   
 has catheterized once in the past month; urinating more frequently own his own now  (18)  Skin cancer  Sleep apnea   
 bi-pap; instructed to bring [facility]  Spondylolisthesis of lumbar region  Thromboembolus [facility])  DVT left leg; caused from a falling accident that damaged left leg  Urine retention  Varicose veins of both lower extremities with complications Past Surgical History:  
Procedure Laterality Date [address]  HX SHOULDER ARTHROSCOPY Left   HX UROLOGICAL  2017 CYSTOSCOPY, TRANSURETHRAL RESECTION OF PROSTATE Family History:  
Problem Relation Age of Onset  No Known Problems Mother  Heart Attack Father  Cancer Sister  Parkinson's Disease Brother Social History Socioeconomic History  Marital status:  Spouse name: Not on file  Number of children: Not on file  Years of education: Not on file  Highest education level: Not on file Social Needs  Financial resource strain: Not on file  Food insecurity - worry: Not on file  Food insecurity - inability: Not on file  Transportation needs - medical: Not on file  Transportation needs - non-medical: Not on file Occupational History  Not on file Tobacco Use  Smoking status: Former Smoker Packs/day: 1.00 Years: 7.00 Pack years: 7.00 Last attempt to quit: 1971 Years since quittin.11  Smokeless tobacco: Never Used Substance and Sexual Activity  Alcohol use: Yes Comment: occasionally  Drug use: No  
 Sexual activity: Not on file Other Topics Concern  Not on file Social History Narrative  Not on file ALLERGIES: Oxycodone and Tramadol Review of Systems Unable to perform ROS: Intubated Vitals:  
 11/27/18 1651 11/27/18 1656 11/27/18 1712 11/27/18 1714 BP:   (!) 64/37 Pulse:  71  80 Resp:  23  21 SpO2:  100%  100% Weight: 90.3 kg (199 lb) Height: 6' (1.829 m) Physical Exam  
Constitutional: Intubated. Unresponsive. Ill-appearing. HENT:  
ETT in place. Eyes:  
Pupils 4 mm and non-reactive bilaterally. Cardiovascular: Normal rate. Pulmonary/Chest:  
ETT in place. CTAB. Significant pectus excavatum present. Abdominal: Soft. Bowel sounds are normal.  
Musculoskeletal:  
IO present to proximal L humerus. Neurological:  
Unresponsive. Intubated. Skin: Skin is warm and dry. No rash. Psychiatric: He has a normal mood and affect. Nursing note and vitals reviewed. MDM Number of Diagnoses or Management Options Cardiac arrest [facility]): new and requires workup Diagnosis management comments: On arrival to ED, IV fluid hydration started. Patient received 1 L normal saline IV fluid bolus. Additionally patient started on dopamine drip. Glucose found to be 30. Patient given amp D50. Also patient given amp of sodium bicarbonate. Upon arrival of patient's family including his medical power of  and they indicated that they wanted to remove endotracheal tube at this time. States that patient is always indicated that he would not want his life prolonged by intubation. Additionally family state that they would like to stop all drips and medications at this time. Informed patient's medical power of  that by removing patient from the ventilator and stopping drips the patient would die. Medical power of  understands and has capacity to make medical decisions. Medical power of  has all documentation with her and was copied into the patient's chart. At 6:48 pm, bedside ultrasound with no cardiac activity. No evidence of pericardial effusion or tamponade. Asystole on monitor. Time of death call at 6:48 pm.  
 
 
 
  
Amount and/or Complexity of Data Reviewed Clinical lab tests: ordered and reviewed Tests in the radiology section of CPT®: ordered and reviewed Tests in the medicine section of CPT®: ordered and reviewed Review and summarize past medical records: yes Independent visualization of images, tracings, or specimens: yes Risk of Complications, Morbidity, and/or Mortality Presenting problems: high Diagnostic procedures: high Management options: high Critical Care Total time providing critical care: 30-74 minutes ED Course as of Nov 28 1053 Tue Nov 27, 2018  
1725 Family presented at bedside. Spoke with medical power of  of the patient who is the patient's daughter as well as Be Garciar also power of . They both state the patient wished to remain DNR. They have DNR paperwork with him at this time as well as paperwork indicating that the patient is unable to care for himself that the patient's medical power of  came make further decisions about his care  [DF] 1730 Patient medical power of  states that she would like to withdraw all care at this time. States she would like the patient to be extubated. [DF] [address] [facility] with no cardiac activity. No pulse present. . Family present at patient bedside. Asystole on monitor. [DF] ED Course User Index 
[DF] Shari Schneider MD  
 
 
Procedures 
 
 
=================================================================== This patient is critically ill and there is a high probability of of imminent or life threatening deterioration in the patient's condition without immediate management. The nature of the patient's clinical problem is: Cardiac arrest 
 
I have spent 45 minutes in direct patient care, documentation, review of labs/xrays/old records, discussion with Family, Staff, Colleague, Nursing . The time involved in the performance of separately reportable procedures was not counted toward critical care time. Skip Avila MD; 11/28/2018 @10:54 AM 
=================================================================== Results Include: 
 
Recent Results (from the past 24 hour(s)) CBC WITH AUTOMATED DIFF Collection Time: 11/27/18  4:40 PM  
Result Value Ref Range WBC 19.2 (H) 4.3 - 11.1 K/uL  
 RBC 3.37 (L) 4.23 - 5.6 M/uL HGB 9.9 (L) 13.6 - 17.2 g/dL HCT 34.3 (L) 41.1 - 50.3 % .8 (H) 79.6 - 97.8 FL  
 MCH 29.4 26.1 - 32.9 PG  
 MCHC 28.9 (L) 31.4 - 35.0 g/dL  
 RDW 17.0 % PLATELET 575 514 - 776 K/uL MPV 9.5 9.4 - 12.3 FL ABSOLUTE NRBC 0.09 0.0 - 0.2 K/uL NEUTROPHILS 62 47 - 75 % LYMPHOCYTES 31 16 - 44 % MONOCYTES 4 3 - 9 % BASOPHILS 1 0 - 2 % METAMYELOCYTES 1 % PROMYELOCYTES 1 % NRBC 1.0  WBC  
 ABS. NEUTROPHILS 12.2 (H) 1.7 - 8.2 K/UL  
 ABS. LYMPHOCYTES 6.0 (H) 0.5 - 4.6 K/UL  
 ABS. MONOCYTES 0.8 0.1 - 1.3 K/UL  
 ABS. BASOPHILS 0.2 0.0 - 0.2 K/UL  
 RBC COMMENTS SLIGHT 
ANISOCYTOSIS + POIKILOCYTOSIS 
    
 WBC COMMENTS Result Confirmed By Smear PLATELET COMMENTS ADEQUATE    
 DF AUTOMATED METABOLIC PANEL, COMPREHENSIVE Collection Time: 11/27/18  4:40 PM  
Result Value Ref Range Sodium 135 (L) 136 - 145 mmol/L Potassium 6.7 (HH) 3.5 - 5.1 mmol/L Chloride 99 98 - 107 mmol/L  
 CO2 14 (L) 21 - 32 mmol/L Anion gap 22 (H) 7 - 16 mmol/L Glucose 25 (LL) 65 - 100 mg/dL BUN 26 (H) 8 - 23 MG/DL Creatinine 1.55 (H) 0.8 - 1.5 MG/DL  
 GFR est AA 57 (L) >60 ml/min/1.73m2 GFR est non-AA 47 (L) >60 ml/min/1.73m2 Calcium 9.3 8.3 - 10.4 MG/DL  Bilirubin, total 0.9 0.2 - 1.1 MG/DL  
 ALT (SGPT) 1,512 (H) 12 - 65 U/L  
 AST (SGOT) 5,585 (H) 15 - 37 U/L Alk. phosphatase 214 (H) 50 - 136 U/L Protein, total 6.7 6.3 - 8.2 g/dL Albumin 1.7 (L) 3.2 - 4.6 g/dL Globulin 5.0 (H) 2.3 - 3.5 g/dL A-G Ratio 0.3 (L) 1.2 - 3.5 MAGNESIUM Collection Time: 11/27/18  4:40 PM  
Result Value Ref Range Magnesium 2.5 (H) 1.8 - 2.4 mg/dL POC TROPONIN-I Collection Time: 11/27/18  4:45 PM  
Result Value Ref Range Troponin-I (POC) 0.05 0.02 - 0.05 ng/ml GLUCOSE, POC Collection Time: 11/27/18  4:45 PM  
Result Value Ref Range Glucose (POC) 30 (LL) 65 - 100 mg/dL POC LACTIC ACID Collection Time: 11/27/18  4:59 PM  
Result Value Ref Range Lactic Acid (POC) 15.20 (H) 0.5 - 1.9 mmol/L  
POC CG8I Collection Time: 11/27/18  5:08 PM  
Result Value Ref Range Device: VENT    
 FIO2 (POC) 100 % pH (POC) 7.141 (LL) 7.35 - 7.45    
 pCO2 (POC) 37.3 35 - 45 MMHG  
 pO2 (POC) 494 (H) 75 - 100 MMHG  
 HCO3 (POC) 12.7 (L) 22 - 26 MMOL/L  
 sO2 (POC) 100 (H) 95 - 98 % Base deficit (POC) 15 mmol/L Mode Pressure regulated volume control Tidal volume 500 ml Set Rate 15 bpm  
 PEEP/CPAP (POC) 8 cmH2O Allens test (POC) YES Inspiratory Time 0.9 sec Site LEFT RADIAL Patient temp. 98.6 Specimen type (POC) ARTERIAL Sodium,  (L) 136 - 145 MMOL/L Potassium, POC 6.3 (H) 3.5 - 5.1 MMOL/L Glucose,  (H) 65 - 100 MG/DL Calcium, ionized (POC) 1.06 (L) 1.12 - 1.32 mmol/L Performed by Avani Castellon   
GLUCOSE, POC Collection Time: 11/27/18  5:17 PM  
Result Value Ref Range Glucose (POC) 71 65 - 100 mg/dL EKG, 12 LEAD, INITIAL Collection Time: 11/27/18  5:26 PM  
Result Value Ref Range Ventricular Rate 77 BPM  
 Atrial Rate 77 BPM  
 P-R Interval 192 ms QRS Duration 152 ms Q-T Interval 460 ms QTC Calculation (Bezet) 520 ms Calculated P Axis 75 degrees Calculated R Axis -89 degrees Calculated T Axis 74 degrees Diagnosis    
  !! AGE AND GENDER SPECIFIC ECG ANALYSIS !! Normal sinus rhythm Left anterior fascicular block Right bundle branch block Abnormal ECG When compared with ECG of 27-NOV-2018 16:45, 
Previous ECG has undetermined rhythm, needs review Right bundle branch block has replaced Non-specific intra-ventricular  
conduction block Confirmed by SALEEM HERNANDEZ (), Anay Luke ([id number]) on 11/28/2018 7:52:54 AM 
  
 
Damon Arcola Carrel Bella Pastel., MD; 11/28/2018 @7:49 PM Voice dictation software was used during the making of this note. This software is not perfect and grammatical and other typographical errors may be present.   This note has not been proofread for errors. 
===================================================================

## 2018-11-27 NOTE — ED TRIAGE NOTES
Arrived via EMS from nursing facility d/t cardiac arrest, patient found by staff. Compressions given and shocked once. Tubed by EMS. Left upper arm IO placed. RT in room. Ramey catheter in place from facility. HR 65, BP 71/39

## 2018-11-27 NOTE — ED NOTES
Dr. Bill Garza spoke with family and per MD, family requested for all treatment to be discontinued. Patient was discontinued from drops and vent. Cleaned and prepped for family to visit at bedside.

## 2018-11-28 LAB
ATRIAL RATE: 77 BPM
CALCULATED P AXIS, ECG09: 75 DEGREES
CALCULATED R AXIS, ECG10: -89 DEGREES
CALCULATED T AXIS, ECG11: 74 DEGREES
DIAGNOSIS, 93000: NORMAL
P-R INTERVAL, ECG05: 192 MS
Q-T INTERVAL, ECG07: 460 MS
QRS DURATION, ECG06: 152 MS
QTC CALCULATION (BEZET), ECG08: 520 MS
VENTRICULAR RATE, ECG03: 77 BPM

## 2019-10-31 NOTE — ED NOTES
Pt to xray in stretcher with xray technician
TRANSFER - OUT REPORT: 
 
Verbal report given to Jackson Quinones RN(name) on Isha Urena  being transferred to 3rd floor medical/surgical(unit) for routine progression of care Report consisted of patients Situation, Background, Assessment and  
Recommendations(SBAR). Information from the following report(s) ED Summary was reviewed with the receiving nurse. Lines:  
Peripheral IV 08/30/18 Left Antecubital (Active) Site Assessment Clean, dry, & intact 8/30/2018 12:44 PM  
Phlebitis Assessment 0 8/30/2018 12:44 PM  
Infiltration Assessment 0 8/30/2018 12:44 PM  
Dressing Status Clean, dry, & intact 8/30/2018 12:44 PM  
   
Peripheral IV 08/30/18 Right Antecubital (Active) Site Assessment Clean, dry, & intact 8/30/2018  1:34 PM  
Phlebitis Assessment 0 8/30/2018  1:34 PM  
Infiltration Assessment 0 8/30/2018  1:34 PM  
Dressing Status Clean, dry, & intact 8/30/2018  1:34 PM  
  
 
Opportunity for questions and clarification was provided. Patient transported with: 
 O2 @ 3 liters, IV fluids, pt chart, pt belongings
This RN to drain salmon prior to pt leaving for assigned admission room - I&O's charted at this time
[] : No

## 2023-03-31 NOTE — PROGRESS NOTES
Shantel Benitez at [facility][1] said pt has been approved to come to room 108B and the report line is [phone]. TERRELL spoke with EUNICE Cooper, at [employer] ([address]) at [phone] who said they will cover the cost of the ambulance ride. Transport scheduled for 4:30 p.m. and discharge packet provided to the RN. Claudell Cornet, daughter, RN, and Shantel Benitez at [facility], notified. Care Management Interventions PCP Verified by CM: Yes Mode of Transport at Discharge: Other (see comment) (Family) Transition of Care Consult (CM Consult): Discharge Planning Physical Therapy Consult: Yes Occupational Therapy Consult: Yes Speech Therapy Consult: Yes Current Support Network: Own Home, Family Lives [place] Confirm Follow Up Transport: Family Plan discussed with Pt/Family/Caregiver: Yes Freedom of Choice Offered: Yes Discharge Location Discharge Placement: Skilled nursing facility [facility][1]) Alert-The patient is alert, awake and responds to voice. The patient is oriented to time, place, and person. The triage nurse is able to obtain subjective information.

## 2023-05-28 NOTE — PROGRESS NOTES
Problem: Falls - Risk of 
Goal: *Absence of Falls Document Sudhakar Sutherland Fall Risk and appropriate interventions in the flowsheet. Outcome: Progressing Towards Goal 
Fall Risk Interventions: 
Mobility Interventions: Bed/chair exit alarm Mentation Interventions: Bed/chair exit alarm Medication Interventions: Evaluate medications/consider consulting pharmacy Elimination Interventions: Bed/chair exit alarm The patient is a 56y Male complaining of abdominal pain.

## (undated) DEVICE — FAN SPRAY KIT: Brand: PULSAVAC®

## (undated) DEVICE — TRAY CATH 16F DRN BG LTX -- CONVERT TO ITEM 363158

## (undated) DEVICE — CURETTE BNE CEM 10IN DISP --

## (undated) DEVICE — MCLASS OSCILLATING SAW BLADE 19 X 1.27 (0.050") X 90 MM: Brand: MCLASS

## (undated) DEVICE — 3000CC GUARDIAN II: Brand: GUARDIAN

## (undated) DEVICE — SOLUTION IV 500ML 0.9% SOD CHL FLX CONT

## (undated) DEVICE — DRAPE SHT 3 QTR PROXIMA 53X77 --

## (undated) DEVICE — DRAPE,U/SHT,SPLIT,FILM,60X84,STERILE: Brand: MEDLINE

## (undated) DEVICE — TRAY PREP DRY W/ PREM GLV 2 APPL 6 SPNG 2 UNDPD 1 OVERWRAP

## (undated) DEVICE — BIPOLAR SEALER 23-112-1 AQM 6.0: Brand: AQUAMANTYS ®

## (undated) DEVICE — SUTURE STRATAFIX SPRL SZ 1 L14IN ABSRB VLT L48CM CTX 1/2 SXPD2B405

## (undated) DEVICE — NEEDLE HYPO 18GA L1.5IN PNK S STL HUB POLYPR SHLD REG BVL

## (undated) DEVICE — PACK TKR SZ 8 FEM PREP TRL POST STBL INTUITION SOLO ATTUNE

## (undated) DEVICE — MEDI-VAC NON-CONDUCTIVE SUCTION TUBING: Brand: CARDINAL HEALTH

## (undated) DEVICE — 3M™ IOBAN™ 2 ANTIMICROBIAL INCISE DRAPE 6650EZ: Brand: IOBAN™ 2

## (undated) DEVICE — 2000CC GUARDIAN II: Brand: GUARDIAN

## (undated) DEVICE — SOLUTION IV 1000ML 0.9% SOD CHL

## (undated) DEVICE — Z DISCONTINUED USE 2744636  DRESSING AQUACEL 14 IN ALG W3.5XL14IN POLYUR FLM CVR W/ HYDRCOLL

## (undated) DEVICE — SKIN STAPLER: Brand: SIGNET

## (undated) DEVICE — BUTTON SWITCH PENCIL BLADE ELECTRODE, HOLSTER: Brand: EDGE

## (undated) DEVICE — Device: Brand: POWER-FLO®

## (undated) DEVICE — DRAPE,TOP,102X53,STERILE: Brand: MEDLINE

## (undated) DEVICE — SUTURE VCRL SZ 2-0 L27IN ABSRB UD L36MM CP-1 1/2 CIR REV J266H

## (undated) DEVICE — SYR LR LCK 1ML GRAD NSAF 30ML --

## (undated) DEVICE — SYR 50ML LR LCK 1ML GRAD NSAF --

## (undated) DEVICE — T4 HOOD

## (undated) DEVICE — BLADE SAW PAT RMR PILT H 51MM --

## (undated) DEVICE — SOLUTION IRRIG 3000ML 0.9% SOD CHL FLX CONT 0797208] ICU MEDICAL INC]

## (undated) DEVICE — MEDI-VAC YANKAUER SUCTION HANDLE W/BULBOUS TIP: Brand: CARDINAL HEALTH

## (undated) DEVICE — (D)PREP SKN CHLRAPRP APPL 26ML -- CONVERT TO ITEM 371833

## (undated) DEVICE — PACK PROCEDURE SURG TOT KNEE

## (undated) DEVICE — BLADE SAW W12.5XL73.5MM THK0.8MM CUT THK1MM RECIP FOR L BNE

## (undated) DEVICE — BANDAGE COMPR SELF ADH 5 YDX4 IN TAN STRL PREMIERPRO LF

## (undated) DEVICE — GOWN,REINF,POLY,ECL,PP SLV,XL: Brand: MEDLINE

## (undated) DEVICE — REM POLYHESIVE ADULT PATIENT RETURN ELECTRODE: Brand: VALLEYLAB

## (undated) DEVICE — X-RAY SPONGES,12 PLY: Brand: DERMACEA

## (undated) DEVICE — SUTURE VCRL SZ 1 L27IN ABSRB UD L36MM CP-1 1/2 CIR REV CUT J268H